# Patient Record
Sex: FEMALE | Race: WHITE | NOT HISPANIC OR LATINO | Employment: FULL TIME | URBAN - METROPOLITAN AREA
[De-identification: names, ages, dates, MRNs, and addresses within clinical notes are randomized per-mention and may not be internally consistent; named-entity substitution may affect disease eponyms.]

---

## 2017-01-05 RX ORDER — OMEPRAZOLE 40 MG/1
CAPSULE, DELAYED RELEASE ORAL
Qty: 90 CAPSULE | Refills: 1 | Status: SHIPPED | OUTPATIENT
Start: 2017-01-05 | End: 2017-09-27 | Stop reason: SDUPTHER

## 2017-02-13 RX ORDER — ALPRAZOLAM 0.5 MG/1
TABLET ORAL
Qty: 60 TABLET | Refills: 4 | OUTPATIENT
Start: 2017-02-13 | End: 2017-03-10

## 2017-03-10 ENCOUNTER — OFFICE VISIT (OUTPATIENT)
Dept: INTERNAL MEDICINE | Facility: CLINIC | Age: 52
End: 2017-03-10

## 2017-03-10 VITALS
WEIGHT: 129 LBS | OXYGEN SATURATION: 98 % | SYSTOLIC BLOOD PRESSURE: 128 MMHG | BODY MASS INDEX: 25.32 KG/M2 | DIASTOLIC BLOOD PRESSURE: 80 MMHG | HEIGHT: 60 IN | HEART RATE: 68 BPM

## 2017-03-10 DIAGNOSIS — N32.81 OAB (OVERACTIVE BLADDER): Primary | ICD-10-CM

## 2017-03-10 DIAGNOSIS — R15.9 FECAL INCONTINENCE: ICD-10-CM

## 2017-03-10 PROCEDURE — 99213 OFFICE O/P EST LOW 20 MIN: CPT | Performed by: NURSE PRACTITIONER

## 2017-03-10 NOTE — PROGRESS NOTES
"Subjective  Med Refill (overactive bladder )      Alcira Phelan is a 51 y.o. female.   Allergies   Allergen Reactions   • Penicillins      History of Present Illness      Pt urinating frequently and can't hold it when she has to go, was on enablex in the past but couldn't tell if working , would like to try something else , has worsened over past 6 mos but started in 2007  Over past few months has noticed she can not always make it to the bathroom when having bm, not diarrhea   The following portions of the patient's history were reviewed and updated as appropriate: allergies, current medications, past family history, past medical history, past social history, past surgical history and problem list.    Review of Systems   Gastrointestinal:        Fecal incontinence   Genitourinary:        OAB   All other systems reviewed and are negative.      Objective   Physical Exam   Constitutional: She is oriented to person, place, and time. She appears well-developed.   HENT:   Head: Normocephalic.   Eyes: Conjunctivae are normal.   Pulmonary/Chest: Effort normal and breath sounds normal.   Neurological: She is alert and oriented to person, place, and time.   Skin: Skin is warm and dry.   Psychiatric: She has a normal mood and affect. Her behavior is normal. Judgment and thought content normal.   Nursing note and vitals reviewed.    Visit Vitals   • /80   • Pulse 68   • Ht 60\" (152.4 cm)   • Wt 129 lb (58.5 kg)   • SpO2 98%   • BMI 25.19 kg/m2       Assessment/Plan     Problem List Items Addressed This Visit        Musculoskeletal and Integument    OAB (overactive bladder) - Primary     Myrbetriq 25 mg and 50 mg samples to try to see if this helps            Other Visit Diagnoses     Fecal incontinence        Relevant Orders    Ambulatory Referral For Screening Colonoscopy        She will let me know if Mybetriq works       "

## 2017-03-30 ENCOUNTER — TELEPHONE (OUTPATIENT)
Dept: INTERNAL MEDICINE | Facility: CLINIC | Age: 52
End: 2017-03-30

## 2017-03-30 DIAGNOSIS — N32.81 OAB (OVERACTIVE BLADDER): Primary | ICD-10-CM

## 2017-03-30 NOTE — TELEPHONE ENCOUNTER
----- Message from Ivory Sr MA sent at 3/30/2017  2:05 PM EDT -----  Contact: PATIENT  Would you be able to fill this? Please advise.   ----- Message -----     From: Adelaide Barkley     Sent: 3/30/2017   2:00 PM       To: Ivory Sr MA    PATIENT IS CALLING NEEDING A SCRIPT FOR MYRBETRIQ 50 MG MEDICATION. CRISS GAVE HER SAMPLES TO TRY AND SHE IS NOW NEEDING A SCRIPT. [facility]. PATIENTS PHONE [phone]

## 2017-07-03 DIAGNOSIS — N32.81 OAB (OVERACTIVE BLADDER): ICD-10-CM

## 2017-07-03 RX ORDER — MIRABEGRON 50 MG/1
TABLET, FILM COATED, EXTENDED RELEASE ORAL
Qty: 30 TABLET | Refills: 2 | Status: SHIPPED | OUTPATIENT
Start: 2017-07-03 | End: 2018-04-18 | Stop reason: SDUPTHER

## 2017-08-19 DIAGNOSIS — K02.9 TOOTH CARIES: ICD-10-CM

## 2017-08-21 RX ORDER — DOXYCYCLINE HYCLATE 100 MG/1
CAPSULE ORAL
Qty: 20 CAPSULE | Refills: 0 | OUTPATIENT
Start: 2017-08-21

## 2017-08-25 RX ORDER — ALPRAZOLAM 0.5 MG/1
0.5 TABLET ORAL 2 TIMES DAILY PRN
Qty: 60 TABLET | Refills: 0 | OUTPATIENT
Start: 2017-08-25 | End: 2017-10-20 | Stop reason: SDUPTHER

## 2017-09-27 RX ORDER — OMEPRAZOLE 40 MG/1
CAPSULE, DELAYED RELEASE ORAL
Qty: 30 CAPSULE | Refills: 0 | Status: SHIPPED | OUTPATIENT
Start: 2017-09-27 | End: 2017-11-05 | Stop reason: SDUPTHER

## 2017-10-20 RX ORDER — ALPRAZOLAM 0.5 MG/1
0.5 TABLET ORAL 2 TIMES DAILY PRN
Qty: 60 TABLET | Refills: 0 | OUTPATIENT
Start: 2017-10-20 | End: 2017-12-20 | Stop reason: SDUPTHER

## 2017-10-26 ENCOUNTER — OFFICE VISIT (OUTPATIENT)
Dept: ONCOLOGY | Facility: CLINIC | Age: 52
End: 2017-10-26

## 2017-10-26 VITALS
RESPIRATION RATE: 16 BRPM | SYSTOLIC BLOOD PRESSURE: 138 MMHG | WEIGHT: 144 LBS | HEIGHT: 60 IN | DIASTOLIC BLOOD PRESSURE: 84 MMHG | BODY MASS INDEX: 28.27 KG/M2 | HEART RATE: 81 BPM | TEMPERATURE: 97.2 F

## 2017-10-26 DIAGNOSIS — C50.112 MALIGNANT NEOPLASM OF CENTRAL PORTION OF LEFT FEMALE BREAST, UNSPECIFIED ESTROGEN RECEPTOR STATUS (HCC): Primary | ICD-10-CM

## 2017-10-26 PROCEDURE — 99213 OFFICE O/P EST LOW 20 MIN: CPT | Performed by: INTERNAL MEDICINE

## 2017-10-26 NOTE — PROGRESS NOTES
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  Status post bilateral mastectomies and subsequent adjuvant therapy PROBLEM LIST   Patient Active Problem List   Diagnosis   • Malignant neoplasm of left female breast   • Depression   • Arthralgia of hand   • Tooth caries   • OAB (overactive bladder)       HISTORY OF PRESENT ILLNESS:   Yearly follow-up.  She is over 10 years out and she's doing well.  Working regularly.  No new neurologic bony pulmonary GI or  symptoms    Past Medical History, Past Surgical History, Social History, Family History have been reviewed and are without significant changes except as mentioned.      Medications:      Current Outpatient Prescriptions:   •  ALPRAZolam (XANAX) 0.5 MG tablet, Take 1 tablet by mouth 2 (Two) Times a Day As Needed for Anxiety., Disp: 60 tablet, Rfl: 0  •  Multiple Vitamins-Minerals (MULTI VITAMIN/MINERALS) tablet, Take  by mouth., Disp: , Rfl:   •  MYRBETRIQ 50 MG tablet sustained-release 24 hour 24 hr tablet, TAKE ONE TABLET BY MOUTH DAILY, Disp: 30 tablet, Rfl: 2  •  omeprazole (priLOSEC) 40 MG capsule, TAKE ONE CAPSULE BY MOUTH DAILY, Disp: 30 capsule, Rfl: 0  •  venlafaxine XR (EFFEXOR-XR) 150 MG 24 hr capsule, Take 1 capsule by mouth Daily., Disp: 30 capsule, Rfl: 2    ALLERGIES:    Allergies   Allergen Reactions   • Penicillins        ROS:  Review of Systems   Constitutional: Negative for activity change and appetite change.   HENT: Negative for mouth sores, sinus pressure and voice change.    Eyes: Negative for visual disturbance.   Respiratory: Negative for shortness of breath.    Cardiovascular: Negative for chest pain.   Gastrointestinal: Negative for abdominal pain and vomiting.   Genitourinary: Negative for dysuria.   Musculoskeletal: Negative for arthralgias and myalgias.   Skin: Negative for color change.   Neurological: Negative for dizziness, syncope and  "headaches.   Hematological: Negative for adenopathy.   Psychiatric/Behavioral: Negative for confusion, sleep disturbance and suicidal ideas. The patient is not nervous/anxious.            Objective:    /84 Comment: RUE  Pulse 81  Temp 97.2 °F (36.2 °C) (Temporal Artery )   Resp 16  Ht 60\" (152.4 cm)  Wt 144 lb (65.3 kg)  BMI 28.12 kg/m2    Physical Exam   Constitutional: She is oriented to person, place, and time. She appears well-developed and well-nourished. No distress.   Appears healthy youthful and vigorous.   HENT:   Head: Normocephalic.   Mouth/Throat: Oropharynx is clear and moist.   Eyes: Conjunctivae and EOM are normal. No scleral icterus.   Neck: Normal range of motion. Neck supple. No thyromegaly present.   Cardiovascular: Normal rate, regular rhythm and normal heart sounds.    No murmur heard.  Pulmonary/Chest: Effort normal and breath sounds normal. She has no wheezes. She has no rales.   Bilateral breast implants in place with excellent symmetry and cosmesis.  No worrisome mass present   Abdominal: Soft. Bowel sounds are normal. She exhibits no distension and no mass. There is no tenderness.   Musculoskeletal: She exhibits no edema or tenderness.   Lymphadenopathy:     She has no cervical adenopathy.   Neurological: She is alert and oriented to person, place, and time. She displays normal reflexes. No cranial nerve deficit.   Skin: Skin is warm and dry. No rash noted.   Psychiatric: She has a normal mood and affect. Judgment normal.   Vitals reviewed.             RECENT LABS:  Hematology WBC   Date Value Ref Range Status   09/06/2016 5.20 3.50 - 10.80 10*3/mm3 Final     Hemoglobin   Date Value Ref Range Status   09/06/2016 15.0 11.5 - 15.5 g/dL Final     Hematocrit   Date Value Ref Range Status   09/06/2016 44.0 34.5 - 44.0 % Final     MCV   Date Value Ref Range Status   09/06/2016 87.3 80.0 - 99.0 fL Final     RDW   Date Value Ref Range Status   09/06/2016 12.1 11.3 - 14.5 % Final     MPV "   Date Value Ref Range Status   09/06/2016 7.4 6.0 - 12.0 fL Final     Platelets   Date Value Ref Range Status   09/06/2016 212 150 - 450 10*3/mm3 Final     Neutrophils, Absolute   Date Value Ref Range Status   09/06/2016 3.30 1.50 - 8.30 10*3/mm3 Final     Lymphocytes, Absolute   Date Value Ref Range Status   09/06/2016 1.60 0.60 - 4.80 10*3/mm3 Final     Monocytes, Absolute   Date Value Ref Range Status   09/06/2016 0.30 0.00 - 1.00 10*3/mm3 Final     Eosinophils Absolute   Date Value Ref Range Status   12/14/2015 0.26 0.10 - 0.30 K/mcL Final     Basophils Absolute   Date Value Ref Range Status   12/14/2015 0.02 0.00 - 0.20 K/mcL Final       Glucose   Date Value Ref Range Status   09/06/2016 82 70 - 100 mg/dL Final     Sodium   Date Value Ref Range Status   09/06/2016 142 132 - 146 mmol/L Final     Potassium   Date Value Ref Range Status   09/06/2016 4.4 3.5 - 5.5 mmol/L Final     CO2   Date Value Ref Range Status   09/06/2016 32.0 (H) 20.0 - 31.0 mmol/L Final     Chloride   Date Value Ref Range Status   09/06/2016 105 99 - 109 mmol/L Final     Anion Gap   Date Value Ref Range Status   09/06/2016 5.0 3.0 - 11.0 mmol/L Final     Creatinine   Date Value Ref Range Status   09/06/2016 0.70 0.60 - 1.30 mg/dL Final     BUN   Date Value Ref Range Status   09/06/2016 16 9 - 23 mg/dL Final     BUN/Creatinine Ratio   Date Value Ref Range Status   09/06/2016 22.9 7.0 - 25.0 Final     Calcium   Date Value Ref Range Status   09/06/2016 9.6 8.7 - 10.4 mg/dL Final     eGFR Non  Amer   Date Value Ref Range Status   09/06/2016 88 >60 mL/min/1.73 Final     Alkaline Phosphatase   Date Value Ref Range Status   09/06/2016 116 (H) 25 - 100 U/L Final     Total Protein   Date Value Ref Range Status   09/06/2016 7.3 5.7 - 8.2 g/dL Final     ALT (SGPT)   Date Value Ref Range Status   09/06/2016 21 7 - 40 U/L Final     AST (SGOT)   Date Value Ref Range Status   09/06/2016 22 0 - 33 U/L Final     Total Bilirubin   Date Value Ref Range  Status   09/06/2016 0.2 (L) 0.3 - 1.2 mg/dL Final     Albumin   Date Value Ref Range Status   09/06/2016 4.40 3.20 - 4.80 g/dL Final     Globulin   Date Value Ref Range Status   09/06/2016 2.9 gm/dL Final     A/G Ratio   Date Value Ref Range Status   09/06/2016 1.5 g/dL Final          Perf Status: 0    Assessment/Plan    Diagnoses and all orders for this visit:    Malignant neoplasm of central portion of left female breast, unspecified estrogen receptor status      Patient is doing splendidly.  She has no evidence of recurrence.  I'll see her back in a year.      Tristian De La Torre MD , 10/26/2017    CC:

## 2017-11-05 RX ORDER — OMEPRAZOLE 40 MG/1
CAPSULE, DELAYED RELEASE ORAL
Qty: 30 CAPSULE | Refills: 5 | Status: SHIPPED | OUTPATIENT
Start: 2017-11-05 | End: 2018-05-22 | Stop reason: SDUPTHER

## 2017-12-20 RX ORDER — ALPRAZOLAM 0.5 MG/1
TABLET ORAL
Qty: 60 TABLET | Refills: 3 | OUTPATIENT
Start: 2017-12-20 | End: 2018-01-25

## 2017-12-26 NOTE — TELEPHONE ENCOUNTER
REFILL REQUEST RECEIVED FROM [facility] FOR VENLAFAXINE. LOV 3/10/17.  LAST LABS 10/26/17. NO F/U SCHEDULED. OK TO REFILL?

## 2017-12-27 RX ORDER — VENLAFAXINE HYDROCHLORIDE 150 MG/1
150 CAPSULE, EXTENDED RELEASE ORAL DAILY
Qty: 30 CAPSULE | Refills: 3 | Status: SHIPPED | OUTPATIENT
Start: 2017-12-27 | End: 2018-05-22 | Stop reason: SDUPTHER

## 2018-01-25 ENCOUNTER — OFFICE VISIT (OUTPATIENT)
Dept: INTERNAL MEDICINE | Facility: CLINIC | Age: 53
End: 2018-01-25

## 2018-01-25 VITALS
HEART RATE: 105 BPM | OXYGEN SATURATION: 100 % | DIASTOLIC BLOOD PRESSURE: 82 MMHG | BODY MASS INDEX: 27.68 KG/M2 | WEIGHT: 141 LBS | HEIGHT: 60 IN | SYSTOLIC BLOOD PRESSURE: 126 MMHG | TEMPERATURE: 98.2 F

## 2018-01-25 DIAGNOSIS — R68.89 FLU-LIKE SYMPTOMS: ICD-10-CM

## 2018-01-25 DIAGNOSIS — A08.4 VIRAL GASTROENTERITIS: Primary | ICD-10-CM

## 2018-01-25 LAB
EXPIRATION DATE: NORMAL
FLUAV AG NPH QL: NEGATIVE
FLUBV AG NPH QL: NEGATIVE
INTERNAL CONTROL: NORMAL
Lab: NORMAL

## 2018-01-25 PROCEDURE — 87804 INFLUENZA ASSAY W/OPTIC: CPT | Performed by: NURSE PRACTITIONER

## 2018-01-25 PROCEDURE — 99213 OFFICE O/P EST LOW 20 MIN: CPT | Performed by: NURSE PRACTITIONER

## 2018-01-25 RX ORDER — RANITIDINE 150 MG/1
150 CAPSULE ORAL EVERY EVENING
Qty: 30 CAPSULE | Refills: 1 | Status: SHIPPED | OUTPATIENT
Start: 2018-01-25 | End: 2018-10-17

## 2018-01-25 RX ORDER — ONDANSETRON 4 MG/1
4 TABLET, ORALLY DISINTEGRATING ORAL EVERY 8 HOURS PRN
Qty: 20 TABLET | Refills: 0 | Status: SHIPPED | OUTPATIENT
Start: 2018-01-25 | End: 2018-10-17

## 2018-02-05 ENCOUNTER — TELEPHONE (OUTPATIENT)
Dept: INTERNAL MEDICINE | Facility: CLINIC | Age: 53
End: 2018-02-05

## 2018-02-05 NOTE — TELEPHONE ENCOUNTER
PATIENT IS NEEDING TO BE ADVISED ABOUT RECENT ILLNESS AND WATCHING HER GRANDDAUGHTER WHO IS NOW ILL. SHE WAS SEEN AT THIS OFFICE BY MATTIE 10 DAYS AGO AND REPORTS SHE WAS DX WITH 'STOMACH FLU'. SHE STATES THAT BECAUSE OF CANCER 5 YEARS AGO, SHE HAS A COMPROMISED IMMUNE SYSTEM AND WANTS TO KNOW IF SHE SHOULD BABYSIT HER GRANDDAUGHTER.     [phone]

## 2018-02-05 NOTE — TELEPHONE ENCOUNTER
She should limit her exposure to anyone that is ill due to her decreased immune system.  I cannot tell her to hire a , but if she is worried then she should be very cautious.

## 2018-03-26 ENCOUNTER — OFFICE VISIT (OUTPATIENT)
Dept: INTERNAL MEDICINE | Facility: CLINIC | Age: 53
End: 2018-03-26

## 2018-03-26 VITALS
BODY MASS INDEX: 28.47 KG/M2 | HEART RATE: 88 BPM | DIASTOLIC BLOOD PRESSURE: 70 MMHG | WEIGHT: 145.8 LBS | OXYGEN SATURATION: 99 % | SYSTOLIC BLOOD PRESSURE: 122 MMHG

## 2018-03-26 DIAGNOSIS — Z91.018 FOOD ALLERGY: Primary | ICD-10-CM

## 2018-03-26 DIAGNOSIS — E04.9 ENLARGED THYROID: ICD-10-CM

## 2018-03-26 LAB — TSH SERPL DL<=0.05 MIU/L-ACNC: 1.6 MIU/ML (ref 0.35–5.35)

## 2018-03-26 PROCEDURE — 84443 ASSAY THYROID STIM HORMONE: CPT | Performed by: NURSE PRACTITIONER

## 2018-03-26 PROCEDURE — 99213 OFFICE O/P EST LOW 20 MIN: CPT | Performed by: NURSE PRACTITIONER

## 2018-03-26 NOTE — PROGRESS NOTES
Subjective  Follow-up (Overactive Bladder)      Alcira Phelan is a 53 y.o. female.   Allergies   Allergen Reactions   • Penicillins      History of Present Illness      Went to dentist last week was told thyroid was in enlarged , no sx   Has had allergy test in the past and was told allergic to eat fish, wants to be retested     Did have full panel labs recently at her work and reports a normal  Work up, will bring those in at next visit for scanning    The following portions of the patient's history were reviewed and updated as appropriate: allergies, past medical history, past surgical history and problem list.    Review of Systems   Genitourinary: Negative for dysuria and genital sores.   All other systems reviewed and are negative.      Objective   Physical Exam   Constitutional: She is oriented to person, place, and time. She appears well-developed and well-nourished.   HENT:   Head: Normocephalic and atraumatic.   Mouth/Throat: Oropharynx is clear and moist.   Eyes: Conjunctivae are normal.   Neck: Thyromegaly (slight enlargement right side ) present.   Cardiovascular: Normal rate and regular rhythm.    Pulmonary/Chest: Effort normal and breath sounds normal.   Lymphadenopathy:     She has no cervical adenopathy.   Neurological: She is alert and oriented to person, place, and time.   Skin: Skin is warm and dry.   Psychiatric: She has a normal mood and affect. Her behavior is normal. Judgment and thought content normal.   Nursing note and vitals reviewed.    /70   Pulse 88   Wt 66.1 kg (145 lb 12.8 oz)   SpO2 99%   Breastfeeding? No   BMI 28.47 kg/m²     Assessment/Plan     Problem List Items Addressed This Visit     None      Visit Diagnoses     Food allergy    -  Primary    Relevant Orders    Ambulatory Referral to Allergy    Enlarged thyroid        Relevant Orders    TSH    US Thyroid          Will call pt with results and poc

## 2018-03-27 ENCOUNTER — TELEPHONE (OUTPATIENT)
Dept: INTERNAL MEDICINE | Facility: CLINIC | Age: 53
End: 2018-03-27

## 2018-04-18 DIAGNOSIS — N32.81 OAB (OVERACTIVE BLADDER): ICD-10-CM

## 2018-04-18 RX ORDER — MIRABEGRON 50 MG/1
TABLET, FILM COATED, EXTENDED RELEASE ORAL
Qty: 30 TABLET | Refills: 2 | Status: SHIPPED | OUTPATIENT
Start: 2018-04-18 | End: 2018-07-22 | Stop reason: SDUPTHER

## 2018-05-09 ENCOUNTER — TELEPHONE (OUTPATIENT)
Dept: INTERNAL MEDICINE | Facility: CLINIC | Age: 53
End: 2018-05-09

## 2018-05-09 NOTE — TELEPHONE ENCOUNTER
PT CALLED NEEDING A REFILL FOR MEDICATION: XANAX. MEDICATION IS NOT IN PATIENTS CHART. PT HAS HAD ANXIETY ISSUES LATELY AND SHE FEELS THAT SHE NEEDS THE MEDICATION NOW. PT SAID THAT CRISS WROTE HER A SCRIPT FOR XANAX AT HER LAST VISIT BUT THE PATIENT DIDN'T FILL SHE NEEDED THE MEDICATION SO SHE DID NOT FILL THE SCRIPT. PLEASE ADVISE AND GIVE PT A CALL. THANKS.

## 2018-05-10 NOTE — TELEPHONE ENCOUNTER
Called pt, LVM explaining appt details and to stop in before appt on Monday for labs/uds. Gave office #.

## 2018-05-10 NOTE — TELEPHONE ENCOUNTER
Patient stated that she can come Monday for lab (UDS) and can only be seen at 9:30am Tuesday or Wednesday. Jia [facility]DA[facility] does not have any open slots on Tuesday. Okay to schedule for Wednesday?  Patient works 3rd shift and sleeps during the morning/afternoon hours and stated it was okay to LVM with appt time and date.

## 2018-05-10 NOTE — TELEPHONE ENCOUNTER
LVM for Pt to return call. Office number given.   Pt needs to come in for lab today and schedule apt for Tuesday per Jia.

## 2018-05-16 ENCOUNTER — OFFICE VISIT (OUTPATIENT)
Dept: INTERNAL MEDICINE | Facility: CLINIC | Age: 53
End: 2018-05-16

## 2018-05-16 VITALS
DIASTOLIC BLOOD PRESSURE: 70 MMHG | SYSTOLIC BLOOD PRESSURE: 124 MMHG | OXYGEN SATURATION: 99 % | BODY MASS INDEX: 28.01 KG/M2 | HEART RATE: 80 BPM | WEIGHT: 143.4 LBS

## 2018-05-16 DIAGNOSIS — F41.9 ANXIETY: ICD-10-CM

## 2018-05-16 DIAGNOSIS — F33.1 MODERATE EPISODE OF RECURRENT MAJOR DEPRESSIVE DISORDER (HCC): Primary | ICD-10-CM

## 2018-05-16 PROCEDURE — 99213 OFFICE O/P EST LOW 20 MIN: CPT | Performed by: NURSE PRACTITIONER

## 2018-05-16 NOTE — PROGRESS NOTES
Subjective  Anxiety, medication concerns (Follow Up)      Alcira Phelan is a 53 y.o. female.   Allergies   Allergen Reactions   • Penicillins      History of Present Illness      Had stopped xanax but now having severe anxiety, does not want to try vistaril, takes effexor with relief  The following portions of the patient's history were reviewed and updated as appropriate: allergies, current medications, past medical history and problem list.    Review of Systems   Psychiatric/Behavioral: Positive for dysphoric mood. The patient is nervous/anxious.    All other systems reviewed and are negative.      Objective   Physical Exam   Constitutional: She appears well-developed and well-nourished.   HENT:   Head: Normocephalic and atraumatic.   Eyes: Conjunctivae are normal.   Neck: Neck supple. No thyromegaly present.   Cardiovascular: Normal rate and regular rhythm.    Pulmonary/Chest: Effort normal and breath sounds normal.   Lymphadenopathy:     She has no cervical adenopathy.   Skin: Skin is warm and dry.   Psychiatric: She has a normal mood and affect. Her behavior is normal. Judgment and thought content normal.   Nursing note and vitals reviewed.    /70   Pulse 80   Wt 65 kg (143 lb 6.4 oz)   SpO2 99%   Breastfeeding? No   BMI 28.01 kg/m²     Assessment/Plan     Problem List Items Addressed This Visit        Other    Depression - Primary      Other Visit Diagnoses     Anxiety            I have told pt that we do not make a habit of benzos but I think vistaril would be a good choice for her, she declines states she does not want to try anything else she will try to stay away from the stressor, which is her brother

## 2018-05-22 RX ORDER — VENLAFAXINE HYDROCHLORIDE 150 MG/1
CAPSULE, EXTENDED RELEASE ORAL
Qty: 30 CAPSULE | Refills: 2 | Status: SHIPPED | OUTPATIENT
Start: 2018-05-22 | End: 2018-10-17 | Stop reason: SDUPTHER

## 2018-05-22 RX ORDER — OMEPRAZOLE 40 MG/1
CAPSULE, DELAYED RELEASE ORAL
Qty: 30 CAPSULE | Refills: 4 | Status: SHIPPED | OUTPATIENT
Start: 2018-05-22 | End: 2018-10-17 | Stop reason: DRUGHIGH

## 2018-07-22 DIAGNOSIS — N32.81 OAB (OVERACTIVE BLADDER): ICD-10-CM

## 2018-07-22 RX ORDER — MIRABEGRON 50 MG/1
TABLET, FILM COATED, EXTENDED RELEASE ORAL
Qty: 30 TABLET | Refills: 1 | Status: SHIPPED | OUTPATIENT
Start: 2018-07-22 | End: 2018-10-17 | Stop reason: SDUPTHER

## 2018-08-20 RX ORDER — VENLAFAXINE HYDROCHLORIDE 150 MG/1
CAPSULE, EXTENDED RELEASE ORAL
Qty: 30 CAPSULE | Refills: 1 | OUTPATIENT
Start: 2018-08-20

## 2018-09-23 DIAGNOSIS — N32.81 OAB (OVERACTIVE BLADDER): ICD-10-CM

## 2018-09-24 RX ORDER — MIRABEGRON 50 MG/1
TABLET, FILM COATED, EXTENDED RELEASE ORAL
Qty: 30 TABLET | Refills: 0 | OUTPATIENT
Start: 2018-09-24

## 2018-10-17 ENCOUNTER — OFFICE VISIT (OUTPATIENT)
Dept: INTERNAL MEDICINE | Facility: CLINIC | Age: 53
End: 2018-10-17

## 2018-10-17 VITALS
BODY MASS INDEX: 28.07 KG/M2 | WEIGHT: 143 LBS | HEART RATE: 66 BPM | SYSTOLIC BLOOD PRESSURE: 120 MMHG | HEIGHT: 60 IN | DIASTOLIC BLOOD PRESSURE: 76 MMHG | OXYGEN SATURATION: 99 %

## 2018-10-17 DIAGNOSIS — F32.9 MAJOR DEPRESSIVE DISORDER, REMISSION STATUS UNSPECIFIED, UNSPECIFIED WHETHER RECURRENT: ICD-10-CM

## 2018-10-17 DIAGNOSIS — K21.9 GASTROESOPHAGEAL REFLUX DISEASE, ESOPHAGITIS PRESENCE NOT SPECIFIED: Primary | ICD-10-CM

## 2018-10-17 DIAGNOSIS — N32.81 OAB (OVERACTIVE BLADDER): ICD-10-CM

## 2018-10-17 LAB
ALBUMIN SERPL-MCNC: 4.42 G/DL (ref 3.2–4.8)
ALBUMIN/GLOB SERPL: 1.9 G/DL (ref 1.5–2.5)
ALP SERPL-CCNC: 109 U/L (ref 25–100)
ALT SERPL W P-5'-P-CCNC: 20 U/L (ref 7–40)
ANION GAP SERPL CALCULATED.3IONS-SCNC: 8 MMOL/L (ref 3–11)
AST SERPL-CCNC: 23 U/L (ref 0–33)
BASOPHILS # BLD AUTO: 0.04 10*3/MM3 (ref 0–0.2)
BASOPHILS NFR BLD AUTO: 0.5 % (ref 0–1)
BILIRUB SERPL-MCNC: 0.2 MG/DL (ref 0.3–1.2)
BUN BLD-MCNC: 21 MG/DL (ref 9–23)
BUN/CREAT SERPL: 25.6 (ref 7–25)
CALCIUM SPEC-SCNC: 9.3 MG/DL (ref 8.7–10.4)
CHLORIDE SERPL-SCNC: 104 MMOL/L (ref 99–109)
CO2 SERPL-SCNC: 27 MMOL/L (ref 20–31)
CREAT BLD-MCNC: 0.82 MG/DL (ref 0.6–1.3)
DEPRECATED RDW RBC AUTO: 37.9 FL (ref 37–54)
EOSINOPHIL # BLD AUTO: 0.25 10*3/MM3 (ref 0–0.3)
EOSINOPHIL NFR BLD AUTO: 3.1 % (ref 0–3)
ERYTHROCYTE [DISTWIDTH] IN BLOOD BY AUTOMATED COUNT: 12.4 % (ref 11.3–14.5)
GFR SERPL CREATININE-BSD FRML MDRD: 72 ML/MIN/1.73
GLOBULIN UR ELPH-MCNC: 2.4 GM/DL
GLUCOSE BLD-MCNC: 83 MG/DL (ref 70–100)
HCT VFR BLD AUTO: 40.8 % (ref 34.5–44)
HGB BLD-MCNC: 14 G/DL (ref 11.5–15.5)
IMM GRANULOCYTES # BLD: 0.01 10*3/MM3 (ref 0–0.03)
IMM GRANULOCYTES NFR BLD: 0.1 % (ref 0–0.6)
LYMPHOCYTES # BLD AUTO: 2.82 10*3/MM3 (ref 0.6–4.8)
LYMPHOCYTES NFR BLD AUTO: 34.8 % (ref 24–44)
MCH RBC QN AUTO: 29 PG (ref 27–31)
MCHC RBC AUTO-ENTMCNC: 34.3 G/DL (ref 32–36)
MCV RBC AUTO: 84.6 FL (ref 80–99)
MONOCYTES # BLD AUTO: 0.52 10*3/MM3 (ref 0–1)
MONOCYTES NFR BLD AUTO: 6.4 % (ref 0–12)
NEUTROPHILS # BLD AUTO: 4.48 10*3/MM3 (ref 1.5–8.3)
NEUTROPHILS NFR BLD AUTO: 55.2 % (ref 41–71)
PLATELET # BLD AUTO: 221 10*3/MM3 (ref 150–450)
PMV BLD AUTO: 9.8 FL (ref 6–12)
POTASSIUM BLD-SCNC: 4 MMOL/L (ref 3.5–5.5)
PROT SERPL-MCNC: 6.8 G/DL (ref 5.7–8.2)
RBC # BLD AUTO: 4.82 10*6/MM3 (ref 3.89–5.14)
SODIUM BLD-SCNC: 139 MMOL/L (ref 132–146)
WBC NRBC COR # BLD: 8.11 10*3/MM3 (ref 3.5–10.8)

## 2018-10-17 PROCEDURE — 80053 COMPREHEN METABOLIC PANEL: CPT | Performed by: NURSE PRACTITIONER

## 2018-10-17 PROCEDURE — 99213 OFFICE O/P EST LOW 20 MIN: CPT | Performed by: NURSE PRACTITIONER

## 2018-10-17 PROCEDURE — 85025 COMPLETE CBC W/AUTO DIFF WBC: CPT | Performed by: NURSE PRACTITIONER

## 2018-10-17 RX ORDER — VENLAFAXINE HYDROCHLORIDE 150 MG/1
150 CAPSULE, EXTENDED RELEASE ORAL DAILY
Qty: 30 CAPSULE | Refills: 2 | Status: SHIPPED | OUTPATIENT
Start: 2018-10-17 | End: 2019-02-05 | Stop reason: SDUPTHER

## 2018-10-17 RX ORDER — OMEPRAZOLE 20 MG/1
20 CAPSULE, DELAYED RELEASE ORAL DAILY
Qty: 30 CAPSULE | Refills: 2 | Status: SHIPPED | OUTPATIENT
Start: 2018-10-17 | End: 2019-01-30 | Stop reason: SDUPTHER

## 2018-10-17 NOTE — PROGRESS NOTES
"Ashli Phelan is a 53 y.o. female.   Chief Complaint   Patient presents with   • OAB   • Depression   • Med Refill     follow up      History of Present Illness as above.  Patient takes Myrbetriq for her bladder.  She says it definitely controls her symptoms.  No blood in urine.  She is on Effexor for depression with good relief.  No thoughts of self-harm.  She is on Prilosec for GERD.  Denies abdominal pain, nausea vomiting or diarrhea, blood in stool.  Reports she feels well.  Patient denies fever chills, headache, ear pain, sore throat, shortness of air, cough, chest pain.    Eating and drinking as usual.  No unexplained weight loss or gain.        The following portions of the patient's history were reviewed and updated as appropriate: allergies, current medications, past family history, past medical history, past social history, past surgical history and problem list.    Current Outpatient Prescriptions:   •  Mirabegron ER (MYRBETRIQ) 50 MG tablet sustained-release 24 hour 24 hr tablet, Take 50 mg by mouth Daily., Disp: 30 tablet, Rfl: 2  •  Multiple Vitamins-Minerals (MULTI VITAMIN/MINERALS) tablet, Take  by mouth., Disp: , Rfl:   •  venlafaxine XR (EFFEXOR-XR) 150 MG 24 hr capsule, Take 1 capsule by mouth Daily., Disp: 30 capsule, Rfl: 2  •  omeprazole (PRILOSEC) 20 MG capsule, Take 1 capsule by mouth Daily., Disp: 30 capsule, Rfl: 2    Review of Systems Consitutional, HEENT, Respiratory, CV, GI, , Skin, Musculoskeletal, Neuro-mental, Endocrinological, Hematological were reviewed.  Positives were discussed in the HPI, otherwise ROS was negative       /76   Pulse 66   Ht 152.4 cm (60\")   Wt 64.9 kg (143 lb)   SpO2 99%   BMI 27.93 kg/m²     Objective   Allergies   Allergen Reactions   • Penicillins        Physical Exam   Constitutional: She is oriented to person, place, and time. She appears well-developed and well-nourished. No distress.   HENT:   Head: Normocephalic.   Right Ear: " External ear normal.   Left Ear: External ear normal.   Mouth/Throat: Oropharynx is clear and moist.   Eyes: Right eye exhibits no discharge. Left eye exhibits no discharge. No scleral icterus.   Neck: Neck supple.   Cardiovascular: Normal rate, regular rhythm, normal heart sounds and intact distal pulses.  Exam reveals no gallop and no friction rub.    No murmur heard.  Pulmonary/Chest: Effort normal and breath sounds normal. No respiratory distress.   Abdominal: Soft. Bowel sounds are normal. There is no tenderness.   Lymphadenopathy:     She has no cervical adenopathy.   Neurological: She is alert and oriented to person, place, and time.   Skin: Skin is warm and dry. Capillary refill takes less than 2 seconds.   Is pink, no rash    Psychiatric: She has a normal mood and affect. Her behavior is normal. Judgment and thought content normal.   Nursing note and vitals reviewed.      Procedures    LABS  Results for orders placed or performed in visit on 10/17/18   Comprehensive Metabolic Panel   Result Value Ref Range    Glucose 83 70 - 100 mg/dL    BUN 21 9 - 23 mg/dL    Creatinine 0.82 0.60 - 1.30 mg/dL    Sodium 139 132 - 146 mmol/L    Potassium 4.0 3.5 - 5.5 mmol/L    Chloride 104 99 - 109 mmol/L    CO2 27.0 20.0 - 31.0 mmol/L    Calcium 9.3 8.7 - 10.4 mg/dL    Total Protein 6.8 5.7 - 8.2 g/dL    Albumin 4.42 3.20 - 4.80 g/dL    ALT (SGPT) 20 7 - 40 U/L    AST (SGOT) 23 0 - 33 U/L    Alkaline Phosphatase 109 (H) 25 - 100 U/L    Total Bilirubin 0.2 (L) 0.3 - 1.2 mg/dL    eGFR Non African Amer 72 >60 mL/min/1.73    Globulin 2.4 gm/dL    A/G Ratio 1.9 1.5 - 2.5 g/dL    BUN/Creatinine Ratio 25.6 (H) 7.0 - 25.0    Anion Gap 8.0 3.0 - 11.0 mmol/L   CBC Auto Differential   Result Value Ref Range    WBC 8.11 3.50 - 10.80 10*3/mm3    RBC 4.82 3.89 - 5.14 10*6/mm3    Hemoglobin 14.0 11.5 - 15.5 g/dL    Hematocrit 40.8 34.5 - 44.0 %    MCV 84.6 80.0 - 99.0 fL    MCH 29.0 27.0 - 31.0 pg    MCHC 34.3 32.0 - 36.0 g/dL    RDW  12.4 11.3 - 14.5 %    RDW-SD 37.9 37.0 - 54.0 fl    MPV 9.8 6.0 - 12.0 fL    Platelets 221 150 - 450 10*3/mm3    Neutrophil % 55.2 41.0 - 71.0 %    Lymphocyte % 34.8 24.0 - 44.0 %    Monocyte % 6.4 0.0 - 12.0 %    Eosinophil % 3.1 (H) 0.0 - 3.0 %    Basophil % 0.5 0.0 - 1.0 %    Immature Grans % 0.1 0.0 - 0.6 %    Neutrophils, Absolute 4.48 1.50 - 8.30 10*3/mm3    Lymphocytes, Absolute 2.82 0.60 - 4.80 10*3/mm3    Monocytes, Absolute 0.52 0.00 - 1.00 10*3/mm3    Eosinophils, Absolute 0.25 0.00 - 0.30 10*3/mm3    Basophils, Absolute 0.04 0.00 - 0.20 10*3/mm3    Immature Grans, Absolute 0.01 0.00 - 0.03 10*3/mm3       Assessment/Plan   Alcira was seen today for oab, depression and med refill.    Diagnoses and all orders for this visit:    Gastroesophageal reflux disease, esophagitis presence not specified  -     omeprazole (PRILOSEC) 20 MG capsule; Take 1 capsule by mouth Daily.  -     Comprehensive Metabolic Panel  -     CBC & Differential  -     CBC Auto Differential    OAB (overactive bladder)  -     Mirabegron ER (MYRBETRIQ) 50 MG tablet sustained-release 24 hour 24 hr tablet; Take 50 mg by mouth Daily.    Major depressive disorder, remission status unspecified, unspecified whether recurrent  -     venlafaxine XR (EFFEXOR-XR) 150 MG 24 hr capsule; Take 1 capsule by mouth Daily.  -     Comprehensive Metabolic Panel  -     CBC & Differential  -     CBC Auto Differential        Patient Instructions   She is to have labs done if that was ordered by Susana previously.  Refilled Prilosec Myrbetriq and Effexor.  Follow-up with Sharyn in 3 months sooner if you worsen in anyway.  Get your flu shot either here or at a pharmacy.  Also, recommend your shingles vaccine. Pt verbalizes understanding and agreement with plan of care.       EMR Dragon/transcription disclaimer:  Please note that portions of this note were completed with a voice recognition program.  Electronic transcription of the voice recognition program may permit  erroneous words or phrases to be inadvertently transcribed.  Although I have reviewed the note for such errors, some may still exist in this documentation   JOVAN Salter

## 2018-10-17 NOTE — PATIENT INSTRUCTIONS
She is to have labs done if that was ordered by Susana previously.  Refilled Prilosec Myrbetriq and Effexor.  Follow-up with Sharyn in 3 months sooner if you worsen in anyway.  Get your flu shot either here or at a pharmacy.  Also, recommend your shingles vaccine. Pt verbalizes understanding and agreement with plan of care.

## 2018-10-18 ENCOUNTER — TELEPHONE (OUTPATIENT)
Dept: INTERNAL MEDICINE | Facility: CLINIC | Age: 53
End: 2018-10-18

## 2018-11-02 PROBLEM — Z85.3 HISTORY OF LEFT BREAST CANCER: Status: ACTIVE | Noted: 2018-11-02

## 2018-11-09 RX ORDER — OMEPRAZOLE 40 MG/1
CAPSULE, DELAYED RELEASE ORAL
Qty: 30 CAPSULE | Refills: 3 | OUTPATIENT
Start: 2018-11-09

## 2019-01-30 DIAGNOSIS — K21.9 GASTROESOPHAGEAL REFLUX DISEASE, ESOPHAGITIS PRESENCE NOT SPECIFIED: ICD-10-CM

## 2019-01-30 DIAGNOSIS — N32.81 OAB (OVERACTIVE BLADDER): ICD-10-CM

## 2019-01-30 DIAGNOSIS — F32.9 MAJOR DEPRESSIVE DISORDER, REMISSION STATUS UNSPECIFIED, UNSPECIFIED WHETHER RECURRENT: ICD-10-CM

## 2019-01-30 RX ORDER — OMEPRAZOLE 20 MG/1
20 CAPSULE, DELAYED RELEASE ORAL DAILY
Qty: 30 CAPSULE | Refills: 2 | Status: SHIPPED | OUTPATIENT
Start: 2019-01-30 | End: 2019-05-15 | Stop reason: SDUPTHER

## 2019-01-30 RX ORDER — OMEPRAZOLE 20 MG/1
CAPSULE, DELAYED RELEASE ORAL
Qty: 30 CAPSULE | Refills: 1 | Status: CANCELLED | OUTPATIENT
Start: 2019-01-30

## 2019-01-31 NOTE — TELEPHONE ENCOUNTER
Received refill request via fax from pharmacy for myrbetriq 50, med refilled electronically per protocol.

## 2019-02-05 RX ORDER — VENLAFAXINE HYDROCHLORIDE 150 MG/1
150 CAPSULE, EXTENDED RELEASE ORAL DAILY
Qty: 30 CAPSULE | Refills: 0 | Status: SHIPPED | OUTPATIENT
Start: 2019-02-05 | End: 2019-03-12 | Stop reason: SDUPTHER

## 2019-03-06 DIAGNOSIS — F32.9 MAJOR DEPRESSIVE DISORDER, REMISSION STATUS UNSPECIFIED, UNSPECIFIED WHETHER RECURRENT: ICD-10-CM

## 2019-03-06 RX ORDER — VENLAFAXINE HYDROCHLORIDE 150 MG/1
CAPSULE, EXTENDED RELEASE ORAL
Qty: 30 CAPSULE | Refills: 0 | Status: CANCELLED | OUTPATIENT
Start: 2019-03-06

## 2019-03-12 DIAGNOSIS — F32.9 MAJOR DEPRESSIVE DISORDER, REMISSION STATUS UNSPECIFIED, UNSPECIFIED WHETHER RECURRENT: ICD-10-CM

## 2019-03-12 RX ORDER — VENLAFAXINE HYDROCHLORIDE 150 MG/1
150 CAPSULE, EXTENDED RELEASE ORAL DAILY
Qty: 30 CAPSULE | Refills: 0 | Status: SHIPPED | OUTPATIENT
Start: 2019-03-12 | End: 2019-05-22 | Stop reason: SDUPTHER

## 2019-04-05 DIAGNOSIS — N32.81 OAB (OVERACTIVE BLADDER): ICD-10-CM

## 2019-04-05 RX ORDER — MIRABEGRON 50 MG/1
TABLET, FILM COATED, EXTENDED RELEASE ORAL
Qty: 30 TABLET | Refills: 0 | Status: CANCELLED | OUTPATIENT
Start: 2019-04-05

## 2019-04-21 DIAGNOSIS — F32.9 MAJOR DEPRESSIVE DISORDER, REMISSION STATUS UNSPECIFIED, UNSPECIFIED WHETHER RECURRENT: ICD-10-CM

## 2019-04-23 RX ORDER — VENLAFAXINE HYDROCHLORIDE 150 MG/1
CAPSULE, EXTENDED RELEASE ORAL
Qty: 30 CAPSULE | Refills: 0 | OUTPATIENT
Start: 2019-04-23

## 2019-05-15 DIAGNOSIS — K21.9 GASTROESOPHAGEAL REFLUX DISEASE, ESOPHAGITIS PRESENCE NOT SPECIFIED: ICD-10-CM

## 2019-05-16 RX ORDER — OMEPRAZOLE 20 MG/1
CAPSULE, DELAYED RELEASE ORAL
Qty: 30 CAPSULE | Refills: 0 | Status: SHIPPED | OUTPATIENT
Start: 2019-05-16 | End: 2019-06-15 | Stop reason: SDUPTHER

## 2019-05-16 NOTE — TELEPHONE ENCOUNTER
I have not seen her since 2016. She needs to come in to establish care with me- or maybe she plans to continue seeing Olivia or Umu, either way is fine. Will do one month refill.

## 2019-05-17 ENCOUNTER — OFFICE VISIT (OUTPATIENT)
Dept: INTERNAL MEDICINE | Facility: CLINIC | Age: 54
End: 2019-05-17

## 2019-05-17 VITALS
DIASTOLIC BLOOD PRESSURE: 82 MMHG | HEART RATE: 85 BPM | BODY MASS INDEX: 28.47 KG/M2 | SYSTOLIC BLOOD PRESSURE: 120 MMHG | TEMPERATURE: 98.8 F | WEIGHT: 145 LBS | OXYGEN SATURATION: 95 % | HEIGHT: 60 IN

## 2019-05-17 DIAGNOSIS — L55.1 SUNBURN OF SECOND DEGREE: Primary | ICD-10-CM

## 2019-05-17 DIAGNOSIS — L03.115 CELLULITIS OF RIGHT LOWER EXTREMITY: ICD-10-CM

## 2019-05-17 DIAGNOSIS — L55.2: ICD-10-CM

## 2019-05-17 PROCEDURE — 99213 OFFICE O/P EST LOW 20 MIN: CPT | Performed by: NURSE PRACTITIONER
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
## 2019-05-21 ENCOUNTER — TELEPHONE (OUTPATIENT)
Dept: INTERNAL MEDICINE | Facility: CLINIC | Age: 54
End: 2019-05-21

## 2019-05-21 DIAGNOSIS — F32.9 MAJOR DEPRESSIVE DISORDER, REMISSION STATUS UNSPECIFIED, UNSPECIFIED WHETHER RECURRENT: ICD-10-CM

## 2019-05-21 RX ORDER — VENLAFAXINE HYDROCHLORIDE 150 MG/1
150 CAPSULE, EXTENDED RELEASE ORAL DAILY
Qty: 30 CAPSULE | Refills: 0 | Status: CANCELLED | OUTPATIENT
Start: 2019-05-21

## 2019-05-21 NOTE — TELEPHONE ENCOUNTER
LVM that [facility] paperwork is ready for pickup. Made copy for scan, office number given

## 2019-05-22 DIAGNOSIS — F32.9 MAJOR DEPRESSIVE DISORDER, REMISSION STATUS UNSPECIFIED, UNSPECIFIED WHETHER RECURRENT: ICD-10-CM

## 2019-05-22 RX ORDER — VENLAFAXINE HYDROCHLORIDE 150 MG/1
150 CAPSULE, EXTENDED RELEASE ORAL DAILY
Qty: 30 CAPSULE | Refills: 0 | Status: SHIPPED | OUTPATIENT
Start: 2019-05-22 | End: 2019-06-20 | Stop reason: SDUPTHER

## 2019-05-22 NOTE — TELEPHONE ENCOUNTER
Please call patient needs an appointment for reassessment of mood.  I have sent in 30 days of her medicine.

## 2019-06-15 DIAGNOSIS — N32.81 OAB (OVERACTIVE BLADDER): ICD-10-CM

## 2019-06-15 DIAGNOSIS — K21.9 GASTROESOPHAGEAL REFLUX DISEASE, ESOPHAGITIS PRESENCE NOT SPECIFIED: ICD-10-CM

## 2019-06-15 RX ORDER — OMEPRAZOLE 20 MG/1
CAPSULE, DELAYED RELEASE ORAL
Qty: 30 CAPSULE | Refills: 0 | Status: SHIPPED | OUTPATIENT
Start: 2019-06-15 | End: 2019-07-17 | Stop reason: SDUPTHER

## 2019-06-15 RX ORDER — MIRABEGRON 50 MG/1
TABLET, FILM COATED, EXTENDED RELEASE ORAL
Qty: 30 TABLET | Refills: 0 | Status: SHIPPED | OUTPATIENT
Start: 2019-06-15 | End: 2019-07-17 | Stop reason: SDUPTHER

## 2019-06-18 DIAGNOSIS — F32.9 MAJOR DEPRESSIVE DISORDER, REMISSION STATUS UNSPECIFIED, UNSPECIFIED WHETHER RECURRENT: ICD-10-CM

## 2019-06-18 RX ORDER — VENLAFAXINE HYDROCHLORIDE 150 MG/1
CAPSULE, EXTENDED RELEASE ORAL
Qty: 30 CAPSULE | Refills: 0 | OUTPATIENT
Start: 2019-06-18

## 2019-06-18 NOTE — TELEPHONE ENCOUNTER
This patient does have an upcoming appointment scheduled with Umu Cabrera on 7/3    Do you want her to reschedule that appointment or can we send a two week supply to her pharmacy?

## 2019-06-18 NOTE — TELEPHONE ENCOUNTER
Addressed yesterday, SHANE RUIZ regarding patient request for Rx refill. Patient needs F/U apt for additional refills. Okay to put her on my schedule if she can come in sometime this week as Umu is out of office. Thanks.

## 2019-06-20 ENCOUNTER — OFFICE VISIT (OUTPATIENT)
Dept: INTERNAL MEDICINE | Facility: CLINIC | Age: 54
End: 2019-06-20

## 2019-06-20 VITALS
RESPIRATION RATE: 18 BRPM | BODY MASS INDEX: 28.66 KG/M2 | OXYGEN SATURATION: 98 % | SYSTOLIC BLOOD PRESSURE: 122 MMHG | HEIGHT: 60 IN | TEMPERATURE: 98.5 F | DIASTOLIC BLOOD PRESSURE: 80 MMHG | WEIGHT: 146 LBS | HEART RATE: 78 BPM

## 2019-06-20 DIAGNOSIS — N64.51 HARDNESS OF BREAST: Primary | ICD-10-CM

## 2019-06-20 DIAGNOSIS — F32.9 MAJOR DEPRESSIVE DISORDER, REMISSION STATUS UNSPECIFIED, UNSPECIFIED WHETHER RECURRENT: ICD-10-CM

## 2019-06-20 PROBLEM — F41.9 ANXIETY: Status: ACTIVE | Noted: 2019-06-20

## 2019-06-20 PROBLEM — M77.9 TENDINITIS: Status: ACTIVE | Noted: 2019-06-20

## 2019-06-20 PROBLEM — S09.90XA INJURY OF HEAD: Status: ACTIVE | Noted: 2019-06-20

## 2019-06-20 PROCEDURE — 99213 OFFICE O/P EST LOW 20 MIN: CPT | Performed by: NURSE PRACTITIONER

## 2019-06-20 RX ORDER — VENLAFAXINE HYDROCHLORIDE 150 MG/1
150 CAPSULE, EXTENDED RELEASE ORAL DAILY
Qty: 30 CAPSULE | Refills: 2 | Status: SHIPPED | OUTPATIENT
Start: 2019-06-20 | End: 2019-09-15 | Stop reason: SDUPTHER

## 2019-06-20 NOTE — PROGRESS NOTES
"Subjective   Alcira Phelan is a 54 y.o. female.   Chief Complaint   Patient presents with   • Depression   • Anxiety      History of Present Illness Needs Effexor RF.  Feels like it is working for her.   Notes her right breast feels hard (she has had bilat Mastectomy for left breast CA with reconstruction).   Hardness began approx 1 week ago.  Not sore or warm.  She has an appointment to see Dr. Kaur who did her breast reconstruction surgeries at the end of July.  Patient denies fever chills, headache, ear pain, sore throat, shortness of air, cough, wheezing, chest pain, abdominal pain, nausea, vomiting, diarrhea, dysuria, blood in stool or urine.  Eating and drinking as usual.  No unexplained weight loss or gain.    Has appt with Umu on July 3, 2019  for physical     The following portions of the patient's history were reviewed and updated as appropriate: allergies, current medications, past family history, past medical history, past social history, past surgical history and problem list.    Current Outpatient Medications:   •  Multiple Vitamins-Minerals (MULTI VITAMIN/MINERALS) tablet, Take  by mouth., Disp: , Rfl:   •  MYRBETRIQ 50 MG tablet sustained-release 24 hour 24 hr tablet, TAKE ONE TABLET BY MOUTH DAILY, Disp: 30 tablet, Rfl: 0  •  omeprazole (priLOSEC) 20 MG capsule, TAKE ONE CAPSULE BY MOUTH DAILY, Disp: 30 capsule, Rfl: 0  •  venlafaxine XR (EFFEXOR-XR) 150 MG 24 hr capsule, Take 1 capsule by mouth Daily., Disp: 30 capsule, Rfl: 2    Review of Systems Consitutional, HEENT, Respiratory, CV, GI, , Skin, Musculoskeletal, Neuro-mental, Endocrinological, Hematological were reviewed.  Positives were discussed in the HPI, otherwise ROS was negative   /80   Pulse 78   Temp 98.5 °F (36.9 °C)   Resp 18   Ht 152.4 cm (60\")   Wt 66.2 kg (146 lb)   SpO2 98%   Breastfeeding? No   BMI 28.51 kg/m²     Objective   Allergies   Allergen Reactions   • Penicillins Other (See Comments)     Headache "       Physical Exam   Constitutional: She is oriented to person, place, and time. She appears well-developed and well-nourished. No distress.   HENT:   Head: Normocephalic.   Eyes: Right eye exhibits no discharge. Left eye exhibits no discharge. No scleral icterus.   Neck: Neck supple.   Cardiovascular: Normal rate, regular rhythm, normal heart sounds and intact distal pulses. Exam reveals no gallop and no friction rub.   No murmur heard.  Pulmonary/Chest: Effort normal and breath sounds normal. No stridor. No respiratory distress. She has no wheezes. She has no rales. She exhibits no tenderness.   Both breast has scarring around the nipples.  Right breast is more firm than left.  No irene mass, nipple discharge, nodes noted.   Lymphadenopathy:     She has no cervical adenopathy.   Neurological: She is alert and oriented to person, place, and time.   Skin: Skin is warm and dry. Capillary refill takes less than 2 seconds.   Psychiatric: She has a normal mood and affect. Her behavior is normal. Judgment and thought content normal.   Nursing note and vitals reviewed.      Procedures        Assessment/Plan   Alcira was seen today for depression and anxiety.    Diagnoses and all orders for this visit:    Hardness of breast    Major depressive disorder, remission status unspecified, unspecified whether recurrent  -     venlafaxine XR (EFFEXOR-XR) 150 MG 24 hr capsule; Take 1 capsule by mouth Daily.        Patient Instructions   I refilled patient's Effexor.  I have spoken with Dr. Haley's office and schedule her to be seen on Monday(brest firmness).  She is to keep her appointment with Umu for her physical.Pt verbalizes understanding and agreement with plan of care.     EMR Dragon/transcription disclaimer:  Please note that portions of this note were completed with a voice recognition program.  Electronic transcription of the voice recognition program may permit erroneous words or phrases to be inadvertently  transcribed.  Although I have reviewed the note for such errors, some may still exist in this documentation     Olivia Mart APRN

## 2019-06-21 PROBLEM — Z98.890 HX OF BREAST RECONSTRUCTION: Status: ACTIVE | Noted: 2019-06-21

## 2019-06-21 NOTE — PATIENT INSTRUCTIONS
I refilled patient's Effexor.  I have spoken with Dr. Haley's office and schedule her to be seen on Monday(brest firmness).  She is to keep her appointment with Umu for her physical.Pt verbalizes understanding and agreement with plan of care.

## 2019-07-01 ENCOUNTER — APPOINTMENT (OUTPATIENT)
Dept: PREADMISSION TESTING | Facility: HOSPITAL | Age: 54
End: 2019-07-01

## 2019-07-01 VITALS — HEIGHT: 61 IN | WEIGHT: 148 LBS | BODY MASS INDEX: 27.94 KG/M2

## 2019-07-01 LAB
BASOPHILS # BLD AUTO: 0.05 10*3/MM3 (ref 0–0.2)
BASOPHILS NFR BLD AUTO: 0.8 % (ref 0–1.5)
DEPRECATED RDW RBC AUTO: 38.9 FL (ref 37–54)
EOSINOPHIL # BLD AUTO: 0.38 10*3/MM3 (ref 0–0.4)
EOSINOPHIL NFR BLD AUTO: 6.2 % (ref 0.3–6.2)
ERYTHROCYTE [DISTWIDTH] IN BLOOD BY AUTOMATED COUNT: 12 % (ref 12.3–15.4)
HCT VFR BLD AUTO: 41.7 % (ref 34–46.6)
HGB BLD-MCNC: 13.9 G/DL (ref 12–15.9)
IMM GRANULOCYTES # BLD AUTO: 0.01 10*3/MM3 (ref 0–0.05)
IMM GRANULOCYTES NFR BLD AUTO: 0.2 % (ref 0–0.5)
LYMPHOCYTES # BLD AUTO: 2.33 10*3/MM3 (ref 0.7–3.1)
LYMPHOCYTES NFR BLD AUTO: 38.1 % (ref 19.6–45.3)
MCH RBC QN AUTO: 29.1 PG (ref 26.6–33)
MCHC RBC AUTO-ENTMCNC: 33.3 G/DL (ref 31.5–35.7)
MCV RBC AUTO: 87.4 FL (ref 79–97)
MONOCYTES # BLD AUTO: 0.49 10*3/MM3 (ref 0.1–0.9)
MONOCYTES NFR BLD AUTO: 8 % (ref 5–12)
NEUTROPHILS # BLD AUTO: 2.86 10*3/MM3 (ref 1.7–7)
NEUTROPHILS NFR BLD AUTO: 46.7 % (ref 42.7–76)
NRBC BLD AUTO-RTO: 0 /100 WBC (ref 0–0.2)
PLATELET # BLD AUTO: 248 10*3/MM3 (ref 140–450)
PMV BLD AUTO: 9.3 FL (ref 6–12)
RBC # BLD AUTO: 4.77 10*6/MM3 (ref 3.77–5.28)
WBC NRBC COR # BLD: 6.12 10*3/MM3 (ref 3.4–10.8)

## 2019-07-01 PROCEDURE — 85025 COMPLETE CBC W/AUTO DIFF WBC: CPT | Performed by: PLASTIC SURGERY

## 2019-07-01 PROCEDURE — 36415 COLL VENOUS BLD VENIPUNCTURE: CPT

## 2019-07-01 NOTE — PAT
No order for consent while in PAT. Called to get new orders however Dr. Sanford was not in the office. Please get consent signed day of surgery.     Patient to apply Chlorhexadine wipes  to surgical area (as instructed) the night before procedure and the AM of procedure. Wipes provided.

## 2019-07-03 ENCOUNTER — OFFICE VISIT (OUTPATIENT)
Dept: INTERNAL MEDICINE | Facility: CLINIC | Age: 54
End: 2019-07-03

## 2019-07-03 VITALS
HEIGHT: 61 IN | SYSTOLIC BLOOD PRESSURE: 118 MMHG | OXYGEN SATURATION: 99 % | WEIGHT: 146 LBS | DIASTOLIC BLOOD PRESSURE: 80 MMHG | BODY MASS INDEX: 27.56 KG/M2 | TEMPERATURE: 98 F | HEART RATE: 85 BPM

## 2019-07-03 DIAGNOSIS — Z00.00 ANNUAL PHYSICAL EXAM: Primary | ICD-10-CM

## 2019-07-03 LAB
ALBUMIN SERPL-MCNC: 4.6 G/DL (ref 3.5–5.2)
ALBUMIN/GLOB SERPL: 1.5 G/DL
ALP SERPL-CCNC: 100 U/L (ref 39–117)
ALT SERPL W P-5'-P-CCNC: 25 U/L (ref 1–33)
ANION GAP SERPL CALCULATED.3IONS-SCNC: 13.6 MMOL/L (ref 5–15)
AST SERPL-CCNC: 29 U/L (ref 1–32)
BILIRUB BLD-MCNC: NEGATIVE MG/DL
BILIRUB SERPL-MCNC: 0.2 MG/DL (ref 0.2–1.2)
BUN BLD-MCNC: 13 MG/DL (ref 6–20)
BUN/CREAT SERPL: 16 (ref 7–25)
CALCIUM SPEC-SCNC: 10.4 MG/DL (ref 8.6–10.5)
CHLORIDE SERPL-SCNC: 102 MMOL/L (ref 98–107)
CHOLEST SERPL-MCNC: 203 MG/DL (ref 0–200)
CLARITY, POC: CLEAR
CO2 SERPL-SCNC: 28.4 MMOL/L (ref 22–29)
COLOR UR: YELLOW
CREAT BLD-MCNC: 0.81 MG/DL (ref 0.57–1)
GFR SERPL CREATININE-BSD FRML MDRD: 73 ML/MIN/1.73
GLOBULIN UR ELPH-MCNC: 3 GM/DL
GLUCOSE BLD-MCNC: 59 MG/DL (ref 65–99)
GLUCOSE UR STRIP-MCNC: NEGATIVE MG/DL
HBA1C MFR BLD: 5.5 % (ref 4.8–5.6)
HDLC SERPL-MCNC: 54 MG/DL (ref 40–60)
KETONES UR QL: NEGATIVE
LDLC SERPL CALC-MCNC: 103 MG/DL (ref 0–100)
LDLC/HDLC SERPL: 1.91 {RATIO}
LEUKOCYTE EST, POC: NEGATIVE
NITRITE UR-MCNC: NEGATIVE MG/ML
PH UR: 6 [PH] (ref 5–8)
POTASSIUM BLD-SCNC: 4.6 MMOL/L (ref 3.5–5.2)
PROT SERPL-MCNC: 7.6 G/DL (ref 6–8.5)
PROT UR STRIP-MCNC: NEGATIVE MG/DL
RBC # UR STRIP: NEGATIVE /UL
SODIUM BLD-SCNC: 144 MMOL/L (ref 136–145)
SP GR UR: 1.02 (ref 1–1.03)
TRIGL SERPL-MCNC: 230 MG/DL (ref 0–150)
TSH SERPL DL<=0.05 MIU/L-ACNC: 3.48 MIU/ML (ref 0.27–4.2)
UROBILINOGEN UR QL: NORMAL
VLDLC SERPL-MCNC: 46 MG/DL (ref 5–40)

## 2019-07-03 PROCEDURE — 90471 IMMUNIZATION ADMIN: CPT | Performed by: NURSE PRACTITIONER

## 2019-07-03 PROCEDURE — 83036 HEMOGLOBIN GLYCOSYLATED A1C: CPT | Performed by: NURSE PRACTITIONER

## 2019-07-03 PROCEDURE — 99396 PREV VISIT EST AGE 40-64: CPT | Performed by: NURSE PRACTITIONER

## 2019-07-03 PROCEDURE — 81003 URINALYSIS AUTO W/O SCOPE: CPT | Performed by: NURSE PRACTITIONER

## 2019-07-03 PROCEDURE — 93000 ELECTROCARDIOGRAM COMPLETE: CPT | Performed by: NURSE PRACTITIONER

## 2019-07-03 PROCEDURE — 80053 COMPREHEN METABOLIC PANEL: CPT | Performed by: NURSE PRACTITIONER

## 2019-07-03 PROCEDURE — 90715 TDAP VACCINE 7 YRS/> IM: CPT | Performed by: NURSE PRACTITIONER

## 2019-07-03 PROCEDURE — 80061 LIPID PANEL: CPT | Performed by: NURSE PRACTITIONER

## 2019-07-03 PROCEDURE — 84443 ASSAY THYROID STIM HORMONE: CPT | Performed by: NURSE PRACTITIONER
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
## 2019-07-08 ENCOUNTER — TELEPHONE (OUTPATIENT)
Dept: INTERNAL MEDICINE | Facility: CLINIC | Age: 54
End: 2019-07-08

## 2019-07-08 ENCOUNTER — ANESTHESIA EVENT (OUTPATIENT)
Dept: PERIOP | Facility: HOSPITAL | Age: 54
End: 2019-07-08

## 2019-07-08 RX ORDER — FAMOTIDINE 10 MG/ML
20 INJECTION, SOLUTION INTRAVENOUS ONCE
Status: CANCELLED | OUTPATIENT
Start: 2019-07-08 | End: 2019-07-08

## 2019-07-08 NOTE — TELEPHONE ENCOUNTER
----- Message from JOVAN Mcclure sent at 7/6/2019  7:42 PM EDT -----  Please contact patient regarding results.  Electrolytes normal; liver kidney function normal. Negative diabetes screening. Normal thyroid lab.  CBC/blood count: no anemia.  Lipid: cholesterol 203  LDL bad cholesterol 103, triglycerides 230; need heart healthy diet and exercise.  Nutrition and activity goals reviewed including: mainly water to drink, limit white flour/processed sugar; increase high protein, high fiber carbs, good breakfast, working toward 150 mins cardio per week, resistance training 2x/week.  Will recheck fasting lipids in 6 months; if remains elevated will consider starting statin antichol med.  Thanks.

## 2019-07-09 ENCOUNTER — HOSPITAL ENCOUNTER (OUTPATIENT)
Facility: HOSPITAL | Age: 54
Setting detail: HOSPITAL OUTPATIENT SURGERY
Discharge: HOME | End: 2019-07-09
Attending: PLASTIC SURGERY | Admitting: PLASTIC SURGERY

## 2019-07-09 ENCOUNTER — ANESTHESIA (OUTPATIENT)
Dept: PERIOP | Facility: HOSPITAL | Age: 54
End: 2019-07-09

## 2019-07-09 VITALS
HEART RATE: 90 BPM | SYSTOLIC BLOOD PRESSURE: 120 MMHG | HEIGHT: 60 IN | WEIGHT: 145 LBS | OXYGEN SATURATION: 97 % | RESPIRATION RATE: 16 BRPM | DIASTOLIC BLOOD PRESSURE: 85 MMHG | TEMPERATURE: 98.5 F | BODY MASS INDEX: 28.47 KG/M2

## 2019-07-09 DIAGNOSIS — C50.919 BREAST CANCER (HCC): ICD-10-CM

## 2019-07-09 PROCEDURE — C1789 PROSTHESIS, BREAST, IMP: HCPCS | Performed by: PLASTIC SURGERY

## 2019-07-09 PROCEDURE — 25010000003 CEFAZOLIN IN DEXTROSE 2-4 GM/100ML-% SOLUTION: Performed by: PLASTIC SURGERY

## 2019-07-09 PROCEDURE — 25010000002 PHENYLEPHRINE PER 1 ML: Performed by: NURSE ANESTHETIST, CERTIFIED REGISTERED

## 2019-07-09 PROCEDURE — 25010000002 MIDAZOLAM PER 1 MG: Performed by: NURSE ANESTHETIST, CERTIFIED REGISTERED

## 2019-07-09 PROCEDURE — 88305 TISSUE EXAM BY PATHOLOGIST: CPT | Performed by: PLASTIC SURGERY

## 2019-07-09 PROCEDURE — 25010000002 PROPOFOL 1000 MG/ML EMULSION: Performed by: NURSE ANESTHETIST, CERTIFIED REGISTERED

## 2019-07-09 PROCEDURE — 88341 IMHCHEM/IMCYTCHM EA ADD ANTB: CPT | Performed by: PLASTIC SURGERY

## 2019-07-09 PROCEDURE — 25010000002 NEOSTIGMINE 10 MG/10ML SOLUTION: Performed by: ANESTHESIOLOGY

## 2019-07-09 PROCEDURE — 88173 CYTOPATH EVAL FNA REPORT: CPT | Performed by: PLASTIC SURGERY

## 2019-07-09 PROCEDURE — 25010000002 ONDANSETRON PER 1 MG: Performed by: ANESTHESIOLOGY

## 2019-07-09 PROCEDURE — 88342 IMHCHEM/IMCYTCHM 1ST ANTB: CPT | Performed by: PLASTIC SURGERY

## 2019-07-09 PROCEDURE — 25010000002 FENTANYL CITRATE (PF) 100 MCG/2ML SOLUTION: Performed by: NURSE ANESTHETIST, CERTIFIED REGISTERED

## 2019-07-09 PROCEDURE — 25010000002 DEXAMETHASONE PER 1 MG: Performed by: NURSE ANESTHETIST, CERTIFIED REGISTERED

## 2019-07-09 PROCEDURE — 25010000002 HYDROMORPHONE PER 4 MG: Performed by: NURSE ANESTHETIST, CERTIFIED REGISTERED

## 2019-07-09 DEVICE — BRST SIL NATRELLE INSPIRA SMOTH F/P 605CC: Type: IMPLANTABLE DEVICE | Site: BREAST | Status: FUNCTIONAL

## 2019-07-09 DEVICE — SMOOTH ROUND HIGH PROFILE GEL-FILLED BREAST IMPLANT, 500CC  SMOOTH ROUND SILICONE
Type: IMPLANTABLE DEVICE | Site: BREAST | Status: FUNCTIONAL
Brand: MENTOR MEMORYGEL BREAST IMPLANT

## 2019-07-09 RX ORDER — HYDROMORPHONE HYDROCHLORIDE 1 MG/ML
0.5 INJECTION, SOLUTION INTRAMUSCULAR; INTRAVENOUS; SUBCUTANEOUS
Status: DISCONTINUED | OUTPATIENT
Start: 2019-07-09 | End: 2019-07-10 | Stop reason: HOSPADM

## 2019-07-09 RX ORDER — GLYCOPYRROLATE 0.2 MG/ML
INJECTION INTRAMUSCULAR; INTRAVENOUS AS NEEDED
Status: DISCONTINUED | OUTPATIENT
Start: 2019-07-09 | End: 2019-07-09 | Stop reason: SURG

## 2019-07-09 RX ORDER — ALBUTEROL SULFATE 2.5 MG/3ML
2.5 SOLUTION RESPIRATORY (INHALATION) ONCE AS NEEDED
Status: DISCONTINUED | OUTPATIENT
Start: 2019-07-09 | End: 2019-07-10 | Stop reason: HOSPADM

## 2019-07-09 RX ORDER — ONDANSETRON 2 MG/ML
4 INJECTION INTRAMUSCULAR; INTRAVENOUS ONCE AS NEEDED
Status: DISCONTINUED | OUTPATIENT
Start: 2019-07-09 | End: 2019-07-10 | Stop reason: HOSPADM

## 2019-07-09 RX ORDER — SODIUM CHLORIDE 0.9 % (FLUSH) 0.9 %
3-10 SYRINGE (ML) INJECTION AS NEEDED
Status: DISCONTINUED | OUTPATIENT
Start: 2019-07-09 | End: 2019-07-09 | Stop reason: HOSPADM

## 2019-07-09 RX ORDER — SODIUM CHLORIDE 9 MG/ML
INJECTION, SOLUTION INTRAVENOUS AS NEEDED
Status: DISCONTINUED | OUTPATIENT
Start: 2019-07-09 | End: 2019-07-09 | Stop reason: HOSPADM

## 2019-07-09 RX ORDER — FENTANYL CITRATE 50 UG/ML
INJECTION, SOLUTION INTRAMUSCULAR; INTRAVENOUS AS NEEDED
Status: DISCONTINUED | OUTPATIENT
Start: 2019-07-09 | End: 2019-07-09 | Stop reason: SURG

## 2019-07-09 RX ORDER — KETAMINE HCL IN NACL, ISO-OSM 100MG/10ML
SYRINGE (ML) INJECTION AS NEEDED
Status: DISCONTINUED | OUTPATIENT
Start: 2019-07-09 | End: 2019-07-09 | Stop reason: SURG

## 2019-07-09 RX ORDER — ACETAMINOPHEN 500 MG
1000 TABLET ORAL EVERY 6 HOURS
Qty: 30 TABLET | Refills: 0 | Status: SHIPPED | OUTPATIENT
Start: 2019-07-09 | End: 2019-07-11

## 2019-07-09 RX ORDER — PROMETHAZINE HYDROCHLORIDE 25 MG/ML
6.25 INJECTION, SOLUTION INTRAMUSCULAR; INTRAVENOUS ONCE AS NEEDED
Status: DISCONTINUED | OUTPATIENT
Start: 2019-07-09 | End: 2019-07-10 | Stop reason: HOSPADM

## 2019-07-09 RX ORDER — DEXAMETHASONE SODIUM PHOSPHATE 10 MG/ML
INJECTION INTRAMUSCULAR; INTRAVENOUS AS NEEDED
Status: DISCONTINUED | OUTPATIENT
Start: 2019-07-09 | End: 2019-07-09 | Stop reason: SURG

## 2019-07-09 RX ORDER — EPHEDRINE SULFATE 50 MG/ML
5 INJECTION, SOLUTION INTRAVENOUS ONCE AS NEEDED
Status: DISCONTINUED | OUTPATIENT
Start: 2019-07-09 | End: 2019-07-10 | Stop reason: HOSPADM

## 2019-07-09 RX ORDER — ROCURONIUM BROMIDE 10 MG/ML
INJECTION, SOLUTION INTRAVENOUS AS NEEDED
Status: DISCONTINUED | OUTPATIENT
Start: 2019-07-09 | End: 2019-07-09 | Stop reason: SURG

## 2019-07-09 RX ORDER — HYDRALAZINE HYDROCHLORIDE 20 MG/ML
5 INJECTION INTRAMUSCULAR; INTRAVENOUS
Status: DISCONTINUED | OUTPATIENT
Start: 2019-07-09 | End: 2019-07-10 | Stop reason: HOSPADM

## 2019-07-09 RX ORDER — LIDOCAINE HYDROCHLORIDE AND EPINEPHRINE 10; 10 MG/ML; UG/ML
INJECTION, SOLUTION INFILTRATION; PERINEURAL AS NEEDED
Status: DISCONTINUED | OUTPATIENT
Start: 2019-07-09 | End: 2019-07-09 | Stop reason: HOSPADM

## 2019-07-09 RX ORDER — FAMOTIDINE 20 MG/1
20 TABLET, FILM COATED ORAL ONCE
Status: COMPLETED | OUTPATIENT
Start: 2019-07-09 | End: 2019-07-09

## 2019-07-09 RX ORDER — SODIUM CHLORIDE, SODIUM LACTATE, POTASSIUM CHLORIDE, CALCIUM CHLORIDE 600; 310; 30; 20 MG/100ML; MG/100ML; MG/100ML; MG/100ML
9 INJECTION, SOLUTION INTRAVENOUS CONTINUOUS
Status: DISCONTINUED | OUTPATIENT
Start: 2019-07-09 | End: 2019-07-10 | Stop reason: HOSPADM

## 2019-07-09 RX ORDER — CEPHALEXIN 500 MG/1
500 CAPSULE ORAL 4 TIMES DAILY
Qty: 40 CAPSULE | Refills: 0 | Status: SHIPPED | OUTPATIENT
Start: 2019-07-09 | End: 2019-07-19

## 2019-07-09 RX ORDER — NEOSTIGMINE METHYLSULFATE 1 MG/ML
INJECTION, SOLUTION INTRAVENOUS AS NEEDED
Status: DISCONTINUED | OUTPATIENT
Start: 2019-07-09 | End: 2019-07-09 | Stop reason: SURG

## 2019-07-09 RX ORDER — ONDANSETRON 2 MG/ML
INJECTION INTRAMUSCULAR; INTRAVENOUS AS NEEDED
Status: DISCONTINUED | OUTPATIENT
Start: 2019-07-09 | End: 2019-07-09 | Stop reason: SURG

## 2019-07-09 RX ORDER — CEFAZOLIN SODIUM 2 G/100ML
2 INJECTION, SOLUTION INTRAVENOUS ONCE
Status: COMPLETED | OUTPATIENT
Start: 2019-07-09 | End: 2019-07-09

## 2019-07-09 RX ORDER — MIDAZOLAM HYDROCHLORIDE 1 MG/ML
INJECTION INTRAMUSCULAR; INTRAVENOUS AS NEEDED
Status: DISCONTINUED | OUTPATIENT
Start: 2019-07-09 | End: 2019-07-09 | Stop reason: SURG

## 2019-07-09 RX ORDER — OXYCODONE HYDROCHLORIDE 5 MG/1
5 TABLET ORAL EVERY 4 HOURS PRN
Qty: 15 TABLET | Refills: 0 | Status: SHIPPED | OUTPATIENT
Start: 2019-07-09 | End: 2020-06-24

## 2019-07-09 RX ORDER — LIDOCAINE HYDROCHLORIDE 10 MG/ML
INJECTION, SOLUTION EPIDURAL; INFILTRATION; INTRACAUDAL; PERINEURAL AS NEEDED
Status: DISCONTINUED | OUTPATIENT
Start: 2019-07-09 | End: 2019-07-09 | Stop reason: SURG

## 2019-07-09 RX ORDER — LIDOCAINE HYDROCHLORIDE 10 MG/ML
0.5 INJECTION, SOLUTION EPIDURAL; INFILTRATION; INTRACAUDAL; PERINEURAL ONCE AS NEEDED
Status: COMPLETED | OUTPATIENT
Start: 2019-07-09 | End: 2019-07-09

## 2019-07-09 RX ORDER — PROMETHAZINE HYDROCHLORIDE 25 MG/1
25 TABLET ORAL ONCE AS NEEDED
Status: DISCONTINUED | OUTPATIENT
Start: 2019-07-09 | End: 2019-07-10 | Stop reason: HOSPADM

## 2019-07-09 RX ORDER — FENTANYL CITRATE 50 UG/ML
50 INJECTION, SOLUTION INTRAMUSCULAR; INTRAVENOUS
Status: DISCONTINUED | OUTPATIENT
Start: 2019-07-09 | End: 2019-07-10 | Stop reason: HOSPADM

## 2019-07-09 RX ORDER — MAGNESIUM HYDROXIDE 1200 MG/15ML
LIQUID ORAL AS NEEDED
Status: DISCONTINUED | OUTPATIENT
Start: 2019-07-09 | End: 2019-07-09 | Stop reason: HOSPADM

## 2019-07-09 RX ORDER — PROMETHAZINE HYDROCHLORIDE 12.5 MG/1
12.5 TABLET ORAL EVERY 6 HOURS PRN
Qty: 15 TABLET | Refills: 0 | Status: SHIPPED | OUTPATIENT
Start: 2019-07-09 | End: 2020-06-24

## 2019-07-09 RX ORDER — VECURONIUM BROMIDE 1 MG/ML
INJECTION, POWDER, LYOPHILIZED, FOR SOLUTION INTRAVENOUS AS NEEDED
Status: DISCONTINUED | OUTPATIENT
Start: 2019-07-09 | End: 2019-07-09 | Stop reason: SURG

## 2019-07-09 RX ORDER — SODIUM CHLORIDE 0.9 % (FLUSH) 0.9 %
3 SYRINGE (ML) INJECTION EVERY 12 HOURS SCHEDULED
Status: DISCONTINUED | OUTPATIENT
Start: 2019-07-09 | End: 2019-07-09 | Stop reason: HOSPADM

## 2019-07-09 RX ORDER — LABETALOL HYDROCHLORIDE 5 MG/ML
5 INJECTION, SOLUTION INTRAVENOUS
Status: DISCONTINUED | OUTPATIENT
Start: 2019-07-09 | End: 2019-07-10 | Stop reason: HOSPADM

## 2019-07-09 RX ORDER — SCOLOPAMINE TRANSDERMAL SYSTEM 1 MG/1
1 PATCH, EXTENDED RELEASE TRANSDERMAL CONTINUOUS
Status: DISCONTINUED | OUTPATIENT
Start: 2019-07-09 | End: 2019-07-10 | Stop reason: HOSPADM

## 2019-07-09 RX ORDER — PROMETHAZINE HYDROCHLORIDE 25 MG/1
25 SUPPOSITORY RECTAL ONCE AS NEEDED
Status: DISCONTINUED | OUTPATIENT
Start: 2019-07-09 | End: 2019-07-10 | Stop reason: HOSPADM

## 2019-07-09 RX ADMIN — FENTANYL CITRATE 50 MCG: 50 INJECTION, SOLUTION INTRAMUSCULAR; INTRAVENOUS at 12:53

## 2019-07-09 RX ADMIN — ROCURONIUM BROMIDE 30 MG: 10 INJECTION INTRAVENOUS at 12:53

## 2019-07-09 RX ADMIN — GLYCOPYRROLATE 0.6 MG: 0.2 INJECTION, SOLUTION INTRAMUSCULAR; INTRAVENOUS at 18:55

## 2019-07-09 RX ADMIN — GLYCOPYRROLATE 0.2 MG: 0.2 INJECTION, SOLUTION INTRAMUSCULAR; INTRAVENOUS at 14:45

## 2019-07-09 RX ADMIN — HYDROMORPHONE HYDROCHLORIDE 0.5 MG: 1 INJECTION, SOLUTION INTRAMUSCULAR; INTRAVENOUS; SUBCUTANEOUS at 20:45

## 2019-07-09 RX ADMIN — DEXAMETHASONE SODIUM PHOSPHATE 4 MG: 10 INJECTION INTRAMUSCULAR; INTRAVENOUS at 12:53

## 2019-07-09 RX ADMIN — METOPROLOL TARTRATE 3 MG: 5 INJECTION, SOLUTION INTRAVENOUS at 17:25

## 2019-07-09 RX ADMIN — PHENYLEPHRINE HYDROCHLORIDE 80 MCG: 10 INJECTION INTRAVENOUS at 15:33

## 2019-07-09 RX ADMIN — FENTANYL CITRATE 100 MCG: 50 INJECTION, SOLUTION INTRAMUSCULAR; INTRAVENOUS at 17:35

## 2019-07-09 RX ADMIN — FAMOTIDINE 20 MG: 20 TABLET ORAL at 11:30

## 2019-07-09 RX ADMIN — VECURONIUM BROMIDE 3 MG: 1 INJECTION, POWDER, LYOPHILIZED, FOR SOLUTION INTRAVENOUS at 15:05

## 2019-07-09 RX ADMIN — LIDOCAINE HYDROCHLORIDE 50 MG: 10 INJECTION, SOLUTION EPIDURAL; INFILTRATION; INTRACAUDAL; PERINEURAL at 12:53

## 2019-07-09 RX ADMIN — ROCURONIUM BROMIDE 10 MG: 10 INJECTION INTRAVENOUS at 14:09

## 2019-07-09 RX ADMIN — MIDAZOLAM HYDROCHLORIDE 2 MG: 1 INJECTION, SOLUTION INTRAMUSCULAR; INTRAVENOUS at 12:53

## 2019-07-09 RX ADMIN — EPHEDRINE SULFATE 10 MG: 50 INJECTION INTRAMUSCULAR; INTRAVENOUS; SUBCUTANEOUS at 13:36

## 2019-07-09 RX ADMIN — VECURONIUM BROMIDE 0.5 MG: 1 INJECTION, POWDER, LYOPHILIZED, FOR SOLUTION INTRAVENOUS at 16:48

## 2019-07-09 RX ADMIN — PROPOFOL 30 MCG/KG/MIN: 10 INJECTION, EMULSION INTRAVENOUS at 13:17

## 2019-07-09 RX ADMIN — CEFAZOLIN SODIUM 2 G: 2 INJECTION, SOLUTION INTRAVENOUS at 12:53

## 2019-07-09 RX ADMIN — SCOPALAMINE 1 PATCH: 1 PATCH, EXTENDED RELEASE TRANSDERMAL at 11:37

## 2019-07-09 RX ADMIN — PHENYLEPHRINE HYDROCHLORIDE 80 MCG: 10 INJECTION INTRAVENOUS at 14:45

## 2019-07-09 RX ADMIN — EPHEDRINE SULFATE 10 MG: 50 INJECTION INTRAMUSCULAR; INTRAVENOUS; SUBCUTANEOUS at 13:47

## 2019-07-09 RX ADMIN — PHENYLEPHRINE HYDROCHLORIDE 80 MCG: 10 INJECTION INTRAVENOUS at 17:50

## 2019-07-09 RX ADMIN — EPHEDRINE SULFATE 10 MG: 50 INJECTION INTRAMUSCULAR; INTRAVENOUS; SUBCUTANEOUS at 14:41

## 2019-07-09 RX ADMIN — VECURONIUM BROMIDE 3 MG: 1 INJECTION, POWDER, LYOPHILIZED, FOR SOLUTION INTRAVENOUS at 15:30

## 2019-07-09 RX ADMIN — EPHEDRINE SULFATE 10 MG: 50 INJECTION INTRAMUSCULAR; INTRAVENOUS; SUBCUTANEOUS at 13:58

## 2019-07-09 RX ADMIN — NEOSTIGMINE METHYLSULFATE 4 MG: 1 INJECTION, SOLUTION INTRAVENOUS at 18:55

## 2019-07-09 RX ADMIN — SODIUM CHLORIDE, POTASSIUM CHLORIDE, SODIUM LACTATE AND CALCIUM CHLORIDE: 600; 310; 30; 20 INJECTION, SOLUTION INTRAVENOUS at 15:34

## 2019-07-09 RX ADMIN — ROCURONIUM BROMIDE 10 MG: 10 INJECTION INTRAVENOUS at 14:40

## 2019-07-09 RX ADMIN — EPHEDRINE SULFATE 10 MG: 50 INJECTION INTRAMUSCULAR; INTRAVENOUS; SUBCUTANEOUS at 14:43

## 2019-07-09 RX ADMIN — FENTANYL CITRATE 50 MCG: 50 INJECTION, SOLUTION INTRAMUSCULAR; INTRAVENOUS at 13:03

## 2019-07-09 RX ADMIN — PHENYLEPHRINE HYDROCHLORIDE 80 MCG: 10 INJECTION INTRAVENOUS at 16:06

## 2019-07-09 RX ADMIN — SODIUM CHLORIDE, POTASSIUM CHLORIDE, SODIUM LACTATE AND CALCIUM CHLORIDE 9 ML/HR: 600; 310; 30; 20 INJECTION, SOLUTION INTRAVENOUS at 11:33

## 2019-07-09 RX ADMIN — Medication 30 MG: at 12:56

## 2019-07-09 RX ADMIN — ONDANSETRON 4 MG: 2 INJECTION INTRAMUSCULAR; INTRAVENOUS at 18:50

## 2019-07-09 RX ADMIN — LIDOCAINE HYDROCHLORIDE 0.5 ML: 10 INJECTION, SOLUTION EPIDURAL; INFILTRATION; INTRACAUDAL; PERINEURAL at 11:33

## 2019-07-09 NOTE — ANESTHESIA POSTPROCEDURE EVALUATION
Patient: Alcira Phelan    Procedure Summary     Date:  07/09/19 Room / Location:   [facility]    Anesthesia Start:  1249 Anesthesia Stop:  1917    Procedure:  BILATERAL BREAST CAPSULOTOMY, REMOVAL OLD IMPLANTS, REPLACEMENT OF NEW IMPLANTS FOR BREAST RECONSTRUCTION (Bilateral Breast) Diagnosis:      Surgeon:  Melanie Sanford MD Provider:  Urbano Magana MD    Anesthesia Type:  general ASA Status:  2          Anesthesia Type: general  Last vitals  BP   132/87 (07/09/19 1122)   Temp   98.7 °F (37.1 °C) (07/09/19 1122)   Pulse   69 (07/09/19 1122)   Resp   18 (07/09/19 1122)     SpO2   99 % (07/09/19 1122)     Post Anesthesia Care and Evaluation    Patient location during evaluation: PACU  Patient participation: complete - patient participated  Level of consciousness: awake and alert  Pain score: 0  Pain management: adequate  Airway patency: patent  Anesthetic complications: No anesthetic complications  PONV Status: none  Cardiovascular status: hemodynamically stable and acceptable  Respiratory status: nonlabored ventilation, acceptable and nasal cannula  Hydration status: acceptable

## 2019-07-09 NOTE — ANESTHESIA PREPROCEDURE EVALUATION
Anesthesia Evaluation     Patient summary reviewed and Nursing notes reviewed   history of anesthetic complications:  NPO Solid Status: > 8 hours  NPO Liquid Status: > 8 hours           Airway   Mallampati: I  TM distance: >3 FB  Neck ROM: full  No difficulty expected  Dental      Pulmonary     breath sounds clear to auscultation  Cardiovascular     Rhythm: regular  Rate: normal        Neuro/Psych  GI/Hepatic/Renal/Endo      Musculoskeletal     Abdominal    Substance History      OB/GYN          Other      history of cancer                    Anesthesia Plan    ASA 2     general     intravenous induction   Anesthetic plan, all risks, benefits, and alternatives have been provided, discussed and informed consent has been obtained with: patient.    Plan discussed with CRNA.

## 2019-07-09 NOTE — BRIEF OP NOTE
BREAST CAPSULOTOMY, IMPLANT REVISION  Progress Note    Alcira Phelan  7/9/2019    Pre-op Diagnosis:   1. Capsular contracture of breast implants  2. History of breast carcinoma  3. Acquired absence of bilateral breasts s/p bilateral mastectomies       Post-Op Diagnosis Codes:     * Capsular contracture of breast implant [T85.44XA]     * Personal history of breast cancer [Z85.3]     * Acquired absence of both breasts [Z90.13]     * Ruptured silicone breast implant [T85.49XA]     * Seroma of breast [N64.89]    Procedure/CPT® Codes:  NJ REMOVAL OF BREAST CAPSULE [90888]  NJ INSERT BREAST PROS IMMED AFTER EXCIS [41617]    Procedure(s):  BILATERAL BREAST CAPSULECTOMY, REMOVAL OLD IMPLANTS, PLACEMENT OF NEW IMPLANTS FOR BREAST RECONSTRUCTION    Surgeon(s):  Melanie Sanford MD    Anesthesia: General with Block    Staff:   Circulator: Jovita Russo RN; Renee Burgos RN; Sabra Waite RN; Rasheeda Campa RN  Scrub Person: Edwardo Powell; Beni Charles; Se Romero  Assistant: Marie La PA-C    Estimated Blood Loss: 100 mL    Urine Voided: 450 mL    Specimens:                Specimens     ID Source Type Tests Collected By Collected At Frozen?      A Breast, Right Tissue · TISSUE PATHOLOGY EXAM   Melanie Sanford MD 7/9/19 1351 No     Description: RIGHT BREAST CAPSULAR TISSUE R/O LYMPOMA    B Breast, Right Tissue · NON-GYNECOLOGIC CYTOLOGY   Melanie Sanford MD 7/9/19 1353 No     Description: FRESH RIGHT BREAST CAPSULAR FLUID     C Breast, Right Tissue · TISSUE PATHOLOGY EXAM   Melanie Sanford MD 7/9/19 1550 No     Description: right breast capsule    D Breast, Left Tissue · TISSUE PATHOLOGY EXAM   Melanie Sanford MD 7/9/19 1705      Description: left breast implant capsule                Drains:   Closed/Suction Drain 1 Right Breast Bulb 15 Fr. (Active)   Dressing Status Clean;Dry;Intact 7/9/2019  7:14 PM   Drainage Appearance Serosanguineous 7/9/2019  7:14 PM   Status To bulb  suction 7/9/2019  7:14 PM       Closed/Suction Drain 2 Left Breast Bulb 15 Fr. (Active)   Dressing Status Clean;Intact;Dry 7/9/2019  7:14 PM   Drainage Appearance Serosanguineous 7/9/2019  7:14 PM   Status To bulb suction 7/9/2019  7:14 PM       Complications: None      CarleyLaure Sanford MD     Date: 7/9/2019  Time: 7:25 PM

## 2019-07-09 NOTE — ANESTHESIA PROCEDURE NOTES
Airway  Urgency: elective    Difficult airway    General Information and Staff    Patient location during procedure: OR    Indications and Patient Condition  Indications for airway management: airway protection    Preoxygenated: yes  MILS maintained throughout  Mask difficulty assessment: 1 - vent by mask    Final Airway Details  Final airway type: endotracheal airway      Successful airway: ETT  Cuffed: yes   Successful intubation technique: direct laryngoscopy  Endotracheal tube insertion site: oral  Blade: Rocío  Blade size: 3  ETT size (mm): 6.5  Cormack-Lehane Classification: grade IIa - partial view of glottis  Placement verified by: chest auscultation   Measured from: gums  ETT to gums (cm): 22  Number of attempts at approach: 1

## 2019-07-09 NOTE — INTERVAL H&P NOTE
[facility] Pre-op    Full history and physical note from office is up to date.  See office note attached.    Afebrile /87 O2 97% HR 72  LAB Results:  Lab Results   Component Value Date    WBC 6.12 07/01/2019    HGB 13.9 07/01/2019    HCT 41.7 07/01/2019    MCV 87.4 07/01/2019     07/01/2019    NEUTROABS 2.86 07/01/2019    GLUCOSE 59 (L) 07/03/2019    BUN 13 07/03/2019    CREATININE 0.81 07/03/2019    EGFRIFNONA 73 07/03/2019     07/03/2019    K 4.6 07/03/2019     07/03/2019    CO2 28.4 07/03/2019    CALCIUM 10.4 07/03/2019    ALBUMIN 4.60 07/03/2019    AST 29 07/03/2019    ALT 25 07/03/2019    BILITOT 0.2 07/03/2019     Ann-Marie Berumen, APRN 7/9/2019 11:15 AM

## 2019-07-10 NOTE — OP NOTE
Preoperative Diagnosis:   1. Capsular contracture bilateral breast implants  2. Personal history of breast carcinoma  3.  Acquired absence of bilateral breasts status post bilateral mastectomies    Postoperative diagnosis:   1. Capsular contracture bilateral breast implants  2. Bilateral ruptured silicone breast implants  3. Right breast bloody seroma  4. Personal history of breast carcinoma  5. Acquired absence of bilateral breast status post bilateral mastectomies    Procedure:   1. Right breast capsulectomy with drainage of seroma, capsular biopsy and removal of ruptured silicone breast implant  2. Placement of new silicone implant for right breast reconstruction  3. Left breast capsulectomy and removal of ruptured silicone breast implant  4. Placement of new silicone implant for left breast reconstruction    Surgeon: Carley Peralta M.D.    Assistant: Marie La PA-C    Anesthesia: General endotracheal    Estimated blood loss: 100 mL    Complications: None    Procedure in detail: The patient was identified and taken to the OR where she was placed in a supine position and general anesthesia was induced. Bilateral sequential compression devices were placed prior to induction of anesthesia.  Care was taken to position her extremities.  The patient was then prepped and draped in a sterile fashion.  Attention was first directed to the right side.  An incision was made along the old  mastectomy scar at the lateral aspect of the breast and carried down to the level of the pectoralis major muscle. Dissection was then carried out in an inferior direction deep to the level of the subcutaneous tissues and superficial to the level of the pectoralis major muscle. The implant capsule was seen at the inferior border and dissection was carried out to separate the overlying pectoralis major muscle from the underlying breast capsule. A tiny hole was made in the capsule and dark brown serous fluid immediately drained from  within the capsule. The capsule was incised and the fluid was drained. It was sent for cytology to rule out ALCL. There was approximately 150 to 200 mL of fluid within the capsule. The silicone implant was noted to be ruptured with multiple tears in the implant shell. The implant shell and silicone were completely removed.  The capsule was noted to be extremely thick.  It was also noted to have a double capsule. A fleshy lesion was noted at the lateral aspect of the interior capsule which was verrucous in appearance. It was biopsied and sent fresh to the pathology lab to be tested to rule out lymphoma.  Capsulectomy was then performed  the thickened capsule from the overlying pectoralis major muscle and surrounding tissues. It was intimately adherent to the muscle and surrounding tissues, making the dissection much more difficult. It was also intimately adherent to the underlying ribs and rib fascia. Most of it could be , but a small portion of posterior capsule approximately 4 cm x 2 cm remained adherent and could not be  from the rib and rib facia at the superomedial aspect of the capsule. It was therefore treated with Bovie electrocautery. The remaining capsule was removed in its entirety. The pocket was then irrigated with copious amounts of antibiotic irrigation and hemostasis achieved with Bovie electrocautery. A sizer was placed and the skin temporarily closed with staples. Attention was then directed to the left side.  Similarly, an incision was made along the old mastectomy scar at the lateral aspect of the breast.  The incision was carried down to the level of the pectoralis major muscle. Dissection was carried out deep to the level of the subcutaneous tissues and superficial to the level of the pectoralis major muscle in an inferior direction to the inferior border of the muscle where the implant capsule was seen. The implant capsule which was incised. The implant was noted to  be ruptured and the shell was in multiple pieces. The entire silicone implant shell and silicone gel were removed. The capsule was moderately thick but not nearly as thick as the contralateral side. It was also not as adherent to the surrounding tissues and overlying muscle. It was then dissected free from the overlying pectoralis major muscle and surrounding tissues using Bovie electrocautery. Upon dissecting it free from the underlying ribs and rib fascia, there was an adherent portion approximately 4.5 cm x 1.5 cm at the superomedial aspect of the posterior capsule.. This was treated with Bovie electrocautery. The remaining capsule was removed in its entirety.  The tissues were irrigated with copious amounts of antibiotic irrigation and hemostasis was achieved with Bovie electrovautery. A sizer was then placed and the right breast noted to be smaller than the left side. It appeared that there was soft tissue deficiency on the right side, compared with the left and particularly at the inferomedial aspect of the right breast. It was determined that a larger implant would be required on the right side. A larger sizer was therefore placed and the patient was then placed into a sitting position to assess for symmetry.  It was felt that the patient would require a silicone implant 605 mL on the right side and 500 mL on the left side.  The patient was placed back into a supine position.  The sizers were removed. The pockets were irrigated with both antibiotic irrigation and dilute Betadine irrigation. A number 15 Barry drain was placed through a separate stab wound and sutured in place. One drain was placed on each side. The skin was then reprepped and gloves were changed.  On the right [facility] smooth silicone gel breast implant, reference number SCF-605 serial #[id number] size 605 mL was brought onto the field.  It was placed into the right breast pocket using a Schmidt funnel.  On the left side  the [place] memory gel breast implant, smooth high profile silicone implant, reference number [id number]  serial #[id number] size 500 mL was brought onto the field.  It was placed into the left breast pocket using a Schmidt funnel.  Closure then proceeded in 3 layers closing the muscle/fascial layer followed by the deep dermis and subcutis.  The drains replaced to bulb suction. Dry dressings were then applied followed by a surgical support bra. The patient tolerated the procedure well.

## 2019-07-11 LAB
LAB AP CASE REPORT: NORMAL
PATH REPORT.FINAL DX SPEC: NORMAL

## 2019-07-13 LAB
CYTO UR: NORMAL
LAB AP CASE REPORT: NORMAL
LAB AP CLINICAL INFORMATION: NORMAL
LAB AP DIAGNOSIS COMMENT: NORMAL
LAB AP FLOW CYTOMETRY SUMMARY: NORMAL
PATH REPORT.FINAL DX SPEC: NORMAL
PATH REPORT.GROSS SPEC: NORMAL

## 2019-07-17 DIAGNOSIS — K21.9 GASTROESOPHAGEAL REFLUX DISEASE, ESOPHAGITIS PRESENCE NOT SPECIFIED: ICD-10-CM

## 2019-07-17 DIAGNOSIS — N32.81 OAB (OVERACTIVE BLADDER): ICD-10-CM

## 2019-07-17 RX ORDER — MIRABEGRON 50 MG/1
TABLET, FILM COATED, EXTENDED RELEASE ORAL
Qty: 30 TABLET | Refills: 5 | Status: SHIPPED | OUTPATIENT
Start: 2019-07-17 | End: 2020-01-15

## 2019-07-17 RX ORDER — OMEPRAZOLE 20 MG/1
CAPSULE, DELAYED RELEASE ORAL
Qty: 30 CAPSULE | Refills: 5 | Status: SHIPPED | OUTPATIENT
Start: 2019-07-17 | End: 2020-01-15

## 2019-09-15 DIAGNOSIS — F32.9 MAJOR DEPRESSIVE DISORDER, REMISSION STATUS UNSPECIFIED, UNSPECIFIED WHETHER RECURRENT: ICD-10-CM

## 2019-09-15 RX ORDER — VENLAFAXINE HYDROCHLORIDE 150 MG/1
CAPSULE, EXTENDED RELEASE ORAL
Qty: 30 CAPSULE | Refills: 1 | Status: SHIPPED | OUTPATIENT
Start: 2019-09-15 | End: 2019-10-29 | Stop reason: SDUPTHER

## 2019-10-29 ENCOUNTER — TELEPHONE (OUTPATIENT)
Dept: INTERNAL MEDICINE | Facility: CLINIC | Age: 54
End: 2019-10-29

## 2019-10-29 DIAGNOSIS — F32.9 MAJOR DEPRESSIVE DISORDER, REMISSION STATUS UNSPECIFIED, UNSPECIFIED WHETHER RECURRENT: ICD-10-CM

## 2019-10-29 RX ORDER — VENLAFAXINE HYDROCHLORIDE 150 MG/1
150 CAPSULE, EXTENDED RELEASE ORAL DAILY
Qty: 30 CAPSULE | Refills: 1 | Status: SHIPPED | OUTPATIENT
Start: 2019-10-29 | End: 2020-01-13 | Stop reason: SDUPTHER

## 2019-10-29 NOTE — TELEPHONE ENCOUNTER
Informing pt no appt needed at this time for future refills. Pt would like refill, sent in per protocol.

## 2019-10-29 NOTE — TELEPHONE ENCOUNTER
Pt called to see if she needs to schedule another appt before she can get her venlafaxine XR refilled.  She says she does not need a refill at this time but just wants to know if she needs appt first.  Pt requesting call back.

## 2020-01-13 ENCOUNTER — OFFICE VISIT (OUTPATIENT)
Dept: INTERNAL MEDICINE | Facility: CLINIC | Age: 55
End: 2020-01-13

## 2020-01-13 VITALS
SYSTOLIC BLOOD PRESSURE: 120 MMHG | OXYGEN SATURATION: 98 % | DIASTOLIC BLOOD PRESSURE: 78 MMHG | BODY MASS INDEX: 29.29 KG/M2 | WEIGHT: 150 LBS | HEART RATE: 77 BPM

## 2020-01-13 DIAGNOSIS — F32.9 MAJOR DEPRESSIVE DISORDER, REMISSION STATUS UNSPECIFIED, UNSPECIFIED WHETHER RECURRENT: ICD-10-CM

## 2020-01-13 DIAGNOSIS — M25.532 LEFT WRIST PAIN: Primary | ICD-10-CM

## 2020-01-13 PROCEDURE — 99213 OFFICE O/P EST LOW 20 MIN: CPT | Performed by: PHYSICIAN ASSISTANT

## 2020-01-13 RX ORDER — VENLAFAXINE HYDROCHLORIDE 150 MG/1
150 CAPSULE, EXTENDED RELEASE ORAL DAILY
Qty: 90 CAPSULE | Refills: 1 | Status: SHIPPED | OUTPATIENT
Start: 2020-01-13 | End: 2020-06-24 | Stop reason: SDUPTHER

## 2020-01-13 NOTE — PROGRESS NOTES
Chief Complaint   Patient presents with   • Knot on left hand     Acute       Subjective   Alcira Phelan is a 54 y.o. female.       History of Present Illness     About 2 months ago pt noticed some swelling in her left radial wrist. Has pain that radiates into her hands and up her arm. Worse at night and in the mornings. Has been told she has carpal tunnel in her left hand.        Current Outpatient Medications:   •  Multiple Vitamins-Minerals (MULTI VITAMIN/MINERALS) tablet, Take  by mouth., Disp: , Rfl:   •  MYRBETRIQ 50 MG tablet sustained-release 24 hour 24 hr tablet, TAKE ONE TABLET BY MOUTH DAILY, Disp: 30 tablet, Rfl: 5  •  omeprazole (priLOSEC) 20 MG capsule, TAKE ONE CAPSULE BY MOUTH DAILY, Disp: 30 capsule, Rfl: 5  •  oxyCODONE (ROXICODONE) 5 MG immediate release tablet, Take 1 tablet by mouth Every 4 (Four) Hours As Needed for Severe Pain  for up to 15 doses., Disp: 15 tablet, Rfl: 0  •  promethazine (PHENERGAN) 12.5 MG tablet, Take 1 tablet by mouth Every 6 (Six) Hours As Needed for Nausea or Vomiting., Disp: 15 tablet, Rfl: 0  •  venlafaxine XR (EFFEXOR-XR) 150 MG 24 hr capsule, Take 1 capsule by mouth Daily., Disp: 90 capsule, Rfl: 1     PMFSH  The following portions of the patient's history were reviewed and updated as appropriate: allergies, current medications, past family history, past medical history, past social history, past surgical history and problem list.    Review of Systems   Constitutional: Negative for fever and unexpected weight change.   HENT: Negative.    Eyes: Negative.    Respiratory: Negative for chest tightness, shortness of breath and wheezing.    Cardiovascular: Negative.    Gastrointestinal: Negative for abdominal pain.   Endocrine: Negative.    Genitourinary: Negative.    Musculoskeletal: Positive for arthralgias, joint swelling and myalgias.   Skin: Negative for color change, rash and wound.   Allergic/Immunologic: Negative.    Neurological: Negative for seizures and  syncope.   Hematological: Negative for adenopathy. Does not bruise/bleed easily.   Psychiatric/Behavioral: Negative for confusion.       Objective   /78   Pulse 77   Wt 68 kg (150 lb)   SpO2 98%   BMI 29.29 kg/m²     Physical Exam   Constitutional: She is oriented to person, place, and time. She appears well-developed and well-nourished.   HENT:   Head: Normocephalic and atraumatic.   Right Ear: External ear normal.   Left Ear: External ear normal.   Eyes: Conjunctivae are normal.   Neck: Normal range of motion.   Cardiovascular: Normal rate and regular rhythm.   Pulmonary/Chest: Effort normal and breath sounds normal.   Musculoskeletal:        Left wrist: She exhibits tenderness (radial) and swelling (radial). She exhibits normal range of motion and no bony tenderness.   Neurological: She is alert and oriented to person, place, and time.   Skin: Skin is warm and dry.            ASSESSMENT/PLAN    Problem List Items Addressed This Visit        Other    Depression     Psychological condition is unchanged.  Continue current treatment regimen.  Regular aerobic exercise.  Psychological condition  will be reassessed at the next regular appointment.         Relevant Medications    venlafaxine XR (EFFEXOR-XR) 150 MG 24 hr capsule      Other Visit Diagnoses     Left wrist pain    -  Primary    Check xray of wrist. Refer for nerve conduction study and hand specialist referral.    Relevant Orders    XR Wrist 3+ View Left    EMG & Nerve Conduction Test    Ambulatory Referral to Hand Surgery               Return in about 6 months (around 7/13/2020) for Annual with pap and fasting labs.

## 2020-01-14 ENCOUNTER — HOSPITAL ENCOUNTER (OUTPATIENT)
Dept: GENERAL RADIOLOGY | Facility: HOSPITAL | Age: 55
Discharge: HOME | End: 2020-01-14
Admitting: PHYSICIAN ASSISTANT

## 2020-01-14 DIAGNOSIS — M25.532 LEFT WRIST PAIN: ICD-10-CM

## 2020-01-14 PROCEDURE — 73110 X-RAY EXAM OF WRIST: CPT

## 2020-01-15 DIAGNOSIS — N32.81 OAB (OVERACTIVE BLADDER): ICD-10-CM

## 2020-01-15 DIAGNOSIS — K21.9 GASTROESOPHAGEAL REFLUX DISEASE, ESOPHAGITIS PRESENCE NOT SPECIFIED: ICD-10-CM

## 2020-01-15 RX ORDER — MIRABEGRON 50 MG/1
TABLET, FILM COATED, EXTENDED RELEASE ORAL
Qty: 30 TABLET | Refills: 1 | Status: SHIPPED | OUTPATIENT
Start: 2020-01-15 | End: 2020-06-24 | Stop reason: SDUPTHER

## 2020-01-15 RX ORDER — OMEPRAZOLE 20 MG/1
CAPSULE, DELAYED RELEASE ORAL
Qty: 30 CAPSULE | Refills: 1 | Status: SHIPPED | OUTPATIENT
Start: 2020-01-15 | End: 2020-04-01

## 2020-01-16 ENCOUNTER — TELEPHONE (OUTPATIENT)
Dept: INTERNAL MEDICINE | Facility: CLINIC | Age: 55
End: 2020-01-16

## 2020-01-16 NOTE — TELEPHONE ENCOUNTER
Patient called wanting to know her x-ray results she had done on 01/14/20. She can be reached at [phone]. Patient states that you can leave a voicemail if she does not answer.

## 2020-01-17 ENCOUNTER — TELEPHONE (OUTPATIENT)
Dept: INTERNAL MEDICINE | Facility: CLINIC | Age: 55
End: 2020-01-17

## 2020-01-17 RX ORDER — IBUPROFEN 800 MG/1
800 TABLET ORAL EVERY 6 HOURS PRN
Qty: 30 TABLET | Refills: 0 | Status: SHIPPED | OUTPATIENT
Start: 2020-01-17 | End: 2020-04-14 | Stop reason: SDUPTHER

## 2020-01-17 NOTE — TELEPHONE ENCOUNTER
PT called, states she was seen Monday, 1/13/2020, for hand pain. PT states pain is ongoing and was wondering if PCP could call in Ibuprofen 800 MG for her.     Confirmed Pharmacy: [facility] [address] [place] [address] [address] [facility] [address] [employer] [address] - [phone]  - [phone] FX.    Please call Alcira back with an update: [phone]

## 2020-01-22 RX ORDER — IBUPROFEN 800 MG/1
TABLET ORAL
Qty: 30 TABLET | Refills: 0 | OUTPATIENT
Start: 2020-01-22

## 2020-02-06 ENCOUNTER — HOSPITAL ENCOUNTER (OUTPATIENT)
Dept: NEUROLOGY | Facility: HOSPITAL | Age: 55
Discharge: HOME | End: 2020-02-06
Admitting: PHYSICIAN ASSISTANT

## 2020-02-06 ENCOUNTER — TELEPHONE (OUTPATIENT)
Dept: INTERNAL MEDICINE | Facility: CLINIC | Age: 55
End: 2020-02-06

## 2020-02-06 DIAGNOSIS — M25.532 LEFT WRIST PAIN: ICD-10-CM

## 2020-02-06 PROCEDURE — 95908 NRV CNDJ TST 3-4 STUDIES: CPT

## 2020-02-06 PROCEDURE — 95886 MUSC TEST DONE W/N TEST COMP: CPT

## 2020-02-06 NOTE — TELEPHONE ENCOUNTER
Patient called regarding results from EMG this morning.    Patient is supposed to be going to see a surgeon tomorrow and would like a call back about the results beforehand.     Patient call back: [phone].

## 2020-03-30 ENCOUNTER — TELEPHONE (OUTPATIENT)
Dept: INTERNAL MEDICINE | Facility: CLINIC | Age: 55
End: 2020-03-30

## 2020-03-30 ENCOUNTER — NURSE TRIAGE (OUTPATIENT)
Dept: CALL CENTER | Facility: HOSPITAL | Age: 55
End: 2020-03-30

## 2020-03-30 NOTE — TELEPHONE ENCOUNTER
"    Reason for Disposition  • [1] Cough occurs AND [2] within 14 days of COVID-19 EXPOSURE  • [1] Fever (or feeling feverish) OR cough occurs AND [2] travel from or living in high risk area (identified by CDC) AND [3] within last 14 days    Additional Information  • Negative: Severe difficulty breathing (e.g., struggling for each breath, speak in single words, bluish lips)  • Negative: Sounds like a life-threatening emergency to the triager  • Negative: [1] Difficulty breathing (shortness of breath) occurs AND [2] onset > 14 days after COVID-19 EXPOSURE (Close Contact)  • Negative: [1] Dry cough occurs AND [2] onset > 14 days after COVID-19 EXPOSURE  • Negative: [1] Wet cough (i.e., white-yellow, yellow, green, or jesus colored sputum) AND [2] onset > 14 days after COVID-19 EXPOSURE  • Negative: [1] Common cold symptoms AND [2] onset > 14 days after COVID-19 EXPOSURE  • Negative: [1] Difficulty breathing occurs AND [2] within 14 days of COVID-19 EXPOSURE (Close Contact)  • Negative: Patient sounds very sick or weak to the triager  • Negative: [1] Fever or feeling feverish AND [2] within 14 Days of COVID-19 EXPOSURE (Close Contact)    Answer Assessment - Initial Assessment Questions  1. CONFIRMED CASE: \"Who is the person with the confirmed COVID-19 infection that you were exposed to?\"      Pt has no known exposure to COVID-19. Pt works in [employer] in [facility]. Last day of work was 3/25/20. Pt went shopping at [email or web address] in [facility] on 3/30/20. Pt has a 3 year old grandchild. Last time patient was with grandchild was 3/22/20.      2. PLACE of CONTACT: \"Where were you when you were exposed to COVID-19  (coronavirus disease 2019)?\" (e.g., city, state, country)      [facility]       3. TYPE of CONTACT: \"How much contact was there?\" (e.g., live in same house, work in same office, same school)      Work at [employer], visited grandchild in her home, and out in the community with close contact with the " "public.      4. DATE of CONTACT: \"When did you have contact with a coronavirus patient?\" (e.g., days)      [place] on 3/30/20. Grandchild on 3/22/20. Last day at work was 3/25/20.      5. DURATION of CONTACT: \"How long were you in contact with the COVID-19 (coronavirus disease) patient?\" (e.g., a few seconds, passed by person, a few minutes, live with the patient)      Pt works for hours. Pt visited with grandchild for 3 hours. Pt is in [facility] at time of call.       6. SYMPTOMS: \"Do you have any symptoms?\" (e.g., fever, cough, breathing difficulty)      Fever-no. She feels warm but never has a fever      Cough-it's dry and wet. Sputum she coughs up is clear. Cough started on 3/26/20.       Breathing difficulty-no       Other symptoms-sweating, clear nasal drainage, nasal congestion, headache, and sore throat that comes and goes      7. PREGNANCY OR POSTPARTUM: \"Is there any chance you are pregnant?\" \"When was your last menstrual period?\" \"Did you deliver in the last 2 weeks?\"      No      8. HIGH RISK: \"Do you have any heart or lung problems? Do you have a weakened immune system?\" (e.g., CHF, COPD, asthma, HIV positive, chemotherapy, renal failure, diabetes mellitus, sickle cell anemia)      Pt has no problems with her heart and lungs. Pt reports no weakened immune system but has HX of cancer.      Pt went to [facility] in [facility] on 3/26/20. Pt was afebrile at clinic. Flu test was negative.    Protocols used: CORONAVIRUS (COVID-19) EXPOSURE-ADULT-AH      "

## 2020-03-30 NOTE — TELEPHONE ENCOUNTER
Please contact pt to screen for possible covid 19 symptoms/exposure  Currently does not have mychart

## 2020-03-31 ENCOUNTER — NURSE TRIAGE (OUTPATIENT)
Dept: CALL CENTER | Facility: HOSPITAL | Age: 55
End: 2020-03-31

## 2020-03-31 ENCOUNTER — TELEPHONE (OUTPATIENT)
Dept: INTERNAL MEDICINE | Facility: CLINIC | Age: 55
End: 2020-03-31

## 2020-03-31 NOTE — TELEPHONE ENCOUNTER
There is a note in her chart that may have been mailed or can be printed for someone to . Her  can use that note or he can contact his PCP for a note. We cannot write one specifically for him since he is not our patient.

## 2020-03-31 NOTE — TELEPHONE ENCOUNTER
PATIENT CALLED STATING SHE IS ON QUARANTINE AS OF YESTERDAY, AND SO IS HER . PATIENT IS REQUESTING A WORK EXCUSE FOR HER AND HER , PATIENT STATES THAT SHE WAS TOLD AN EXCUSE WOULD BE MAILED TO HER YESTERDAY BUT NOW THAT HE HAS TO BE QUARANTINED, SHE IS REQUESTING A NOTE FOR HIM TO BE MAILED TO THEIR HOME TOO,.      'S NAME    SHELDON THOMAS    PLEASE CALL PATIENT AND CONFIRM   [phone]

## 2020-03-31 NOTE — TELEPHONE ENCOUNTER
"    Reason for Disposition  • Information only question and nurse able to answer    Additional Information  • Negative: Nursing judgment  • Negative: Nursing judgment  • Negative: Nursing judgment  • Negative: Nursing judgment    Answer Assessment - Initial Assessment Questions  1. REASON FOR CALL or QUESTION: \"What is your reason for calling today?\" or \"How can I best help you?\" or \"What question do you have that I can help answer?\"        Pt was advised yesterday to self quarantine for 14 days r/t S&S. She's requesting a work note for  as well since they live in the same home. RN advised patient that her  needs to call his PCP and     1. ask for a note for himself or   2. he can use the one we've mailed for her.     Informed patient that since her  has a different PCP RN is unable to provide note for him. Patient verbalized understanding and requested a copy of the note that was generated yesterday be mailed to her. RN will request a copy of the patients work note be mailed to her.    Protocols used: INFORMATION ONLY CALL - NO TRIAGE-ADULT-OH      "

## 2020-04-01 DIAGNOSIS — K21.9 GASTROESOPHAGEAL REFLUX DISEASE, ESOPHAGITIS PRESENCE NOT SPECIFIED: ICD-10-CM

## 2020-04-01 RX ORDER — OMEPRAZOLE 20 MG/1
CAPSULE, DELAYED RELEASE ORAL
Qty: 30 CAPSULE | Refills: 0 | Status: SHIPPED | OUTPATIENT
Start: 2020-04-01 | End: 2020-05-06

## 2020-04-07 ENCOUNTER — TELEPHONE (OUTPATIENT)
Dept: INTERNAL MEDICINE | Facility: CLINIC | Age: 55
End: 2020-04-07

## 2020-04-07 NOTE — TELEPHONE ENCOUNTER
Pt said Susana Marquez put her whole family on self quarantine for 14 days.  Pt says she is needing a note for her husbands work to excuse him from being off.  Pt is requesting call back to discuss further.

## 2020-04-07 NOTE — TELEPHONE ENCOUNTER
She was advised to self-quarantine by the nursing triage based on her symptoms. She was told at that time and again when she called our office the next day that we cannot provide a note for her , he would need to get that from his primary care provider.

## 2020-04-14 RX ORDER — IBUPROFEN 800 MG/1
800 TABLET ORAL EVERY 6 HOURS PRN
Qty: 30 TABLET | Refills: 1 | Status: SHIPPED | OUTPATIENT
Start: 2020-04-14 | End: 2020-05-19 | Stop reason: SDUPTHER

## 2020-04-14 NOTE — TELEPHONE ENCOUNTER
PATIENT REQUESTING A REFILL ON HER ibuprofen (ADVIL,MOTRIN) 800 MG tablet    PHARMACY VERIFIED AS:  [facility] [place] [address] [address] [facility] [address]  - [phone]  - [phone] FX     ANY QUESTIONS PLEASE CALL PATIENT   [phone]

## 2020-05-06 ENCOUNTER — TELEPHONE (OUTPATIENT)
Dept: INTERNAL MEDICINE | Facility: CLINIC | Age: 55
End: 2020-05-06

## 2020-05-06 DIAGNOSIS — K21.9 GASTROESOPHAGEAL REFLUX DISEASE, ESOPHAGITIS PRESENCE NOT SPECIFIED: ICD-10-CM

## 2020-05-06 RX ORDER — OMEPRAZOLE 20 MG/1
CAPSULE, DELAYED RELEASE ORAL
Qty: 30 CAPSULE | Refills: 0 | Status: SHIPPED | OUTPATIENT
Start: 2020-05-06 | End: 2020-06-24 | Stop reason: SDUPTHER

## 2020-05-06 NOTE — TELEPHONE ENCOUNTER
Pt called in with extreme chest pain and states it feels like a brick on her chest, I advised her to call 911 and go to the ED and she voiced understanding,

## 2020-05-19 ENCOUNTER — TELEPHONE (OUTPATIENT)
Dept: INTERNAL MEDICINE | Facility: CLINIC | Age: 55
End: 2020-05-19

## 2020-05-19 RX ORDER — IBUPROFEN 800 MG/1
800 TABLET ORAL 2 TIMES DAILY PRN
Qty: 60 TABLET | Refills: 2 | Status: SHIPPED | OUTPATIENT
Start: 2020-05-19 | End: 2020-08-03 | Stop reason: SDUPTHER

## 2020-05-19 NOTE — TELEPHONE ENCOUNTER
I sent in refill of the ibuprofen but I want her to only take it when she needs it and make sure she takes it with a big glass of water and some food on her stomach.     Also, she should be cleared to care for her granddaughter.

## 2020-05-19 NOTE — TELEPHONE ENCOUNTER
PATIENT CALLING TO REQUEST A REFILL ON     ibuprofen (ADVIL,MOTRIN) 800 MG tablet    PATIENT SAYS SHE DID SEE THE HAND SPECIALIST AND IT WAS RECOMMENDED THAT SHE GET A STEROID INJECTION. PT SAYS SHE DECLINED THAT AT THE TIME AND SAYS  MG MOTRIN HAS BEEN WORKING PRETTY GOOD.     PATIENT SAYS SHE IS TAKING TWICE A DAY.     PATIENT ALSO WANTS TO KNOW IF IT'S ALRIGHT TO START WATCHING HER GRANDDAUGHTER AGAIN.   SAYS SHE PREV HAD COVID BUT HAS BEEN CLEARED AND BACK TO WORK FOR ABOUT A MONTH NOW.     [facility] [id number] [facility]--CONFIRMED       PATIENT CALLBACK [phone]

## 2020-05-24 PROCEDURE — U0003 INFECTIOUS AGENT DETECTION BY NUCLEIC ACID (DNA OR RNA); SEVERE ACUTE RESPIRATORY SYNDROME CORONAVIRUS 2 (SARS-COV-2) (CORONAVIRUS DISEASE [COVID-19]), AMPLIFIED PROBE TECHNIQUE, MAKING USE OF HIGH THROUGHPUT TECHNOLOGIES AS DESCRIBED BY CMS-2020-01-R: HCPCS | Performed by: NURSE PRACTITIONER

## 2020-05-27 ENCOUNTER — TELEPHONE (OUTPATIENT)
Dept: URGENT CARE | Facility: CLINIC | Age: 55
End: 2020-05-27

## 2020-05-27 NOTE — TELEPHONE ENCOUNTER
Pt called and given negative covid results. Pt states she is feeling better. Advised pt that CDC recommends at least 7 day home quarantine from onset of symptoms. MARIA DE JESUS

## 2020-06-06 DIAGNOSIS — N32.81 OAB (OVERACTIVE BLADDER): ICD-10-CM

## 2020-06-06 DIAGNOSIS — K21.9 GASTROESOPHAGEAL REFLUX DISEASE, ESOPHAGITIS PRESENCE NOT SPECIFIED: ICD-10-CM

## 2020-06-08 RX ORDER — OMEPRAZOLE 20 MG/1
CAPSULE, DELAYED RELEASE ORAL
Qty: 30 CAPSULE | Refills: 0 | OUTPATIENT
Start: 2020-06-08

## 2020-06-08 RX ORDER — MIRABEGRON 50 MG/1
TABLET, FILM COATED, EXTENDED RELEASE ORAL
Qty: 30 TABLET | Refills: 0 | OUTPATIENT
Start: 2020-06-08

## 2020-06-24 ENCOUNTER — OFFICE VISIT (OUTPATIENT)
Dept: INTERNAL MEDICINE | Facility: CLINIC | Age: 55
End: 2020-06-24

## 2020-06-24 VITALS
DIASTOLIC BLOOD PRESSURE: 82 MMHG | HEIGHT: 60 IN | BODY MASS INDEX: 29.29 KG/M2 | RESPIRATION RATE: 16 BRPM | TEMPERATURE: 97.2 F | SYSTOLIC BLOOD PRESSURE: 128 MMHG

## 2020-06-24 DIAGNOSIS — F32.9 MAJOR DEPRESSIVE DISORDER, REMISSION STATUS UNSPECIFIED, UNSPECIFIED WHETHER RECURRENT: ICD-10-CM

## 2020-06-24 DIAGNOSIS — N32.81 OAB (OVERACTIVE BLADDER): ICD-10-CM

## 2020-06-24 DIAGNOSIS — K21.9 GASTROESOPHAGEAL REFLUX DISEASE, ESOPHAGITIS PRESENCE NOT SPECIFIED: ICD-10-CM

## 2020-06-24 PROCEDURE — 99213 OFFICE O/P EST LOW 20 MIN: CPT | Performed by: NURSE PRACTITIONER

## 2020-06-24 RX ORDER — VENLAFAXINE HYDROCHLORIDE 150 MG/1
150 CAPSULE, EXTENDED RELEASE ORAL DAILY
Qty: 30 CAPSULE | Refills: 0 | Status: SHIPPED | OUTPATIENT
Start: 2020-06-24 | End: 2020-07-15 | Stop reason: SDUPTHER

## 2020-06-24 RX ORDER — VENLAFAXINE HYDROCHLORIDE 150 MG/1
150 CAPSULE, EXTENDED RELEASE ORAL DAILY
Qty: 30 CAPSULE | Refills: 0 | Status: CANCELLED | OUTPATIENT
Start: 2020-06-24

## 2020-06-24 RX ORDER — OMEPRAZOLE 20 MG/1
20 CAPSULE, DELAYED RELEASE ORAL DAILY
Qty: 30 CAPSULE | Refills: 0 | Status: SHIPPED | OUTPATIENT
Start: 2020-06-24 | End: 2020-07-20

## 2020-06-24 NOTE — PROGRESS NOTES
Subjective     Alcira Phelan is a 55 y.o. female here today for Anxiety (Follow Up); Depression; and Med Refill        I have reviewed the following portions of the patient's history and confirmed they are accurate: allergies, current medications, past family history, past medical history, past social history, past surgical history and problem list    I have personally completed the patient's review of systems.  55 year old female presents to follow up for medication refills. She has a history of anxiety and depression. She also has overactive bladder. She is needing refills until her next appointment with her primary. She has been tolerating the medication well with no issues.      Review of Systems   Constitutional: Negative for chills and fever.   Eyes: Negative for blurred vision and visual disturbance.   Respiratory: Negative for cough.    Cardiovascular: Negative for chest pain, palpitations and leg swelling.   Gastrointestinal: Negative for abdominal pain and nausea.   Genitourinary: Negative for difficulty urinating, dysuria, hematuria and urinary incontinence.   Skin: Negative for rash.   Allergic/Immunologic: Negative for immunocompromised state.   Neurological: Negative for headache.   Hematological: Negative for adenopathy.   Psychiatric/Behavioral: Negative for behavioral problems, sleep disturbance, suicidal ideas and depressed mood. The patient is nervous/anxious.        Current Outpatient Medications on File Prior to Visit   Medication Sig   • ibuprofen (ADVIL,MOTRIN) 800 MG tablet Take 1 tablet by mouth 2 (Two) Times a Day As Needed for Mild Pain .   • Multiple Vitamins-Minerals (MULTI VITAMIN/MINERALS) tablet Take  by mouth.   • [DISCONTINUED] MYRBETRIQ 50 MG tablet sustained-release 24 hour 24 hr tablet TAKE ONE TABLET BY MOUTH DAILY   • [DISCONTINUED] omeprazole (priLOSEC) 20 MG capsule TAKE ONE CAPSULE BY MOUTH DAILY   • [DISCONTINUED] venlafaxine XR (EFFEXOR-XR) 150 MG 24 hr capsule Take 1  "capsule by mouth Daily.   • [DISCONTINUED] oxyCODONE (ROXICODONE) 5 MG immediate release tablet Take 1 tablet by mouth Every 4 (Four) Hours As Needed for Severe Pain  for up to 15 doses.   • [DISCONTINUED] promethazine (PHENERGAN) 12.5 MG tablet Take 1 tablet by mouth Every 6 (Six) Hours As Needed for Nausea or Vomiting.     No current facility-administered medications on file prior to visit.        Objective   Vitals:    06/24/20 0956   BP: 128/82   Resp: 16   Temp: 97.2 °F (36.2 °C)   Height: 152.4 cm (60\")   PainSc: 0-No pain     Body mass index is 29.29 kg/m².    Physical Exam   Constitutional: She is oriented to person, place, and time. She appears well-developed and well-nourished. No distress.   HENT:   Head: Normocephalic and atraumatic.   Eyes: Pupils are equal, round, and reactive to light. Conjunctivae and EOM are normal.   Neck: Normal range of motion. Neck supple.   Cardiovascular: Normal rate, regular rhythm and normal heart sounds. Exam reveals no gallop and no friction rub.   No murmur heard.  Pulmonary/Chest: Effort normal and breath sounds normal.   Musculoskeletal: Normal range of motion.   Lymphadenopathy:     She has no cervical adenopathy.   Neurological: She is alert and oriented to person, place, and time.   Skin: Skin is warm. Capillary refill takes less than 2 seconds. No rash noted. She is not diaphoretic.   Psychiatric: She has a normal mood and affect. Her behavior is normal. Judgment and thought content normal.   Nursing note and vitals reviewed.      Assessment/Plan   Problems Addressed this Visit        Genitourinary    OAB (overactive bladder)     Refilled Myrbetriq         Relevant Medications    Mirabegron ER (Myrbetriq) 50 MG tablet sustained-release 24 hour 24 hr tablet       Other    Depression     Psychological condition is improving with treatment.  Continue current treatment regimen.  Psychological condition  will be reassessed at the next regular appointment.         " "Relevant Medications    venlafaxine XR (EFFEXOR-XR) 150 MG 24 hr capsule      Other Visit Diagnoses     Gastroesophageal reflux disease, esophagitis presence not specified        Relevant Medications    omeprazole (priLOSEC) 20 MG capsule             Current Outpatient Medications:   •  ibuprofen (ADVIL,MOTRIN) 800 MG tablet, Take 1 tablet by mouth 2 (Two) Times a Day As Needed for Mild Pain ., Disp: 60 tablet, Rfl: 2  •  Mirabegron ER (Myrbetriq) 50 MG tablet sustained-release 24 hour 24 hr tablet, Take 50 mg by mouth Daily., Disp: 30 tablet, Rfl: 0  •  Multiple Vitamins-Minerals (MULTI VITAMIN/MINERALS) tablet, Take  by mouth., Disp: , Rfl:   •  omeprazole (priLOSEC) 20 MG capsule, Take 1 capsule by mouth Daily., Disp: 30 capsule, Rfl: 0  •  venlafaxine XR (EFFEXOR-XR) 150 MG 24 hr capsule, Take 1 capsule by mouth Daily., Disp: 30 capsule, Rfl: 0       Plan of care reviewed with the patient at the conclusion of today's visit.  Education was provided regarding diagnosis, management, and any prescribed or recommended OTC medications.  Patient verbalized understanding of and agreement with management plan.     “I, the teaching provider, verify the accuracy of all student documentation.  I further attest that I was physically present during the HPI portion of the encounter, and personally performed/re-performed the exam, assessment, and plan.\" Carmina Galvin MSN, APRN, FNP-C      Return in about 1 month (around 7/24/2020) for Annual.      JOVAN Cassidy      "

## 2020-07-15 DIAGNOSIS — F32.9 MAJOR DEPRESSIVE DISORDER, REMISSION STATUS UNSPECIFIED, UNSPECIFIED WHETHER RECURRENT: ICD-10-CM

## 2020-07-15 RX ORDER — VENLAFAXINE HYDROCHLORIDE 150 MG/1
150 CAPSULE, EXTENDED RELEASE ORAL DAILY
Qty: 30 CAPSULE | Refills: 0 | Status: SHIPPED | OUTPATIENT
Start: 2020-07-15 | End: 2020-08-03 | Stop reason: SDUPTHER

## 2020-07-15 NOTE — TELEPHONE ENCOUNTER
PATIENT IS ALMOST OUT OF THE FOLLOWING MEDICATION: SHE HAS APPOIINTMENT ON 8/3/20, CAN SHE GET ENOUGH CALLED IN UNTIL HER APPOINTMENT.     venlafaxine XR (EFFEXOR-XR) 150 MG 24 hr capsule 30 capsule 0      Sig - Route: Take 1 capsule by mouth Daily. - Oral                Associated Diagnoses     Major depressive disorder, remission status unspecified, unspecified whether recurrent       Ph[facility]     [facility] [address] [facility] [address] - [phone]  - [phone] FX

## 2020-07-20 DIAGNOSIS — K21.9 GASTROESOPHAGEAL REFLUX DISEASE, ESOPHAGITIS PRESENCE NOT SPECIFIED: ICD-10-CM

## 2020-07-20 RX ORDER — OMEPRAZOLE 20 MG/1
CAPSULE, DELAYED RELEASE ORAL
Qty: 30 CAPSULE | Refills: 0 | Status: SHIPPED | OUTPATIENT
Start: 2020-07-20 | End: 2020-08-03 | Stop reason: SDUPTHER

## 2020-07-21 DIAGNOSIS — N32.81 OAB (OVERACTIVE BLADDER): ICD-10-CM

## 2020-07-21 RX ORDER — MIRABEGRON 50 MG/1
TABLET, FILM COATED, EXTENDED RELEASE ORAL
Qty: 30 TABLET | Refills: 0 | Status: SHIPPED | OUTPATIENT
Start: 2020-07-21 | End: 2020-08-03 | Stop reason: SDUPTHER

## 2020-08-03 ENCOUNTER — OFFICE VISIT (OUTPATIENT)
Dept: INTERNAL MEDICINE | Facility: CLINIC | Age: 55
End: 2020-08-03

## 2020-08-03 VITALS
BODY MASS INDEX: 28.55 KG/M2 | OXYGEN SATURATION: 99 % | WEIGHT: 146.2 LBS | SYSTOLIC BLOOD PRESSURE: 140 MMHG | DIASTOLIC BLOOD PRESSURE: 78 MMHG | HEART RATE: 84 BPM

## 2020-08-03 DIAGNOSIS — Z11.59 ENCOUNTER FOR HEPATITIS C SCREENING TEST FOR LOW RISK PATIENT: ICD-10-CM

## 2020-08-03 DIAGNOSIS — K21.9 GASTROESOPHAGEAL REFLUX DISEASE, ESOPHAGITIS PRESENCE NOT SPECIFIED: ICD-10-CM

## 2020-08-03 DIAGNOSIS — Z78.0 POST-MENOPAUSAL: ICD-10-CM

## 2020-08-03 DIAGNOSIS — Z00.00 HEALTH CARE MAINTENANCE: Primary | ICD-10-CM

## 2020-08-03 DIAGNOSIS — F32.9 MAJOR DEPRESSIVE DISORDER, REMISSION STATUS UNSPECIFIED, UNSPECIFIED WHETHER RECURRENT: ICD-10-CM

## 2020-08-03 DIAGNOSIS — N32.81 OAB (OVERACTIVE BLADDER): ICD-10-CM

## 2020-08-03 PROCEDURE — 99396 PREV VISIT EST AGE 40-64: CPT | Performed by: PHYSICIAN ASSISTANT

## 2020-08-03 RX ORDER — IBUPROFEN 800 MG/1
800 TABLET ORAL 2 TIMES DAILY PRN
Qty: 60 TABLET | Refills: 2 | Status: SHIPPED | OUTPATIENT
Start: 2020-08-03 | End: 2021-01-07

## 2020-08-03 RX ORDER — VENLAFAXINE HYDROCHLORIDE 150 MG/1
150 CAPSULE, EXTENDED RELEASE ORAL DAILY
Qty: 90 CAPSULE | Refills: 3 | Status: SHIPPED | OUTPATIENT
Start: 2020-08-03 | End: 2021-09-08

## 2020-08-03 RX ORDER — OMEPRAZOLE 20 MG/1
20 CAPSULE, DELAYED RELEASE ORAL DAILY
Qty: 90 CAPSULE | Refills: 3 | Status: SHIPPED | OUTPATIENT
Start: 2020-08-03 | End: 2021-09-07

## 2020-08-03 NOTE — PROGRESS NOTES
"Chief Complaint   Patient presents with   • Annual Exam       Subjective   Alcira Phelan is a 55 y.o. female.       History of Present Illness     The patient is being seen for a health maintenance evaluation.  The last health maintenance was 1 year(s) ago.    Social History  Alcira  does not smoke cigarettes.   She drinks no alcohol.  She does not use illicit drugs.    General History  Alcira  does have regular dental visits.  She does complain of vision problems.  Wears reading glasses. Last eye exam was 2018.  Immunizations are not up to date. The patient needs the following immunizations: shingrix recommended    Lifestyle  Alcira  consumes in general, an \"unhealthy\" diet.  She exercises intermittently. On her feet at work.     Reproductive Health  Alcira  is postmenopausal.  She reports periods are nonexistent since menopause in 2008.  She is not sexually active. Her contraceptive plan is no method.    Screening  Last pap was 2017 by Jia Schultz, per patient. History of abnormal pap smear or family history of gyn cancer: none  Last mammogram was  2007, no longer needed due to double mastectomy. Personal or family history of abnormal mammograms or breast cancer: breast cancer in 2008; aunt with breast cancer  Last colonoscopy was 2017 by Jannette, repeat 10 years. Family history of colon cancer: none  Last DEXA was 2016.                      Current Outpatient Medications:   •  ibuprofen (ADVIL,MOTRIN) 800 MG tablet, Take 1 tablet by mouth 2 (Two) Times a Day As Needed for Mild Pain ., Disp: 60 tablet, Rfl: 2  •  Mirabegron ER (Myrbetriq) 50 MG tablet sustained-release 24 hour 24 hr tablet, Take 50 mg by mouth Daily., Disp: 90 tablet, Rfl: 3  •  Multiple Vitamins-Minerals (MULTI VITAMIN/MINERALS) tablet, Take  by mouth., Disp: , Rfl:   •  omeprazole (priLOSEC) 20 MG capsule, Take 1 capsule by mouth Daily., Disp: 90 capsule, Rfl: 3  •  venlafaxine XR (EFFEXOR-XR) 150 MG 24 hr capsule, Take 1 capsule by mouth " Daily., Disp: 90 capsule, Rfl: 3     PMFSH  The following portions of the patient's history were reviewed and updated as appropriate: allergies, current medications, past family history, past medical history, past social history, past surgical history and problem list.    Review of Systems   Constitutional: Negative for appetite change, fever and unexpected weight change.   HENT: Negative.  Negative for ear pain, facial swelling and sore throat.    Eyes: Negative for pain and visual disturbance.   Respiratory: Negative for chest tightness, shortness of breath and wheezing.         No nipple discharge or breast mass   Cardiovascular: Negative for chest pain and palpitations.   Gastrointestinal: Negative for abdominal pain and blood in stool.   Endocrine: Negative.    Genitourinary: Negative for difficulty urinating, dyspareunia, dysuria, frequency, hematuria, menstrual problem, pelvic pain, vaginal discharge and vaginal pain.   Musculoskeletal: Negative for joint swelling.   Skin: Negative for color change, rash and wound.   Allergic/Immunologic: Negative.    Neurological: Negative for dizziness, tremors, seizures and syncope.   Hematological: Negative for adenopathy.   Psychiatric/Behavioral: Negative.    All other systems reviewed and are negative.      Objective   /78   Pulse 84   Wt 66.3 kg (146 lb 3.2 oz)   SpO2 99%   Breastfeeding No   BMI 28.55 kg/m²     Physical Exam   Constitutional: She is oriented to person, place, and time. She appears well-developed and well-nourished. No distress.   HENT:   Head: Normocephalic and atraumatic. Hair is normal.   Right Ear: Hearing, tympanic membrane, external ear and ear canal normal. No drainage. No decreased hearing is noted.   Left Ear: Hearing, tympanic membrane, external ear and ear canal normal. No decreased hearing is noted.   Nose: No nasal deformity.   Mouth/Throat: Oropharynx is clear and moist.   Eyes: Pupils are equal, round, and reactive to light.  Conjunctivae, EOM and lids are normal. Lids are everted and swept, no foreign bodies found. Right eye exhibits no discharge. Left eye exhibits no discharge.   Fundoscopic exam:       The right eye shows red reflex.        The left eye shows red reflex.   Neck: Normal range of motion. Neck supple. No JVD present. No tracheal deviation present. No thyromegaly present.   Cardiovascular: Normal rate, regular rhythm, normal heart sounds, intact distal pulses and normal pulses. Exam reveals no gallop and no friction rub.   No murmur heard.  Pulmonary/Chest: Effort normal and breath sounds normal. No respiratory distress. She has no wheezes. She has no rales. She exhibits no tenderness. Right breast exhibits no mass, no nipple discharge and no skin change. Left breast exhibits no mass, no nipple discharge and no skin change.   Abdominal: Soft. Bowel sounds are normal. She exhibits no distension and no mass. There is no tenderness. There is no rebound and no guarding. No hernia.   Genitourinary: Vagina normal and uterus normal. Pelvic exam was performed with patient supine. There is no rash or lesion on the right labia. There is no rash or lesion on the left labia. Uterus is not enlarged and not tender. Cervix exhibits no motion tenderness, no discharge and no friability. Right adnexum displays no mass, no tenderness and no fullness. Left adnexum displays no mass, no tenderness and no fullness. No erythema, tenderness or bleeding in the vagina. No vaginal discharge found.   Musculoskeletal: Normal range of motion. She exhibits no edema, tenderness or deformity.   Lymphadenopathy:     She has no cervical adenopathy.        Right: No inguinal adenopathy present.        Left: No inguinal adenopathy present.   Neurological: She is alert and oriented to person, place, and time. She has normal reflexes. She displays normal reflexes. No cranial nerve deficit. She exhibits normal muscle tone. Coordination normal.   Skin: Skin is  warm and dry. No rash noted. She is not diaphoretic. No erythema.   Psychiatric: She has a normal mood and affect. Her behavior is normal. Judgment and thought content normal.   Nursing note and vitals reviewed.           ASSESSMENT/PLAN    Problem List Items Addressed This Visit        Genitourinary    OAB (overactive bladder)    Relevant Medications    Mirabegron ER (Myrbetriq) 50 MG tablet sustained-release 24 hour 24 hr tablet       Other    Depression    Relevant Medications    venlafaxine XR (EFFEXOR-XR) 150 MG 24 hr capsule    Health care maintenance - Primary     Immunizations: shingrix recommended, others utd  Eye exam: recommended  Pap Smear: done today  Mammogram: no longer needed due to double mastectomy  Dexa: ordered  Colonoscopy: done 2017 by Jannette, repeat 2027  Labs: fasting labs ordered    Counseled patient regarding multimodal approach with healthy nutrition, healthy sleep, regular physical activity, social activities, counseling, safety measures and medications.          Relevant Orders    CBC & Differential    Comprehensive Metabolic Panel    Lipid Panel    TSH      Other Visit Diagnoses     Gastroesophageal reflux disease, esophagitis presence not specified        Relevant Medications    omeprazole (priLOSEC) 20 MG capsule    Post-menopausal        Relevant Orders    DEXA Bone Density Axial    Encounter for hepatitis C screening test for low risk patient        Relevant Orders    Hepatitis C Antibody               Return in about 1 year (around 8/3/2021) for Annual with fasting labs.

## 2020-08-03 NOTE — ASSESSMENT & PLAN NOTE
Immunizations: shingrix recommended, others utd  Eye exam: recommended  Pap Smear: done today  Mammogram: no longer needed due to double mastectomy  Dexa: ordered  Colonoscopy: done 2017 by Jannette, gwen 2027  Labs: fasting labs ordered    Counseled patient regarding multimodal approach with healthy nutrition, healthy sleep, regular physical activity, social activities, counseling, safety measures and medications.

## 2020-09-02 PROCEDURE — U0003 INFECTIOUS AGENT DETECTION BY NUCLEIC ACID (DNA OR RNA); SEVERE ACUTE RESPIRATORY SYNDROME CORONAVIRUS 2 (SARS-COV-2) (CORONAVIRUS DISEASE [COVID-19]), AMPLIFIED PROBE TECHNIQUE, MAKING USE OF HIGH THROUGHPUT TECHNOLOGIES AS DESCRIBED BY CMS-2020-01-R: HCPCS | Performed by: PERSONAL EMERGENCY RESPONSE ATTENDANT

## 2020-09-09 ENCOUNTER — TELEPHONE (OUTPATIENT)
Dept: URGENT CARE | Facility: CLINIC | Age: 55
End: 2020-09-09

## 2020-09-09 NOTE — TELEPHONE ENCOUNTER
Lab results reviewed with TESS ALDANA. Patient notified of negative Covid-19 test result. VU. States they are feeling better. Advised to remain in quarantine for 10 days from onset of symptoms per CDC recommendation, patient TWILA.

## 2020-09-21 ENCOUNTER — LAB (OUTPATIENT)
Dept: LAB | Facility: HOSPITAL | Age: 55
End: 2020-09-21

## 2020-09-21 DIAGNOSIS — Z11.59 ENCOUNTER FOR HEPATITIS C SCREENING TEST FOR LOW RISK PATIENT: ICD-10-CM

## 2020-09-21 DIAGNOSIS — Z00.00 HEALTH CARE MAINTENANCE: ICD-10-CM

## 2020-09-21 LAB
ALBUMIN SERPL-MCNC: 4.4 G/DL (ref 3.5–5.2)
ALBUMIN/GLOB SERPL: 1.5 G/DL
ALP SERPL-CCNC: 100 U/L (ref 39–117)
ALT SERPL W P-5'-P-CCNC: 14 U/L (ref 1–33)
ANION GAP SERPL CALCULATED.3IONS-SCNC: 8.1 MMOL/L (ref 5–15)
AST SERPL-CCNC: 16 U/L (ref 1–32)
BASOPHILS # BLD AUTO: 0.03 10*3/MM3 (ref 0–0.2)
BASOPHILS NFR BLD AUTO: 0.6 % (ref 0–1.5)
BILIRUB SERPL-MCNC: <0.2 MG/DL (ref 0–1.2)
BUN SERPL-MCNC: 16 MG/DL (ref 6–20)
BUN/CREAT SERPL: 20 (ref 7–25)
CALCIUM SPEC-SCNC: 9.7 MG/DL (ref 8.6–10.5)
CHLORIDE SERPL-SCNC: 107 MMOL/L (ref 98–107)
CHOLEST SERPL-MCNC: 227 MG/DL (ref 0–200)
CO2 SERPL-SCNC: 28.9 MMOL/L (ref 22–29)
CREAT SERPL-MCNC: 0.8 MG/DL (ref 0.57–1)
DEPRECATED RDW RBC AUTO: 42 FL (ref 37–54)
EOSINOPHIL # BLD AUTO: 0.34 10*3/MM3 (ref 0–0.4)
EOSINOPHIL NFR BLD AUTO: 6.4 % (ref 0.3–6.2)
ERYTHROCYTE [DISTWIDTH] IN BLOOD BY AUTOMATED COUNT: 12.9 % (ref 12.3–15.4)
GFR SERPL CREATININE-BSD FRML MDRD: 74 ML/MIN/1.73
GLOBULIN UR ELPH-MCNC: 2.9 GM/DL
GLUCOSE SERPL-MCNC: 79 MG/DL (ref 65–99)
HCT VFR BLD AUTO: 38.1 % (ref 34–46.6)
HCV AB SER DONR QL: NORMAL
HDLC SERPL-MCNC: 57 MG/DL (ref 40–60)
HGB BLD-MCNC: 12.9 G/DL (ref 12–15.9)
IMM GRANULOCYTES # BLD AUTO: 0.01 10*3/MM3 (ref 0–0.05)
IMM GRANULOCYTES NFR BLD AUTO: 0.2 % (ref 0–0.5)
LDLC SERPL CALC-MCNC: 143 MG/DL (ref 0–100)
LDLC/HDLC SERPL: 2.52 {RATIO}
LYMPHOCYTES # BLD AUTO: 1.66 10*3/MM3 (ref 0.7–3.1)
LYMPHOCYTES NFR BLD AUTO: 31.4 % (ref 19.6–45.3)
MCH RBC QN AUTO: 30.4 PG (ref 26.6–33)
MCHC RBC AUTO-ENTMCNC: 33.9 G/DL (ref 31.5–35.7)
MCV RBC AUTO: 89.6 FL (ref 79–97)
MONOCYTES # BLD AUTO: 0.54 10*3/MM3 (ref 0.1–0.9)
MONOCYTES NFR BLD AUTO: 10.2 % (ref 5–12)
NEUTROPHILS NFR BLD AUTO: 2.7 10*3/MM3 (ref 1.7–7)
NEUTROPHILS NFR BLD AUTO: 51.2 % (ref 42.7–76)
NRBC BLD AUTO-RTO: 0 /100 WBC (ref 0–0.2)
PLATELET # BLD AUTO: 238 10*3/MM3 (ref 140–450)
PMV BLD AUTO: 10.1 FL (ref 6–12)
POTASSIUM SERPL-SCNC: 4.8 MMOL/L (ref 3.5–5.2)
PROT SERPL-MCNC: 7.3 G/DL (ref 6–8.5)
RBC # BLD AUTO: 4.25 10*6/MM3 (ref 3.77–5.28)
SODIUM SERPL-SCNC: 144 MMOL/L (ref 136–145)
TRIGL SERPL-MCNC: 133 MG/DL (ref 0–150)
TSH SERPL DL<=0.05 MIU/L-ACNC: 1.46 UIU/ML (ref 0.27–4.2)
VLDLC SERPL-MCNC: 26.6 MG/DL (ref 5–40)
WBC # BLD AUTO: 5.28 10*3/MM3 (ref 3.4–10.8)

## 2020-09-21 PROCEDURE — 86803 HEPATITIS C AB TEST: CPT | Performed by: PHYSICIAN ASSISTANT

## 2020-09-21 PROCEDURE — 84443 ASSAY THYROID STIM HORMONE: CPT | Performed by: PHYSICIAN ASSISTANT

## 2020-09-21 PROCEDURE — 80053 COMPREHEN METABOLIC PANEL: CPT | Performed by: PHYSICIAN ASSISTANT

## 2020-09-21 PROCEDURE — 85025 COMPLETE CBC W/AUTO DIFF WBC: CPT | Performed by: PHYSICIAN ASSISTANT

## 2020-09-21 PROCEDURE — 80061 LIPID PANEL: CPT | Performed by: PHYSICIAN ASSISTANT

## 2020-10-24 ENCOUNTER — APPOINTMENT (OUTPATIENT)
Dept: GENERAL RADIOLOGY | Facility: HOSPITAL | Age: 55
End: 2020-10-24

## 2020-10-24 ENCOUNTER — HOSPITAL ENCOUNTER (EMERGENCY)
Facility: HOSPITAL | Age: 55
Discharge: HOME | End: 2020-10-25
Attending: EMERGENCY MEDICINE | Admitting: EMERGENCY MEDICINE

## 2020-10-24 VITALS
RESPIRATION RATE: 18 BRPM | SYSTOLIC BLOOD PRESSURE: 148 MMHG | HEIGHT: 61 IN | WEIGHT: 147 LBS | HEART RATE: 63 BPM | OXYGEN SATURATION: 97 % | BODY MASS INDEX: 27.75 KG/M2 | TEMPERATURE: 97.9 F | DIASTOLIC BLOOD PRESSURE: 94 MMHG

## 2020-10-24 DIAGNOSIS — R07.9 CHEST PAIN, UNSPECIFIED TYPE: Primary | ICD-10-CM

## 2020-10-24 LAB
ALBUMIN SERPL-MCNC: 4.2 G/DL (ref 3.5–5.2)
ALBUMIN/GLOB SERPL: 1.4 G/DL
ALP SERPL-CCNC: 107 U/L (ref 39–117)
ALT SERPL W P-5'-P-CCNC: 19 U/L (ref 1–33)
ANION GAP SERPL CALCULATED.3IONS-SCNC: 11 MMOL/L (ref 5–15)
AST SERPL-CCNC: 22 U/L (ref 1–32)
BASOPHILS # BLD AUTO: 0.04 10*3/MM3 (ref 0–0.2)
BASOPHILS NFR BLD AUTO: 0.7 % (ref 0–1.5)
BILIRUB SERPL-MCNC: 0.2 MG/DL (ref 0–1.2)
BUN SERPL-MCNC: 17 MG/DL (ref 6–20)
BUN/CREAT SERPL: 25.4 (ref 7–25)
CALCIUM SPEC-SCNC: 9.4 MG/DL (ref 8.6–10.5)
CHLORIDE SERPL-SCNC: 106 MMOL/L (ref 98–107)
CO2 SERPL-SCNC: 22 MMOL/L (ref 22–29)
CREAT SERPL-MCNC: 0.67 MG/DL (ref 0.57–1)
DEPRECATED RDW RBC AUTO: 38 FL (ref 37–54)
EOSINOPHIL # BLD AUTO: 0.96 10*3/MM3 (ref 0–0.4)
EOSINOPHIL NFR BLD AUTO: 16.1 % (ref 0.3–6.2)
ERYTHROCYTE [DISTWIDTH] IN BLOOD BY AUTOMATED COUNT: 12.1 % (ref 12.3–15.4)
GFR SERPL CREATININE-BSD FRML MDRD: 91 ML/MIN/1.73
GLOBULIN UR ELPH-MCNC: 2.9 GM/DL
GLUCOSE SERPL-MCNC: 96 MG/DL (ref 65–99)
HCT VFR BLD AUTO: 39.9 % (ref 34–46.6)
HGB BLD-MCNC: 13.6 G/DL (ref 12–15.9)
HOLD SPECIMEN: NORMAL
HOLD SPECIMEN: NORMAL
IMM GRANULOCYTES # BLD AUTO: 0.01 10*3/MM3 (ref 0–0.05)
IMM GRANULOCYTES NFR BLD AUTO: 0.2 % (ref 0–0.5)
LIPASE SERPL-CCNC: 25 U/L (ref 13–60)
LYMPHOCYTES # BLD AUTO: 1.93 10*3/MM3 (ref 0.7–3.1)
LYMPHOCYTES NFR BLD AUTO: 32.4 % (ref 19.6–45.3)
MCH RBC QN AUTO: 29.4 PG (ref 26.6–33)
MCHC RBC AUTO-ENTMCNC: 34.1 G/DL (ref 31.5–35.7)
MCV RBC AUTO: 86.4 FL (ref 79–97)
MONOCYTES # BLD AUTO: 0.53 10*3/MM3 (ref 0.1–0.9)
MONOCYTES NFR BLD AUTO: 8.9 % (ref 5–12)
NEUTROPHILS NFR BLD AUTO: 2.48 10*3/MM3 (ref 1.7–7)
NEUTROPHILS NFR BLD AUTO: 41.7 % (ref 42.7–76)
NRBC BLD AUTO-RTO: 0 /100 WBC (ref 0–0.2)
NT-PROBNP SERPL-MCNC: 35.5 PG/ML (ref 0–900)
PLAT MORPH BLD: NORMAL
PLATELET # BLD AUTO: 188 10*3/MM3 (ref 140–450)
PMV BLD AUTO: 9.8 FL (ref 6–12)
POTASSIUM SERPL-SCNC: 4.5 MMOL/L (ref 3.5–5.2)
PROT SERPL-MCNC: 7.1 G/DL (ref 6–8.5)
RBC # BLD AUTO: 4.62 10*6/MM3 (ref 3.77–5.28)
RBC MORPH BLD: NORMAL
SODIUM SERPL-SCNC: 139 MMOL/L (ref 136–145)
TROPONIN T SERPL-MCNC: <0.01 NG/ML (ref 0–0.03)
TROPONIN T SERPL-MCNC: <0.01 NG/ML (ref 0–0.03)
WBC # BLD AUTO: 5.95 10*3/MM3 (ref 3.4–10.8)
WBC MORPH BLD: NORMAL
WHOLE BLOOD HOLD SPECIMEN: NORMAL
WHOLE BLOOD HOLD SPECIMEN: NORMAL

## 2020-10-24 PROCEDURE — 99284 EMERGENCY DEPT VISIT MOD MDM: CPT

## 2020-10-24 PROCEDURE — 80053 COMPREHEN METABOLIC PANEL: CPT | Performed by: EMERGENCY MEDICINE

## 2020-10-24 PROCEDURE — 71045 X-RAY EXAM CHEST 1 VIEW: CPT

## 2020-10-24 PROCEDURE — 83880 ASSAY OF NATRIURETIC PEPTIDE: CPT | Performed by: EMERGENCY MEDICINE

## 2020-10-24 PROCEDURE — 93005 ELECTROCARDIOGRAM TRACING: CPT | Performed by: EMERGENCY MEDICINE

## 2020-10-24 PROCEDURE — 84484 ASSAY OF TROPONIN QUANT: CPT | Performed by: EMERGENCY MEDICINE

## 2020-10-24 PROCEDURE — 93005 ELECTROCARDIOGRAM TRACING: CPT

## 2020-10-24 PROCEDURE — 85025 COMPLETE CBC W/AUTO DIFF WBC: CPT | Performed by: EMERGENCY MEDICINE

## 2020-10-24 PROCEDURE — 85007 BL SMEAR W/DIFF WBC COUNT: CPT | Performed by: EMERGENCY MEDICINE

## 2020-10-24 PROCEDURE — 83690 ASSAY OF LIPASE: CPT | Performed by: EMERGENCY MEDICINE

## 2020-10-24 RX ORDER — SODIUM CHLORIDE 0.9 % (FLUSH) 0.9 %
10 SYRINGE (ML) INJECTION AS NEEDED
Status: DISCONTINUED | OUTPATIENT
Start: 2020-10-24 | End: 2020-10-25 | Stop reason: HOSPADM

## 2020-10-24 RX ORDER — ASPIRIN 325 MG
325 TABLET ORAL ONCE
Status: DISCONTINUED | OUTPATIENT
Start: 2020-10-24 | End: 2020-10-25 | Stop reason: HOSPADM
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
## 2020-10-28 ENCOUNTER — OFFICE VISIT (OUTPATIENT)
Dept: CARDIOLOGY | Facility: HOSPITAL | Age: 55
End: 2020-10-28

## 2020-10-28 VITALS
SYSTOLIC BLOOD PRESSURE: 135 MMHG | HEIGHT: 61 IN | HEART RATE: 83 BPM | DIASTOLIC BLOOD PRESSURE: 65 MMHG | BODY MASS INDEX: 28.32 KG/M2 | TEMPERATURE: 97.1 F | RESPIRATION RATE: 18 BRPM | OXYGEN SATURATION: 98 % | WEIGHT: 150 LBS

## 2020-10-28 DIAGNOSIS — R07.89 CHEST PAIN, ATYPICAL: Primary | ICD-10-CM

## 2020-10-28 DIAGNOSIS — R42 LIGHTHEADEDNESS: ICD-10-CM

## 2020-10-28 DIAGNOSIS — R68.84 JAW PAIN: ICD-10-CM

## 2020-10-28 PROCEDURE — 99213 OFFICE O/P EST LOW 20 MIN: CPT | Performed by: NURSE PRACTITIONER

## 2020-11-03 ENCOUNTER — TELEPHONE (OUTPATIENT)
Dept: CARDIOLOGY | Facility: HOSPITAL | Age: 55
End: 2020-11-03

## 2020-11-03 NOTE — TELEPHONE ENCOUNTER
Spoke with patient and informed her that covid testing needed to be 48 hours prior to centralized scheduling. Transferred patient to centralized scheduling to schedule.

## 2020-11-04 ENCOUNTER — TELEPHONE (OUTPATIENT)
Dept: CARDIOLOGY | Facility: HOSPITAL | Age: 55
End: 2020-11-04

## 2020-11-04 NOTE — TELEPHONE ENCOUNTER
Patient has not been scheduled. She was given the number to central scheduling to call and schedule her stress test and covid.

## 2020-11-04 NOTE — TELEPHONE ENCOUNTER
----- Message from Cande Almaguer sent at 11/3/2020  8:01 AM EST -----  Regarding: FW: Patient unsure about scheduling , due to requirement of covid testing.  Please contact patient to discuss her concerns about the CoVid testing and her needed stress test please.    Thank you  Marge   ----- Message -----  From: Jodie Tomas APRN  Sent: 11/2/2020   3:45 PM EST  To: Cande Almaguer  Subject: FW: Patient unsure about scheduling , due to#    Yes, please have MA call this patient. thanks  ----- Message -----  From: Marge Moreno RegSched Rep  Sent: 11/2/2020   3:35 PM EST  To: JOVAN Villanueva  Subject: Patient unsure about scheduling , due to req#    Patient unsure about scheduling , due to requirement of covid testing. Should an MA call her to discuss this?  Please Advise

## 2020-11-17 ENCOUNTER — TELEPHONE (OUTPATIENT)
Dept: INTERNAL MEDICINE | Facility: CLINIC | Age: 55
End: 2020-11-17

## 2020-11-17 NOTE — TELEPHONE ENCOUNTER
To eliminate any risk of infection, she would need to stay away from the sick child. However, If it is just a runny nose for a month, it is probably allergy or viral. If there is concern for a Covid infection the child may be able to be tested through his/her pediatrician. Otherwise, she can wear a mask around the kids to be safe.

## 2020-11-17 NOTE — TELEPHONE ENCOUNTER
PT STATED SHE WAS ASKED TO WATCH HER GRANDCHILDREN OVER THANKSGIVING BREAK. PT IS CONCERN THAT ONE OF THE CHILD HAVE HAD A RUNNY NOSE FOR THE PAST MONTH. SHE WOULD LIKE SOME GUIDANCE ABOUT HER QUESTIONS AND CONCERNS.      CALLBACK NUMBER# [phone]    PLEASE ADVISE

## 2020-11-19 NOTE — TELEPHONE ENCOUNTER
Spoke with patient and advised her of Susana's recommendations, she said the grandchild was tested today and her lungs are inflamed and she has asthma, she will not be babysitting her now anyway

## 2020-12-06 ENCOUNTER — APPOINTMENT (OUTPATIENT)
Dept: PREADMISSION TESTING | Facility: HOSPITAL | Age: 55
End: 2020-12-06

## 2020-12-08 ENCOUNTER — APPOINTMENT (OUTPATIENT)
Dept: CARDIOLOGY | Facility: HOSPITAL | Age: 55
End: 2020-12-08

## 2021-01-06 NOTE — TELEPHONE ENCOUNTER
Last Office Visit: 08/03/20  Next Office Visit: 08/10/21    Labs completed in past 6 months? yes  Labs completed in past year? no    Last Refill Date:08/03/20  Quantity: 60  Refills:2    Pharmacy:

## 2021-01-07 RX ORDER — IBUPROFEN 800 MG/1
TABLET ORAL
Qty: 60 TABLET | Refills: 1 | Status: SHIPPED | OUTPATIENT
Start: 2021-01-07 | End: 2021-04-15 | Stop reason: SDUPTHER

## 2021-01-15 ENCOUNTER — APPOINTMENT (OUTPATIENT)
Dept: BONE DENSITY | Facility: HOSPITAL | Age: 56
End: 2021-01-15

## 2021-01-17 ENCOUNTER — APPOINTMENT (OUTPATIENT)
Dept: PREADMISSION TESTING | Facility: HOSPITAL | Age: 56
End: 2021-01-17

## 2021-01-19 ENCOUNTER — HOSPITAL ENCOUNTER (OUTPATIENT)
Dept: CARDIOLOGY | Facility: HOSPITAL | Age: 56
End: 2021-01-19

## 2021-02-24 ENCOUNTER — OFFICE VISIT (OUTPATIENT)
Dept: INTERNAL MEDICINE | Facility: CLINIC | Age: 56
End: 2021-02-24

## 2021-02-24 VITALS
HEART RATE: 75 BPM | HEIGHT: 61 IN | SYSTOLIC BLOOD PRESSURE: 150 MMHG | OXYGEN SATURATION: 98 % | TEMPERATURE: 97.7 F | WEIGHT: 150 LBS | DIASTOLIC BLOOD PRESSURE: 82 MMHG | BODY MASS INDEX: 28.32 KG/M2

## 2021-02-24 DIAGNOSIS — S46.812A STRAIN OF LEFT TRAPEZIUS MUSCLE, INITIAL ENCOUNTER: Primary | ICD-10-CM

## 2021-02-24 PROCEDURE — 99213 OFFICE O/P EST LOW 20 MIN: CPT | Performed by: INTERNAL MEDICINE

## 2021-02-24 PROCEDURE — 96372 THER/PROPH/DIAG INJ SC/IM: CPT | Performed by: INTERNAL MEDICINE

## 2021-02-24 RX ORDER — CYCLOBENZAPRINE HCL 5 MG
5 TABLET ORAL 3 TIMES DAILY PRN
Qty: 60 TABLET | Refills: 0 | Status: SHIPPED | OUTPATIENT
Start: 2021-02-24 | End: 2023-02-07

## 2021-02-24 RX ORDER — PREDNISONE 10 MG/1
TABLET ORAL
Qty: 18 TABLET | Refills: 0 | Status: SHIPPED | OUTPATIENT
Start: 2021-02-24 | End: 2022-03-02

## 2021-02-24 RX ORDER — KETOROLAC TROMETHAMINE 30 MG/ML
60 INJECTION, SOLUTION INTRAMUSCULAR; INTRAVENOUS ONCE
Status: COMPLETED | OUTPATIENT
Start: 2021-02-24 | End: 2021-02-24

## 2021-02-24 RX ADMIN — KETOROLAC TROMETHAMINE 60 MG: 30 INJECTION, SOLUTION INTRAMUSCULAR; INTRAVENOUS at 13:56

## 2021-02-24 NOTE — PROGRESS NOTES
Neck Pain (for about 1 week)    Subjective   Alcira Phelan is a 55 y.o. female is here today for follow-up.    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  Took the 800mg ibuprofen , tried , ice, and heat    Current Outpatient Medications:   •  ibuprofen (ADVIL,MOTRIN) 800 MG tablet, TAKE ONE TABLET BY MOUTH TWICE A DAY AS NEEDED FOR MILD PAIN, Disp: 60 tablet, Rfl: 1  •  Mirabegron ER (Myrbetriq) 50 MG tablet sustained-release 24 hour 24 hr tablet, Take 50 mg by mouth Daily., Disp: 90 tablet, Rfl: 3  •  Multiple Vitamins-Minerals (MULTI VITAMIN/MINERALS) tablet, Take  by mouth., Disp: , Rfl:   •  omeprazole (priLOSEC) 20 MG capsule, Take 1 capsule by mouth Daily., Disp: 90 capsule, Rfl: 3  •  venlafaxine XR (EFFEXOR-XR) 150 MG 24 hr capsule, Take 1 capsule by mouth Daily., Disp: 90 capsule, Rfl: 3  •  cyclobenzaprine (FLEXERIL) 5 MG tablet, Take 1 tablet by mouth 3 (Three) Times a Day As Needed for Muscle Spasms., Disp: 60 tablet, Rfl: 0  •  predniSONE (DELTASONE) 10 MG tablet, Take 3 tabs daily x 3 days then 2 tabs daily x 3 days then 1 tab daily x 3 days then stop., Disp: 18 tablet, Rfl: 0      The following portions of the patient's history were reviewed and updated as appropriate: allergies, current medications, past family history, past medical history, past social history, past surgical history and problem list.    Review of Systems   Constitutional: Negative.  Negative for chills and fever.   HENT: Negative for ear discharge, ear pain, sinus pressure and sore throat.    Respiratory: Negative for cough, chest tightness and shortness of breath.    Cardiovascular: Negative for chest pain, palpitations and leg swelling.   Gastrointestinal: Negative for diarrhea, nausea and vomiting.   Musculoskeletal: Positive for neck pain and neck stiffness. Negative for arthralgias, back pain and  "myalgias.   Neurological: Negative for dizziness, syncope and headaches.   Psychiatric/Behavioral: Negative for confusion and sleep disturbance.       Objective   /82   Pulse 75   Temp 97.7 °F (36.5 °C)   Ht 154.9 cm (61\")   Wt 68 kg (150 lb)   SpO2 98% Comment: ra  BMI 28.34 kg/m²   Physical Exam  Vitals signs and nursing note reviewed.   Constitutional:       Appearance: She is well-developed.   HENT:      Head: Normocephalic and atraumatic.      Mouth/Throat:      Pharynx: No oropharyngeal exudate.   Eyes:      Conjunctiva/sclera: Conjunctivae normal.      Pupils: Pupils are equal, round, and reactive to light.   Neck:      Musculoskeletal: Neck supple.      Thyroid: No thyromegaly.   Cardiovascular:      Rate and Rhythm: Normal rate and regular rhythm.   Pulmonary:      Effort: Pulmonary effort is normal.      Breath sounds: Normal breath sounds.   Abdominal:      General: Bowel sounds are normal. There is no distension.      Palpations: Abdomen is soft.      Tenderness: There is no abdominal tenderness.   Musculoskeletal:         General: Tenderness present.      Cervical back: She exhibits decreased range of motion, tenderness (left upper trap) and spasm.   Skin:     General: Skin is warm and dry.   Neurological:      Mental Status: She is alert and oriented to person, place, and time.      Cranial Nerves: No cranial nerve deficit.   Psychiatric:         Judgment: Judgment normal.           Results for orders placed or performed during the hospital encounter of 10/24/20   Troponin    Specimen: Blood   Result Value Ref Range    Troponin T <0.010 0.000 - 0.030 ng/mL   Troponin    Specimen: Blood   Result Value Ref Range    Troponin T <0.010 0.000 - 0.030 ng/mL   Comprehensive Metabolic Panel    Specimen: Blood   Result Value Ref Range    Glucose 96 65 - 99 mg/dL    BUN 17 6 - 20 mg/dL    Creatinine 0.67 0.57 - 1.00 mg/dL    Sodium 139 136 - 145 mmol/L    Potassium 4.5 3.5 - 5.2 mmol/L    Chloride 106 " 98 - 107 mmol/L    CO2 22.0 22.0 - 29.0 mmol/L    Calcium 9.4 8.6 - 10.5 mg/dL    Total Protein 7.1 6.0 - 8.5 g/dL    Albumin 4.20 3.50 - 5.20 g/dL    ALT (SGPT) 19 1 - 33 U/L    AST (SGOT) 22 1 - 32 U/L    Alkaline Phosphatase 107 39 - 117 U/L    Total Bilirubin 0.2 0.0 - 1.2 mg/dL    eGFR Non African Amer 91 >60 mL/min/1.73    Globulin 2.9 gm/dL    A/G Ratio 1.4 g/dL    BUN/Creatinine Ratio 25.4 (H) 7.0 - 25.0    Anion Gap 11.0 5.0 - 15.0 mmol/L   Lipase    Specimen: Blood   Result Value Ref Range    Lipase 25 13 - 60 U/L   BNP    Specimen: Blood   Result Value Ref Range    proBNP 35.5 0.0 - 900.0 pg/mL   CBC Auto Differential    Specimen: Blood   Result Value Ref Range    WBC 5.95 3.40 - 10.80 10*3/mm3    RBC 4.62 3.77 - 5.28 10*6/mm3    Hemoglobin 13.6 12.0 - 15.9 g/dL    Hematocrit 39.9 34.0 - 46.6 %    MCV 86.4 79.0 - 97.0 fL    MCH 29.4 26.6 - 33.0 pg    MCHC 34.1 31.5 - 35.7 g/dL    RDW 12.1 (L) 12.3 - 15.4 %    RDW-SD 38.0 37.0 - 54.0 fl    MPV 9.8 6.0 - 12.0 fL    Platelets 188 140 - 450 10*3/mm3    Neutrophil % 41.7 (L) 42.7 - 76.0 %    Lymphocyte % 32.4 19.6 - 45.3 %    Monocyte % 8.9 5.0 - 12.0 %    Eosinophil % 16.1 (H) 0.3 - 6.2 %    Basophil % 0.7 0.0 - 1.5 %    Immature Grans % 0.2 0.0 - 0.5 %    Neutrophils, Absolute 2.48 1.70 - 7.00 10*3/mm3    Lymphocytes, Absolute 1.93 0.70 - 3.10 10*3/mm3    Monocytes, Absolute 0.53 0.10 - 0.90 10*3/mm3    Eosinophils, Absolute 0.96 (H) 0.00 - 0.40 10*3/mm3    Basophils, Absolute 0.04 0.00 - 0.20 10*3/mm3    Immature Grans, Absolute 0.01 0.00 - 0.05 10*3/mm3    nRBC 0.0 0.0 - 0.2 /100 WBC   Scan Slide    Specimen: Blood   Result Value Ref Range    RBC Morphology Normal Normal    WBC Morphology Normal Normal    Platelet Morphology Normal Normal   Light Blue Top   Result Value Ref Range    Extra Tube hold for add-on    Green Top (Gel)   Result Value Ref Range    Extra Tube Hold for add-ons.    Lavender Top   Result Value Ref Range    Extra Tube hold for add-on     Gold Top - SST   Result Value Ref Range    Extra Tube Hold for add-ons.              Assessment/Plan   Diagnoses and all orders for this visit:    Strain of left trapezius muscle, initial encounter  -     ketorolac (TORADOL) injection 60 mg  -     predniSONE (DELTASONE) 10 MG tablet; Take 3 tabs daily x 3 days then 2 tabs daily x 3 days then 1 tab daily x 3 days then stop.  -     cyclobenzaprine (FLEXERIL) 5 MG tablet; Take 1 tablet by mouth 3 (Three) Times a Day As Needed for Muscle Spasms.  -     XR Spine Cervical 2 or 3 View; Future  -     Ambulatory Referral to Physical Therapy Evaluate and treat    Start meds and if not better in 1 week proceed with x-ray of the neck and pending results start physical therapy.  Patient agrees to this plan.             Return if symptoms worsen or fail to improve.

## 2021-04-15 NOTE — TELEPHONE ENCOUNTER
Caller: Aclira Phelan    Relationship: Self    Best call back number: [phone]    Medication needed:   Requested Prescriptions     Pending Prescriptions Disp Refills   • ibuprofen (ADVIL,MOTRIN) 800 MG tablet 60 tablet 1     Sig: Take 1 tablet by mouth 2 (Two) Times a Day As Needed for Mild Pain .       When do you need the refill by: ASAP    What additional details did the patient provide when requesting the medication: PATIENT IS OUT OF MEDICATION     Does the patient have less than a 3 day supply:  [x] Yes  [] No    What is the patient's preferred pharmacy: [facility] [address] AT [facility] & [address] - [phone]  - [phone] FX

## 2021-04-15 NOTE — TELEPHONE ENCOUNTER
Last Office Visit: 2/24/21 Essie  Next Office Visit: 8/10/21    Labs completed in past 6 months? yes  Labs completed in past year? yes    Last Refill Date:1/7/21  Quantity: 60 tab  Refills: 1    Pharmacy: On File    Please review pended refill request for any changes needed on refills or quantities. Thank you!

## 2021-04-16 RX ORDER — IBUPROFEN 800 MG/1
800 TABLET ORAL 2 TIMES DAILY PRN
Qty: 60 TABLET | Refills: 1 | Status: SHIPPED | OUTPATIENT
Start: 2021-04-16 | End: 2021-06-21 | Stop reason: SDUPTHER

## 2021-06-21 RX ORDER — IBUPROFEN 800 MG/1
800 TABLET ORAL 2 TIMES DAILY PRN
Qty: 60 TABLET | Refills: 1 | Status: SHIPPED | OUTPATIENT
Start: 2021-06-21 | End: 2021-10-20 | Stop reason: SDUPTHER

## 2021-06-21 NOTE — TELEPHONE ENCOUNTER
Caller: Tapan Alcira L    Relationship: Self    Best call back number: [phone]    Medication needed:   Requested Prescriptions     Pending Prescriptions Disp Refills   • ibuprofen (ADVIL,MOTRIN) 800 MG tablet 60 tablet 1     Sig: Take 1 tablet by mouth 2 (Two) Times a Day As Needed for Mild Pain .       When do you need the refill by: 06/21/2021    What additional details did the patient provide when requesting the medication:   PATIENT IS COMPLETLEY OUT OF MEDICATION     Does the patient have less than a 3 day supply:  [x] Yes  [] No    What is the patient's preferred pharmacy: [facility] [address] [facility] & [address] - [phone]  - [phone] FX

## 2021-08-18 ENCOUNTER — OFFICE VISIT (OUTPATIENT)
Dept: INTERNAL MEDICINE | Facility: CLINIC | Age: 56
End: 2021-08-18

## 2021-08-18 VITALS
DIASTOLIC BLOOD PRESSURE: 82 MMHG | OXYGEN SATURATION: 98 % | BODY MASS INDEX: 27.21 KG/M2 | SYSTOLIC BLOOD PRESSURE: 138 MMHG | TEMPERATURE: 98.5 F | WEIGHT: 144 LBS | HEART RATE: 96 BPM

## 2021-08-18 DIAGNOSIS — J06.9 ACUTE URI: Primary | ICD-10-CM

## 2021-08-18 DIAGNOSIS — R05.9 COUGH: ICD-10-CM

## 2021-08-18 PROCEDURE — 87804 INFLUENZA ASSAY W/OPTIC: CPT | Performed by: PHYSICIAN ASSISTANT

## 2021-08-18 PROCEDURE — 99213 OFFICE O/P EST LOW 20 MIN: CPT | Performed by: PHYSICIAN ASSISTANT

## 2021-08-18 PROCEDURE — U0004 COV-19 TEST NON-CDC HGH THRU: HCPCS | Performed by: PHYSICIAN ASSISTANT

## 2021-08-18 NOTE — PROGRESS NOTES
Chief Complaint   Patient presents with   • Cough   • URI       Subjective     Alcira Phelan is a 56 y.o. female.        History of Present Illness     Pt started feeling bad a few days ago with head congestion, nasal drainage, left sided sore throat, cough. Some sweats at night. Feels some stomach upset, decreased appetite. No taste or smell changes. Has been vaccinated for Covid. Coworker is positive for flu.    Had to miss work today due to illness.        Current Outpatient Medications:   •  cyclobenzaprine (FLEXERIL) 5 MG tablet, Take 1 tablet by mouth 3 (Three) Times a Day As Needed for Muscle Spasms., Disp: 60 tablet, Rfl: 0  •  ibuprofen (ADVIL,MOTRIN) 800 MG tablet, Take 1 tablet by mouth 2 (Two) Times a Day As Needed for Mild Pain ., Disp: 60 tablet, Rfl: 1  •  Mirabegron ER (Myrbetriq) 50 MG tablet sustained-release 24 hour 24 hr tablet, Take 50 mg by mouth Daily., Disp: 90 tablet, Rfl: 3  •  Multiple Vitamins-Minerals (MULTI VITAMIN/MINERALS) tablet, Take  by mouth., Disp: , Rfl:   •  omeprazole (priLOSEC) 20 MG capsule, Take 1 capsule by mouth Daily., Disp: 90 capsule, Rfl: 3  •  predniSONE (DELTASONE) 10 MG tablet, Take 3 tabs daily x 3 days then 2 tabs daily x 3 days then 1 tab daily x 3 days then stop., Disp: 18 tablet, Rfl: 0  •  venlafaxine XR (EFFEXOR-XR) 150 MG 24 hr capsule, Take 1 capsule by mouth Daily., Disp: 90 capsule, Rfl: 3     PMFSH  The following portions of the patient's history were reviewed and updated as appropriate: allergies, current medications, past family history, past medical history, past social history, past surgical history and problem list.    Review of Systems   Constitutional: Positive for fatigue. Negative for activity change and unexpected weight change.   HENT: Positive for congestion, postnasal drip and sore throat. Negative for ear pain.    Eyes: Negative for pain and discharge.   Respiratory: Positive for cough. Negative for chest tightness, shortness of breath  and wheezing.    Cardiovascular: Negative for chest pain and palpitations.   Gastrointestinal: Negative for abdominal pain, diarrhea and vomiting.   Endocrine: Negative.    Genitourinary: Negative.    Musculoskeletal: Negative for joint swelling.   Skin: Negative for color change, rash and wound.   Allergic/Immunologic: Negative.    Neurological: Negative for seizures and syncope.   Psychiatric/Behavioral: Negative.        Objective   /82   Pulse 96   Temp 98.5 °F (36.9 °C)   Wt 65.3 kg (144 lb)   SpO2 98%   Breastfeeding No   BMI 27.21 kg/m²     Physical Exam  Vitals and nursing note reviewed.   Constitutional:       General: She is not in acute distress.     Appearance: She is well-developed. She is not toxic-appearing or diaphoretic.   HENT:      Head: Normocephalic and atraumatic. Hair is normal.      Right Ear: External ear normal. No drainage, swelling or tenderness. Tympanic membrane is retracted.      Left Ear: External ear normal. No drainage, swelling or tenderness. Tympanic membrane is retracted.      Nose: Mucosal edema present.      Mouth/Throat:      Mouth: No oral lesions.      Pharynx: Uvula midline. Posterior oropharyngeal erythema present. No oropharyngeal exudate or uvula swelling.   Eyes:      General: No scleral icterus.        Right eye: No discharge.         Left eye: No discharge.      Conjunctiva/sclera: Conjunctivae normal.      Pupils: Pupils are equal, round, and reactive to light.   Cardiovascular:      Rate and Rhythm: Normal rate and regular rhythm.      Heart sounds: Normal heart sounds. No murmur heard.   No gallop.    Pulmonary:      Effort: No respiratory distress.      Breath sounds: Normal breath sounds. No stridor. No wheezing or rales.   Chest:      Chest wall: No tenderness.   Abdominal:      Palpations: Abdomen is soft.      Tenderness: There is no abdominal tenderness.   Musculoskeletal:      Cervical back: Normal range of motion and neck supple.    Lymphadenopathy:      Cervical: Cervical adenopathy present.   Skin:     General: Skin is warm and dry.      Findings: No rash.   Neurological:      Mental Status: She is alert and oriented to person, place, and time.      Motor: No abnormal muscle tone.   Psychiatric:         Behavior: Behavior normal.         Thought Content: Thought content normal.         Judgment: Judgment normal.         Results for orders placed or performed in visit on 08/18/21   POCT Influenza A/B    Specimen: Swab   Result Value Ref Range    Rapid Influenza A Ag Negative Negative    Rapid Influenza B Ag Negative Negative    Internal Control Passed Passed    Lot Number [id number]     Expiration Date 2,269,693         ASSESSMENT/PLAN    Diagnoses and all orders for this visit:    1. Acute URI (Primary)  Comments:  In office flu test is negative. Check for Covid. Off work until results available. Continue otc symptomatic care. Increase rest and fluids.   Orders:  -     COVID-19 PCR, [facility], NP SWAB IN [facility]SPORT MEDIA/ORAL SWISH 24-30 HR TAT - Swab, Nasopharynx; Future  -     POCT Influenza A/B  -     COVID-19 PCR, [facility], NP SWAB IN [facility] TRANSP[facility] MEDIA/ORAL SWISH 24-30 HR TAT - Swab, Nasopharynx    2. Cough  -     POCT Influenza A/B             Return if symptoms worsen or fail to improve.

## 2021-08-19 LAB — SARS-COV-2 RNA NOSE QL NAA+PROBE: NOT DETECTED

## 2021-08-19 NOTE — PROGRESS NOTES
Please let her know that her Covid test is negative. She can go back to work tomorrow if she is feeling better.

## 2021-09-07 DIAGNOSIS — K21.9 GASTROESOPHAGEAL REFLUX DISEASE: ICD-10-CM

## 2021-09-07 RX ORDER — OMEPRAZOLE 20 MG/1
CAPSULE, DELAYED RELEASE ORAL
Qty: 90 CAPSULE | Refills: 3 | Status: SHIPPED | OUTPATIENT
Start: 2021-09-07 | End: 2022-09-05

## 2021-09-08 DIAGNOSIS — F32.9 MAJOR DEPRESSIVE DISORDER, REMISSION STATUS UNSPECIFIED, UNSPECIFIED WHETHER RECURRENT: ICD-10-CM

## 2021-09-08 RX ORDER — VENLAFAXINE HYDROCHLORIDE 150 MG/1
CAPSULE, EXTENDED RELEASE ORAL
Qty: 90 CAPSULE | Refills: 3 | Status: SHIPPED | OUTPATIENT
Start: 2021-09-08 | End: 2021-10-20 | Stop reason: SDUPTHER

## 2021-09-08 NOTE — TELEPHONE ENCOUNTER
Last Office Visit: 8/18/21  Next Office Visit: 10/20/21    Labs completed in past 6 months? yes  Labs completed in past year? yes    Last Refill Date: 8/3/20  Quantity: 90 caps  Refills: 3    Pharmacy: [facility] [address] [place] [address] [address] AT [facility] [address] - [phone]  - [phone] FX

## 2021-10-20 ENCOUNTER — LAB (OUTPATIENT)
Dept: LAB | Facility: HOSPITAL | Age: 56
End: 2021-10-20

## 2021-10-20 ENCOUNTER — OFFICE VISIT (OUTPATIENT)
Dept: INTERNAL MEDICINE | Facility: CLINIC | Age: 56
End: 2021-10-20

## 2021-10-20 VITALS
OXYGEN SATURATION: 98 % | BODY MASS INDEX: 27.38 KG/M2 | HEART RATE: 84 BPM | WEIGHT: 145 LBS | TEMPERATURE: 97.6 F | SYSTOLIC BLOOD PRESSURE: 144 MMHG | HEIGHT: 61 IN | DIASTOLIC BLOOD PRESSURE: 72 MMHG

## 2021-10-20 DIAGNOSIS — F32.9 MAJOR DEPRESSIVE DISORDER, REMISSION STATUS UNSPECIFIED, UNSPECIFIED WHETHER RECURRENT: ICD-10-CM

## 2021-10-20 DIAGNOSIS — Z00.00 HEALTH CARE MAINTENANCE: Primary | ICD-10-CM

## 2021-10-20 DIAGNOSIS — Z78.0 POST-MENOPAUSAL: ICD-10-CM

## 2021-10-20 DIAGNOSIS — Z00.00 HEALTH CARE MAINTENANCE: ICD-10-CM

## 2021-10-20 LAB
ALBUMIN SERPL-MCNC: 4.9 G/DL (ref 3.5–5.2)
ALBUMIN/GLOB SERPL: 1.8 G/DL
ALP SERPL-CCNC: 106 U/L (ref 39–117)
ALT SERPL W P-5'-P-CCNC: 27 U/L (ref 1–33)
ANION GAP SERPL CALCULATED.3IONS-SCNC: 11.1 MMOL/L (ref 5–15)
AST SERPL-CCNC: 27 U/L (ref 1–32)
BASOPHILS # BLD AUTO: 0.04 10*3/MM3 (ref 0–0.2)
BASOPHILS NFR BLD AUTO: 0.5 % (ref 0–1.5)
BILIRUB SERPL-MCNC: <0.2 MG/DL (ref 0–1.2)
BUN SERPL-MCNC: 14 MG/DL (ref 6–20)
BUN/CREAT SERPL: 19.4 (ref 7–25)
CALCIUM SPEC-SCNC: 9.7 MG/DL (ref 8.6–10.5)
CHLORIDE SERPL-SCNC: 103 MMOL/L (ref 98–107)
CHOLEST SERPL-MCNC: 215 MG/DL (ref 0–200)
CO2 SERPL-SCNC: 27.9 MMOL/L (ref 22–29)
CREAT SERPL-MCNC: 0.72 MG/DL (ref 0.57–1)
DEPRECATED RDW RBC AUTO: 40 FL (ref 37–54)
EOSINOPHIL # BLD AUTO: 0.38 10*3/MM3 (ref 0–0.4)
EOSINOPHIL NFR BLD AUTO: 5.1 % (ref 0.3–6.2)
ERYTHROCYTE [DISTWIDTH] IN BLOOD BY AUTOMATED COUNT: 12.5 % (ref 12.3–15.4)
GFR SERPL CREATININE-BSD FRML MDRD: 83 ML/MIN/1.73
GLOBULIN UR ELPH-MCNC: 2.8 GM/DL
GLUCOSE SERPL-MCNC: 77 MG/DL (ref 65–99)
HCT VFR BLD AUTO: 42.9 % (ref 34–46.6)
HDLC SERPL-MCNC: 49 MG/DL (ref 40–60)
HGB BLD-MCNC: 14.5 G/DL (ref 12–15.9)
IMM GRANULOCYTES # BLD AUTO: 0.02 10*3/MM3 (ref 0–0.05)
IMM GRANULOCYTES NFR BLD AUTO: 0.3 % (ref 0–0.5)
LDLC SERPL CALC-MCNC: 130 MG/DL (ref 0–100)
LDLC/HDLC SERPL: 2.57 {RATIO}
LYMPHOCYTES # BLD AUTO: 2.28 10*3/MM3 (ref 0.7–3.1)
LYMPHOCYTES NFR BLD AUTO: 30.7 % (ref 19.6–45.3)
MCH RBC QN AUTO: 29.8 PG (ref 26.6–33)
MCHC RBC AUTO-ENTMCNC: 33.8 G/DL (ref 31.5–35.7)
MCV RBC AUTO: 88.3 FL (ref 79–97)
MONOCYTES # BLD AUTO: 0.57 10*3/MM3 (ref 0.1–0.9)
MONOCYTES NFR BLD AUTO: 7.7 % (ref 5–12)
NEUTROPHILS NFR BLD AUTO: 4.13 10*3/MM3 (ref 1.7–7)
NEUTROPHILS NFR BLD AUTO: 55.7 % (ref 42.7–76)
NRBC BLD AUTO-RTO: 0 /100 WBC (ref 0–0.2)
PLATELET # BLD AUTO: 279 10*3/MM3 (ref 140–450)
PMV BLD AUTO: 9.7 FL (ref 6–12)
POTASSIUM SERPL-SCNC: 4.8 MMOL/L (ref 3.5–5.2)
PROT SERPL-MCNC: 7.7 G/DL (ref 6–8.5)
RBC # BLD AUTO: 4.86 10*6/MM3 (ref 3.77–5.28)
SODIUM SERPL-SCNC: 142 MMOL/L (ref 136–145)
TRIGL SERPL-MCNC: 200 MG/DL (ref 0–150)
VLDLC SERPL-MCNC: 36 MG/DL (ref 5–40)
WBC # BLD AUTO: 7.42 10*3/MM3 (ref 3.4–10.8)

## 2021-10-20 PROCEDURE — 85025 COMPLETE CBC W/AUTO DIFF WBC: CPT

## 2021-10-20 PROCEDURE — 84443 ASSAY THYROID STIM HORMONE: CPT

## 2021-10-20 PROCEDURE — 80061 LIPID PANEL: CPT

## 2021-10-20 PROCEDURE — 80053 COMPREHEN METABOLIC PANEL: CPT

## 2021-10-20 PROCEDURE — 99396 PREV VISIT EST AGE 40-64: CPT | Performed by: PHYSICIAN ASSISTANT

## 2021-10-20 RX ORDER — IBUPROFEN 800 MG/1
800 TABLET ORAL 2 TIMES DAILY PRN
Qty: 60 TABLET | Refills: 1 | Status: SHIPPED | OUTPATIENT
Start: 2021-10-20 | End: 2022-08-03 | Stop reason: SDUPTHER

## 2021-10-20 RX ORDER — VENLAFAXINE HYDROCHLORIDE 150 MG/1
150 CAPSULE, EXTENDED RELEASE ORAL DAILY
Qty: 90 CAPSULE | Refills: 3 | Status: SHIPPED | OUTPATIENT
Start: 2021-10-20 | End: 2022-12-03

## 2021-10-20 NOTE — PROGRESS NOTES
"Chief Complaint   Patient presents with   • Annual Exam   • Depression   • Anxiety       Subjective     History of Present Illness    Alcira Phelan is a 56 y.o. female.     The patient is being seen for a health maintenance evaluation.  The last health maintenance was 1 year(s) ago.    Social History  Alcira  does not smoke cigarettes.   She drinks no alcohol.  She does not use illicit drugs.    General History  Alcira  does have regular dental visits.  She does complain of vision problems. Wears reading glasses. Last eye exam was a few years ago.  Immunizations are not up to date. The patient needs the following immunizations: shingrix recommended    Lifestyle  Alcira  consumes in general, an \"unhealthy\" diet.  She exercises intermittently. On her feet at work.    Reproductive Health  Alcira  is postmenopausal.  She reports periods are nonexistent since menopause in 2008.  She is not sexually active. Her contraceptive plan is no method.    Screening  Last pap was 2020 by me. History of abnormal pap smear or family history of gyn cancer: none  Last mammogram was  2007, no longer needed due to double mastectomy. Personal or family history of abnormal mammograms or breast cancer: breast cancer in 2008; aunt with breast cancer  Last colonoscopy was 2017 by Dr. Bhatia, repeat in 10 years. Family history of colon cancer: none  Last DEXA was 2016.                 Current Outpatient Medications:   •  cyclobenzaprine (FLEXERIL) 5 MG tablet, Take 1 tablet by mouth 3 (Three) Times a Day As Needed for Muscle Spasms., Disp: 60 tablet, Rfl: 0  •  ibuprofen (ADVIL,MOTRIN) 800 MG tablet, Take 1 tablet by mouth 2 (Two) Times a Day As Needed for Mild Pain ., Disp: 60 tablet, Rfl: 1  •  Mirabegron ER (Myrbetriq) 50 MG tablet sustained-release 24 hour 24 hr tablet, Take 50 mg by mouth Daily., Disp: 90 tablet, Rfl: 3  •  Multiple Vitamins-Minerals (MULTI VITAMIN/MINERALS) tablet, Take  by mouth., Disp: , Rfl:   •  omeprazole " "(priLOSEC) 20 MG capsule, TAKE ONE CAPSULE BY MOUTH DAILY, Disp: 90 capsule, Rfl: 3  •  predniSONE (DELTASONE) 10 MG tablet, Take 3 tabs daily x 3 days then 2 tabs daily x 3 days then 1 tab daily x 3 days then stop., Disp: 18 tablet, Rfl: 0  •  venlafaxine XR (EFFEXOR-XR) 150 MG 24 hr capsule, Take 1 capsule by mouth Daily., Disp: 90 capsule, Rfl: 3     PMFSH  The following portions of the patient's history were reviewed and updated as appropriate: allergies, current medications, past family history, past medical history, past social history, past surgical history and problem list.    Review of Systems   Constitutional: Negative for appetite change, fever and unexpected weight change.   HENT: Negative for ear pain, facial swelling and sore throat.    Eyes: Negative for pain and visual disturbance.   Respiratory: Negative for chest tightness, shortness of breath and wheezing.    Cardiovascular: Negative for chest pain and palpitations.   Gastrointestinal: Negative for abdominal pain and blood in stool.   Endocrine: Negative.    Genitourinary: Negative for difficulty urinating and hematuria.   Musculoskeletal: Negative for joint swelling.   Neurological: Negative for tremors, seizures and syncope.   Hematological: Negative for adenopathy.   Psychiatric/Behavioral: Negative for dysphoric mood. The patient is not nervous/anxious.        Objective   /72   Pulse 84   Temp 97.6 °F (36.4 °C)   Ht 154.9 cm (61\")   Wt 65.8 kg (145 lb)   SpO2 98%   Breastfeeding No   BMI 27.40 kg/m²     Physical Exam  Vitals and nursing note reviewed.   Constitutional:       General: She is not in acute distress.     Appearance: She is well-developed. She is not diaphoretic.   HENT:      Head: Normocephalic and atraumatic. Hair is normal.      Right Ear: Hearing, tympanic membrane, ear canal and external ear normal. No decreased hearing noted. No drainage.      Left Ear: Hearing, tympanic membrane, ear canal and external ear " normal. No decreased hearing noted.      Nose: No nasal deformity.   Eyes:      General: Lids are normal. Lids are everted, no foreign bodies appreciated.         Right eye: No discharge.         Left eye: No discharge.      Conjunctiva/sclera: Conjunctivae normal.      Pupils: Pupils are equal, round, and reactive to light.   Neck:      Thyroid: No thyromegaly.      Vascular: No JVD.      Trachea: No tracheal deviation.   Cardiovascular:      Rate and Rhythm: Normal rate and regular rhythm.      Pulses: Normal pulses.      Heart sounds: Normal heart sounds. No murmur heard.  No friction rub. No gallop.    Pulmonary:      Effort: Pulmonary effort is normal. No respiratory distress.      Breath sounds: Normal breath sounds. No wheezing or rales.   Chest:      Chest wall: No tenderness.   Abdominal:      General: Bowel sounds are normal. There is no distension.      Palpations: Abdomen is soft. There is no mass.      Tenderness: There is no abdominal tenderness. There is no guarding or rebound.      Hernia: No hernia is present.   Musculoskeletal:         General: No tenderness or deformity. Normal range of motion.      Cervical back: Normal range of motion and neck supple.   Lymphadenopathy:      Cervical: No cervical adenopathy.   Skin:     General: Skin is warm and dry.      Findings: No erythema or rash.   Neurological:      Mental Status: She is alert and oriented to person, place, and time.      Cranial Nerves: No cranial nerve deficit.      Motor: No abnormal muscle tone.      Coordination: Coordination normal.      Deep Tendon Reflexes: Reflexes are normal and symmetric. Reflexes normal.   Psychiatric:         Behavior: Behavior normal.         Thought Content: Thought content normal.         Judgment: Judgment normal.              ASSESSMENT/PLAN    Diagnoses and all orders for this visit:    1. Health care maintenance (Primary)  Assessment & Plan:  Immunizations: shingrix recommended, others utd  Eye exam:  recommended  Pap Smear: done 2020  Mammogram: no longer needed due to double mastectomy  Dexa: ordered  Colonoscopy: done 2017 by Jannette, repeat 2027  Labs: fasting labs ordered    Counseled patient regarding multimodal approach with healthy nutrition, healthy sleep, regular physical activity, social activities, counseling, safety measures and medications.     Orders:  -     CBC & Differential; Future  -     Comprehensive Metabolic Panel; Future  -     Lipid Panel; Future  -     TSH; Future    2. Major depressive disorder, remission status unspecified, unspecified whether recurrent  Assessment & Plan:  Patient's depression is recurrent and is mild without psychosis. Their depression is currently in full remission and the condition is improving with treatment. This will be reassessed at the next regular appointment. F/U as described:patient will continue current medication therapy.    Orders:  -     venlafaxine XR (EFFEXOR-XR) 150 MG 24 hr capsule; Take 1 capsule by mouth Daily.  Dispense: 90 capsule; Refill: 3    3. Post-menopausal  -     DEXA Bone Density Axial; Future    Other orders  -     ibuprofen (ADVIL,MOTRIN) 800 MG tablet; Take 1 tablet by mouth 2 (Two) Times a Day As Needed for Mild Pain .  Dispense: 60 tablet; Refill: 1           Return in about 1 year (around 10/20/2022) for Annual with fasting labs.

## 2021-10-20 NOTE — ASSESSMENT & PLAN NOTE
Immunizations: shingrix recommended, others utd  Eye exam: recommended  Pap Smear: done 2020  Mammogram: no longer needed due to double mastectomy  Dexa: ordered  Colonoscopy: done 2017 by Jannette, gwen 2027  Labs: fasting labs ordered    Counseled patient regarding multimodal approach with healthy nutrition, healthy sleep, regular physical activity, social activities, counseling, safety measures and medications.

## 2021-10-21 LAB — TSH SERPL DL<=0.05 MIU/L-ACNC: 2.8 UIU/ML (ref 0.27–4.2)

## 2022-01-11 ENCOUNTER — OFFICE VISIT (OUTPATIENT)
Dept: INTERNAL MEDICINE | Facility: CLINIC | Age: 57
End: 2022-01-11

## 2022-01-11 VITALS
DIASTOLIC BLOOD PRESSURE: 90 MMHG | HEART RATE: 66 BPM | SYSTOLIC BLOOD PRESSURE: 130 MMHG | WEIGHT: 149.6 LBS | OXYGEN SATURATION: 98 % | BODY MASS INDEX: 28.27 KG/M2

## 2022-01-11 DIAGNOSIS — R22.2 SOFT TISSUE SWELLING OF CHEST WALL: Primary | ICD-10-CM

## 2022-01-11 PROCEDURE — 99213 OFFICE O/P EST LOW 20 MIN: CPT | Performed by: PHYSICIAN ASSISTANT

## 2022-01-11 NOTE — PROGRESS NOTES
Chief Complaint   Patient presents with   • Knot on upper chest     Acute        Subjective     Alcira Phelan is a 56 y.o. female.        History of Present Illness     Pt noticed a swelling in her right upper chest 2 days ago when she was in the shower. No pain or fullness associated with it. No skin discoloration. Has not changed since she noticed it.       Current Outpatient Medications:   •  cyclobenzaprine (FLEXERIL) 5 MG tablet, Take 1 tablet by mouth 3 (Three) Times a Day As Needed for Muscle Spasms., Disp: 60 tablet, Rfl: 0  •  ibuprofen (ADVIL,MOTRIN) 800 MG tablet, Take 1 tablet by mouth 2 (Two) Times a Day As Needed for Mild Pain ., Disp: 60 tablet, Rfl: 1  •  Mirabegron ER (Myrbetriq) 50 MG tablet sustained-release 24 hour 24 hr tablet, Take 50 mg by mouth Daily., Disp: 90 tablet, Rfl: 3  •  Multiple Vitamins-Minerals (MULTI VITAMIN/MINERALS) tablet, Take  by mouth., Disp: , Rfl:   •  omeprazole (priLOSEC) 20 MG capsule, TAKE ONE CAPSULE BY MOUTH DAILY, Disp: 90 capsule, Rfl: 3  •  predniSONE (DELTASONE) 10 MG tablet, Take 3 tabs daily x 3 days then 2 tabs daily x 3 days then 1 tab daily x 3 days then stop., Disp: 18 tablet, Rfl: 0  •  venlafaxine XR (EFFEXOR-XR) 150 MG 24 hr capsule, Take 1 capsule by mouth Daily., Disp: 90 capsule, Rfl: 3     PMFSH  The following portions of the patient's history were reviewed and updated as appropriate: allergies, current medications, past family history, past medical history, past social history, past surgical history and problem list.    Review of Systems   Constitutional: Negative for activity change, appetite change and fatigue.   HENT: Negative for congestion and rhinorrhea.    Respiratory: Negative for chest tightness and shortness of breath.    Cardiovascular: Negative for chest pain and palpitations.   Gastrointestinal: Negative for abdominal pain.   Genitourinary: Negative for dysuria.   Musculoskeletal: Negative for arthralgias and myalgias.    Neurological: Negative for dizziness, weakness, light-headedness and headaches.   Psychiatric/Behavioral: Negative for dysphoric mood. The patient is not nervous/anxious.        Objective   /90   Pulse 66   Wt 67.9 kg (149 lb 9.6 oz)   SpO2 98%   BMI 28.27 kg/m²     Physical Exam  Chest:          Comments: Prominent, soft mass in area depicted; no tenderness, no skin changes             ASSESSMENT/PLAN    Diagnoses and all orders for this visit:    1. Soft tissue swelling of chest wall (Primary)  Comments:  Check ultrasound of area. Further recs based on results.  Orders:  -     US chest; Future             Return if symptoms worsen or fail to improve, for Next scheduled follow up.

## 2022-01-12 ENCOUNTER — APPOINTMENT (OUTPATIENT)
Dept: ULTRASOUND IMAGING | Facility: HOSPITAL | Age: 57
End: 2022-01-12

## 2022-01-14 ENCOUNTER — HOSPITAL ENCOUNTER (OUTPATIENT)
Dept: ULTRASOUND IMAGING | Facility: HOSPITAL | Age: 57
Discharge: HOME | End: 2022-01-14
Admitting: PHYSICIAN ASSISTANT

## 2022-01-14 DIAGNOSIS — R22.2 MASS OF CHEST WALL, LEFT: Primary | ICD-10-CM

## 2022-01-14 DIAGNOSIS — R22.2 SOFT TISSUE SWELLING OF CHEST WALL: ICD-10-CM

## 2022-01-14 PROCEDURE — 76604 US EXAM CHEST: CPT

## 2022-01-14 NOTE — PROGRESS NOTES
Discussed with Radiologist who recommended chest CT for next step. Spoke with patient and discussed results. Chest CT with and without was ordered.

## 2022-01-17 ENCOUNTER — TELEPHONE (OUTPATIENT)
Dept: INTERNAL MEDICINE | Facility: CLINIC | Age: 57
End: 2022-01-17

## 2022-01-17 ENCOUNTER — HOSPITAL ENCOUNTER (OUTPATIENT)
Dept: CT IMAGING | Facility: HOSPITAL | Age: 57
Discharge: HOME | End: 2022-01-17
Admitting: PHYSICIAN ASSISTANT

## 2022-01-17 DIAGNOSIS — R22.2 CHEST WALL MASS: Primary | ICD-10-CM

## 2022-01-17 DIAGNOSIS — Z85.3 HISTORY OF LEFT BREAST CANCER: ICD-10-CM

## 2022-01-17 DIAGNOSIS — R22.2 MASS OF CHEST WALL, LEFT: ICD-10-CM

## 2022-01-17 PROCEDURE — 71270 CT THORAX DX C-/C+: CPT

## 2022-01-17 PROCEDURE — 25010000002 IOPAMIDOL 61 % SOLUTION: Performed by: PHYSICIAN ASSISTANT

## 2022-01-17 RX ADMIN — IOPAMIDOL 75 ML: 612 INJECTION, SOLUTION INTRAVENOUS at 09:40

## 2022-01-17 NOTE — TELEPHONE ENCOUNTER
Caller: Alcira Phelan    Relationship: Self    Best call back number: [phone]     Caller requesting test results: PATIENT    What test was performed: CT CHEST    When was the test performed: TODAY    Where was the test performed: [facility]    Additional Notes:  THE PATIENT STATES THAT THEY WERE STAT RESULTS.

## 2022-01-18 NOTE — TELEPHONE ENCOUNTER
Spoke with patient and gave her results of MRI- still needing u/s guided biopsy. Will refer to Oncology to re-establish care and for assistance with interpreting results.

## 2022-01-19 ENCOUNTER — TELEPHONE (OUTPATIENT)
Dept: INTERNAL MEDICINE | Facility: CLINIC | Age: 57
End: 2022-01-19

## 2022-01-19 NOTE — TELEPHONE ENCOUNTER
Spoke with pt and she confirmed she spoke with someone this morning who scheduled her apt to see the surgeon in two weeks.

## 2022-01-19 NOTE — TELEPHONE ENCOUNTER
Spoke with patient and she stated that they called her this morning to schedule the apt with the general surgeon for the biopsy. She goes in two weeks.

## 2022-01-19 NOTE — TELEPHONE ENCOUNTER
Please let her know that I have put in a referral to a general surgeon to discuss how to go about the biopsy.

## 2022-01-31 ENCOUNTER — TRANSCRIBE ORDERS (OUTPATIENT)
Dept: ADMINISTRATIVE | Facility: HOSPITAL | Age: 57
End: 2022-01-31

## 2022-01-31 DIAGNOSIS — R22.2 MASS IN CHEST: Primary | ICD-10-CM

## 2022-02-01 ENCOUNTER — TELEPHONE (OUTPATIENT)
Dept: INTERNAL MEDICINE | Facility: CLINIC | Age: 57
End: 2022-02-01

## 2022-02-01 NOTE — TELEPHONE ENCOUNTER
Patient states she had u/s and CT scan and a mass was found and was told yesterday she has cancer, she would like to speak with you but also wanted a second opinion

## 2022-02-01 NOTE — TELEPHONE ENCOUNTER
Caller: Alcira Phelan    Relationship: Self    Best call back number: [phone]    What is the best time to reach you: ANYTIME     Who are you requesting to speak with (clinical staff, provider,  specific staff member): CLINICAL STAFF     What was the call regarding: PATIENT SI WANTING TO SPEAK WITH HER PCP ABOUT THE RESULTS SHE RECEIVED. THE PATIENT IS WANTING A SECOND OPINION.     Do you require a callback: YES

## 2022-02-02 NOTE — TELEPHONE ENCOUNTER
Spoke with patient. She saw Dr. Ng and was not happy with the way the appointment went. She has since seen her plastic surgeon who scheduled her with Dr. GUPTA. She does have ultrasound guided biopsy scheduled. Oncology appointment is still pending scheduling.

## 2022-02-08 ENCOUNTER — TELEPHONE (OUTPATIENT)
Dept: INTERNAL MEDICINE | Facility: CLINIC | Age: 57
End: 2022-02-08

## 2022-02-08 NOTE — TELEPHONE ENCOUNTER
MEET FROM [facility] CALLED STATING SHE HAD SAW AN ENCOUNTER WHERE THE PT WAS NOT HAPPY WITH HER VISIT WITH DR. ADAM, BUT SHE STILL HAS AN APPOINTMENT SCHEDULED THROUGH ORDERS FROM DR. ADAM, AND THEY JUST WANTED TO SEE IF SHE NEEDED TO REACH OUT AND CANCEL THAT APPOINTMENT.       PT WAS SCHEDULED ANOTHER BIOPSY WITH DR. GUPTA BUT STILL HAS APPOINTMENTS THROUGH DR. ADAM.    PLEASE ADVISE [phone]

## 2022-02-09 NOTE — TELEPHONE ENCOUNTER
Spoke to Umu (Umu works with [employer]), states that pt is switching care of MD's, and just wanted you to know.  Pt is sched for biopsy tomorrow.

## 2022-02-10 ENCOUNTER — HOSPITAL ENCOUNTER (OUTPATIENT)
Dept: ULTRASOUND IMAGING | Facility: HOSPITAL | Age: 57
Discharge: HOME | End: 2022-02-10
Admitting: SURGERY

## 2022-02-10 DIAGNOSIS — R22.2 MASS IN CHEST: ICD-10-CM

## 2022-02-10 PROCEDURE — 88307 TISSUE EXAM BY PATHOLOGIST: CPT | Performed by: SURGERY

## 2022-02-10 PROCEDURE — 76942 ECHO GUIDE FOR BIOPSY: CPT

## 2022-02-10 PROCEDURE — 88360 TUMOR IMMUNOHISTOCHEM/MANUAL: CPT | Performed by: SURGERY

## 2022-02-10 PROCEDURE — 88342 IMHCHEM/IMCYTCHM 1ST ANTB: CPT | Performed by: SURGERY

## 2022-02-10 PROCEDURE — 88341 IMHCHEM/IMCYTCHM EA ADD ANTB: CPT | Performed by: SURGERY

## 2022-02-10 RX ORDER — LIDOCAINE HYDROCHLORIDE 10 MG/ML
0.5 INJECTION, SOLUTION EPIDURAL; INFILTRATION; INTRACAUDAL; PERINEURAL ONCE
Status: COMPLETED | OUTPATIENT
Start: 2022-02-10 | End: 2022-02-10

## 2022-02-10 RX ADMIN — LIDOCAINE HYDROCHLORIDE 0.5 ML: 10 INJECTION, SOLUTION EPIDURAL; INFILTRATION; INTRACAUDAL; PERINEURAL at 12:22

## 2022-02-13 NOTE — PROGRESS NOTES
"  Subjective     PROBLEM LIST:  1. Local recurrence of HR+ carcinoma of the left breast  A) history of left breast cancer in 2007.  Bilateral mastectomy 5/30/07.  pathology showed grade 2 IDC of the left breast measuring 1.8 cm.  ER+ AR+ Her2 negative.   0/2 SLN involved.  [id number].  B) adjuvant chemotherapy with TC x 4 cycles, completed September 2007 with Dr. De La Torre.  Tamoxifen October 2007 thru October 2012.  C) presented January 2022 with left chest wall mass.  CT chest 1/17/22 showed 4.6 cm lesion involving left manubrium with soft tissue extent extending posteriorly to the anterior aspect of mediastinum.  12 mm nodule right lung base.  D) ultrasound guided biopsy of chest wall mass on 2/10/22.  Pathology showed invasive ductal carcinoma of the breast.  ER positive (100%), AR positive (50%), HER-2 2+  2. Depression/anxiety  3. GERD      CHIEF COMPLAINT: breast cancer      HISTORY OF PRESENT ILLNESS:  The patient is a 56 y.o. female, referred for evaluation of a recently diagnosed chest wall recurrence of breast cancer.    She has a history of left breast cancer, treated by Dr. Colby Oakley and Dr. De La Torre as detailed above.  She has been off of treatment for 10 years, completing endocrine therapy in 2012.    REVIEW OF SYSTEMS:  A 14 point review of systems was performed and is negative except as noted above.    Past Medical History:   Diagnosis Date   • Anxiety and depression    • Cancer (HCC)     breast cancer   • Cellulitis    • GERD (gastroesophageal reflux disease)    • Head injuries     hx of trauma to head with coke bottle   • Headache    • History of breast problem     malignant carcinoma   • Joint pain, knee     pt denies this   • Localized swelling, mass and lump, neck     pt denies this   • PONV (postoperative nausea and vomiting)    • Urinary frequency    • Wears glasses                Objective     /78   Pulse 77   Temp 98.2 °F (36.8 °C)   Resp 18   Ht 154.9 cm (61\")   Wt 66.7 kg (147 lb) "   SpO2 96%   BMI 27.78 kg/m²   Performance Status:  ECOG score: 0           General: well appearing female in no acute distress  Neuro: alert and oriented  HEENT: sclerae anicteric  Chest: To the left of the sternal notch there is an approximately 4 cm rounded nontender mass  Extremities: no lower extremity edema  Skin: no rashes, lesions, bruising, or petechiae  Psych: mood and affect appropriate    Lab Results   Component Value Date    WBC 7.42 10/20/2021    HGB 14.5 10/20/2021    HCT 42.9 10/20/2021    MCV 88.3 10/20/2021     10/20/2021     Lab Results   Component Value Date    GLUCOSE 77 10/20/2021    BUN 14 10/20/2021    CREATININE 0.72 10/20/2021    EGFRIFNONA 83 10/20/2021    BCR 19.4 10/20/2021    K 4.8 10/20/2021    CO2 27.9 10/20/2021    CALCIUM 9.7 10/20/2021    ALBUMIN 4.90 10/20/2021    AST 27 10/20/2021    ALT 27 10/20/2021       Ct chest:   IMPRESSION:  4.6 cm lesion involving the left aspect of the manubrium  with considerable extraosseous soft tissue extent correlating to the  palpable finding noted on recent ultrasound. This lesion would again be  amenable to ultrasound-guided soft tissue sampling, as indicated,  remaining concerning for breast cancer recurrence. This finding also  extends posteriorly to the anterior aspect of the mediastinum.     Nonspecific 12 mm nodule medially at the right lung base. There is an  adjacent large thoracic osteophyte in this could represent some chronic  inflammatory change, however in the setting of suspected recurrence  raises suspicion for metastatic involvement.     Additional component of nonspecific mild new interlobular septal  thickening involving the upper lobes with minimal peribronchial  nodularity. This finding is nonspecific and favored to relate to  possible vascular congestion or minimal atypical pneumonia. Lymphangitic  metastatic involvement cannot a similar appearance but is felt less    I personally reviewed the imaging  studies.          Assessment/Plan     Alcira Phelan is a 56 y.o. female with a history of stage I breast cancer, now with local chest wall recurrence that is ER/MS+ Her2 2+    We will do a PET CT for further staging evaluation.      We discussed that at this point until I know the results of the PET scan as well as the results of the HER-2 now FISH testing, I am not able to recommend a specific treatment.  It is possible that she has only localized disease recurrence, in which case I would consider radiation treatment followed by endocrine therapy.  If she has more widespread disease, then an aromatase inhibitor along with a CDK 4 6 inhibitor would be an option.    We discussed that because of bone involvement this would be considered stage IV disease.  It may not be curable, but there is still certainly the potential for effective treatment that can sometimes result in additional years of life.    I will plan to see her back in a week to review PET scan results as well as the HER-2 status.  We will make treatment plans at that time.               A total greater than 60 mins minutes was spent in face to face patient time, examination, counseling, charting, reviewing test results, and reviewing outside records.    Ruth Castro MD    2/16/2022

## 2022-02-14 ENCOUNTER — TELEPHONE (OUTPATIENT)
Dept: INTERNAL MEDICINE | Facility: CLINIC | Age: 57
End: 2022-02-14

## 2022-02-14 NOTE — TELEPHONE ENCOUNTER
Caller: Alcira Phelan    Relationship: Self    Best call back number: [phone] (H)    Caller requesting test results: YES     What test was performed: BIOPSY    When was the test performed:02/10/22    Where was the test performed: WITH IN [facility]     Additional notes: PATIENT CALLED AND REQUESTED HER RESULTS- THE CALL WAS DROPPED AND I CALLED THE PATIENT BACK BUT IT WENT STRAIGHT  TO

## 2022-02-15 NOTE — TELEPHONE ENCOUNTER
Spoke with patient and gave her results of biopsy. She has appointment with Oncology, Dr. Castro , tomorrow to discuss next steps.

## 2022-02-15 NOTE — TELEPHONE ENCOUNTER
PT CALLED TO CHECK ON STATUS FOR TEST RESULTS, STATED THAT SHE HAD TEST DONE LAST Thursday.    PLEASE ADVISE.  CALL BACK:[phone]

## 2022-02-16 ENCOUNTER — LAB (OUTPATIENT)
Dept: LAB | Facility: HOSPITAL | Age: 57
End: 2022-02-16

## 2022-02-16 ENCOUNTER — CONSULT (OUTPATIENT)
Dept: ONCOLOGY | Facility: CLINIC | Age: 57
End: 2022-02-16

## 2022-02-16 VITALS
TEMPERATURE: 98.2 F | HEIGHT: 61 IN | RESPIRATION RATE: 18 BRPM | SYSTOLIC BLOOD PRESSURE: 154 MMHG | HEART RATE: 77 BPM | OXYGEN SATURATION: 96 % | DIASTOLIC BLOOD PRESSURE: 78 MMHG | WEIGHT: 147 LBS | BODY MASS INDEX: 27.75 KG/M2

## 2022-02-16 DIAGNOSIS — C50.112 MALIGNANT NEOPLASM OF CENTRAL PORTION OF LEFT FEMALE BREAST, UNSPECIFIED ESTROGEN RECEPTOR STATUS: Primary | ICD-10-CM

## 2022-02-16 DIAGNOSIS — C50.112 MALIGNANT NEOPLASM OF CENTRAL PORTION OF LEFT FEMALE BREAST, UNSPECIFIED ESTROGEN RECEPTOR STATUS: ICD-10-CM

## 2022-02-16 LAB
ALBUMIN SERPL-MCNC: 4.4 G/DL (ref 3.5–5.2)
ALBUMIN/GLOB SERPL: 1.5 G/DL
ALP SERPL-CCNC: 118 U/L (ref 39–117)
ALT SERPL W P-5'-P-CCNC: 20 U/L (ref 1–33)
ANION GAP SERPL CALCULATED.3IONS-SCNC: 8 MMOL/L (ref 5–15)
AST SERPL-CCNC: 21 U/L (ref 1–32)
BILIRUB SERPL-MCNC: <0.2 MG/DL (ref 0–1.2)
BUN SERPL-MCNC: 14 MG/DL (ref 6–20)
BUN/CREAT SERPL: 18.2 (ref 7–25)
CALCIUM SPEC-SCNC: 9.5 MG/DL (ref 8.6–10.5)
CANCER AG15-3 SERPL-ACNC: 36.7 U/ML
CHLORIDE SERPL-SCNC: 105 MMOL/L (ref 98–107)
CO2 SERPL-SCNC: 29 MMOL/L (ref 22–29)
CREAT SERPL-MCNC: 0.77 MG/DL (ref 0.57–1)
ERYTHROCYTE [DISTWIDTH] IN BLOOD BY AUTOMATED COUNT: 12.3 % (ref 12.3–15.4)
GFR SERPL CREATININE-BSD FRML MDRD: 77 ML/MIN/1.73
GLOBULIN UR ELPH-MCNC: 3 GM/DL
GLUCOSE SERPL-MCNC: 103 MG/DL (ref 65–99)
HCT VFR BLD AUTO: 42.1 % (ref 34–46.6)
HGB BLD-MCNC: 13.8 G/DL (ref 12–15.9)
LYMPHOCYTES # BLD AUTO: 2.5 10*3/MM3 (ref 0.7–3.1)
LYMPHOCYTES NFR BLD AUTO: 37.7 % (ref 19.6–45.3)
MCH RBC QN AUTO: 28.9 PG (ref 26.6–33)
MCHC RBC AUTO-ENTMCNC: 32.7 G/DL (ref 31.5–35.7)
MCV RBC AUTO: 88.3 FL (ref 79–97)
MONOCYTES # BLD AUTO: 0.4 10*3/MM3 (ref 0.1–0.9)
MONOCYTES NFR BLD AUTO: 6 % (ref 5–12)
NEUTROPHILS NFR BLD AUTO: 3.8 10*3/MM3 (ref 1.7–7)
NEUTROPHILS NFR BLD AUTO: 56.3 % (ref 42.7–76)
PLATELET # BLD AUTO: 275 10*3/MM3 (ref 140–450)
PMV BLD AUTO: 7 FL (ref 6–12)
POTASSIUM SERPL-SCNC: 4.2 MMOL/L (ref 3.5–5.2)
PROT SERPL-MCNC: 7.4 G/DL (ref 6–8.5)
RBC # BLD AUTO: 4.77 10*6/MM3 (ref 3.77–5.28)
SODIUM SERPL-SCNC: 142 MMOL/L (ref 136–145)
WBC NRBC COR # BLD: 6.7 10*3/MM3 (ref 3.4–10.8)

## 2022-02-16 PROCEDURE — 99205 OFFICE O/P NEW HI 60 MIN: CPT | Performed by: INTERNAL MEDICINE

## 2022-02-16 PROCEDURE — 85025 COMPLETE CBC W/AUTO DIFF WBC: CPT

## 2022-02-16 PROCEDURE — 80053 COMPREHEN METABOLIC PANEL: CPT

## 2022-02-16 PROCEDURE — 36415 COLL VENOUS BLD VENIPUNCTURE: CPT

## 2022-02-16 PROCEDURE — 86300 IMMUNOASSAY TUMOR CA 15-3: CPT

## 2022-02-17 LAB — CANCER AG27-29 SERPL-ACNC: 63.6 U/ML (ref 0–38.6)

## 2022-02-21 LAB
CYTO UR: NORMAL
LAB AP CASE REPORT: NORMAL
LAB AP CLINICAL INFORMATION: NORMAL
LAB AP DIAGNOSIS COMMENT: NORMAL
LAB AP INTEGRATED ONCOLOGY, ADDENDUM: NORMAL
LAB AP SPECIAL STAINS: NORMAL
PATH REPORT.FINAL DX SPEC: NORMAL
PATH REPORT.GROSS SPEC: NORMAL

## 2022-02-23 ENCOUNTER — APPOINTMENT (OUTPATIENT)
Dept: PET IMAGING | Facility: HOSPITAL | Age: 57
End: 2022-02-23

## 2022-02-23 ENCOUNTER — HOSPITAL ENCOUNTER (OUTPATIENT)
Dept: PET IMAGING | Facility: HOSPITAL | Age: 57
End: 2022-02-23

## 2022-02-28 ENCOUNTER — HOSPITAL ENCOUNTER (OUTPATIENT)
Dept: PET IMAGING | Facility: HOSPITAL | Age: 57
Discharge: HOME | End: 2022-02-28

## 2022-02-28 DIAGNOSIS — C50.112 MALIGNANT NEOPLASM OF CENTRAL PORTION OF LEFT FEMALE BREAST, UNSPECIFIED ESTROGEN RECEPTOR STATUS: ICD-10-CM

## 2022-02-28 LAB — GLUCOSE BLDC GLUCOMTR-MCNC: 88 MG/DL (ref 70–130)

## 2022-02-28 PROCEDURE — A9552 F18 FDG: HCPCS | Performed by: INTERNAL MEDICINE

## 2022-02-28 PROCEDURE — 0 FLUDEOXYGLUCOSE F18 SOLUTION: Performed by: INTERNAL MEDICINE

## 2022-02-28 PROCEDURE — 78815 PET IMAGE W/CT SKULL-THIGH: CPT

## 2022-02-28 PROCEDURE — 82962 GLUCOSE BLOOD TEST: CPT

## 2022-02-28 RX ADMIN — FLUDEOXYGLUCOSE F18 1 DOSE: 300 INJECTION INTRAVENOUS at 13:16

## 2022-03-01 ENCOUNTER — TELEPHONE (OUTPATIENT)
Dept: ONCOLOGY | Facility: CLINIC | Age: 57
End: 2022-03-01

## 2022-03-01 NOTE — TELEPHONE ENCOUNTER
Caller: Alcira Phelan    Relationship: Self    Best call back number: [phone]      What was the call regarding: PATIENT MISSED CALL FROM THE OFFICE, SOMETHING ABOUT CHANGING APPOINTMENT. SHE CAN DO TOMORROW BUT WHAT TIME    Do you require a callback: YES

## 2022-03-02 ENCOUNTER — OFFICE VISIT (OUTPATIENT)
Dept: RADIATION ONCOLOGY | Facility: HOSPITAL | Age: 57
End: 2022-03-02

## 2022-03-02 ENCOUNTER — HOSPITAL ENCOUNTER (OUTPATIENT)
Dept: RADIATION ONCOLOGY | Facility: HOSPITAL | Age: 57
Setting detail: RADIATION/ONCOLOGY SERIES
Discharge: HOME | End: 2022-03-02

## 2022-03-02 ENCOUNTER — OFFICE VISIT (OUTPATIENT)
Dept: ONCOLOGY | Facility: CLINIC | Age: 57
End: 2022-03-02

## 2022-03-02 VITALS
SYSTOLIC BLOOD PRESSURE: 135 MMHG | TEMPERATURE: 98 F | HEIGHT: 60 IN | DIASTOLIC BLOOD PRESSURE: 75 MMHG | RESPIRATION RATE: 16 BRPM | OXYGEN SATURATION: 97 % | BODY MASS INDEX: 28.64 KG/M2 | HEART RATE: 73 BPM | WEIGHT: 145.9 LBS

## 2022-03-02 VITALS
TEMPERATURE: 98 F | BODY MASS INDEX: 27.38 KG/M2 | HEIGHT: 61 IN | RESPIRATION RATE: 16 BRPM | SYSTOLIC BLOOD PRESSURE: 145 MMHG | OXYGEN SATURATION: 97 % | DIASTOLIC BLOOD PRESSURE: 78 MMHG | HEART RATE: 80 BPM | WEIGHT: 145 LBS

## 2022-03-02 DIAGNOSIS — Z85.3 HISTORY OF LEFT BREAST CANCER: Primary | ICD-10-CM

## 2022-03-02 DIAGNOSIS — C79.51 BONE METASTASES: Primary | ICD-10-CM

## 2022-03-02 DIAGNOSIS — C50.112 MALIGNANT NEOPLASM OF CENTRAL PORTION OF LEFT FEMALE BREAST, UNSPECIFIED ESTROGEN RECEPTOR STATUS: Primary | ICD-10-CM

## 2022-03-02 PROCEDURE — 99215 OFFICE O/P EST HI 40 MIN: CPT | Performed by: INTERNAL MEDICINE

## 2022-03-02 RX ORDER — LETROZOLE 2.5 MG/1
TABLET, FILM COATED ORAL DAILY
COMMUNITY
End: 2022-03-04 | Stop reason: SDUPTHER

## 2022-03-02 NOTE — PROGRESS NOTES
CONSULTATION NOTE    NAME:      Alcira Phelan  :                                                          1965  DATE OF CONSULTATION:                       22  REQUESTING PHYSICIAN:                   Ruth Castro MD  REASON FOR CONSULTATION:           Local recurrence in the sternum of hormone receptor positive left breast cancer       BRIEF HISTORY:  Alcira Phelan  is a very pleasant 56 y.o. female  with a history of left breast cancer in .  She underwent bilateral mastectomies 2007.  Pathology showed grade 2 invasive ductal carcinoma of the left breast measuring 1.8 cm, ER/CA positive HER-2/lizz negative.  0/2 sentinel nodes were positive for tumor.  She underwent adjuvant chemotherapy of TC x4 completing 2007.  She then took tamoxifen for 5 years.  In 2022 she presented with a left chest wall mass.  CT 2022 showed a 4.6 cm lesion involving the left manubrium with soft tissue extent posteriorly to the anterior aspect of the mediastinum.  There is a 12 mm nodule in the right lung base.  Ultrasound-guided biopsy of the chest wall mass on 2/10/2022 showed invasive ductal carcinoma ER/CA positive HER-2 2+.    2022  total body PET scan revealed a hypermetabolic soft tissue mass involving the manubrium of the sternum and extending along the left internal mammary artery and vein and into the left pectoralis major muscle consistent with metastatic disease.  There was no metastatic disease elsewhere within the neck chest abdomen or pelvis.  Tumor size is 4.5 x 3.2 cm with SUV of 4.7.  Within the medial right lower lobe of the lung there was a stable noncalcified pulmonary nodule measuring 1.4 cm in size that was not hypermetabolic.    Ms. Phelan is here to discuss palliative radiotherapy.      Age at menarche:  10  Age at menopause:  42  Hormone replacement therapy:  no  Personal history of breast cancer:  yes  Family history of breast cancer:  yes; family member  aunt  Radiation to chest before age of 28:  no  Age of first live birth:  16    BMI:  Body mass index is 28.49 kg/m².      Social History     Substance and Sexual Activity   Alcohol Use No       Allergies   Allergen Reactions   • Penicillins Other (See Comments)     Headache       Social History     Tobacco Use   • Smoking status: Former Smoker     Types: Cigarettes   • Smokeless tobacco: Never Used   • Tobacco comment: as a teenager   Vaping Use   • Vaping Use: Never used   Substance Use Topics   • Alcohol use: No   • Drug use: No         Past Medical History:   Diagnosis Date   • Anxiety and depression    • Arthritis    • Breast cancer (HCC)    • Cancer (HCC)     breast cancer   • Cellulitis    • GERD (gastroesophageal reflux disease)    • Head injuries     hx of trauma to head with coke bottle   • Headache    • History of breast problem     malignant carcinoma   • Joint pain, knee     pt denies this   • Localized swelling, mass and lump, neck     pt denies this   • PONV (postoperative nausea and vomiting)    • Urinary frequency    • Wears glasses        family history includes Breast cancer in her maternal aunt; Diabetes in her mother and another family member; Emphysema in her paternal grandmother; Heart attack (age of onset: 72) in her father; Hypertension in her father; No Known Problems in her brother, maternal grandfather, maternal grandmother, paternal grandfather, and sister.     Past Surgical History:   Procedure Laterality Date   • BREAST CAPSULOTOMY, IMPLANT REVISION Bilateral 7/9/2019    Procedure: BREAST CAPSULOTOMY, REMOVAL OLD IMPLANTS, REPLACEMENT OF NEW IMPLANTS FOR BREAST RECONSTRUCTION BILATERAL;  Surgeon: Melanie Sanford MD;  Location: [facility];  Service: Plastics   • CARPAL TUNNEL RELEASE Right    • COLONOSCOPY  06/06/2017    Dr Johnson Repeat in 2027   • MASTECTOMY Bilateral 05/30/2007        Review of Systems   Constitutional: Negative.    HENT:  Negative.    Eyes: Negative.   "  Respiratory: Negative.    Cardiovascular: Negative.    Gastrointestinal: Negative.    Endocrine: Negative.    Genitourinary: Negative.     Musculoskeletal: Negative.    Skin: Negative.    Neurological: Negative.    Hematological: Negative.    Psychiatric/Behavioral: Negative.            Objective   VITAL SIGNS:   Vitals:    03/02/22 1428   BP: 135/75   Pulse: 73   Resp: 16   Temp: 98 °F (36.7 °C)   SpO2: 97%  Comment: RA   Weight: 66.2 kg (145 lb 14.4 oz)   Height: 152.4 cm (60\")   PainSc: 0-No pain        KPS       90%    Physical Exam  Constitutional:       Appearance: Normal appearance. She is normal weight.   Cardiovascular:      Rate and Rhythm: Normal rate and regular rhythm.      Heart sounds: Normal heart sounds.   Pulmonary:      Effort: Pulmonary effort is normal.      Breath sounds: Normal breath sounds.   Neurological:      Mental Status: She is alert.     Ms. Ward has 6 cm firm mass on the left anterior chest wall.  It is fixed and not freely movable.        The following portions of the patient's history were reviewed and updated as appropriate: allergies, current medications, past family history, past medical history, past social history, past surgical history and problem list.    Assessment      IMPRESSION:     Alcira Phelan  is a 56 y.o. female  with a history of left breast cancer now with a a 4.6 cm lesion involving the left manubrium with soft tissue extent posteriorly to the anterior aspect of the mediastinum and a 12 mm nodule in the right lung base.  Ultrasound-guided biopsy of the chest wall mass on 2/10/2022 showed invasive ductal carcinoma ER/MD positive HER-2 2+.  PET scan revealed the right lung nodule was not hypermetabolic.    RECOMMENDATIONS: I discussed radiation treatment options with Ms. Phelan and her .  She has only getting metastatic disease.  We could do conventional radiation but we could get a match better dose in with CyberKnife SBRT.  The pros and cons, risks " and benefits of treatment were discussed and informed consent obtained.  They watched our CyberKnife educational video as well.  I will have our interventional radiologist place fiducials in the lesion for tracking.  I anticipate 40-50 Gray in 3-5 fractions.  Dr. Ruth Castro was in agreement and plans systemic therapy to follow.  Thank you for asking me to see this very pleasant patient               Anuja Pisano MD    Total time of patient care on day of service including time prior to, face to face with patient, and following visit spent in reviewing records, lab results, imaging studies, discussion with patient, and documentation/charting was > 45 minutes.    Errors in dictation may reflect use of voice recognition software and not all errors in transcription may have been detected prior to signing.

## 2022-03-02 NOTE — PROGRESS NOTES
"      PROBLEM LIST:  1. Local recurrence of HR+ carcinoma of the left breast  A) history of left breast cancer in 2007.  Bilateral mastectomy 5/30/07.  pathology showed grade 2 IDC of the left breast measuring 1.8 cm.  ER+ WA+ Her2 negative.   0/2 SLN involved.  [id number].  B) adjuvant chemotherapy with TC x 4 cycles, completed September 2007 with Dr. De La Torre.  Tamoxifen October 2007 thru October 2012.  C) presented January 2022 with left chest wall mass.  CT chest 1/17/22 showed 4.6 cm lesion involving left manubrium with soft tissue extent extending posteriorly to the anterior aspect of mediastinum.  12 mm nodule right lung base.  D) ultrasound guided biopsy of chest wall mass on 2/10/22.  Pathology showed invasive ductal carcinoma of the breast.  ER positive (100%), WA positive (50%), HER-2 2+  2. Depression/anxiety  3. GERD    Subjective     CHIEF COMPLAINT: Breast cancer    HISTORY OF PRESENT ILLNESS:   Alcira Phelan returns for follow-up.   She is here to review her PET scan results.      Objective      /78   Pulse 80   Temp 98 °F (36.7 °C)   Resp 16   Ht 154.9 cm (61\")   Wt 65.8 kg (145 lb)   SpO2 97%   BMI 27.40 kg/m²      Performance Status:  ECOG score: 0             General: well appearing female in no acute distress  Neuro: alert and oriented  Psych: mood and affect appropriate          RECENT LABS:  Lab Results   Component Value Date    WBC 6.70 02/16/2022    HGB 13.8 02/16/2022    HCT 42.1 02/16/2022    MCV 88.3 02/16/2022     02/16/2022       Lab Results   Component Value Date    GLUCOSE 103 (H) 02/16/2022    BUN 14 02/16/2022    CREATININE 0.77 02/16/2022    EGFRIFNONA 77 02/16/2022    BCR 18.2 02/16/2022    K 4.2 02/16/2022    CO2 29.0 02/16/2022    CALCIUM 9.5 02/16/2022    ALBUMIN 4.40 02/16/2022    AST 21 02/16/2022    ALT 20 02/16/2022       NM PET/CT Skull Base to Mid Thigh  Narrative: NM PET/CT SKULL BASE TO MID THIGH-     Date of Exam: 2/28/2022 1:33 PM     Indication: " recurrent breast cancer; C50.112-Malignant neoplasm of  central portion of left female breast.     Comparison Exams: Chest CT 1/17/2022     Technique: Routine PET scanning was performed from the skull vertex  through the mid thighs following the intravenous injection of 13.7  millicuries of fluorine 18 labeled FDG. The patient's blood glucose  level was 88 milligrams per deciliter at the time of injection.  Limited noncontrast CT scanning was also performed from skull base  through mid thighs for anatomic correlation and attenuation correction  purposes.     FINDINGS:  Mediastinal background activity: SUV max 1.9     Head/neck: Visualized intracranial contents and globes and orbits appear  within normal limits.  No evidence of cervical adenopathy.  No abnormal  hypermetabolic activity seen within the head or neck.     CHEST:Patient status post bilateral mastectomy.  Again seen is a soft  tissue mass at the level of the manubrium sternum extending into the  left pectoralis major muscle.  There is also stable tissue thickening  along the internal artery and vein.  Soft tissue masslike area measures  approximately 4.5 x 3.2 cm in size.  This area is hypermetabolic with a  maximum SUV of 4.7.  Findings would be consistent with metastatic  disease.  No other abnormal hypermetabolic activity seen within the  chest.  No hilar or axillary adenopathy.  No enlarged or hypermetabolic  supraclavicular nodes.  No evidence of pleural effusion.  Within the  medial right lower lobe there is a stable noncalcified pulmonary nodule  measuring 1.4 cm in size.  This is not hypermetabolic.  No other  noncalcified pulmonary nodule identified.     ABDOMEN:The liver, pancreas, and spleen are within normal limits.   Bilateral adrenal glands appear normal.  Kidneys appear normal.   Gallbladder is normal.  There is no abdominal or retroperitoneal  adenopathy.  The upper GI tract is within normal limits.  No abnormal  hypermetabolic activity  seen within the abdomen.     Pelvis:No abnormal hypermetabolic activity seen within the pelvis.  GI  tract is within normal limits.  Urinary bladder is completely empty.   Uterus and ovaries appear within normal limits.  The appendix is normal.   There is no pelvic or inguinal adenopathy.  No free intraperitoneal  fluid.     Bones:There are degenerative changes throughout spine.  No suspicious  lytic or sclerotic bony lesions identified.  No abnormal hypermetabolic  activity seen within the bony structures.           Impression:    1.  Hypermetabolic soft tissue mass involving the manubrium of the  sternum and extending along the left internal mammary artery and vein  and into the left pectoralis major muscle consistent with metastatic  disease.  2.  No metastatic disease elsewhere within the neck, chest, abdomen, or  pelvis.     This report was finalized on 2/28/2022 2:42 PM by Ethan Wall MD.         I personally reviewed the imaging studies.        Assessment/Plan   Alcira Phelan is a 56 y.o. female with a local regional recurrence of ER positive MA positive HER-2 negative invasive ductal carcinoma.  This is mainly involving the sternum.  There is no evidence of distant disease.  We discussed that this is not surgically resectable, but given its localized nature, I do recommend radiation treatment.  I have discussed this with Dr. Pisano.  Dr. Pisano has agreed to see her today.    Following radiation treatment I would recommend systemic therapy with letrozole and palbociclib.  Side effects of this treatment were discussed including  hot flashes, vaginal dryness, joint pain, decrease in bone density, mood or sleep disturbance, nausea, and neutropenia.     We will start the letrozole now, and likely plan to begin palbociclib following completion of radiation treatment.    For now follow-up in 6 weeks.    Total time of patient care on day of service including time prior to, face to face with patient, and  following visit spent in reviewing records, lab results, imaging studies, discussion with patient, and documentation/charting was > 40 minutes.                            Ruth Castro MD  [facility] Hematology and Oncology    3/2/2022          CC:

## 2022-03-03 ENCOUNTER — TELEPHONE (OUTPATIENT)
Dept: ONCOLOGY | Facility: CLINIC | Age: 57
End: 2022-03-03

## 2022-03-03 DIAGNOSIS — C50.112 MALIGNANT NEOPLASM OF CENTRAL PORTION OF LEFT FEMALE BREAST, UNSPECIFIED ESTROGEN RECEPTOR STATUS: Primary | ICD-10-CM

## 2022-03-03 NOTE — TELEPHONE ENCOUNTER
VIVIENNE w/Urbano. Medication was sent in already in October #90 w/3RF from [facility]. Should they need anything further to call our office.   [id number]   ~Shell

## 2022-03-03 NOTE — TELEPHONE ENCOUNTER
Caller: Urbano Phelan    Relationship: Emergency Contact    Best call back number: [phone]    What medications are you currently taking:   Current Outpatient Medications on File Prior to Visit   Medication Sig Dispense Refill   • cyclobenzaprine (FLEXERIL) 5 MG tablet Take 1 tablet by mouth 3 (Three) Times a Day As Needed for Muscle Spasms. 60 tablet 0   • ibuprofen (ADVIL,MOTRIN) 800 MG tablet Take 1 tablet by mouth 2 (Two) Times a Day As Needed for Mild Pain . 60 tablet 1   • letrozole (FEMARA) 2.5 MG tablet Take  by mouth Daily.     • Mirabegron ER (Myrbetriq) 50 MG tablet sustained-release 24 hour 24 hr tablet Take 50 mg by mouth Daily. 90 tablet 3   • Multiple Vitamins-Minerals (MULTI VITAMIN/MINERALS) tablet Take  by mouth.     • omeprazole (priLOSEC) 20 MG capsule TAKE ONE CAPSULE BY MOUTH DAILY 90 capsule 3   • venlafaxine XR (EFFEXOR-XR) 150 MG 24 hr capsule Take 1 capsule by mouth Daily. 90 capsule 3     No current facility-administered medications on file prior to visit.      When did you start taking these medications: PER SPOUSE, HE WENT TO  RX THAT DR. OTTO WAS SUPPOSED TO CALL IN FOR PATIENT YESTERDAY BUT IT HAS NOT BEEN REC'D BY THE PHARMACY    Who prescribed you this medication: CLARITA    What are your concerns: DR. OTTO HAD WANTED PATIENT TO START THIS MEDICATION ASAP    PLEASE CALL SPOUSE TO DISCUSS AND ADVISE WHEN THIS HAS BEEN CALLED IN     [address] [facility] [address]  - [phone]  - [phone] FX

## 2022-03-04 ENCOUNTER — SPECIALTY PHARMACY (OUTPATIENT)
Dept: ONCOLOGY | Facility: HOSPITAL | Age: 57
End: 2022-03-04

## 2022-03-04 DIAGNOSIS — C50.112 MALIGNANT NEOPLASM OF CENTRAL PORTION OF LEFT FEMALE BREAST, UNSPECIFIED ESTROGEN RECEPTOR STATUS: Primary | ICD-10-CM

## 2022-03-04 DIAGNOSIS — C79.51 BONE METASTASES: ICD-10-CM

## 2022-03-04 DIAGNOSIS — Z85.3 HISTORY OF LEFT BREAST CANCER: ICD-10-CM

## 2022-03-04 RX ORDER — LETROZOLE 2.5 MG/1
2.5 TABLET, FILM COATED ORAL DAILY
Qty: 30 TABLET | Refills: 11 | Status: SHIPPED | OUTPATIENT
Start: 2022-03-04 | End: 2023-02-24

## 2022-03-04 NOTE — TELEPHONE ENCOUNTER
Caller: Alcira Phelan    Relationship: Self    Best call back number: [phone]    What is the best time to reach you: ANYTIME    Who are you requesting to speak with (clinical staff, provider,  specific staff member): DR OTTO OR NURSE    What was the call regarding: PT STATED THAT DR OTTO WAS SUPPOSED TO CALL IN A PRESCRIPTION FOR LETROZOLE AND HER PHARMACY HAS NOT RECVD ANYTHING YET.     [facility] [address] AT [facility] [address] [facility] - [phone]  - [phone] FX  [phone].    Do you require a callback: YES, PLS CALL PT TO LET HER KNOW SCRIPT HAS BEEN SENT TO THE PHARMACY.

## 2022-03-04 NOTE — PROGRESS NOTES
Oral Chemotherapy - New Referral    Received a referral from Dr. Castro     Medication(s): ribociclib + letrozole  Start date of treatment planned for: Patient will complete radiation prior to starting ribociclib (late April). Patient will begin letrozole now.  Indication: metastatic ER/MA+, HER2- breast cancer  Relevant past treatments: bilateral mastectomy, adjuvant chemotherapy with TC x 4 cycles, tamoxifen  Is the therapy appropriate based on treatment guidelines and FDA labeling?: yes  Therapeutic Goals: Continue treatment until progression or intolerable toxicity  Patient can self-administer oral medications: Yes    The current medication list was reviewed and drug-drug interactions of note include:   Flexeril and ribociclib - increase QT prolongation  Effexor and ribociclib - increase QT prolongation  This will be discussed during education.    The patient has no relevant drug allergies.    The patient's most recent labs were reviewed and all are WNL to start treatment at this dose.  Patient will need baseline labs prior to starting ribociclib.    Patient will need a baseline EKG and this will need to be monitored closely due to DDIs.     A prescription was released to [facility] for   Drug: ribociclib  Strength: 200 mg  Directions: Take 3 tablets PO daily Days 1-21 then 7 days off  Quantity: 63  Refills: 5    Due to insurance restriction, patient must fill at [facility].    Insurance denied ribociclib due to it being non-formulary.  Formulary options are palbociclib or abemaciclib.  Dr. Castro would like to proceed with palbociclib.     Prescription sent to [facility].  Drug: palbociclib  Strength: 125 mg  Directions: Take 1 tablet PO daily Days 1-21 then 7 days off  Quantity: 21  Refills: 5

## 2022-03-07 NOTE — PROGRESS NOTES
I completed an independent review of the medication order and prescription for ribociclib. I agree with Dr. Joseph' assessment and what is in her note.  I confirm she sent the prescription to [facility] specialty pharmacy as described.    I completed an independent review of the medication order and prescription for palbociclib. I agree with Dr. Joseph' assessment and what is in her note.  I confirm she sent the prescription to [facility] as described.    Ines Haro, PharmD, BCOP - Oncology Clinical Pharmacist [phone]

## 2022-03-08 ENCOUNTER — TELEPHONE (OUTPATIENT)
Dept: ONCOLOGY | Facility: CLINIC | Age: 57
End: 2022-03-08

## 2022-03-08 NOTE — TELEPHONE ENCOUNTER
Pharmacy Name:  [facility]    Pharmacy representative name:APRIL    Pharmacy representative phone number: [phone]    What medication are you calling in regards to:  ribociclib succinate 200 MG tablet therapy pack tablet [[id number]] (Order [id number])    What question does the pharmacy have: PHARMACY REQUESTING A COPY OF INS CARDS AND CHART NOTES    Who is the provider that prescribed the medication:CLARITA

## 2022-03-14 ENCOUNTER — HOSPITAL ENCOUNTER (OUTPATIENT)
Dept: INTERVENTIONAL RADIOLOGY/VASCULAR | Facility: HOSPITAL | Age: 57
Discharge: HOME | End: 2022-03-14
Admitting: RADIOLOGY

## 2022-03-14 VITALS
WEIGHT: 146 LBS | BODY MASS INDEX: 28.66 KG/M2 | TEMPERATURE: 98.6 F | DIASTOLIC BLOOD PRESSURE: 79 MMHG | HEART RATE: 84 BPM | RESPIRATION RATE: 16 BRPM | SYSTOLIC BLOOD PRESSURE: 140 MMHG | OXYGEN SATURATION: 98 % | HEIGHT: 60 IN

## 2022-03-14 DIAGNOSIS — Z85.3 HISTORY OF LEFT BREAST CANCER: ICD-10-CM

## 2022-03-14 LAB
ANION GAP SERPL CALCULATED.3IONS-SCNC: 9 MMOL/L (ref 5–15)
BASOPHILS # BLD AUTO: 0.04 10*3/MM3 (ref 0–0.2)
BASOPHILS NFR BLD AUTO: 0.8 % (ref 0–1.5)
BUN SERPL-MCNC: 14 MG/DL (ref 6–20)
BUN/CREAT SERPL: 21.2 (ref 7–25)
CALCIUM SPEC-SCNC: 9.6 MG/DL (ref 8.6–10.5)
CHLORIDE SERPL-SCNC: 106 MMOL/L (ref 98–107)
CO2 SERPL-SCNC: 26 MMOL/L (ref 22–29)
CREAT SERPL-MCNC: 0.66 MG/DL (ref 0.57–1)
DEPRECATED RDW RBC AUTO: 37.8 FL (ref 37–54)
EGFRCR SERPLBLD CKD-EPI 2021: 101.8 ML/MIN/1.73
EOSINOPHIL # BLD AUTO: 0.6 10*3/MM3 (ref 0–0.4)
EOSINOPHIL NFR BLD AUTO: 11.4 % (ref 0.3–6.2)
ERYTHROCYTE [DISTWIDTH] IN BLOOD BY AUTOMATED COUNT: 11.9 % (ref 12.3–15.4)
GLUCOSE SERPL-MCNC: 94 MG/DL (ref 65–99)
HCT VFR BLD AUTO: 41.2 % (ref 34–46.6)
HGB BLD-MCNC: 14 G/DL (ref 12–15.9)
IMM GRANULOCYTES # BLD AUTO: 0 10*3/MM3 (ref 0–0.05)
IMM GRANULOCYTES NFR BLD AUTO: 0 % (ref 0–0.5)
INR PPP: 0.96 (ref 0.85–1.16)
LYMPHOCYTES # BLD AUTO: 1.75 10*3/MM3 (ref 0.7–3.1)
LYMPHOCYTES NFR BLD AUTO: 33.1 % (ref 19.6–45.3)
MCH RBC QN AUTO: 29.5 PG (ref 26.6–33)
MCHC RBC AUTO-ENTMCNC: 34 G/DL (ref 31.5–35.7)
MCV RBC AUTO: 86.9 FL (ref 79–97)
MONOCYTES # BLD AUTO: 0.53 10*3/MM3 (ref 0.1–0.9)
MONOCYTES NFR BLD AUTO: 10 % (ref 5–12)
NEUTROPHILS NFR BLD AUTO: 2.36 10*3/MM3 (ref 1.7–7)
NEUTROPHILS NFR BLD AUTO: 44.7 % (ref 42.7–76)
NRBC BLD AUTO-RTO: 0 /100 WBC (ref 0–0.2)
PLATELET # BLD AUTO: 248 10*3/MM3 (ref 140–450)
PMV BLD AUTO: 9.3 FL (ref 6–12)
POTASSIUM SERPL-SCNC: 4.2 MMOL/L (ref 3.5–5.2)
PROTHROMBIN TIME: 12.5 SECONDS (ref 11.4–14.4)
RBC # BLD AUTO: 4.74 10*6/MM3 (ref 3.77–5.28)
SODIUM SERPL-SCNC: 141 MMOL/L (ref 136–145)
WBC NRBC COR # BLD: 5.28 10*3/MM3 (ref 3.4–10.8)

## 2022-03-14 PROCEDURE — C1819 TISSUE LOCALIZATION-EXCISION: HCPCS

## 2022-03-14 PROCEDURE — 76942 ECHO GUIDE FOR BIOPSY: CPT

## 2022-03-14 PROCEDURE — 85025 COMPLETE CBC W/AUTO DIFF WBC: CPT | Performed by: RADIOLOGY

## 2022-03-14 PROCEDURE — 0 LIDOCAINE 1 % SOLUTION: Performed by: RADIOLOGY

## 2022-03-14 PROCEDURE — 85610 PROTHROMBIN TIME: CPT | Performed by: RADIOLOGY

## 2022-03-14 PROCEDURE — 80048 BASIC METABOLIC PNL TOTAL CA: CPT | Performed by: RADIOLOGY

## 2022-03-14 RX ORDER — SODIUM CHLORIDE 0.9 % (FLUSH) 0.9 %
10 SYRINGE (ML) INJECTION AS NEEDED
Status: DISCONTINUED | OUTPATIENT
Start: 2022-03-14 | End: 2022-03-15 | Stop reason: HOSPADM

## 2022-03-14 RX ORDER — SODIUM CHLORIDE 0.9 % (FLUSH) 0.9 %
3 SYRINGE (ML) INJECTION EVERY 12 HOURS SCHEDULED
Status: DISCONTINUED | OUTPATIENT
Start: 2022-03-14 | End: 2022-03-15 | Stop reason: HOSPADM

## 2022-03-14 RX ORDER — LIDOCAINE HYDROCHLORIDE 10 MG/ML
4 INJECTION, SOLUTION INFILTRATION; PERINEURAL ONCE
Status: COMPLETED | OUTPATIENT
Start: 2022-03-14 | End: 2022-03-14

## 2022-03-14 RX ADMIN — LIDOCAINE HYDROCHLORIDE 4 ML: 10 INJECTION, SOLUTION INFILTRATION; PERINEURAL at 12:25

## 2022-03-14 RX ADMIN — SODIUM BICARBONATE 1 MEQ: 0.2 INJECTION, SOLUTION INTRAVENOUS at 12:25

## 2022-03-15 ENCOUNTER — TELEPHONE (OUTPATIENT)
Dept: INFUSION THERAPY | Facility: HOSPITAL | Age: 57
End: 2022-03-15

## 2022-03-17 ENCOUNTER — TELEPHONE (OUTPATIENT)
Dept: ONCOLOGY | Facility: CLINIC | Age: 57
End: 2022-03-17

## 2022-03-17 NOTE — TELEPHONE ENCOUNTER
Specialty Pharmacy is working on the appeal.  Thank you!    Ines Haro, PharmD, BCOP - Oncology Clinical Pharmacist [phone]

## 2022-03-17 NOTE — TELEPHONE ENCOUNTER
Caller: [facility] [place] - [address] - [phone]  - [phone] FX    Relationship: Pharmacy    Best call back number: MASOOD [phone]    Who are you requesting to speak with (clinical staff, provider,  specific staff member): CLINICAL STAFF    What was the call regarding: [facility]  MASOOD SPENCER PATIENT ACCESS CORD CALLED IN STATING THAT THE PRIOR AUTH FOR PALBOCICLIB 125 MG TABLET PRESCRIBED BY MD OTTO WAS NOT APPROVED.  IN ORDER TO APPEAL PRIOR AUTH PLEASE CONTACT MASOOD [phone].  MASOOD STATED THEY WILL NEED A COPY OF THE DENIAL LETTER FIRST BEFORE THEY CAN BEGIN THE APPEAL.  PLEASE FAX THE DENIAL LETTER [phone] [facility].        Do you require a callback: YES ONCE COMPLETED         DJC/ALEXA

## 2022-03-21 ENCOUNTER — OFFICE VISIT (OUTPATIENT)
Dept: RADIATION ONCOLOGY | Facility: HOSPITAL | Age: 57
End: 2022-03-21

## 2022-03-21 ENCOUNTER — HOSPITAL ENCOUNTER (OUTPATIENT)
Dept: RADIATION ONCOLOGY | Facility: HOSPITAL | Age: 57
Discharge: HOME | End: 2022-03-21

## 2022-03-21 VITALS
HEART RATE: 80 BPM | OXYGEN SATURATION: 97 % | HEIGHT: 60 IN | TEMPERATURE: 98 F | BODY MASS INDEX: 29.12 KG/M2 | WEIGHT: 148.3 LBS | SYSTOLIC BLOOD PRESSURE: 139 MMHG | RESPIRATION RATE: 16 BRPM | DIASTOLIC BLOOD PRESSURE: 84 MMHG

## 2022-03-21 DIAGNOSIS — C79.51 BONE METASTASES: Primary | ICD-10-CM

## 2022-03-21 PROCEDURE — 77290 THER RAD SIMULAJ FIELD CPLX: CPT | Performed by: RADIOLOGY

## 2022-03-21 PROCEDURE — 77332 RADIATION TREATMENT AID(S): CPT | Performed by: RADIOLOGY

## 2022-03-21 PROCEDURE — G0463 HOSPITAL OUTPT CLINIC VISIT: HCPCS

## 2022-03-21 NOTE — PROGRESS NOTES
"RE-EVALUATION    PATIENT:                                                      Alcira Phelan  :                                                          1965  DATE:                          3/21/2022   DIAGNOSIS:      Local recurrence in the sternum of hormone receptor positive left breast cancer        BRIEF HISTORY:  Ms. Phelan is a pleasant 56-year-old female with history of left breast cancer .  She was recently found to have ultrasound-guided biopsy positive recurrence in the chest wall.  I saw her for consult on 3/2/2022.  There is a full note dictated on that day.  She has had fiducials placed in the mass and is ready to proceed with CyberKnife SBRT.  She has no complaints today.    Allergies   Allergen Reactions   • Penicillins Other (See Comments)     Headache       Review of Systems   Cardiovascular: Positive for chest pain (discomfort).   Psychiatric/Behavioral: Positive for sleep disturbance (works 3rd shift).           Objective   VITAL SIGNS:   Vitals:    22 0931   BP: 139/84   Pulse: 80   Resp: 16   Temp: 98 °F (36.7 °C)   TempSrc: Tympanic   SpO2: 97%  Comment: RA   Weight: 67.3 kg (148 lb 4.8 oz)   Height: 152.4 cm (60\")   PainSc:   3   PainLoc: Chest  Comment: left side/burning sensation        Karnofsky score: 90       Physical Exam  Vitals reviewed.   Constitutional:       Appearance: Normal appearance. She is normal weight.   Cardiovascular:      Rate and Rhythm: Normal rate and regular rhythm.      Heart sounds: Normal heart sounds.   Pulmonary:      Effort: Pulmonary effort is normal.      Breath sounds: Normal breath sounds.   Neurological:      Mental Status: She is alert.      She has a 6 cm firm mass on the left anterior chest wall.  It is fixed and not freely movable.              The following portions of the patient's history were reviewed and updated as appropriate: allergies, current medications, past family history, past medical history, past social history, " past surgical history and problem list.    Diagnoses and all orders for this visit:    Bone metastases (HCC)      IMPRESSION:   Ms. Phelan is a pleasant 56-year-old female with history of left breast cancer 2007.  She was recently found to have ultrasound-guided biopsy positive recurrence in the chest wall.  I saw her for consult on 3/2/2022.  There is a full note dictated on that day.  She has had fiducials placed in the mass and is ready to proceed with CyberKnife SBRT.  She has no complaints today.    RECOMMENDATIONS:  I again discussed radiation treatment options with Ms. Phelan.    She has oliogometastatic disease. The pros and cons, risks and benefits of treatment were discussed and informed consent obtained.   I anticipate 40-50 Gray in 3-5 fractions.  Dr. Ruth Castro was in agreement and plans systemic therapy to follow.            Anuja Pisano MD    Total time of patient care on day of service including time prior to, face to face with patient, and following visit spent in reviewing records, lab results, imaging studies, discussion with patient, and documentation/charting was > 30 minutes.

## 2022-03-22 ENCOUNTER — HOSPITAL ENCOUNTER (OUTPATIENT)
Dept: ONCOLOGY | Facility: HOSPITAL | Age: 57
Discharge: HOME | End: 2022-03-22
Admitting: INTERNAL MEDICINE

## 2022-03-22 ENCOUNTER — EDUCATION (OUTPATIENT)
Dept: ONCOLOGY | Facility: HOSPITAL | Age: 57
End: 2022-03-22

## 2022-03-22 PROCEDURE — G0463 HOSPITAL OUTPT CLINIC VISIT: HCPCS

## 2022-03-22 NOTE — PLAN OF CARE
Specialty Pharmacy - Oral Oncology     Ashli Eli is a 56 y.o. female, who is followed by the specialty pharmacy service for Palbociclib and Letrozole    Cancer Staging  No matching staging information was found for the patient.    Treatment Plan:  Treatment Plan Name:   OP BREAST Letrozole / Palbociclib      Treatment Plan Provider:   Ruth Castro MD      Treatment Plan Goal:   Control      Treatment Plan Status:   Active      Treatment Plan Start Date:   3/10/2022 (Planned)      Treatment Plan Specialty Medication:   Palbociclib 125 MG tablet, 125 mg, Oral, Daily, 0 of 1 cycle, Start date: 3/10/2022, End date: 3/31/2022        Treatment Weight:   Weight type: Documented (effective on 3/9/2022), 66.2 kg (entered on 3/2/2022), 152.4 cm (entered on 3/2/2022), BSA 1.63 m2      Last Treatment Date:   1/12/2023 (Planned)      Treatment Discontinue Date:   [Plan is still active]      Treatment Discontinue Reason:   [Plan is still active]            Objective     Oral Chemotherapy Teaching  Oral Chemotherapy Regimen: Palbociclib (IBRANCE) 125 mg tablet - take 1 tablet by mouth daily with food for 21 days, then off for 7 days; Letrozole   Date Started Medication: Letrozole - already started (patient said MD is aware), Palbociclib - late April following radiation  Chemotherapy/Biotherapy Education Sheets Provided: Palbociclib, Letrozole, Adherence, CINV and Diarrhea  Expected Duration of Therapy: 12 cycles OR Until disease progression or intolerable toxicity.    Initial Teaching Comments   Safety   Contraindications/Safety Precautions N/A   Storage instructions (away from children; away from heat/cold, sunlight, or moisture), handling - use of gloves (caregivers), washing hands after touching pills, managing waste -Storage: Reviewed with patient to store at room temperature away from sunlight and moisture.  -Discussed safe handling and what to do with any unused medication.   Adherence    patient and/or  caregiver on how to take medication, take with/without food, assess their adherence potential, stress importance of adherence, ways to manage adherence (pill boxes, phone reminders, calendars), what to do if miss a dose -Dose/Frequency: Palbociclib 125 mg tablet once daily for 21 days, then off for 7 days; Letrozole 2.5 mg tablet - take one tablet by mouth daily  -Administration: Palbociclib 125 mg tablet - Take 1 tablet by mouth once daily with food for 21 days, then off for 7 days; Letrozole 2.5 mg tablet - take one tablet by mouth daily  -Patient is likely to have good treatment adherence;  reinforced the importance of adherence.   -Reviewed how to address missed doses and to let us know of any missed doses.   Side Effects/Adverse Reactions    patient on potential side effects, s/s, ways to manage, when to call MD/seek help See full details below.   Miscellaneous   Food interactions, DDIs, financial issues -Drug-drug interactions: Palbociclib and omeprazole - take together and take with food    -Reminded the patient to let us know before making any changes or starting any new prescription or OTC medications so we can first assess drug interactions.  -Drug-food interactions: Grapefruit/Grapefruit juice  -Financial issues: Insurance approved       TOPICS EDUCATION PROVIDED COMMENTS   ANEMIA:  role of RBC, cause, s/s, ways to manage, role of transfusion [x] Reviewed the role of RBC and the use of transfusions if hemoglobin decreases too much.  Patient to notify us if they experience shortness of breath, dizziness, or palpitations.  Also let patient know they could feel more tired than usual and to try to stay active, but rest if they need to.    THROMBOCYTOPENIA:  role of platelet, cause, s/s, ways to prevent bleeding, things to avoid, when to seek help [x] Reviewed the role of platelets in blood clotting and when to call clinic (bloody nose that bleeds for 5 mins despite pressure, a cut that won't stop  bleeding despite pressure, gums that bleed excessively with brushing or flossing). Recommended using an electric razor, soft bristle toothbrush, and blowing your nose gently.    NEUTROPENIA:  role of WBC, cause, infection precautions, s/s of infection, when to call MD [x] Reviewed the role of WBC, good infection prevention practices, and when to call the clinic (temperature 100.4F, sore throat, burning urination, etc)  Vaccines: 2 doses of COVID-19 Moderna, received 2021 flu vaccine   DIARRHEA:  causes, s/s of dehydration, ways to manage, dietary changes, when to call MD [x] Provided supplementary handout with instructions for use of loperamide and other OTC therapies to manage diarrhea.  Instructed to call us if medications aren't working.    NAUSEA & VOMITING:  cause, use of antiemetics, dietary changes, when to call MD [x] Premeds: N/A  PRN meds: Ondansetron 8 mg   Pharmacy PRN meds sent to: [facility] [place]  Instructed the patient to take a dose of the PRN medication at the first onset of nausea and if it's not working to call us for additional medications.  Also provided non-drug measures to mitigate nausea.    MOUTH SORES:  causes, oral care, ways to manage [x] Mouth sores can be prevented by making a mouth wash mixture of salt, baking soda, and water. The patient was instructed to swish and spit four times daily after meals and before bedtime.  Use of a soft bristle toothbrush was recommended.  The patient was instructed to avoid alcohol-containing OTC mouthwashes.   ALOPECIA:  cause, ways to manage, resources [x] Discussed with patient that there may be some hair thinning associated with this regimen.   INFERTILITY & SEXUALITY:  causes, fertility preservation options, sexuality changes, ways to manage, importance of birth control [x] Discussed safe sex practices.   PAIN:  causes, ways to manage [x] Discussed possible joint pain and fatigue from letrozole and ways to help with this and keep track of this.    ORGAN TOXICITIES:  cause, s/s, need for diagnostic tests, labs, when to notify MD [x] Discussed potential effects on organ systems, monitoring, diagnostic tests, labs, and when to notify their MD. Discussed the signs/symptoms of the following: hepatotoxicity   HOME CARE:  use of spill kits, storing of PO chemo, how to manage bodily fluids [x] PO - Counseled on management of soiled linens and proper flush technique. Discussed proper handling, storage, and disposal of PO chemo. Discussed how to manage all the side effects at home and advised when to contact the MD office   MISCELLANEOUS:  drug interactions, administration, vesicant, etc [x] Discussed chemotherapy schedule, lab draws, infusion times, and total expected visit time.     Also discussed hot flashes may be caused by letrozole and ways to help with these.    DDIs: Counseled patient on interaction between palbociclib and omeprazole. Patient counseled to take both medications at the same time and with food.    Discussed with patient that she will be getting lab draws on Days 1 and 15 of the first 2 cycles, and then on Day 1 of each subsequent cycle.       Assessment/Plan   Indication, effectiveness, safety and convenience of her specialty medication(s) were reviewed today.     Follow-up: Appointment with Dr. Castro on 4/14/2022 @ [id number]    Notes: All questions and concerns were addressed. Provided a personalized treatment calendar to patient (includes treatment and lab schedule). Provided patient with contact information for the pharmacist and clinic while instructing them to call if any questions or concerns arise. Informed consent for treatment was obtained. Patient was receptive to information and expressed understanding.    Jennifer Zamorano, PharmD   03/22/22   11:58 EDT     Ines Haro, PharmD, [facility]  Oncology Clinical Pharmacist  [phone]    Batool March, KimD, BCOP  Oncology Clinical Pharmacist  [phone]

## 2022-03-25 PROCEDURE — 77295 3-D RADIOTHERAPY PLAN: CPT | Performed by: RADIOLOGY

## 2022-03-25 PROCEDURE — 77334 RADIATION TREATMENT AID(S): CPT | Performed by: RADIOLOGY

## 2022-03-25 PROCEDURE — 77300 RADIATION THERAPY DOSE PLAN: CPT | Performed by: RADIOLOGY

## 2022-04-11 ENCOUNTER — HOSPITAL ENCOUNTER (OUTPATIENT)
Dept: RADIATION ONCOLOGY | Facility: HOSPITAL | Age: 57
Setting detail: RADIATION/ONCOLOGY SERIES
Discharge: HOME | End: 2022-04-11

## 2022-04-11 ENCOUNTER — HOSPITAL ENCOUNTER (OUTPATIENT)
Dept: RADIATION ONCOLOGY | Facility: HOSPITAL | Age: 57
Discharge: HOME | End: 2022-04-11

## 2022-04-11 PROCEDURE — 77332 RADIATION TREATMENT AID(S): CPT | Performed by: RADIOLOGY

## 2022-04-11 PROCEDURE — 77290 THER RAD SIMULAJ FIELD CPLX: CPT | Performed by: RADIOLOGY

## 2022-04-11 PROCEDURE — 77373 STRTCTC BDY RAD THER TX DLVR: CPT | Performed by: RADIOLOGY

## 2022-04-12 ENCOUNTER — HOSPITAL ENCOUNTER (OUTPATIENT)
Dept: RADIATION ONCOLOGY | Facility: HOSPITAL | Age: 57
Discharge: HOME | End: 2022-04-12

## 2022-04-12 PROCEDURE — 77373 STRTCTC BDY RAD THER TX DLVR: CPT | Performed by: RADIOLOGY

## 2022-04-12 PROCEDURE — 77290 THER RAD SIMULAJ FIELD CPLX: CPT | Performed by: RADIOLOGY

## 2022-04-13 ENCOUNTER — HOSPITAL ENCOUNTER (OUTPATIENT)
Dept: RADIATION ONCOLOGY | Facility: HOSPITAL | Age: 57
Discharge: HOME | End: 2022-04-13

## 2022-04-13 PROCEDURE — 77290 THER RAD SIMULAJ FIELD CPLX: CPT | Performed by: RADIOLOGY

## 2022-04-13 PROCEDURE — 77373 STRTCTC BDY RAD THER TX DLVR: CPT | Performed by: RADIOLOGY

## 2022-04-14 ENCOUNTER — OFFICE VISIT (OUTPATIENT)
Dept: ONCOLOGY | Facility: CLINIC | Age: 57
End: 2022-04-14

## 2022-04-14 ENCOUNTER — HOSPITAL ENCOUNTER (OUTPATIENT)
Dept: RADIATION ONCOLOGY | Facility: HOSPITAL | Age: 57
Discharge: HOME | End: 2022-04-14

## 2022-04-14 VITALS
SYSTOLIC BLOOD PRESSURE: 168 MMHG | HEIGHT: 60 IN | RESPIRATION RATE: 18 BRPM | BODY MASS INDEX: 29.45 KG/M2 | WEIGHT: 150 LBS | DIASTOLIC BLOOD PRESSURE: 86 MMHG | HEART RATE: 73 BPM | OXYGEN SATURATION: 98 % | TEMPERATURE: 97.3 F

## 2022-04-14 DIAGNOSIS — Z85.3 HISTORY OF LEFT BREAST CANCER: ICD-10-CM

## 2022-04-14 DIAGNOSIS — C79.51 BONE METASTASES: ICD-10-CM

## 2022-04-14 DIAGNOSIS — C50.112 MALIGNANT NEOPLASM OF CENTRAL PORTION OF LEFT FEMALE BREAST, UNSPECIFIED ESTROGEN RECEPTOR STATUS: Primary | ICD-10-CM

## 2022-04-14 PROCEDURE — 99214 OFFICE O/P EST MOD 30 MIN: CPT | Performed by: INTERNAL MEDICINE

## 2022-04-14 PROCEDURE — 77373 STRTCTC BDY RAD THER TX DLVR: CPT | Performed by: RADIOLOGY

## 2022-04-14 PROCEDURE — 77290 THER RAD SIMULAJ FIELD CPLX: CPT | Performed by: RADIOLOGY

## 2022-04-14 RX ORDER — LORAZEPAM 1 MG/1
1 TABLET ORAL EVERY 8 HOURS PRN
Qty: 30 TABLET | Refills: 2 | Status: SHIPPED | OUTPATIENT
Start: 2022-04-14 | End: 2023-02-07

## 2022-04-14 NOTE — PROGRESS NOTES
"      PROBLEM LIST:  1. Local recurrence of HR+ carcinoma of the left breast  A) history of left breast cancer in 2007.  Bilateral mastectomy 5/30/07.  pathology showed grade 2 IDC of the left breast measuring 1.8 cm.  ER+ HI+ Her2 negative.   0/2 SLN involved.  [id number].  B) adjuvant chemotherapy with TC x 4 cycles, completed September 2007 with Dr. De La Torre.  Tamoxifen October 2007 thru October 2012.  C) presented January 2022 with left chest wall mass.  CT chest 1/17/22 showed 4.6 cm lesion involving left manubrium with soft tissue extent extending posteriorly to the anterior aspect of mediastinum.  12 mm nodule right lung base.  D) ultrasound guided biopsy of chest wall mass on 2/10/22.  Pathology showed invasive ductal carcinoma of the breast.  ER positive (100%), HI positive (50%), HER-2 2+  E) letrozole started February 2022.  CyberKnife to the chest wall mass completed 4/15/2022.  Ibrance started 4/18/2022.  2. Depression/anxiety  3. GERD    Subjective     CHIEF COMPLAINT: Breast cancer    HISTORY OF PRESENT ILLNESS:   Alcira Phelan returns for follow-up.  She says she is doing okay.  She will complete CyberKnife radiation treatment tomorrow.  She has been taking the letrozole.  She has her Ibrance and is ready to start it after radiation is complete.  She says she has been feeling a little tired and sometimes has an upset stomach.  She has a history of anxiety and panic attacks.  She is taking Effexor 150.  Despite this she still has occasional panic attacks and is asking for medication for this.      Objective      /86   Pulse 73   Temp 97.3 °F (36.3 °C) (Temporal)   Resp 18   Ht 152.4 cm (60\")   Wt 68 kg (150 lb)   SpO2 98%   BMI 29.29 kg/m²      Performance Status:  ECOG score: 0             General: well appearing female in no acute distress  Neuro: alert and oriented  Psych: mood and affect appropriate          RECENT LABS:  Lab Results   Component Value Date    WBC 5.28 03/14/2022    " HGB 14.0 03/14/2022    HCT 41.2 03/14/2022    MCV 86.9 03/14/2022     03/14/2022       Lab Results   Component Value Date    GLUCOSE 94 03/14/2022    BUN 14 03/14/2022    CREATININE 0.66 03/14/2022    EGFRIFNONA 77 02/16/2022    BCR 21.2 03/14/2022    K 4.2 03/14/2022    CO2 26.0 03/14/2022    CALCIUM 9.6 03/14/2022    ALBUMIN 4.40 02/16/2022    AST 21 02/16/2022    ALT 20 02/16/2022                   Assessment/Plan   Alcira Phelan is a 57 y.o. female with a local regional recurrence of ER positive VT positive HER-2 negative invasive ductal carcinoma.      She will complete stereotactic radiation tomorrow.  I recommended that she start taking Ibrance on Monday.  We will need to do labs 2 weeks after starting the medication.  We discussed that the Ibrance can have side effects of neutropenia, hair thinning, or nausea.     I would plan to repeat PET scan imaging in about 3 months from now.    Anxiety and panic attacks: Prescription for Ativan sent to her pharmacy.  I suggested that she talk with her primary care doctor regarding whether any adjustment needs to be made to her Effexor.    Total time of patient care on day of service including time prior to, face to face with patient, and following visit spent in reviewing records, lab results, discussion with patient, and documentation/charting was > 32 minutes.                            Ruth Castro MD  [facility]    4/14/2022          CC:

## 2022-04-15 ENCOUNTER — SPECIALTY PHARMACY (OUTPATIENT)
Dept: ONCOLOGY | Facility: HOSPITAL | Age: 57
End: 2022-04-15

## 2022-04-15 ENCOUNTER — HOSPITAL ENCOUNTER (OUTPATIENT)
Dept: RADIATION ONCOLOGY | Facility: HOSPITAL | Age: 57
Discharge: HOME | End: 2022-04-15

## 2022-04-15 PROCEDURE — 77290 THER RAD SIMULAJ FIELD CPLX: CPT | Performed by: RADIOLOGY

## 2022-04-15 PROCEDURE — 77373 STRTCTC BDY RAD THER TX DLVR: CPT | Performed by: RADIOLOGY

## 2022-04-15 PROCEDURE — 77336 RADIATION PHYSICS CONSULT: CPT | Performed by: RADIOLOGY

## 2022-04-20 ENCOUNTER — TELEPHONE (OUTPATIENT)
Dept: ONCOLOGY | Facility: CLINIC | Age: 57
End: 2022-04-20

## 2022-04-20 NOTE — TELEPHONE ENCOUNTER
I called the patient back and told her that per Dr. Castro, it was okay to take the vitamin B12 vitamin.  I had to leave a voicemail as patient did not answer.

## 2022-04-21 ENCOUNTER — TELEPHONE (OUTPATIENT)
Dept: ONCOLOGY | Facility: CLINIC | Age: 57
End: 2022-04-21

## 2022-04-21 NOTE — TELEPHONE ENCOUNTER
Spoke w/PT as to where the letter needs to go. PT will have  . PT notified and verbalized understanding that her letter will be at the  in an envelope.  [id number]  ~Shell

## 2022-04-21 NOTE — TELEPHONE ENCOUNTER
Caller: OSMAN    Who are you requesting to speak with (clinical staff, provider,  specific staff member): PROVIDER    What was the call regarding: OSMAN HAS JURY DUTY SCHEDULED FOR June, SHE STATES SHE DOES NOT FEEL UP TO IT AND IS WANTING TO SEE IF SHE CAN GET DR OTTO TO WRITE HER A LETTER    Do you require a callback: YES

## 2022-05-11 ENCOUNTER — APPOINTMENT (OUTPATIENT)
Dept: LAB | Facility: HOSPITAL | Age: 57
End: 2022-05-11

## 2022-05-11 ENCOUNTER — TELEPHONE (OUTPATIENT)
Dept: ONCOLOGY | Facility: CLINIC | Age: 57
End: 2022-05-11

## 2022-05-11 ENCOUNTER — LAB (OUTPATIENT)
Dept: LAB | Facility: HOSPITAL | Age: 57
End: 2022-05-11

## 2022-05-11 DIAGNOSIS — Z85.3 HISTORY OF LEFT BREAST CANCER: ICD-10-CM

## 2022-05-11 DIAGNOSIS — C50.112 MALIGNANT NEOPLASM OF CENTRAL PORTION OF LEFT FEMALE BREAST, UNSPECIFIED ESTROGEN RECEPTOR STATUS: ICD-10-CM

## 2022-05-11 DIAGNOSIS — C79.51 BONE METASTASES: ICD-10-CM

## 2022-05-11 LAB
ALBUMIN SERPL-MCNC: 4.3 G/DL (ref 3.5–5.2)
ALBUMIN/GLOB SERPL: 1.4 G/DL
ALP SERPL-CCNC: 92 U/L (ref 39–117)
ALT SERPL W P-5'-P-CCNC: 18 U/L (ref 1–33)
ANION GAP SERPL CALCULATED.3IONS-SCNC: 11 MMOL/L (ref 5–15)
AST SERPL-CCNC: 23 U/L (ref 1–32)
BASOPHILS # BLD AUTO: 0.02 10*3/MM3 (ref 0–0.2)
BASOPHILS NFR BLD AUTO: 1.2 % (ref 0–1.5)
BILIRUB SERPL-MCNC: 0.3 MG/DL (ref 0–1.2)
BUN SERPL-MCNC: 15 MG/DL (ref 6–20)
BUN/CREAT SERPL: 19 (ref 7–25)
CALCIUM SPEC-SCNC: 9.4 MG/DL (ref 8.6–10.5)
CHLORIDE SERPL-SCNC: 107 MMOL/L (ref 98–107)
CO2 SERPL-SCNC: 26 MMOL/L (ref 22–29)
CREAT SERPL-MCNC: 0.79 MG/DL (ref 0.57–1)
DEPRECATED RDW RBC AUTO: 38.5 FL (ref 37–54)
EGFRCR SERPLBLD CKD-EPI 2021: 86.3 ML/MIN/1.73
EOSINOPHIL # BLD AUTO: 0.09 10*3/MM3 (ref 0–0.4)
EOSINOPHIL NFR BLD AUTO: 5.5 % (ref 0.3–6.2)
ERYTHROCYTE [DISTWIDTH] IN BLOOD BY AUTOMATED COUNT: 12.9 % (ref 12.3–15.4)
GLOBULIN UR ELPH-MCNC: 3 GM/DL
GLUCOSE SERPL-MCNC: 81 MG/DL (ref 65–99)
HCT VFR BLD AUTO: 31 % (ref 34–46.6)
HGB BLD-MCNC: 10.6 G/DL (ref 12–15.9)
IMM GRANULOCYTES # BLD AUTO: 0 10*3/MM3 (ref 0–0.05)
IMM GRANULOCYTES NFR BLD AUTO: 0 % (ref 0–0.5)
LYMPHOCYTES # BLD AUTO: 0.72 10*3/MM3 (ref 0.7–3.1)
LYMPHOCYTES NFR BLD AUTO: 44.2 % (ref 19.6–45.3)
MCH RBC QN AUTO: 29.8 PG (ref 26.6–33)
MCHC RBC AUTO-ENTMCNC: 34.2 G/DL (ref 31.5–35.7)
MCV RBC AUTO: 87.1 FL (ref 79–97)
MONOCYTES # BLD AUTO: 0.13 10*3/MM3 (ref 0.1–0.9)
MONOCYTES NFR BLD AUTO: 8 % (ref 5–12)
NEUTROPHILS NFR BLD AUTO: 0.67 10*3/MM3 (ref 1.7–7)
NEUTROPHILS NFR BLD AUTO: 41.1 % (ref 42.7–76)
NRBC BLD AUTO-RTO: 0 /100 WBC (ref 0–0.2)
PLATELET # BLD AUTO: 120 10*3/MM3 (ref 140–450)
PMV BLD AUTO: 9.6 FL (ref 6–12)
POTASSIUM SERPL-SCNC: 4.7 MMOL/L (ref 3.5–5.2)
PROT SERPL-MCNC: 7.3 G/DL (ref 6–8.5)
RBC # BLD AUTO: 3.56 10*6/MM3 (ref 3.77–5.28)
SODIUM SERPL-SCNC: 144 MMOL/L (ref 136–145)
WBC NRBC COR # BLD: 1.63 10*3/MM3 (ref 3.4–10.8)

## 2022-05-11 PROCEDURE — 85025 COMPLETE CBC W/AUTO DIFF WBC: CPT

## 2022-05-11 PROCEDURE — 80053 COMPREHEN METABOLIC PANEL: CPT

## 2022-05-11 PROCEDURE — 36415 COLL VENOUS BLD VENIPUNCTURE: CPT

## 2022-05-11 NOTE — TELEPHONE ENCOUNTER
----- Message from Regina Anderson RN sent at 5/11/2022  4:07 PM EDT -----  Regarding: critical lab      Critical Test Results      MD: Matthew    Date: 05/11/2022     Critical test result: WBC 1.63    Time results received: 4:07pm

## 2022-05-11 NOTE — TELEPHONE ENCOUNTER
Name of Physician notified:  Matthew    Time Physician Notified: 4:15      []  Orders received    []  Protocol/Standing orders followed    [x]  No new orders  Dr Castro spoke to Mervat about patient.  Patient has appt tomorrow.

## 2022-05-12 ENCOUNTER — OFFICE VISIT (OUTPATIENT)
Dept: ONCOLOGY | Facility: CLINIC | Age: 57
End: 2022-05-12

## 2022-05-12 VITALS
WEIGHT: 143 LBS | HEART RATE: 81 BPM | HEIGHT: 60 IN | BODY MASS INDEX: 28.07 KG/M2 | OXYGEN SATURATION: 98 % | DIASTOLIC BLOOD PRESSURE: 77 MMHG | RESPIRATION RATE: 16 BRPM | SYSTOLIC BLOOD PRESSURE: 127 MMHG | TEMPERATURE: 96.9 F

## 2022-05-12 DIAGNOSIS — Z17.0 MALIGNANT NEOPLASM OF CENTRAL PORTION OF LEFT BREAST IN FEMALE, ESTROGEN RECEPTOR POSITIVE: Primary | ICD-10-CM

## 2022-05-12 DIAGNOSIS — C50.112 MALIGNANT NEOPLASM OF CENTRAL PORTION OF LEFT BREAST IN FEMALE, ESTROGEN RECEPTOR POSITIVE: Primary | ICD-10-CM

## 2022-05-12 PROCEDURE — 99215 OFFICE O/P EST HI 40 MIN: CPT | Performed by: NURSE PRACTITIONER

## 2022-05-12 NOTE — PROGRESS NOTES
"      PROBLEM LIST:  1. Local recurrence of HR+ carcinoma of the left breast  A) history of left breast cancer in 2007.  Bilateral mastectomy 5/30/07.  pathology showed grade 2 IDC of the left breast measuring 1.8 cm.  ER+ GA+ Her2 negative.   0/2 SLN involved.  [id number].  B) adjuvant chemotherapy with TC x 4 cycles, completed September 2007 with Dr. De La Torre.  Tamoxifen October 2007 thru October 2012.  C) presented January 2022 with left chest wall mass.  CT chest 1/17/22 showed 4.6 cm lesion involving left manubrium with soft tissue extent extending posteriorly to the anterior aspect of mediastinum.  12 mm nodule right lung base.  D) ultrasound guided biopsy of chest wall mass on 2/10/22.  Pathology showed invasive ductal carcinoma of the breast.  ER positive (100%), GA positive (50%), HER-2 2+  E) letrozole started February 2022.  CyberKnife to the chest wall mass completed 4/15/2022.  Ibrance started 4/18/2022.  2. Depression/anxiety  3. GERD    Subjective     CHIEF COMPLAINT: Breast cancer    HISTORY OF PRESENT ILLNESS:   Alcira Phelan returns for follow-up.  She started Ibrance 4/18/2022.  She continues on letrozole.  She is tolerating the medication well.  She can tell a significant improvement in the left chest wall recurrence after radiation.  Her energy level is good.  She denies any pain.  No fevers or chills.  Her bowels are good.  No cough or shortness of air.        Objective      /77   Pulse 81   Temp 96.9 °F (36.1 °C)   Resp 16   Ht 152.4 cm (60\")   Wt 64.9 kg (143 lb)   SpO2 98%   BMI 27.93 kg/m²    Vitals:    05/12/22 0923   PainSc: 0-No pain               ECOG score: 0             General: well appearing female in no acute distress  Neuro: alert and oriented  Chest: no palpable mass noted on left chest wall  Psych: mood and affect appropriate          RECENT LABS:  Lab Results   Component Value Date    WBC 1.63 (C) 05/11/2022    HGB 10.6 (L) 05/11/2022    HCT 31.0 (L) 05/11/2022    " MCV 87.1 05/11/2022     (L) 05/11/2022       Lab Results   Component Value Date    GLUCOSE 81 05/11/2022    BUN 15 05/11/2022    CREATININE 0.79 05/11/2022    EGFRIFNONA 77 02/16/2022    BCR 19.0 05/11/2022    K 4.7 05/11/2022    CO2 26.0 05/11/2022    CALCIUM 9.4 05/11/2022    ALBUMIN 4.30 05/11/2022    AST 23 05/11/2022    ALT 18 05/11/2022                   Assessment & Plan   Alcira Phelan is a 57 y.o. female with a local regional recurrence of ER positive MI positive HER-2 negative invasive ductal carcinoma.      She completed stereotactic radiation to left chest wall mass 4/15/2022.  She started Ibrance 4/18/2022.  She continues on letrozole.  She is tolerating the medication well.  Her ANC yesterday was low at 0.67 and white count was 1.63.  Hemoglobin was 10.6.  I will have her recheck a CBC on Monday, May 16.  If her white count and ANC are improved we will restart Ibrance on May 16.  If her ANC is less than 1 we will hold the Ibrance for 1 week.  She has had an excellent response to the left chest wall mass.  It is no longer palpable on today's exam.    Plan to repeat PET scan July 2022.    Follow-up in 1 month with labs prior to return.              I spent 40 minutes caring for Alcira on this date of service. This time includes time spent by me in the following activities: preparing for the visit, reviewing tests, obtaining and/or reviewing a separately obtained history, performing a medically appropriate examination and/or evaluation, counseling and educating the patient/family/caregiver, ordering medications, tests, or procedures and documenting information in the medical record          Rasheeda Ramirez APRN  [facility] Hematology and Oncology    5/12/2022          CC:

## 2022-05-16 ENCOUNTER — TELEPHONE (OUTPATIENT)
Dept: ONCOLOGY | Facility: CLINIC | Age: 57
End: 2022-05-16

## 2022-05-16 ENCOUNTER — LAB (OUTPATIENT)
Dept: LAB | Facility: HOSPITAL | Age: 57
End: 2022-05-16

## 2022-05-16 ENCOUNTER — SPECIALTY PHARMACY (OUTPATIENT)
Dept: ONCOLOGY | Facility: HOSPITAL | Age: 57
End: 2022-05-16

## 2022-05-16 DIAGNOSIS — C50.112 MALIGNANT NEOPLASM OF CENTRAL PORTION OF LEFT FEMALE BREAST, UNSPECIFIED ESTROGEN RECEPTOR STATUS: ICD-10-CM

## 2022-05-16 DIAGNOSIS — C50.112 MALIGNANT NEOPLASM OF CENTRAL PORTION OF LEFT BREAST IN FEMALE, ESTROGEN RECEPTOR POSITIVE: ICD-10-CM

## 2022-05-16 DIAGNOSIS — Z85.3 HISTORY OF LEFT BREAST CANCER: ICD-10-CM

## 2022-05-16 DIAGNOSIS — Z17.0 MALIGNANT NEOPLASM OF CENTRAL PORTION OF LEFT BREAST IN FEMALE, ESTROGEN RECEPTOR POSITIVE: ICD-10-CM

## 2022-05-16 DIAGNOSIS — C79.51 BONE METASTASES: ICD-10-CM

## 2022-05-16 LAB
ALBUMIN SERPL-MCNC: 4.6 G/DL (ref 3.5–5.2)
ALBUMIN/GLOB SERPL: 1.8 G/DL
ALP SERPL-CCNC: 103 U/L (ref 39–117)
ALT SERPL W P-5'-P-CCNC: 18 U/L (ref 1–33)
ANION GAP SERPL CALCULATED.3IONS-SCNC: 7 MMOL/L (ref 5–15)
AST SERPL-CCNC: 25 U/L (ref 1–32)
BILIRUB SERPL-MCNC: <0.2 MG/DL (ref 0–1.2)
BUN SERPL-MCNC: 17 MG/DL (ref 6–20)
BUN/CREAT SERPL: 25.8 (ref 7–25)
CALCIUM SPEC-SCNC: 9.5 MG/DL (ref 8.6–10.5)
CHLORIDE SERPL-SCNC: 106 MMOL/L (ref 98–107)
CO2 SERPL-SCNC: 28 MMOL/L (ref 22–29)
CREAT SERPL-MCNC: 0.66 MG/DL (ref 0.57–1)
EGFRCR SERPLBLD CKD-EPI 2021: 101.2 ML/MIN/1.73
ERYTHROCYTE [DISTWIDTH] IN BLOOD BY AUTOMATED COUNT: 14.8 % (ref 12.3–15.4)
GLOBULIN UR ELPH-MCNC: 2.5 GM/DL
GLUCOSE SERPL-MCNC: 75 MG/DL (ref 65–99)
HCT VFR BLD AUTO: 34.1 % (ref 34–46.6)
HGB BLD-MCNC: 11 G/DL (ref 12–15.9)
LYMPHOCYTES # BLD AUTO: 0.9 10*3/MM3 (ref 0.7–3.1)
LYMPHOCYTES NFR BLD AUTO: 40.7 % (ref 19.6–45.3)
MCH RBC QN AUTO: 28.5 PG (ref 26.6–33)
MCHC RBC AUTO-ENTMCNC: 32.2 G/DL (ref 31.5–35.7)
MCV RBC AUTO: 88.5 FL (ref 79–97)
MONOCYTES # BLD AUTO: 0.3 10*3/MM3 (ref 0.1–0.9)
MONOCYTES NFR BLD AUTO: 12 % (ref 5–12)
NEUTROPHILS NFR BLD AUTO: 1.1 10*3/MM3 (ref 1.7–7)
NEUTROPHILS NFR BLD AUTO: 47.3 % (ref 42.7–76)
PLATELET # BLD AUTO: 241 10*3/MM3 (ref 140–450)
PMV BLD AUTO: 5.8 FL (ref 6–12)
POTASSIUM SERPL-SCNC: 4.1 MMOL/L (ref 3.5–5.2)
PROT SERPL-MCNC: 7.1 G/DL (ref 6–8.5)
RBC # BLD AUTO: 3.85 10*6/MM3 (ref 3.77–5.28)
SODIUM SERPL-SCNC: 141 MMOL/L (ref 136–145)
WBC NRBC COR # BLD: 2.3 10*3/MM3 (ref 3.4–10.8)

## 2022-05-16 PROCEDURE — 85025 COMPLETE CBC W/AUTO DIFF WBC: CPT

## 2022-05-16 PROCEDURE — 80053 COMPREHEN METABOLIC PANEL: CPT

## 2022-05-16 PROCEDURE — 36415 COLL VENOUS BLD VENIPUNCTURE: CPT

## 2022-05-16 NOTE — TELEPHONE ENCOUNTER
Pt came by  hem/onc wanting to speak with aRegan(RN) due to a rash that has appeared on patient. Pt also states she was around someone who tested positive for covid and wants to speak with Raegan about that as well. I advised pt, Raegan would contact her regarding these concerns.

## 2022-05-16 NOTE — TELEPHONE ENCOUNTER
Reviewed patient's lab results.  Her white count and ANC have improved and she can restart Ibrance today.  Patient reports a rash on her chest that started Saturday, 5/14/2022.  She has been putting Benadryl cream on the rash and it is improving.  She does not have a rash anywhere else on her body.    Saturday evening May 14, 2022 her son and daughter-in-law came to visit after returning from a cruise.  The daughter-in-law tested positive for COVID yesterday.  Currently patient has no symptoms of COVID.  I did ask if she develops symptoms to test for COVID right away.  If she does not have symptoms of COVID she should test for COVID on Thursday, 5/19/2022.

## 2022-05-18 ENCOUNTER — HOSPITAL ENCOUNTER (OUTPATIENT)
Dept: RADIATION ONCOLOGY | Facility: HOSPITAL | Age: 57
Setting detail: RADIATION/ONCOLOGY SERIES
Discharge: HOME | End: 2022-05-18

## 2022-05-18 ENCOUNTER — OFFICE VISIT (OUTPATIENT)
Dept: RADIATION ONCOLOGY | Facility: HOSPITAL | Age: 57
End: 2022-05-18

## 2022-05-18 VITALS
WEIGHT: 141.4 LBS | OXYGEN SATURATION: 97 % | SYSTOLIC BLOOD PRESSURE: 138 MMHG | TEMPERATURE: 98.1 F | RESPIRATION RATE: 16 BRPM | HEIGHT: 61 IN | HEART RATE: 78 BPM | DIASTOLIC BLOOD PRESSURE: 76 MMHG | BODY MASS INDEX: 26.7 KG/M2

## 2022-05-18 DIAGNOSIS — C79.51 BONE METASTASES: Primary | ICD-10-CM

## 2022-05-18 NOTE — PROGRESS NOTES
FOLLOW UP NOTE    PATIENT:                                                      Alcira Phelan  MEDICAL RECORD #:                        [id number]  :                                                          1965  COMPLETION DATE:   4/15/2022  DIAGNOSIS:     Metastatic breast cancer to bone      BRIEF HISTORY:  Alcira Phelan is a 57 y.o. female presenting for initial follow up visit.  She has a history of left breast cancer diagnosed in  status post bilateral mastectomies and adjuvant chemotherapy followed by 5 years of adjuvant Tamoxifen.  More recently, she was found to have an ultrasound-guided biopsy positive recurrence in the chest wall with pathology consistent with invasive ductal carcinoma ER/AL positive Her2/lizz 2+.  Staging PET/CT scan demonstrated a hypermetabolic soft tissue mass involving the left manubrium of the sternum and extending along the left internal mammary artery and vein and into the left pectoralis major muscle, consistent with oligometastatic disease.  There is a noncalcified nodule in the right lower lobe of lung that is nonhypermetabolic, and no evidence of distant disease.  She underwent a course of SBRT on the CyberKnife to a dose of 50 Gy in 5 fractions delivered to the recurrent sternal mass.  She tolerated treatment well.  She did not develop significant fatigue and has continued to work full time and remain active.  She states the palpable mass resolved very quickly.  She denies chestwall pain, dyspnea, cough, esophagitis symptoms, fever, or chills.  She does note a blotchy, pruritic rash on her chest.  She has been applying Benadryl cream to this.  She denies rash to other areas of the body or new medications.  She continues letrozole as well as Ibrance with Dr. Castro and denies concerns.       MEDICATIONS: Medication reconciliation for the patient was reviewed and confirmed in the electronic medical record.    Review of Systems   Skin: Positive for itching and  "rash.   All other systems reviewed and are negative.          KPS 90%    Physical Exam  Vitals and nursing note reviewed.   Constitutional:       General: She is not in acute distress.     Appearance: Normal appearance. She is well-developed.   HENT:      Head: Normocephalic and atraumatic.   Eyes:      Conjunctiva/sclera: Conjunctivae normal.      Pupils: Pupils are equal, round, and reactive to light.   Neck:      Comments: No palpable cervical or supraclavicular lymphadenopathy  Cardiovascular:      Rate and Rhythm: Normal rate and regular rhythm.      Heart sounds: No murmur heard.    No friction rub.   Pulmonary:      Effort: Pulmonary effort is normal.      Breath sounds: Normal breath sounds. No wheezing.   Chest:      Comments: Examination of the left anterior chest wall demonstrates erythematous rash consistent with miliara rubra.  No evidence of cellulitis or infection.  Treated sternal mass appears resolved and no longer palpable.   Abdominal:      General: Bowel sounds are normal. There is no distension.      Palpations: Abdomen is soft. There is no mass.      Tenderness: There is no abdominal tenderness.   Musculoskeletal:         General: Normal range of motion.      Cervical back: Normal range of motion and neck supple.   Lymphadenopathy:      Cervical: No cervical adenopathy.   Skin:     General: Skin is warm and dry.   Neurological:      Mental Status: She is alert and oriented to person, place, and time.   Psychiatric:         Behavior: Behavior normal.         Thought Content: Thought content normal.         Judgment: Judgment normal.         VITAL SIGNS:   Vitals:    05/18/22 0942   BP: 138/76   Pulse: 78   Resp: 16   Temp: 98.1 °F (36.7 °C)   SpO2: 97%  Comment: RA   Weight: 64.1 kg (141 lb 6.4 oz)   Height: 154.9 cm (61\")   PainSc: 0-No pain             The following portions of the patient's history were reviewed and updated as appropriate: allergies, current medications, past family history, " past medical history, past social history, past surgical history and problem list.         Diagnoses and all orders for this visit:    1. Bone metastases (HCC) (Primary)         IMPRESSION:  Alcira Phelan is a 57 y.o. female with past history significant for early stage left-sided breast cancer, who now presents with a left sternal mass consistent with locally recurrent hormone receptor positive IDC.  She is now 1 month status post CyberKnife SBRT to the biopsy-proven chest wall mass.  She tolerated treatment well.  She did develop miliara rubra within the anterior treated chest.  She was recommended to apply topical hydrocortisone 1% and CeraVe anti-itch lotion to the area.  She was instructed to call us back if no improvement over the next several days.  Sun precautions discussed.  The patient has had excellent clinical response with mass no longer palpable on today's exam.  She will be due for repeat PET scan which is expected in July per medical oncology.  In the interim, she will continue systemic therapy with letrozole and Ibrance per medical oncology.  We reviewed follow up intervals, surveillance imaging, and expectations for response to treatment.    RECOMMENDATIONS:  Alcira Phelan continues routine oncologic management under the care of Dr. Castro, with follow up scheduled 6/9/2022.  At this point, we will be happy to remain available as needed, certainly should symptomology/imaging warrant.    I spent a total of 35 minutes on today's visit, with more than 15 minutes in direct face to face communication, and the remainder of the time spent in reviewing the relevant history, records, available imaging, and for documentation.    Return if symptoms worsen or fail to improve, for Office Visit.    Lona Bentley, APRN

## 2022-05-19 ENCOUNTER — TELEPHONE (OUTPATIENT)
Dept: ONCOLOGY | Facility: CLINIC | Age: 57
End: 2022-05-19

## 2022-05-19 NOTE — TELEPHONE ENCOUNTER
LMOM that her letter is ready for  at the  in an envelope with her name on it.  [id number]  ~Shell

## 2022-05-19 NOTE — TELEPHONE ENCOUNTER
Caller: Alcira Phelan    Relationship: Self    Best call back number: [phone]    What is the best time to reach you: ANYTIME    Who are you requesting to speak with (clinical staff, provider,  specific staff member): DR OTTO OR NURSE    What was the call regarding: PT NEEDS A NOTE FOR WORK TO BE ABLE TO HAVE WATER WITH HER TO STAY HYDRATED.     PLEASE CALL PT TO DISCUSS FURTHER AND SHE WILL PICKUP NOTE WHEN READY.     Do you require a callback: YES

## 2022-05-20 PROCEDURE — U0004 COV-19 TEST NON-CDC HGH THRU: HCPCS | Performed by: NURSE PRACTITIONER

## 2022-06-08 ENCOUNTER — LAB (OUTPATIENT)
Dept: LAB | Facility: HOSPITAL | Age: 57
End: 2022-06-08

## 2022-06-08 DIAGNOSIS — C50.112 MALIGNANT NEOPLASM OF CENTRAL PORTION OF LEFT BREAST IN FEMALE, ESTROGEN RECEPTOR POSITIVE: ICD-10-CM

## 2022-06-08 DIAGNOSIS — Z17.0 MALIGNANT NEOPLASM OF CENTRAL PORTION OF LEFT BREAST IN FEMALE, ESTROGEN RECEPTOR POSITIVE: ICD-10-CM

## 2022-06-08 LAB
ALBUMIN SERPL-MCNC: 4.3 G/DL (ref 3.5–5.2)
ALBUMIN/GLOB SERPL: 1.2 G/DL
ALP SERPL-CCNC: 103 U/L (ref 39–117)
ALT SERPL W P-5'-P-CCNC: 13 U/L (ref 1–33)
ANION GAP SERPL CALCULATED.3IONS-SCNC: 10 MMOL/L (ref 5–15)
AST SERPL-CCNC: 18 U/L (ref 1–32)
BILIRUB SERPL-MCNC: 0.2 MG/DL (ref 0–1.2)
BUN SERPL-MCNC: 15 MG/DL (ref 6–20)
BUN/CREAT SERPL: 16.5 (ref 7–25)
CALCIUM SPEC-SCNC: 9.8 MG/DL (ref 8.6–10.5)
CHLORIDE SERPL-SCNC: 104 MMOL/L (ref 98–107)
CO2 SERPL-SCNC: 30 MMOL/L (ref 22–29)
CREAT SERPL-MCNC: 0.91 MG/DL (ref 0.57–1)
EGFRCR SERPLBLD CKD-EPI 2021: 72.8 ML/MIN/1.73
ERYTHROCYTE [DISTWIDTH] IN BLOOD BY AUTOMATED COUNT: 17.8 % (ref 12.3–15.4)
GLOBULIN UR ELPH-MCNC: 3.5 GM/DL
GLUCOSE SERPL-MCNC: 105 MG/DL (ref 65–99)
HCT VFR BLD AUTO: 40.3 % (ref 34–46.6)
HGB BLD-MCNC: 12.8 G/DL (ref 12–15.9)
LYMPHOCYTES # BLD AUTO: 0.9 10*3/MM3 (ref 0.7–3.1)
LYMPHOCYTES NFR BLD AUTO: 40.9 % (ref 19.6–45.3)
MCH RBC QN AUTO: 29.2 PG (ref 26.6–33)
MCHC RBC AUTO-ENTMCNC: 31.9 G/DL (ref 31.5–35.7)
MCV RBC AUTO: 91.6 FL (ref 79–97)
MONOCYTES # BLD AUTO: 0.1 10*3/MM3 (ref 0.1–0.9)
MONOCYTES NFR BLD AUTO: 6.9 % (ref 5–12)
NEUTROPHILS NFR BLD AUTO: 1.1 10*3/MM3 (ref 1.7–7)
NEUTROPHILS NFR BLD AUTO: 52.2 % (ref 42.7–76)
PLATELET # BLD AUTO: 178 10*3/MM3 (ref 140–450)
PMV BLD AUTO: 6.5 FL (ref 6–12)
POTASSIUM SERPL-SCNC: 3.9 MMOL/L (ref 3.5–5.2)
PROT SERPL-MCNC: 7.8 G/DL (ref 6–8.5)
RBC # BLD AUTO: 4.4 10*6/MM3 (ref 3.77–5.28)
SODIUM SERPL-SCNC: 144 MMOL/L (ref 136–145)
WBC NRBC COR # BLD: 2.1 10*3/MM3 (ref 3.4–10.8)

## 2022-06-08 PROCEDURE — 85025 COMPLETE CBC W/AUTO DIFF WBC: CPT

## 2022-06-08 PROCEDURE — 36415 COLL VENOUS BLD VENIPUNCTURE: CPT

## 2022-06-08 PROCEDURE — 86300 IMMUNOASSAY TUMOR CA 15-3: CPT

## 2022-06-08 PROCEDURE — 80053 COMPREHEN METABOLIC PANEL: CPT

## 2022-06-09 ENCOUNTER — SPECIALTY PHARMACY (OUTPATIENT)
Dept: ONCOLOGY | Facility: HOSPITAL | Age: 57
End: 2022-06-09

## 2022-06-09 ENCOUNTER — OFFICE VISIT (OUTPATIENT)
Dept: ONCOLOGY | Facility: CLINIC | Age: 57
End: 2022-06-09

## 2022-06-09 VITALS
OXYGEN SATURATION: 97 % | TEMPERATURE: 97.3 F | WEIGHT: 135 LBS | HEIGHT: 61 IN | RESPIRATION RATE: 16 BRPM | BODY MASS INDEX: 25.49 KG/M2 | HEART RATE: 88 BPM | SYSTOLIC BLOOD PRESSURE: 134 MMHG | DIASTOLIC BLOOD PRESSURE: 84 MMHG

## 2022-06-09 DIAGNOSIS — C50.112 MALIGNANT NEOPLASM OF CENTRAL PORTION OF LEFT FEMALE BREAST, UNSPECIFIED ESTROGEN RECEPTOR STATUS: ICD-10-CM

## 2022-06-09 DIAGNOSIS — Z17.0 MALIGNANT NEOPLASM OF CENTRAL PORTION OF LEFT BREAST IN FEMALE, ESTROGEN RECEPTOR POSITIVE: Primary | ICD-10-CM

## 2022-06-09 DIAGNOSIS — C50.112 MALIGNANT NEOPLASM OF CENTRAL PORTION OF LEFT BREAST IN FEMALE, ESTROGEN RECEPTOR POSITIVE: Primary | ICD-10-CM

## 2022-06-09 DIAGNOSIS — Z85.3 HISTORY OF LEFT BREAST CANCER: ICD-10-CM

## 2022-06-09 DIAGNOSIS — C79.51 BONE METASTASES: Primary | ICD-10-CM

## 2022-06-09 LAB
CANCER AG15-3 SERPL-ACNC: 25.1 U/ML
CANCER AG27-29 SERPL-ACNC: 29.4 U/ML (ref 0–38.6)

## 2022-06-09 PROCEDURE — 99214 OFFICE O/P EST MOD 30 MIN: CPT | Performed by: INTERNAL MEDICINE

## 2022-06-09 NOTE — PROGRESS NOTES
"      PROBLEM LIST:  1. Local recurrence of HR+ carcinoma of the left breast  A) history of left breast cancer in 2007.  Bilateral mastectomy 5/30/07.  pathology showed grade 2 IDC of the left breast measuring 1.8 cm.  ER+ AL+ Her2 negative.   0/2 SLN involved.  [id number].  B) adjuvant chemotherapy with TC x 4 cycles, completed September 2007 with Dr. De La Torre.  Tamoxifen October 2007 thru October 2012.  C) presented January 2022 with left chest wall mass.  CT chest 1/17/22 showed 4.6 cm lesion involving left manubrium with soft tissue extent extending posteriorly to the anterior aspect of mediastinum.  12 mm nodule right lung base.  D) ultrasound guided biopsy of chest wall mass on 2/10/22.  Pathology showed invasive ductal carcinoma of the breast.  ER positive (100%), AL positive (50%), HER-2 2+  E) letrozole started February 2022.  CyberKnife to the chest wall mass completed 4/15/2022.  Ibrance started 4/18/2022.  2. Depression/anxiety  3. GERD    Subjective     CHIEF COMPLAINT: Breast cancer    HISTORY OF PRESENT ILLNESS:   Alcira Phelan returns for follow-up.  She continues on letrozole and ibrance.  She has been having some nausea the past few days.  She also feels very fatigued.  She is still working and feels like she has to force herself to get up and get going.  This seems to be worse over the past month or so.        Objective      /84   Pulse 88   Temp 97.3 °F (36.3 °C) (Temporal)   Resp 16   Ht 154.9 cm (60.98\")   Wt 61.2 kg (135 lb)   SpO2 97%   BMI 25.52 kg/m²    Vitals:    06/09/22 0954   PainSc: 0-No pain               ECOG score: 0             General: well appearing female in no acute distress  Neuro: alert and oriented  Chest: no palpable mass noted on left chest wall  Psych: mood and affect appropriate      I have reexamined the patient and the results are consistent with the previously documented exam. Ruth Castro MD        RECENT LABS:  Lab Results   Component Value Date    " WBC 2.10 (L) 06/08/2022    HGB 12.8 06/08/2022    HCT 40.3 06/08/2022    MCV 91.6 06/08/2022     06/08/2022       Lab Results   Component Value Date    GLUCOSE 105 (H) 06/08/2022    BUN 15 06/08/2022    CREATININE 0.91 06/08/2022    EGFRIFNONA 77 02/16/2022    BCR 16.5 06/08/2022    K 3.9 06/08/2022    CO2 30.0 (H) 06/08/2022    CALCIUM 9.8 06/08/2022    ALBUMIN 4.30 06/08/2022    AST 18 06/08/2022    ALT 13 06/08/2022                   Assessment & Plan   Alcira Phelan is a 57 y.o. female with a local regional recurrence of ER positive SC positive HER-2 negative invasive ductal carcinoma.      She is doing well with improvement in the chest wall mass and downtrending tumor markers.    She is having some fatigue and nausea.  Its possible this is related to Ibrance but there could be other causes as well.  She does have a history of depression.  I recommended that she continue to hold her Ibrance for at least a week or up to 2 weeks to see if this leads to improvement in her symptoms.    Follow-up in 1 month with PET scan prior to return.              I spent 40 minutes caring for Alcira on this date of service. This time includes time spent by me in the following activities: preparing for the visit, reviewing tests, obtaining and/or reviewing a separately obtained history, performing a medically appropriate examination and/or evaluation, counseling and educating the patient/family/caregiver, ordering medications, tests, or procedures and documenting information in the medical record          Ruth Castro MD  [facility] Hematology and Oncology    6/9/2022          CC:

## 2022-06-09 NOTE — PROGRESS NOTES
Re: Refills of Oral Specialty Medication - Ibrance (palbociclib)    • Drug-Drug Interactions: The current medication list was reviewed and there are no relevant drug-drug interactions.  • Medication Allergies: The patient has no relevant allergies as it relates to their oral specialty medication  • Review of Labs/Dose Adjustments: NO DOSE CHANGE - I reviewed the most recent note and labs and the patient will continue without any dose changes.  I sent refills as described below.   • Note - a refill was sent because the original prescription sent in March 2022 had a stop date so it's gone from the medication list.  She should have around 2 refills on that prescription.  I just sent one with 11 refills and will have Pati Derrick notify [facility] to put this prescription on hold until the patient needs refills.  • As Dr. Castro's most recent note mentions, neutropenia has been a problem, so if a dose reduction is needed, Specialty Pharmacy will send a new prescription for the reduced dose.    Drug: Ibrance (palbociclib)  Strength: 125 mg  Directions: Take 1 tablet by mouth daily ON days 1-21, OFF for 7 days of each 28 day cycle  Quantity: 21  Refills: 11  Pharmacy prescription sent to: [facility]    Ines Haro, PharmD, [facility]  Oncology Clinical Pharmacist  6/9/2022  11:57 EDT

## 2022-06-10 ENCOUNTER — TELEPHONE (OUTPATIENT)
Dept: GENETICS | Facility: HOSPITAL | Age: 57
End: 2022-06-10

## 2022-06-10 NOTE — TELEPHONE ENCOUNTER
Referral from Dr. EATON was received on 2-3-2022. Patient was contacted on 4- and 5- regarding setting up a genetic appointment. Messages were left on the patients voice mail. No response back from the patient.

## 2022-07-12 ENCOUNTER — LAB (OUTPATIENT)
Dept: LAB | Facility: HOSPITAL | Age: 57
End: 2022-07-12

## 2022-07-12 ENCOUNTER — HOSPITAL ENCOUNTER (OUTPATIENT)
Dept: PET IMAGING | Facility: HOSPITAL | Age: 57
Discharge: HOME | End: 2022-07-12

## 2022-07-12 ENCOUNTER — TELEPHONE (OUTPATIENT)
Dept: ONCOLOGY | Facility: CLINIC | Age: 57
End: 2022-07-12

## 2022-07-12 DIAGNOSIS — C50.112 MALIGNANT NEOPLASM OF CENTRAL PORTION OF LEFT BREAST IN FEMALE, ESTROGEN RECEPTOR POSITIVE: ICD-10-CM

## 2022-07-12 DIAGNOSIS — Z85.3 HISTORY OF LEFT BREAST CANCER: ICD-10-CM

## 2022-07-12 DIAGNOSIS — C50.112 MALIGNANT NEOPLASM OF CENTRAL PORTION OF LEFT FEMALE BREAST, UNSPECIFIED ESTROGEN RECEPTOR STATUS: ICD-10-CM

## 2022-07-12 DIAGNOSIS — C79.51 BONE METASTASES: ICD-10-CM

## 2022-07-12 DIAGNOSIS — Z17.0 MALIGNANT NEOPLASM OF CENTRAL PORTION OF LEFT BREAST IN FEMALE, ESTROGEN RECEPTOR POSITIVE: ICD-10-CM

## 2022-07-12 LAB
ALBUMIN SERPL-MCNC: 4 G/DL (ref 3.5–5.2)
ALBUMIN/GLOB SERPL: 1.5 G/DL
ALP SERPL-CCNC: 103 U/L (ref 39–117)
ALT SERPL W P-5'-P-CCNC: 14 U/L (ref 1–33)
ANION GAP SERPL CALCULATED.3IONS-SCNC: 9 MMOL/L (ref 5–15)
AST SERPL-CCNC: 19 U/L (ref 1–32)
BASOPHILS # BLD AUTO: 0.05 10*3/MM3 (ref 0–0.2)
BASOPHILS NFR BLD AUTO: 2.5 % (ref 0–1.5)
BILIRUB SERPL-MCNC: <0.2 MG/DL (ref 0–1.2)
BUN SERPL-MCNC: 13 MG/DL (ref 6–20)
BUN/CREAT SERPL: 16.9 (ref 7–25)
CALCIUM SPEC-SCNC: 8.9 MG/DL (ref 8.6–10.5)
CHLORIDE SERPL-SCNC: 106 MMOL/L (ref 98–107)
CO2 SERPL-SCNC: 27 MMOL/L (ref 22–29)
CREAT SERPL-MCNC: 0.77 MG/DL (ref 0.57–1)
DEPRECATED RDW RBC AUTO: 56.4 FL (ref 37–54)
EGFRCR SERPLBLD CKD-EPI 2021: 89 ML/MIN/1.73
EOSINOPHIL # BLD AUTO: 0.11 10*3/MM3 (ref 0–0.4)
EOSINOPHIL NFR BLD AUTO: 5.6 % (ref 0.3–6.2)
ERYTHROCYTE [DISTWIDTH] IN BLOOD BY AUTOMATED COUNT: 16.2 % (ref 12.3–15.4)
GLOBULIN UR ELPH-MCNC: 2.6 GM/DL
GLUCOSE BLDC GLUCOMTR-MCNC: 118 MG/DL (ref 70–130)
GLUCOSE SERPL-MCNC: 92 MG/DL (ref 65–99)
HCT VFR BLD AUTO: 33.4 % (ref 34–46.6)
HGB BLD-MCNC: 11.6 G/DL (ref 12–15.9)
IMM GRANULOCYTES # BLD AUTO: 0.01 10*3/MM3 (ref 0–0.05)
IMM GRANULOCYTES NFR BLD AUTO: 0.5 % (ref 0–0.5)
LYMPHOCYTES # BLD AUTO: 0.71 10*3/MM3 (ref 0.7–3.1)
LYMPHOCYTES NFR BLD AUTO: 35.9 % (ref 19.6–45.3)
MCH RBC QN AUTO: 32.7 PG (ref 26.6–33)
MCHC RBC AUTO-ENTMCNC: 34.7 G/DL (ref 31.5–35.7)
MCV RBC AUTO: 94.1 FL (ref 79–97)
MONOCYTES # BLD AUTO: 0.38 10*3/MM3 (ref 0.1–0.9)
MONOCYTES NFR BLD AUTO: 19.2 % (ref 5–12)
NEUTROPHILS NFR BLD AUTO: 0.72 10*3/MM3 (ref 1.7–7)
NEUTROPHILS NFR BLD AUTO: 36.3 % (ref 42.7–76)
NRBC BLD AUTO-RTO: 0 /100 WBC (ref 0–0.2)
PLATELET # BLD AUTO: 169 10*3/MM3 (ref 140–450)
PMV BLD AUTO: 9.5 FL (ref 6–12)
POTASSIUM SERPL-SCNC: 4.2 MMOL/L (ref 3.5–5.2)
PROT SERPL-MCNC: 6.6 G/DL (ref 6–8.5)
RBC # BLD AUTO: 3.55 10*6/MM3 (ref 3.77–5.28)
SODIUM SERPL-SCNC: 142 MMOL/L (ref 136–145)
WBC NRBC COR # BLD: 1.98 10*3/MM3 (ref 3.4–10.8)

## 2022-07-12 PROCEDURE — A9552 F18 FDG: HCPCS | Performed by: INTERNAL MEDICINE

## 2022-07-12 PROCEDURE — 36415 COLL VENOUS BLD VENIPUNCTURE: CPT

## 2022-07-12 PROCEDURE — 78815 PET IMAGE W/CT SKULL-THIGH: CPT

## 2022-07-12 PROCEDURE — 80053 COMPREHEN METABOLIC PANEL: CPT

## 2022-07-12 PROCEDURE — 85025 COMPLETE CBC W/AUTO DIFF WBC: CPT

## 2022-07-12 PROCEDURE — 0 FLUDEOXYGLUCOSE F18 SOLUTION: Performed by: INTERNAL MEDICINE

## 2022-07-12 PROCEDURE — 82962 GLUCOSE BLOOD TEST: CPT

## 2022-07-12 RX ADMIN — FLUDEOXYGLUCOSE F18 1 DOSE: 300 INJECTION INTRAVENOUS at 09:51

## 2022-07-12 NOTE — TELEPHONE ENCOUNTER
----- Message from Regina Anderson RN sent at 7/12/2022  1:27 PM EDT -----  Regarding: critical lab      Critical Test Results      MD: Matthew    Date: 07/12/2022     Critical test result: WBC 1.98    Time results received: 127pm

## 2022-07-12 NOTE — TELEPHONE ENCOUNTER
Name of Physician notified:   Matthew    Time Physician Notified: 1:50      []  Orders received    []  Protocol/Standing orders followed    [x]  No new orders   Patient has appt this week with Dr. Castro

## 2022-07-14 ENCOUNTER — SPECIALTY PHARMACY (OUTPATIENT)
Dept: ONCOLOGY | Facility: HOSPITAL | Age: 57
End: 2022-07-14

## 2022-07-14 ENCOUNTER — OFFICE VISIT (OUTPATIENT)
Dept: ONCOLOGY | Facility: CLINIC | Age: 57
End: 2022-07-14

## 2022-07-14 VITALS
OXYGEN SATURATION: 98 % | HEIGHT: 61 IN | DIASTOLIC BLOOD PRESSURE: 69 MMHG | HEART RATE: 70 BPM | SYSTOLIC BLOOD PRESSURE: 158 MMHG | RESPIRATION RATE: 16 BRPM | BODY MASS INDEX: 25.3 KG/M2 | TEMPERATURE: 97.1 F | WEIGHT: 134 LBS

## 2022-07-14 DIAGNOSIS — C50.112 MALIGNANT NEOPLASM OF CENTRAL PORTION OF LEFT FEMALE BREAST, UNSPECIFIED ESTROGEN RECEPTOR STATUS: ICD-10-CM

## 2022-07-14 DIAGNOSIS — C50.112 MALIGNANT NEOPLASM OF CENTRAL PORTION OF LEFT BREAST IN FEMALE, ESTROGEN RECEPTOR POSITIVE: Primary | ICD-10-CM

## 2022-07-14 DIAGNOSIS — C79.51 BONE METASTASES: ICD-10-CM

## 2022-07-14 DIAGNOSIS — Z85.3 HISTORY OF LEFT BREAST CANCER: ICD-10-CM

## 2022-07-14 DIAGNOSIS — Z17.0 MALIGNANT NEOPLASM OF CENTRAL PORTION OF LEFT BREAST IN FEMALE, ESTROGEN RECEPTOR POSITIVE: Primary | ICD-10-CM

## 2022-07-14 PROCEDURE — 99215 OFFICE O/P EST HI 40 MIN: CPT | Performed by: INTERNAL MEDICINE

## 2022-07-14 NOTE — PROGRESS NOTES
"      PROBLEM LIST:  1. Local recurrence of HR+ carcinoma of the left breast  A) history of left breast cancer in 2007.  Bilateral mastectomy 5/30/07.  pathology showed grade 2 IDC of the left breast measuring 1.8 cm.  ER+ MN+ Her2 negative.   0/2 SLN involved.  [id number].  B) adjuvant chemotherapy with TC x 4 cycles, completed September 2007 with Dr. De La Torre.  Tamoxifen October 2007 thru October 2012.  C) presented January 2022 with left chest wall mass.  CT chest 1/17/22 showed 4.6 cm lesion involving left manubrium with soft tissue extent extending posteriorly to the anterior aspect of mediastinum.  12 mm nodule right lung base.  D) ultrasound guided biopsy of chest wall mass on 2/10/22.  Pathology showed invasive ductal carcinoma of the breast.  ER positive (100%), MN positive (50%), HER-2 2+  E) letrozole started February 2022.  CyberKnife to the chest wall mass completed 4/15/2022.  Ibrance started 4/18/2022.  2. Depression/anxiety  3. GERD    Subjective     CHIEF COMPLAINT: Breast cancer    HISTORY OF PRESENT ILLNESS:   Alcira Phelan returns for follow-up.  She continues on letrozole and ibrance.  She did take a short break from it and said this helped with her energy levels.  Her energy has been okay recently.  No other new symptoms or complaints.        Objective      /69   Pulse 70   Temp 97.1 °F (36.2 °C) (Temporal)   Resp 16   Ht 154.9 cm (60.98\")   Wt 60.8 kg (134 lb)   SpO2 98%   BMI 25.33 kg/m²    Vitals:    07/14/22 0928   PainSc: 0-No pain               ECOG score: 0             General: well appearing female in no acute distress  Neuro: alert and oriented  Chest: no palpable mass on chest wall.  Psych: mood and affect appropriate      I have reexamined the patient and the results are consistent with the previously documented exam. Ruth Castro MD        RECENT LABS:  Lab Results   Component Value Date    WBC 1.98 (C) 07/12/2022    HGB 11.6 (L) 07/12/2022    HCT 33.4 (L) 07/12/2022 "    MCV 94.1 07/12/2022     07/12/2022       Lab Results   Component Value Date    GLUCOSE 92 07/12/2022    BUN 13 07/12/2022    CREATININE 0.77 07/12/2022    EGFRIFNONA 77 02/16/2022    BCR 16.9 07/12/2022    K 4.2 07/12/2022    CO2 27.0 07/12/2022    CALCIUM 8.9 07/12/2022    ALBUMIN 4.00 07/12/2022    AST 19 07/12/2022    ALT 14 07/12/2022         NM PET/CT Skull Base to Mid Thigh  Narrative: DATE OF EXAM: 7/12/2022 9:35 AM     PROCEDURE: NM PET/CT SKULL BASE TO MID THIGH-     INDICATIONS: breast cancer; C50.112-Malignant neoplasm of central  portion of left female breast; Z17.0-Estrogen receptor positive status  (ER+)     TECHNIQUE:  13.11 mCi of F-18 labeled FDG was administered intravenously  followed by PET imaging from the skull vertex through the mid thighs.  Low-dose non contrast CT imaging of the same body region was performed.  Fused PET-CT images and 3D MIP PET images were utilized for  interpretation. Blood glucose level at the time of injection was 118  mg/dl.     COMPARISON: Subsequent exam for follow-up with prior PET/CT dated  2/28/2022     HEAD AND NECK: Physiologic hypermetabolism of the aerodigestive tract  structures is present, without hypermetabolic cervical adenopathy or  aerodigestive tract mass. Normal hypermetabolism of the cerebral  hemispheres.     CHEST: There is physiologic hypermetabolism of the left ventricular  myocardium. There is evidence of interval treatment response with  resolved hypermetabolism noted involving the manubrium and anterior  chest wall. CT demonstrates persistent lytic appearance in this area in  addition to some interstitial changes and groundglass opacity within the  anterior lung fields, likely representing some post radiation effects.  No new abnormal hypermetabolism is present in the chest.     ABDOMEN AND PELVIS: There is physiologic activity of the  gastrointestinal and genitourinary tracts, without focal hypermetabolism  concerning for acute or  neoplastic process. Osseous structures are  unremarkable diffusely.     Impression: Evidence of interval treatment response with resolved hypermetabolism of  the previously noted manubrial and anterior chest wall lesion. CT  demonstrates persistent abnormal lytic appearance of the involved  osseous structures, and there is evidence of some impulse radiation  effects within the adjacent anterior lung fields.     PET/CT is otherwise negative, without evidence of new metastatic  involvement elsewhere.     This report was finalized on 7/12/2022 11:58 AM by Ayden Haji.       I personally reviewed the imaging studies          Assessment & Plan   Alcira Phelan is a 57 y.o. female with a local regional recurrence of ER positive NH positive HER-2 negative invasive ductal carcinoma.      Repeat PET scan shows resolution of hypermetabolic activity in the chest wall mass consistent with response to treatment.  We will continue letrozole and ibrance.  We discussed that this treatment will likely be indefinite, as long as she has evidence of disease control.  I would plan to repeat imaging in 4 to 6 months based on how she is feeling and her blood work.    Neutropenia: Her labs were done after she had already started her most recent cycle of treatment.  We will plan to continue this cycle, but she should come in August 5 for a repeat lab check prior to starting the next cycle.  We discussed that if her ANC is still low at that point I would plan to reduce her dose to 100 mg.    Follow-up in 2 months.              I spent 43 minutes caring for Alcira on this date of service. This time includes time spent by me in the following activities: preparing for the visit, reviewing tests, obtaining and/or reviewing a separately obtained history, performing a medically appropriate examination and/or evaluation, counseling and educating the patient/family/caregiver, ordering medications, tests, or procedures, documenting information  in the medical record and independently interpreting results and communicating that information with the patient/family/caregiver          Ruth Castro MD  [facility] Hematology and Oncology    7/14/2022          CC:

## 2022-08-01 ENCOUNTER — TELEPHONE (OUTPATIENT)
Dept: ONCOLOGY | Facility: CLINIC | Age: 57
End: 2022-08-01

## 2022-08-01 DIAGNOSIS — U07.1 COVID-19: ICD-10-CM

## 2022-08-01 DIAGNOSIS — C50.112 MALIGNANT NEOPLASM OF CENTRAL PORTION OF LEFT BREAST IN FEMALE, ESTROGEN RECEPTOR POSITIVE: Primary | ICD-10-CM

## 2022-08-01 DIAGNOSIS — Z17.0 MALIGNANT NEOPLASM OF CENTRAL PORTION OF LEFT BREAST IN FEMALE, ESTROGEN RECEPTOR POSITIVE: Primary | ICD-10-CM

## 2022-08-01 PROCEDURE — U0004 COV-19 TEST NON-CDC HGH THRU: HCPCS | Performed by: FAMILY MEDICINE

## 2022-08-01 NOTE — TELEPHONE ENCOUNTER
I called the patient and she developed symptoms Saturday of sore throat and terrible headache.  She has been neutropenic and we were going to recheck her labs this week per Dr. Castro to see if we needed to decrease her mg of ibrance.  This was her week off.  I talked with Dr. Pereira and we will send in some paxlovid for her to take and he said for her to hold her ibrance and I will check with her on Monday (one week) and see how she is doing.  She is going to go to an urgent care to have a pcr test ran and have an exam.

## 2022-08-03 ENCOUNTER — TELEPHONE (OUTPATIENT)
Dept: URGENT CARE | Facility: CLINIC | Age: 57
End: 2022-08-03

## 2022-08-03 ENCOUNTER — TELEPHONE (OUTPATIENT)
Dept: INTERNAL MEDICINE | Facility: CLINIC | Age: 57
End: 2022-08-03

## 2022-08-03 RX ORDER — IBUPROFEN 800 MG/1
800 TABLET ORAL 2 TIMES DAILY PRN
Qty: 60 TABLET | Refills: 1 | Status: SHIPPED | OUTPATIENT
Start: 2022-08-03 | End: 2022-11-11

## 2022-08-03 NOTE — TELEPHONE ENCOUNTER
Caller: Tapan Alciranilson Ugalde    Relationship: Self    Best call back number: [phone]    Requested Prescriptions:   Requested Prescriptions     Pending Prescriptions Disp Refills   • ibuprofen (ADVIL,MOTRIN) 800 MG tablet 60 tablet 1     Sig: Take 1 tablet by mouth 2 (Two) Times a Day As Needed for Mild Pain .        Pharmacy where request should be sent: [facility] [address] AT [facility] - [phone]  - [phone] FX     Additional details provided by patient: PATIENT IS OUT OF MEDICATION.    Does the patient have less than a 3 day supply:  [x] Yes  [] No    Cande Brown Rep   08/03/22 12:44 EDT

## 2022-08-03 NOTE — TELEPHONE ENCOUNTER
----- Message from Clayton Ruano MD sent at 8/2/2022  7:51 PM EDT -----  COVID is positive.  Please inform patient and have them continue quarantine per CDC guidelines.  Patient is on Paxil Craig as prescribed by her oncologist-Clayton Ruano MD

## 2022-08-08 ENCOUNTER — TELEPHONE (OUTPATIENT)
Dept: ONCOLOGY | Facility: CLINIC | Age: 57
End: 2022-08-08

## 2022-08-08 NOTE — TELEPHONE ENCOUNTER
Patient had COVID last week and was told to stop the Ibrance she is know negative so she needs to know if she can start back on this? Please call.

## 2022-08-08 NOTE — TELEPHONE ENCOUNTER
I had talked with dr Castro and patient is to have labs done this week.  She will come on Wednesday to have labs done.  I told her to continue holding ibrance until we see those numbers.

## 2022-08-10 ENCOUNTER — LAB (OUTPATIENT)
Dept: LAB | Facility: HOSPITAL | Age: 57
End: 2022-08-10

## 2022-08-10 ENCOUNTER — TELEPHONE (OUTPATIENT)
Dept: ONCOLOGY | Facility: CLINIC | Age: 57
End: 2022-08-10

## 2022-08-10 ENCOUNTER — SPECIALTY PHARMACY (OUTPATIENT)
Dept: ONCOLOGY | Facility: HOSPITAL | Age: 57
End: 2022-08-10

## 2022-08-10 DIAGNOSIS — C79.51 BONE METASTASES: ICD-10-CM

## 2022-08-10 DIAGNOSIS — Z85.3 HISTORY OF LEFT BREAST CANCER: ICD-10-CM

## 2022-08-10 DIAGNOSIS — C50.112 MALIGNANT NEOPLASM OF CENTRAL PORTION OF LEFT FEMALE BREAST, UNSPECIFIED ESTROGEN RECEPTOR STATUS: ICD-10-CM

## 2022-08-10 LAB
ALBUMIN SERPL-MCNC: 4.3 G/DL (ref 3.5–5.2)
ALBUMIN/GLOB SERPL: 1.3 G/DL
ALP SERPL-CCNC: 100 U/L (ref 39–117)
ALT SERPL W P-5'-P-CCNC: 38 U/L (ref 1–33)
ANION GAP SERPL CALCULATED.3IONS-SCNC: 12 MMOL/L (ref 5–15)
AST SERPL-CCNC: 28 U/L (ref 1–32)
BILIRUB SERPL-MCNC: 0.2 MG/DL (ref 0–1.2)
BUN SERPL-MCNC: 23 MG/DL (ref 6–20)
BUN/CREAT SERPL: 18.7 (ref 7–25)
CALCIUM SPEC-SCNC: 9.5 MG/DL (ref 8.6–10.5)
CHLORIDE SERPL-SCNC: 103 MMOL/L (ref 98–107)
CO2 SERPL-SCNC: 27 MMOL/L (ref 22–29)
CREAT SERPL-MCNC: 1.23 MG/DL (ref 0.57–1)
EGFRCR SERPLBLD CKD-EPI 2021: 50.7 ML/MIN/1.73
ERYTHROCYTE [DISTWIDTH] IN BLOOD BY AUTOMATED COUNT: 14.2 % (ref 12.3–15.4)
GLOBULIN UR ELPH-MCNC: 3.2 GM/DL
GLUCOSE SERPL-MCNC: 88 MG/DL (ref 65–99)
HCT VFR BLD AUTO: 35.5 % (ref 34–46.6)
HGB BLD-MCNC: 11.5 G/DL (ref 12–15.9)
LYMPHOCYTES # BLD AUTO: 1.2 10*3/MM3 (ref 0.7–3.1)
LYMPHOCYTES NFR BLD AUTO: 33.5 % (ref 19.6–45.3)
MCH RBC QN AUTO: 31.5 PG (ref 26.6–33)
MCHC RBC AUTO-ENTMCNC: 32.4 G/DL (ref 31.5–35.7)
MCV RBC AUTO: 97.1 FL (ref 79–97)
MONOCYTES # BLD AUTO: 0.5 10*3/MM3 (ref 0.1–0.9)
MONOCYTES NFR BLD AUTO: 13 % (ref 5–12)
NEUTROPHILS NFR BLD AUTO: 1.9 10*3/MM3 (ref 1.7–7)
NEUTROPHILS NFR BLD AUTO: 53.5 % (ref 42.7–76)
PLATELET # BLD AUTO: 237 10*3/MM3 (ref 140–450)
PMV BLD AUTO: 6.1 FL (ref 6–12)
POTASSIUM SERPL-SCNC: 5.5 MMOL/L (ref 3.5–5.2)
PROT SERPL-MCNC: 7.5 G/DL (ref 6–8.5)
RBC # BLD AUTO: 3.66 10*6/MM3 (ref 3.77–5.28)
SODIUM SERPL-SCNC: 142 MMOL/L (ref 136–145)
WBC NRBC COR # BLD: 3.5 10*3/MM3 (ref 3.4–10.8)

## 2022-08-10 PROCEDURE — 85025 COMPLETE CBC W/AUTO DIFF WBC: CPT

## 2022-08-10 PROCEDURE — 36415 COLL VENOUS BLD VENIPUNCTURE: CPT

## 2022-08-10 PROCEDURE — 80053 COMPREHEN METABOLIC PANEL: CPT

## 2022-08-10 NOTE — PROGRESS NOTES
Patient has been taking Ibrance 125mg. She has been experiencing neutropenia throughout her treatment course. Dr. Castro would like to reduce the Ibrance to 100mg.  Patient will wait to restart the Ibrance until she receives the new dosage.    Drug: palbociclib  Strength: 100mg tablet  Directions: Take 1 tablet PO daily on Days 1-21 then off for 7 days in a 28-day cycle  QTY: 21  RF:11    Released to pharmacy: [facility]

## 2022-08-10 NOTE — TELEPHONE ENCOUNTER
I called the patient about her labs that were done today.  Dr. Castro reviewed the labs and the white count has recovered but the patient has had two episodes of neutropenic labs since starting the ibrance in April. We are going to reduce the dose of ibrance to 100mg per Dr. Castro.  Patient is to stay off the ibrance until she gets the new dosage.  I left a detailed voicemail for patient and told her if questions, to call me back.

## 2022-08-18 ENCOUNTER — TELEPHONE (OUTPATIENT)
Dept: ONCOLOGY | Facility: CLINIC | Age: 57
End: 2022-08-18

## 2022-08-18 NOTE — TELEPHONE ENCOUNTER
[facility]     Pharmacy representative name: [facility]    Pharmacy representative phone number: [phone]    What medication are you calling in regards to: IBRANCE 100 MG     What question does the pharmacy have: MEDICATION WILL NEED A PRIOR AUTHORIZATION.    Who is the provider that prescribed the medication: CLARITA    Additional notes: REQUESTING CHART NOTE AND LABS BE FAXED FX#[phone]

## 2022-08-30 ENCOUNTER — TELEPHONE (OUTPATIENT)
Dept: ONCOLOGY | Facility: CLINIC | Age: 57
End: 2022-08-30

## 2022-08-30 NOTE — TELEPHONE ENCOUNTER
Caller: Alcira Phelan    Relationship: Self    Best call back number: [phone]    What is the best time to reach you: ASAP    Who are you requesting to speak with (clinical staff, provider,  specific staff member): NURSE    Do you know the name of the person who called:     What was the call regarding: PT HAS QUESTIONS ABOUT IBRANCE    Do you require a callback: YES

## 2022-08-30 NOTE — TELEPHONE ENCOUNTER
Patient wanted to know when to start Ibrance. I advised her to start once she gets the new 100mg dosage.  She will start the new dose on Monday, 9/5.  She also had questions regarding hair loss. Discussed the risk of hair loss with the patient. I provided patient with my direct phone number, if she has any further questions.

## 2022-09-05 DIAGNOSIS — K21.9 GASTROESOPHAGEAL REFLUX DISEASE: ICD-10-CM

## 2022-09-05 RX ORDER — OMEPRAZOLE 20 MG/1
CAPSULE, DELAYED RELEASE ORAL
Qty: 90 CAPSULE | Refills: 0 | Status: SHIPPED | OUTPATIENT
Start: 2022-09-05 | End: 2022-12-03

## 2022-09-12 ENCOUNTER — TELEPHONE (OUTPATIENT)
Dept: ONCOLOGY | Facility: CLINIC | Age: 57
End: 2022-09-12

## 2022-09-12 NOTE — TELEPHONE ENCOUNTER
Caller: Alcira Phelan    Relationship to patient: Self    Best call back number: [phone]    Chief complaint: PATIENT NOT FEELING WELL WANTED TO RESCHEDULE     Type of visit: FOLLOW UP      If rescheduling, when is the original appointment: 9-13-22     Additional notes:PLEASE CALL PATIENT TO RESCHEDULE             Information could not be obtained

## 2022-09-20 ENCOUNTER — LAB (OUTPATIENT)
Dept: LAB | Facility: HOSPITAL | Age: 57
End: 2022-09-20

## 2022-09-20 DIAGNOSIS — C79.51 BONE METASTASES: ICD-10-CM

## 2022-09-20 DIAGNOSIS — Z85.3 HISTORY OF LEFT BREAST CANCER: ICD-10-CM

## 2022-09-20 DIAGNOSIS — C50.112 MALIGNANT NEOPLASM OF CENTRAL PORTION OF LEFT FEMALE BREAST, UNSPECIFIED ESTROGEN RECEPTOR STATUS: ICD-10-CM

## 2022-09-20 DIAGNOSIS — Z85.3 HISTORY OF LEFT BREAST CANCER: Primary | ICD-10-CM

## 2022-09-20 DIAGNOSIS — C79.51 BONE METASTASES: Primary | ICD-10-CM

## 2022-09-20 LAB
ALBUMIN SERPL-MCNC: 3.9 G/DL (ref 3.5–5.2)
ALBUMIN/GLOB SERPL: 1.4 G/DL
ALP SERPL-CCNC: 104 U/L (ref 39–117)
ALT SERPL W P-5'-P-CCNC: 14 U/L (ref 1–33)
ANION GAP SERPL CALCULATED.3IONS-SCNC: 8 MMOL/L (ref 5–15)
AST SERPL-CCNC: 18 U/L (ref 1–32)
BASOPHILS # BLD AUTO: 0.02 10*3/MM3 (ref 0–0.2)
BASOPHILS NFR BLD AUTO: 1 % (ref 0–1.5)
BILIRUB SERPL-MCNC: <0.2 MG/DL (ref 0–1.2)
BUN SERPL-MCNC: 11 MG/DL (ref 6–20)
BUN/CREAT SERPL: 15.9 (ref 7–25)
CALCIUM SPEC-SCNC: 9 MG/DL (ref 8.6–10.5)
CANCER AG15-3 SERPL-ACNC: 16.3 U/ML
CHLORIDE SERPL-SCNC: 107 MMOL/L (ref 98–107)
CO2 SERPL-SCNC: 29 MMOL/L (ref 22–29)
CREAT SERPL-MCNC: 0.69 MG/DL (ref 0.57–1)
DEPRECATED RDW RBC AUTO: 41.3 FL (ref 37–54)
EGFRCR SERPLBLD CKD-EPI 2021: 100.1 ML/MIN/1.73
EOSINOPHIL # BLD AUTO: 0.15 10*3/MM3 (ref 0–0.4)
EOSINOPHIL NFR BLD AUTO: 7.2 % (ref 0.3–6.2)
ERYTHROCYTE [DISTWIDTH] IN BLOOD BY AUTOMATED COUNT: 11.8 % (ref 12.3–15.4)
GLOBULIN UR ELPH-MCNC: 2.7 GM/DL
GLUCOSE SERPL-MCNC: 104 MG/DL (ref 65–99)
HCT VFR BLD AUTO: 34.6 % (ref 34–46.6)
HGB BLD-MCNC: 12.1 G/DL (ref 12–15.9)
IMM GRANULOCYTES # BLD AUTO: 0 10*3/MM3 (ref 0–0.05)
IMM GRANULOCYTES NFR BLD AUTO: 0 % (ref 0–0.5)
LYMPHOCYTES # BLD AUTO: 0.74 10*3/MM3 (ref 0.7–3.1)
LYMPHOCYTES NFR BLD AUTO: 35.4 % (ref 19.6–45.3)
MCH RBC QN AUTO: 33.3 PG (ref 26.6–33)
MCHC RBC AUTO-ENTMCNC: 35 G/DL (ref 31.5–35.7)
MCV RBC AUTO: 95.3 FL (ref 79–97)
MONOCYTES # BLD AUTO: 0.15 10*3/MM3 (ref 0.1–0.9)
MONOCYTES NFR BLD AUTO: 7.2 % (ref 5–12)
NEUTROPHILS NFR BLD AUTO: 1.03 10*3/MM3 (ref 1.7–7)
NEUTROPHILS NFR BLD AUTO: 49.2 % (ref 42.7–76)
PLATELET # BLD AUTO: 106 10*3/MM3 (ref 140–450)
PMV BLD AUTO: 9.3 FL (ref 6–12)
POTASSIUM SERPL-SCNC: 4.3 MMOL/L (ref 3.5–5.2)
PROT SERPL-MCNC: 6.6 G/DL (ref 6–8.5)
RBC # BLD AUTO: 3.63 10*6/MM3 (ref 3.77–5.28)
SODIUM SERPL-SCNC: 144 MMOL/L (ref 136–145)
WBC NRBC COR # BLD: 2.09 10*3/MM3 (ref 3.4–10.8)

## 2022-09-20 PROCEDURE — 80053 COMPREHEN METABOLIC PANEL: CPT

## 2022-09-20 PROCEDURE — 85025 COMPLETE CBC W/AUTO DIFF WBC: CPT

## 2022-09-20 PROCEDURE — 86300 IMMUNOASSAY TUMOR CA 15-3: CPT

## 2022-09-20 PROCEDURE — 36415 COLL VENOUS BLD VENIPUNCTURE: CPT

## 2022-09-21 ENCOUNTER — TELEPHONE (OUTPATIENT)
Dept: ONCOLOGY | Facility: CLINIC | Age: 57
End: 2022-09-21

## 2022-09-21 LAB — CANCER AG27-29 SERPL-ACNC: 18.1 U/ML (ref 0–38.6)

## 2022-09-21 NOTE — PROGRESS NOTES
Can you let her know her labs look good and she can continue on with her Ibrance and letrozole.  Also let her know her CA 27.29 and CA-15-3 are improving as well.  We will see her at her appointment October 11.

## 2022-09-21 NOTE — TELEPHONE ENCOUNTER
I called patient per JOVAN Carranza to let her know her labs look good and she can continue ibrance and femara.  I also told her that the tumor markers had improved.  I had to leave voicemail as patient did not answer.

## 2022-10-11 ENCOUNTER — LAB (OUTPATIENT)
Dept: LAB | Facility: HOSPITAL | Age: 57
End: 2022-10-11

## 2022-10-11 ENCOUNTER — OFFICE VISIT (OUTPATIENT)
Dept: ONCOLOGY | Facility: CLINIC | Age: 57
End: 2022-10-11

## 2022-10-11 ENCOUNTER — TELEPHONE (OUTPATIENT)
Dept: ONCOLOGY | Facility: CLINIC | Age: 57
End: 2022-10-11

## 2022-10-11 VITALS
TEMPERATURE: 97.1 F | OXYGEN SATURATION: 98 % | HEIGHT: 61 IN | WEIGHT: 130 LBS | HEART RATE: 76 BPM | SYSTOLIC BLOOD PRESSURE: 127 MMHG | DIASTOLIC BLOOD PRESSURE: 80 MMHG | RESPIRATION RATE: 16 BRPM | BODY MASS INDEX: 24.55 KG/M2

## 2022-10-11 DIAGNOSIS — C50.112 MALIGNANT NEOPLASM OF CENTRAL PORTION OF LEFT BREAST IN FEMALE, ESTROGEN RECEPTOR POSITIVE: Primary | ICD-10-CM

## 2022-10-11 DIAGNOSIS — C79.51 BONE METASTASES: ICD-10-CM

## 2022-10-11 DIAGNOSIS — Z17.0 MALIGNANT NEOPLASM OF CENTRAL PORTION OF LEFT BREAST IN FEMALE, ESTROGEN RECEPTOR POSITIVE: Primary | ICD-10-CM

## 2022-10-11 LAB
ALBUMIN SERPL-MCNC: 4.1 G/DL (ref 3.5–5.2)
ALBUMIN/GLOB SERPL: 1.5 G/DL
ALP SERPL-CCNC: 98 U/L (ref 39–117)
ALT SERPL W P-5'-P-CCNC: 13 U/L (ref 1–33)
ANION GAP SERPL CALCULATED.3IONS-SCNC: 9 MMOL/L (ref 5–15)
AST SERPL-CCNC: 18 U/L (ref 1–32)
BASOPHILS # BLD AUTO: 0.03 10*3/MM3 (ref 0–0.2)
BASOPHILS NFR BLD AUTO: 1.7 % (ref 0–1.5)
BILIRUB SERPL-MCNC: <0.2 MG/DL (ref 0–1.2)
BUN SERPL-MCNC: 14 MG/DL (ref 6–20)
BUN/CREAT SERPL: 20.3 (ref 7–25)
CALCIUM SPEC-SCNC: 9.2 MG/DL (ref 8.6–10.5)
CANCER AG15-3 SERPL-ACNC: 14.9 U/ML
CHLORIDE SERPL-SCNC: 108 MMOL/L (ref 98–107)
CO2 SERPL-SCNC: 26 MMOL/L (ref 22–29)
CREAT SERPL-MCNC: 0.69 MG/DL (ref 0.57–1)
DEPRECATED RDW RBC AUTO: 44.5 FL (ref 37–54)
EGFRCR SERPLBLD CKD-EPI 2021: 100.1 ML/MIN/1.73
EOSINOPHIL # BLD AUTO: 0.11 10*3/MM3 (ref 0–0.4)
EOSINOPHIL NFR BLD AUTO: 6.2 % (ref 0.3–6.2)
ERYTHROCYTE [DISTWIDTH] IN BLOOD BY AUTOMATED COUNT: 12.6 % (ref 12.3–15.4)
GLOBULIN UR ELPH-MCNC: 2.8 GM/DL
GLUCOSE SERPL-MCNC: 86 MG/DL (ref 65–99)
HCT VFR BLD AUTO: 34.6 % (ref 34–46.6)
HGB BLD-MCNC: 12.1 G/DL (ref 12–15.9)
IMM GRANULOCYTES # BLD AUTO: 0.01 10*3/MM3 (ref 0–0.05)
IMM GRANULOCYTES NFR BLD AUTO: 0.6 % (ref 0–0.5)
LYMPHOCYTES # BLD AUTO: 0.69 10*3/MM3 (ref 0.7–3.1)
LYMPHOCYTES NFR BLD AUTO: 38.8 % (ref 19.6–45.3)
MCH RBC QN AUTO: 33.3 PG (ref 26.6–33)
MCHC RBC AUTO-ENTMCNC: 35 G/DL (ref 31.5–35.7)
MCV RBC AUTO: 95.3 FL (ref 79–97)
MONOCYTES # BLD AUTO: 0.14 10*3/MM3 (ref 0.1–0.9)
MONOCYTES NFR BLD AUTO: 7.9 % (ref 5–12)
NEUTROPHILS NFR BLD AUTO: 0.8 10*3/MM3 (ref 1.7–7)
NEUTROPHILS NFR BLD AUTO: 44.8 % (ref 42.7–76)
PLATELET # BLD AUTO: 162 10*3/MM3 (ref 140–450)
PMV BLD AUTO: 8.7 FL (ref 6–12)
POTASSIUM SERPL-SCNC: 3.9 MMOL/L (ref 3.5–5.2)
PROT SERPL-MCNC: 6.9 G/DL (ref 6–8.5)
RBC # BLD AUTO: 3.63 10*6/MM3 (ref 3.77–5.28)
SODIUM SERPL-SCNC: 143 MMOL/L (ref 136–145)
WBC NRBC COR # BLD: 1.78 10*3/MM3 (ref 3.4–10.8)

## 2022-10-11 PROCEDURE — 99214 OFFICE O/P EST MOD 30 MIN: CPT | Performed by: NURSE PRACTITIONER

## 2022-10-11 PROCEDURE — 80053 COMPREHEN METABOLIC PANEL: CPT | Performed by: NURSE PRACTITIONER

## 2022-10-11 PROCEDURE — 85025 COMPLETE CBC W/AUTO DIFF WBC: CPT | Performed by: NURSE PRACTITIONER

## 2022-10-11 PROCEDURE — 86300 IMMUNOASSAY TUMOR CA 15-3: CPT | Performed by: NURSE PRACTITIONER

## 2022-10-11 PROCEDURE — 36415 COLL VENOUS BLD VENIPUNCTURE: CPT | Performed by: NURSE PRACTITIONER

## 2022-10-11 NOTE — PROGRESS NOTES
"      PROBLEM LIST:  1. Local recurrence of HR+ carcinoma of the left breast  A) history of left breast cancer in 2007.  Bilateral mastectomy 5/30/07.  pathology showed grade 2 IDC of the left breast measuring 1.8 cm.  ER+ DC+ Her2 negative.   0/2 SLN involved.  [id number].  B) adjuvant chemotherapy with TC x 4 cycles, completed September 2007 with Dr. De La Torre.  Tamoxifen October 2007 thru October 2012.  C) presented January 2022 with left chest wall mass.  CT chest 1/17/22 showed 4.6 cm lesion involving left manubrium with soft tissue extent extending posteriorly to the anterior aspect of mediastinum.  12 mm nodule right lung base.  D) ultrasound guided biopsy of chest wall mass on 2/10/22.  Pathology showed invasive ductal carcinoma of the breast.  ER positive (100%), DC positive (50%), HER-2 2+  E) letrozole started February 2022.  CyberKnife to the chest wall mass completed 4/15/2022.  Ibrance started 4/18/2022.  Ibrance decreased to 100 mg due to neutropenia 9/5/2022  2. Depression/anxiety  3. GERD    Subjective     CHIEF COMPLAINT: Breast cancer    HISTORY OF PRESENT ILLNESS:   Alcira Phelan returns for follow-up.  She continues on letrozole and ibrance.  Ibrance was decreased to 100 mg due to neutropenia on 8/10/2022.  She started her first reduced dose cycle on 9/5/2022.  She is tolerating the reduced dose well.  She denies any fatigue.  She denies any pain.  She has an occasional cough that she has had since radiation but denies any shortness of air.  No headaches or diplopia.    She was diagnosed with COVID August 1, 2022.  She took Paxlovid as prescribed and has recovered from COVID completely.          Objective      /80 Comment: RUE  Pulse 76   Temp 97.1 °F (36.2 °C) (Infrared)   Resp 16   Ht 154.9 cm (61\")   Wt 59 kg (130 lb)   SpO2 98% Comment: RA  BMI 24.56 kg/m²    Vitals:    10/11/22 0959   PainSc: 0-No pain               ECOG score: 0             General: well appearing female in no " acute distress  Neuro: alert and oriented  Chest: no palpable mass on chest wall.  Psych: mood and affect appropriate      I have reexamined the patient and the results are consistent with the previously documented exam. Rasheeda Ramirez, JOVAN        RECENT LABS:  Lab Results   Component Value Date    WBC 1.78 (L) 10/11/2022    HGB 12.1 10/11/2022    HCT 34.6 10/11/2022    MCV 95.3 10/11/2022     10/11/2022       Lab Results   Component Value Date    GLUCOSE 104 (H) 09/20/2022    BUN 11 09/20/2022    CREATININE 0.69 09/20/2022    EGFRIFNONA 77 02/16/2022    BCR 15.9 09/20/2022    K 4.3 09/20/2022    CO2 29.0 09/20/2022    CALCIUM 9.0 09/20/2022    ALBUMIN 3.90 09/20/2022    AST 18 09/20/2022    ALT 14 09/20/2022         NM PET/CT Skull Base to Mid Thigh  Narrative: DATE OF EXAM: 7/12/2022 9:35 AM     PROCEDURE: NM PET/CT SKULL BASE TO MID THIGH-     INDICATIONS: breast cancer; C50.112-Malignant neoplasm of central  portion of left female breast; Z17.0-Estrogen receptor positive status  (ER+)     TECHNIQUE:  13.11 mCi of F-18 labeled FDG was administered intravenously  followed by PET imaging from the skull vertex through the mid thighs.  Low-dose non contrast CT imaging of the same body region was performed.  Fused PET-CT images and 3D MIP PET images were utilized for  interpretation. Blood glucose level at the time of injection was 118  mg/dl.     COMPARISON: Subsequent exam for follow-up with prior PET/CT dated  2/28/2022     HEAD AND NECK: Physiologic hypermetabolism of the aerodigestive tract  structures is present, without hypermetabolic cervical adenopathy or  aerodigestive tract mass. Normal hypermetabolism of the cerebral  hemispheres.     CHEST: There is physiologic hypermetabolism of the left ventricular  myocardium. There is evidence of interval treatment response with  resolved hypermetabolism noted involving the manubrium and anterior  chest wall. CT demonstrates persistent lytic appearance in  this area in  addition to some interstitial changes and groundglass opacity within the  anterior lung fields, likely representing some post radiation effects.  No new abnormal hypermetabolism is present in the chest.     ABDOMEN AND PELVIS: There is physiologic activity of the  gastrointestinal and genitourinary tracts, without focal hypermetabolism  concerning for acute or neoplastic process. Osseous structures are  unremarkable diffusely.     Impression: Evidence of interval treatment response with resolved hypermetabolism of  the previously noted manubrial and anterior chest wall lesion. CT  demonstrates persistent abnormal lytic appearance of the involved  osseous structures, and there is evidence of some impulse radiation  effects within the adjacent anterior lung fields.     PET/CT is otherwise negative, without evidence of new metastatic  involvement elsewhere.     This report was finalized on 7/12/2022 11:58 AM by Ayden Haji.                 Assessment & Plan   Alcira Phelan is a 57 y.o. female with a local regional recurrence of ER positive NE positive HER-2 negative invasive ductal carcinoma.      She continues on letrozole and Ibrance.  We did have to reduce the Ibrance to 100 mg daily days 1 through 21 every 28-day cycle due to neutropenia on 9/5/2022.  She is tolerating the medication well.  Treatment will likely be indefinite, as long as she has evidence of disease control.  We will plan on repeat PET scan prior to return in 2 months to evaluate response to treatment.  I will obtain labs today and contact her with results when available.      Follow-up in 2 months.                      Rasheeda Ramirez APRN  [facility]    10/11/2022          CC:

## 2022-10-11 NOTE — TELEPHONE ENCOUNTER
Caller: Urbano Phelan    Relationship: Emergency Contact    Best call back number: [phone]      What was the call regarding: MISSED CALL FROM THE OFFICE, NO MESSAGE

## 2022-10-11 NOTE — TELEPHONE ENCOUNTER
Reviewed lab results with patient.  ANC is 800.  She is midcycle of her Ibrance currently.  After consultation with Dr. Ruth Castro, I will have patient continue on Ibrance at 100 mg daily.  We will recheck a CBC prior to starting next cycle on 10/31/2022.

## 2022-10-12 ENCOUNTER — SPECIALTY PHARMACY (OUTPATIENT)
Dept: ONCOLOGY | Facility: HOSPITAL | Age: 57
End: 2022-10-12

## 2022-10-12 LAB — CANCER AG27-29 SERPL-ACNC: 14.5 U/ML (ref 0–38.6)

## 2022-11-11 RX ORDER — IBUPROFEN 800 MG/1
TABLET ORAL
Qty: 60 TABLET | Refills: 1 | Status: SHIPPED | OUTPATIENT
Start: 2022-11-11 | End: 2023-01-10

## 2022-12-03 DIAGNOSIS — F32.9 MAJOR DEPRESSIVE DISORDER, REMISSION STATUS UNSPECIFIED, UNSPECIFIED WHETHER RECURRENT: ICD-10-CM

## 2022-12-03 DIAGNOSIS — K21.9 GASTROESOPHAGEAL REFLUX DISEASE: ICD-10-CM

## 2022-12-03 RX ORDER — VENLAFAXINE HYDROCHLORIDE 150 MG/1
CAPSULE, EXTENDED RELEASE ORAL
Qty: 90 CAPSULE | Refills: 0 | Status: SHIPPED | OUTPATIENT
Start: 2022-12-03 | End: 2023-02-07 | Stop reason: SDUPTHER

## 2022-12-03 RX ORDER — OMEPRAZOLE 20 MG/1
CAPSULE, DELAYED RELEASE ORAL
Qty: 90 CAPSULE | Refills: 0 | Status: SHIPPED | OUTPATIENT
Start: 2022-12-03 | End: 2023-02-07 | Stop reason: SDUPTHER

## 2022-12-08 ENCOUNTER — TELEPHONE (OUTPATIENT)
Dept: ONCOLOGY | Facility: CLINIC | Age: 57
End: 2022-12-08

## 2022-12-08 NOTE — TELEPHONE ENCOUNTER
Caller: Alcira Phelan    Relationship: Self    Best call back number: [phone]    What is the best time to reach you: ANYTIME    Who are you requesting to speak with (clinical staff, provider,  specific staff member): SCHEDULING    What was the call regarding: PT NEEDS TO R/S HER 12/15 F/U APPT TIME - SHE PREFERS AROUND 10 AM.     PLEASE CALL TO R/S.     Do you require a callback: YES

## 2022-12-13 ENCOUNTER — HOSPITAL ENCOUNTER (OUTPATIENT)
Dept: PET IMAGING | Facility: HOSPITAL | Age: 57
Discharge: HOME | End: 2022-12-13

## 2022-12-13 ENCOUNTER — LAB (OUTPATIENT)
Dept: LAB | Facility: HOSPITAL | Age: 57
End: 2022-12-13

## 2022-12-13 DIAGNOSIS — C79.51 BONE METASTASES: ICD-10-CM

## 2022-12-13 DIAGNOSIS — Z17.0 MALIGNANT NEOPLASM OF CENTRAL PORTION OF LEFT BREAST IN FEMALE, ESTROGEN RECEPTOR POSITIVE: ICD-10-CM

## 2022-12-13 DIAGNOSIS — C50.112 MALIGNANT NEOPLASM OF CENTRAL PORTION OF LEFT BREAST IN FEMALE, ESTROGEN RECEPTOR POSITIVE: ICD-10-CM

## 2022-12-13 LAB
ALBUMIN SERPL-MCNC: 4.3 G/DL (ref 3.5–5.2)
ALBUMIN/GLOB SERPL: 1.6 G/DL
ALP SERPL-CCNC: 73 U/L (ref 39–117)
ALT SERPL W P-5'-P-CCNC: 12 U/L (ref 1–33)
ANION GAP SERPL CALCULATED.3IONS-SCNC: 11 MMOL/L (ref 5–15)
AST SERPL-CCNC: 21 U/L (ref 1–32)
BASOPHILS # BLD AUTO: 0.03 10*3/MM3 (ref 0–0.2)
BASOPHILS NFR BLD AUTO: 0.9 % (ref 0–1.5)
BILIRUB SERPL-MCNC: 0.3 MG/DL (ref 0–1.2)
BUN SERPL-MCNC: 10 MG/DL (ref 6–20)
BUN/CREAT SERPL: 15.6 (ref 7–25)
CALCIUM SPEC-SCNC: 9.1 MG/DL (ref 8.6–10.5)
CANCER AG15-3 SERPL-ACNC: 15.1 U/ML
CHLORIDE SERPL-SCNC: 105 MMOL/L (ref 98–107)
CO2 SERPL-SCNC: 26 MMOL/L (ref 22–29)
CREAT SERPL-MCNC: 0.64 MG/DL (ref 0.57–1)
DEPRECATED RDW RBC AUTO: 45.7 FL (ref 37–54)
EGFRCR SERPLBLD CKD-EPI 2021: 101.9 ML/MIN/1.73
EOSINOPHIL # BLD AUTO: 0.09 10*3/MM3 (ref 0–0.4)
EOSINOPHIL NFR BLD AUTO: 2.8 % (ref 0.3–6.2)
ERYTHROCYTE [DISTWIDTH] IN BLOOD BY AUTOMATED COUNT: 13.4 % (ref 12.3–15.4)
GLOBULIN UR ELPH-MCNC: 2.7 GM/DL
GLUCOSE BLDC GLUCOMTR-MCNC: 93 MG/DL (ref 70–130)
GLUCOSE SERPL-MCNC: 95 MG/DL (ref 65–99)
HCT VFR BLD AUTO: 33.4 % (ref 34–46.6)
HGB BLD-MCNC: 11.7 G/DL (ref 12–15.9)
IMM GRANULOCYTES # BLD AUTO: 0.01 10*3/MM3 (ref 0–0.05)
IMM GRANULOCYTES NFR BLD AUTO: 0.3 % (ref 0–0.5)
LYMPHOCYTES # BLD AUTO: 0.66 10*3/MM3 (ref 0.7–3.1)
LYMPHOCYTES NFR BLD AUTO: 20.5 % (ref 19.6–45.3)
MCH RBC QN AUTO: 33.1 PG (ref 26.6–33)
MCHC RBC AUTO-ENTMCNC: 35 G/DL (ref 31.5–35.7)
MCV RBC AUTO: 94.4 FL (ref 79–97)
MONOCYTES # BLD AUTO: 0.21 10*3/MM3 (ref 0.1–0.9)
MONOCYTES NFR BLD AUTO: 6.5 % (ref 5–12)
NEUTROPHILS NFR BLD AUTO: 2.22 10*3/MM3 (ref 1.7–7)
NEUTROPHILS NFR BLD AUTO: 69 % (ref 42.7–76)
NRBC BLD AUTO-RTO: 0 /100 WBC (ref 0–0.2)
PLATELET # BLD AUTO: 138 10*3/MM3 (ref 140–450)
PMV BLD AUTO: 9.7 FL (ref 6–12)
POTASSIUM SERPL-SCNC: 3.9 MMOL/L (ref 3.5–5.2)
PROT SERPL-MCNC: 7 G/DL (ref 6–8.5)
RBC # BLD AUTO: 3.54 10*6/MM3 (ref 3.77–5.28)
SODIUM SERPL-SCNC: 142 MMOL/L (ref 136–145)
WBC NRBC COR # BLD: 3.22 10*3/MM3 (ref 3.4–10.8)

## 2022-12-13 PROCEDURE — 80053 COMPREHEN METABOLIC PANEL: CPT

## 2022-12-13 PROCEDURE — 36415 COLL VENOUS BLD VENIPUNCTURE: CPT

## 2022-12-13 PROCEDURE — 85025 COMPLETE CBC W/AUTO DIFF WBC: CPT

## 2022-12-13 PROCEDURE — 86300 IMMUNOASSAY TUMOR CA 15-3: CPT

## 2022-12-13 PROCEDURE — 82962 GLUCOSE BLOOD TEST: CPT

## 2022-12-13 PROCEDURE — A9552 F18 FDG: HCPCS | Performed by: NURSE PRACTITIONER

## 2022-12-13 PROCEDURE — 78815 PET IMAGE W/CT SKULL-THIGH: CPT

## 2022-12-13 PROCEDURE — 0 FLUDEOXYGLUCOSE F18 SOLUTION: Performed by: NURSE PRACTITIONER

## 2022-12-13 RX ADMIN — FLUDEOXYGLUCOSE F18 1 DOSE: 300 INJECTION INTRAVENOUS at 08:51

## 2022-12-14 LAB — CANCER AG27-29 SERPL-ACNC: 16.7 U/ML (ref 0–38.6)

## 2022-12-15 ENCOUNTER — OFFICE VISIT (OUTPATIENT)
Dept: ONCOLOGY | Facility: CLINIC | Age: 57
End: 2022-12-15

## 2022-12-15 ENCOUNTER — SPECIALTY PHARMACY (OUTPATIENT)
Dept: ONCOLOGY | Facility: HOSPITAL | Age: 57
End: 2022-12-15

## 2022-12-15 VITALS
HEART RATE: 73 BPM | OXYGEN SATURATION: 97 % | TEMPERATURE: 97.5 F | WEIGHT: 132 LBS | BODY MASS INDEX: 24.92 KG/M2 | DIASTOLIC BLOOD PRESSURE: 67 MMHG | SYSTOLIC BLOOD PRESSURE: 143 MMHG | RESPIRATION RATE: 17 BRPM | HEIGHT: 61 IN

## 2022-12-15 DIAGNOSIS — Z17.0 MALIGNANT NEOPLASM OF CENTRAL PORTION OF LEFT BREAST IN FEMALE, ESTROGEN RECEPTOR POSITIVE: Primary | ICD-10-CM

## 2022-12-15 DIAGNOSIS — C50.112 MALIGNANT NEOPLASM OF CENTRAL PORTION OF LEFT BREAST IN FEMALE, ESTROGEN RECEPTOR POSITIVE: Primary | ICD-10-CM

## 2022-12-15 PROCEDURE — 99214 OFFICE O/P EST MOD 30 MIN: CPT | Performed by: INTERNAL MEDICINE

## 2022-12-15 NOTE — PROGRESS NOTES
"      PROBLEM LIST:  1. Local recurrence of HR+ carcinoma of the left breast  A) history of left breast cancer in 2007.  Bilateral mastectomy 5/30/07.  pathology showed grade 2 IDC of the left breast measuring 1.8 cm.  ER+ MI+ Her2 negative.   0/2 SLN involved.  [id number].  B) adjuvant chemotherapy with TC x 4 cycles, completed September 2007 with Dr. De La Torre.  Tamoxifen October 2007 thru October 2012.  C) presented January 2022 with left chest wall mass.  CT chest 1/17/22 showed 4.6 cm lesion involving left manubrium with soft tissue extent extending posteriorly to the anterior aspect of mediastinum.  12 mm nodule right lung base.  D) ultrasound guided biopsy of chest wall mass on 2/10/22.  Pathology showed invasive ductal carcinoma of the breast.  ER positive (100%), MI positive (50%), HER-2 2+  E) letrozole started February 2022.  CyberKnife to the chest wall mass completed 4/15/2022.  Ibrance started 4/18/2022.  Ibrance decreased to 100 mg due to neutropenia 9/5/2022  2. Depression/anxiety  3. GERD    Subjective     CHIEF COMPLAINT: Breast cancer    HISTORY OF PRESENT ILLNESS:   Alcira Phelan returns for follow-up.  She continues on letrozole and ibrance.   She has been feeling pretty well.  She may have a little bit of hair thinning.  No other concerns or complaints.        Objective      /67   Pulse 73   Temp 97.5 °F (36.4 °C) (Temporal)   Resp 17   Ht 154.9 cm (60.98\")   Wt 59.9 kg (132 lb)   SpO2 97%   BMI 24.95 kg/m²    Vitals:    12/15/22 0938   PainSc: 0-No pain               ECOG score: 0           General: well appearing female in no acute distress  Neuro: alert and oriented  HEENT: sclera anicteric, oropharynx clear  Skin: no rashes, lesions, bruising, or petechiae  Psych: mood and affect appropriate            RECENT LABS:  Lab Results   Component Value Date    WBC 3.22 (L) 12/13/2022    HGB 11.7 (L) 12/13/2022    HCT 33.4 (L) 12/13/2022    MCV 94.4 12/13/2022     (L) 12/13/2022 "       Lab Results   Component Value Date    GLUCOSE 95 12/13/2022    BUN 10 12/13/2022    CREATININE 0.64 12/13/2022    EGFRIFNONA 77 02/16/2022    BCR 15.6 12/13/2022    K 3.9 12/13/2022    CO2 26.0 12/13/2022    CALCIUM 9.1 12/13/2022    ALBUMIN 4.30 12/13/2022    AST 21 12/13/2022    ALT 12 12/13/2022         NM PET/CT Skull Base to Mid Thigh  Narrative: DATE OF EXAM: 12/13/2022 8:57 AM     PROCEDURE: NM PET/CT SKULL BASE TO MID THIGH-     INDICATIONS: breast cancer with mets evaluation of treatment;  C50.112-Malignant neoplasm of central portion of left female breast;  Z17.0-Estrogen receptor positive status (ER+); C79.51-Secondary  malignant neoplasm of bone     COMPARISON: PET-CT 7/12/2022     TECHNIQUE: 13.2 mCi of F-18 labeled FDG was administered intravenously  followed by PET imaging from the skull vertex through the mid thighs.  Low-dose non contrast CT imaging of the same body region was performed.  Fused PET-CT images and 3D MIP PET images were utilized for  interpretation. Blood glucose level at the time of injection was 93  mg/dl.     FINDINGS:  Head and neck:  Symmetric FDG uptake within the brain. No hypermetabolic neck mass or  cervical lymph node. There is physiologic uptake within the oropharynx  and muscles of phonation.     Chest:  There is physiologic uptake within the left ventricular myocardium. No  focal hypermetabolic pulmonary parenchymal process. Peribronchovascular  and subpleural reticulations in the upper lobes appear improved from  prior exam. No discrete lung nodule noting limitations of low-dose CT  technique. No hypermetabolic mediastinal, hilar, or axillary lymph  nodes.     Abdomen and pelvis:  No focal hypermetabolism within the abdomen or pelvis to suggest primary  or metastatic visceral or rodriguez disease. There is physiologic uptake  within the GI and  tracts.     Bones and body wall soft tissues:  Redemonstration of a few punctate metallic densities within the  superior  midline and left chest soft tissues, likely fiducial markers. There is  similar post treatment change of the manubrium and adjacent left chest  walls soft tissue with redemonstration of mottled and somewhat lucent  appearance of the manubrium, without discrete adjacent soft tissue  lesion. There is background FDG uptake within this region, without  hypermetabolism. Elsewhere the bony structures are unremarkable without  evidence of focal marrow uptake. The body wall soft tissues are  unremarkable. Bilateral breast prostheses are redemonstrated.     Impression: Negative PET-CT.     Improved interstitial opacities in the lungs from prior, likely evolving  postradiation change.     This report was finalized on 12/13/2022 12:14 PM by Ronald Mendez MD.         I personally reviewed the imaging studies.        Assessment & Plan   Alcira Phelan is a 57 y.o. female with a local regional recurrence of ER positive MD positive HER-2 negative invasive ductal carcinoma.      She continues on letrozole and Ibrance.  We did have to reduce the Ibrance to 100 mg daily days 1 through 21 every 28-day cycle due to neutropenia on 9/5/2022.  She is doing well and labs are adequate to continue her next cycle of treatment.  We reviewed her PET scan which shows no hypermetabolic activity in the sternum, and no other sites of disease.  We will plan to continue treatment indefinitely as long as she has no evidence of progression.    Follow-up in 2 months.  Tentatively plan to repeat imaging in 6 months.                      Ruth Castro MD  [facility]    12/15/2022          CC:

## 2023-01-10 RX ORDER — IBUPROFEN 800 MG/1
TABLET ORAL
Qty: 60 TABLET | Refills: 1 | Status: SHIPPED | OUTPATIENT
Start: 2023-01-10 | End: 2023-02-07 | Stop reason: SDUPTHER

## 2023-01-13 ENCOUNTER — TELEPHONE (OUTPATIENT)
Dept: ONCOLOGY | Facility: CLINIC | Age: 58
End: 2023-01-13

## 2023-01-13 NOTE — TELEPHONE ENCOUNTER
Caller: Tapan Alciranilson Ugalde    Relationship: Self    Best call back number: [phone]    What is the best time to reach you: ANYTIME    Who are you requesting to speak with (clinical staff, provider,  specific staff member): CLINICAL    What was the call regarding: ALCIRA IS CALLING REGARDING HER HIGH COPAY TO GET HER IBRANCE FILLED, IT WILL BE 2000.00     SHE IS UNABLE TO AFFORD IT AND WOULD LIKE EITHER A NEW MEDICATION, OR ASSISTANCE FOR THE MEDICATION    Do you require a callback: YES

## 2023-02-07 ENCOUNTER — LAB (OUTPATIENT)
Dept: LAB | Facility: HOSPITAL | Age: 58
End: 2023-02-07
Payer: COMMERCIAL

## 2023-02-07 ENCOUNTER — OFFICE VISIT (OUTPATIENT)
Dept: INTERNAL MEDICINE | Facility: CLINIC | Age: 58
End: 2023-02-07
Payer: COMMERCIAL

## 2023-02-07 VITALS
WEIGHT: 136.2 LBS | HEART RATE: 73 BPM | OXYGEN SATURATION: 96 % | HEIGHT: 61 IN | BODY MASS INDEX: 25.71 KG/M2 | DIASTOLIC BLOOD PRESSURE: 82 MMHG | SYSTOLIC BLOOD PRESSURE: 140 MMHG | TEMPERATURE: 97 F

## 2023-02-07 DIAGNOSIS — G47.00 INSOMNIA, UNSPECIFIED TYPE: ICD-10-CM

## 2023-02-07 DIAGNOSIS — Z00.00 HEALTH CARE MAINTENANCE: ICD-10-CM

## 2023-02-07 DIAGNOSIS — Z00.00 HEALTH CARE MAINTENANCE: Primary | ICD-10-CM

## 2023-02-07 DIAGNOSIS — Z23 NEED FOR VACCINATION: ICD-10-CM

## 2023-02-07 DIAGNOSIS — F32.9 MAJOR DEPRESSIVE DISORDER, REMISSION STATUS UNSPECIFIED, UNSPECIFIED WHETHER RECURRENT: ICD-10-CM

## 2023-02-07 DIAGNOSIS — K21.9 GASTROESOPHAGEAL REFLUX DISEASE: ICD-10-CM

## 2023-02-07 DIAGNOSIS — Z23 NEED FOR INFLUENZA VACCINATION: ICD-10-CM

## 2023-02-07 DIAGNOSIS — Z78.0 POST-MENOPAUSAL: ICD-10-CM

## 2023-02-07 LAB
ALBUMIN SERPL-MCNC: 4.6 G/DL (ref 3.5–5.2)
ALBUMIN/GLOB SERPL: 2.1 G/DL
ALP SERPL-CCNC: 98 U/L (ref 39–117)
ALT SERPL W P-5'-P-CCNC: 14 U/L (ref 1–33)
ANION GAP SERPL CALCULATED.3IONS-SCNC: 7 MMOL/L (ref 5–15)
AST SERPL-CCNC: 21 U/L (ref 1–32)
BASOPHILS # BLD AUTO: 0.03 10*3/MM3 (ref 0–0.2)
BASOPHILS NFR BLD AUTO: 1.2 % (ref 0–1.5)
BILIRUB SERPL-MCNC: <0.2 MG/DL (ref 0–1.2)
BUN SERPL-MCNC: 13 MG/DL (ref 6–20)
BUN/CREAT SERPL: 17.1 (ref 7–25)
CALCIUM SPEC-SCNC: 9.6 MG/DL (ref 8.6–10.5)
CHLORIDE SERPL-SCNC: 105 MMOL/L (ref 98–107)
CHOLEST SERPL-MCNC: 184 MG/DL (ref 0–200)
CO2 SERPL-SCNC: 29 MMOL/L (ref 22–29)
CREAT SERPL-MCNC: 0.76 MG/DL (ref 0.57–1)
DEPRECATED RDW RBC AUTO: 46.4 FL (ref 37–54)
EGFRCR SERPLBLD CKD-EPI 2021: 90.4 ML/MIN/1.73
EOSINOPHIL # BLD AUTO: 0.17 10*3/MM3 (ref 0–0.4)
EOSINOPHIL NFR BLD AUTO: 7 % (ref 0.3–6.2)
ERYTHROCYTE [DISTWIDTH] IN BLOOD BY AUTOMATED COUNT: 13.5 % (ref 12.3–15.4)
GLOBULIN UR ELPH-MCNC: 2.2 GM/DL
GLUCOSE SERPL-MCNC: 93 MG/DL (ref 65–99)
HCT VFR BLD AUTO: 37.4 % (ref 34–46.6)
HDLC SERPL-MCNC: 75 MG/DL (ref 40–60)
HGB BLD-MCNC: 13 G/DL (ref 12–15.9)
IMM GRANULOCYTES # BLD AUTO: 0.01 10*3/MM3 (ref 0–0.05)
IMM GRANULOCYTES NFR BLD AUTO: 0.4 % (ref 0–0.5)
LDLC SERPL CALC-MCNC: 98 MG/DL (ref 0–100)
LDLC/HDLC SERPL: 1.3 {RATIO}
LYMPHOCYTES # BLD AUTO: 0.86 10*3/MM3 (ref 0.7–3.1)
LYMPHOCYTES NFR BLD AUTO: 35.5 % (ref 19.6–45.3)
MCH RBC QN AUTO: 32.6 PG (ref 26.6–33)
MCHC RBC AUTO-ENTMCNC: 34.8 G/DL (ref 31.5–35.7)
MCV RBC AUTO: 93.7 FL (ref 79–97)
MONOCYTES # BLD AUTO: 0.23 10*3/MM3 (ref 0.1–0.9)
MONOCYTES NFR BLD AUTO: 9.5 % (ref 5–12)
NEUTROPHILS NFR BLD AUTO: 1.12 10*3/MM3 (ref 1.7–7)
NEUTROPHILS NFR BLD AUTO: 46.4 % (ref 42.7–76)
NRBC BLD AUTO-RTO: 0.4 /100 WBC (ref 0–0.2)
PLATELET # BLD AUTO: 172 10*3/MM3 (ref 140–450)
PMV BLD AUTO: 9.5 FL (ref 6–12)
POTASSIUM SERPL-SCNC: 4.3 MMOL/L (ref 3.5–5.2)
PROT SERPL-MCNC: 6.8 G/DL (ref 6–8.5)
RBC # BLD AUTO: 3.99 10*6/MM3 (ref 3.77–5.28)
SODIUM SERPL-SCNC: 141 MMOL/L (ref 136–145)
TRIGL SERPL-MCNC: 56 MG/DL (ref 0–150)
TSH SERPL DL<=0.05 MIU/L-ACNC: 3.07 UIU/ML (ref 0.27–4.2)
VLDLC SERPL-MCNC: 11 MG/DL (ref 5–40)
WBC NRBC COR # BLD: 2.42 10*3/MM3 (ref 3.4–10.8)

## 2023-02-07 PROCEDURE — 80050 GENERAL HEALTH PANEL: CPT

## 2023-02-07 PROCEDURE — 91312 COVID-19 (PFIZER) BIVALENT BOOSTER 12+YRS: CPT | Performed by: PHYSICIAN ASSISTANT

## 2023-02-07 PROCEDURE — 80061 LIPID PANEL: CPT

## 2023-02-07 PROCEDURE — 90471 IMMUNIZATION ADMIN: CPT | Performed by: PHYSICIAN ASSISTANT

## 2023-02-07 PROCEDURE — 90686 IIV4 VACC NO PRSV 0.5 ML IM: CPT | Performed by: PHYSICIAN ASSISTANT

## 2023-02-07 PROCEDURE — 99396 PREV VISIT EST AGE 40-64: CPT | Performed by: PHYSICIAN ASSISTANT

## 2023-02-07 PROCEDURE — 99213 OFFICE O/P EST LOW 20 MIN: CPT | Performed by: PHYSICIAN ASSISTANT

## 2023-02-07 PROCEDURE — 0124A PR ADM SARSCOV2 30MCG/0.3ML BST: CPT | Performed by: PHYSICIAN ASSISTANT

## 2023-02-07 RX ORDER — VENLAFAXINE HYDROCHLORIDE 150 MG/1
150 CAPSULE, EXTENDED RELEASE ORAL DAILY
Qty: 90 CAPSULE | Refills: 3 | Status: SHIPPED | OUTPATIENT
Start: 2023-02-07

## 2023-02-07 RX ORDER — TRAZODONE HYDROCHLORIDE 50 MG/1
50 TABLET ORAL NIGHTLY PRN
Qty: 30 TABLET | Refills: 2 | Status: SHIPPED | OUTPATIENT
Start: 2023-02-07 | End: 2023-02-27

## 2023-02-07 RX ORDER — OMEPRAZOLE 20 MG/1
20 CAPSULE, DELAYED RELEASE ORAL DAILY
Qty: 90 CAPSULE | Refills: 3 | Status: SHIPPED | OUTPATIENT
Start: 2023-02-07

## 2023-02-07 RX ORDER — IBUPROFEN 800 MG/1
800 TABLET ORAL 2 TIMES DAILY PRN
Qty: 60 TABLET | Refills: 1 | Status: SHIPPED | OUTPATIENT
Start: 2023-02-07

## 2023-02-07 NOTE — PROGRESS NOTES
Immunization  Immunization performed in right deltoid by Monica Werner MA. Patient tolerated the procedure well without complications.  02/07/23   Monica Werner MA

## 2023-02-07 NOTE — ASSESSMENT & PLAN NOTE
Immunizations: influenza and Covid booster done today; will return for shingrix and prevnar 20  Eye exam: done 2022  Pap Smear: done today  Mammogram: no longer needed due to double mastectomy  Dexa: ordered  Colonoscopy: done 2017 by gwen Bhatia 2027  Labs: fasting labs ordered    Counseled patient regarding multimodal approach with healthy nutrition, healthy sleep, regular physical activity, social activities, counseling, safety measures and medications.

## 2023-02-07 NOTE — PROGRESS NOTES
Chief Complaint   Patient presents with   • Annual Exam   • Anxiety   • Depression       Subjective     Alcira Phelan is a 57 y.o. female.     The patient reports she is still on chemotherapy. She is following with Dr. Castro. She has to be on chemotherapy for the rest of her life with the Ibrance. She will take letrozole, a hormone blocker, as well for the rest of her life. She had a scan in 12/2022 and everything looked good. She follows up with Dr. Castro every 3 months with a scan repeated every 6 months.    She works third shift and has difficulty sleeping after she returns home. She has been taking Aleve PM. This has been an issue before that she has encountered. She has also been taking melatonin. The melatonin is working and Aleve makes her drowsy when she wakes up which is why she is inquiring if anything could be prescribed to help.    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 She takes it once a day or as needed for when her hands starts to hurt.    She is no longer taking Flexeril. She is no longer taking lorazepam.    The patient is no longer taking Myrbetriq because it stopped working. She is taking Effexor for her mood stabilizer. She is taking omeprazole for acid reflux.     She is currently fasting and agrees to have lab work repeated today. She will receive the influenza and COVID-19 booster today.        History of Present Illness  The patient is being seen for a health maintenance evaluation.  The last health maintenance was 1 year(s) ago.     Social History  Alcira  does not smoke cigarettes.   She drinks no alcohol.  She does not use illicit drugs.     General History  Alcira  does have regular dental visits.  She does complain of vision problems. Wears glasses. Last eye exam was 2023.  Immunizations are not up to date. The patient needs the following immunizations: shingrix recommended; influenza and Covid booster today; return prevnar  "20     Lifestyle  Alcira  consumes in general, an \"unhealthy\" diet.  She exercises intermittently. On her feet at work.     Reproductive Health  Alcira  is postmenopausal.  She reports periods are nonexistent since menopause in 2008.  She is not sexually active. Her contraceptive plan is no method.     Screening  Last pap was 2020 by me. History of abnormal pap smear or family history of gyn cancer: none  Last mammogram was  2007, no longer needed due to double mastectomy. Personal or family history of abnormal mammograms or breast cancer: breast cancer in 2008; aunt with breast cancer  Last colonoscopy was 2017 by Dr. Bhatia, repeat in 10 years. Family history of colon cancer: none  Last DEXA was 2016.               Current Outpatient Medications:   •  ibuprofen (ADVIL,MOTRIN) 800 MG tablet, Take 1 tablet by mouth 2 (Two) Times a Day As Needed for Mild Pain., Disp: 60 tablet, Rfl: 1  •  letrozole (FEMARA) 2.5 MG tablet, Take 1 tablet by mouth Daily., Disp: 30 tablet, Rfl: 11  •  Multiple Vitamins-Minerals (MULTI VITAMIN/MINERALS) tablet, Take  by mouth., Disp: , Rfl:   •  omeprazole (priLOSEC) 20 MG capsule, Take 1 capsule by mouth Daily., Disp: 90 capsule, Rfl: 3  •  Palbociclib 100 MG tablet tablet, Take 1 tablet by mouth Daily. with food on days 1-21, then off for 7 days in a 28-day cycle., Disp: 21 tablet, Rfl: 11  •  venlafaxine XR (EFFEXOR-XR) 150 MG 24 hr capsule, Take 1 capsule by mouth Daily., Disp: 90 capsule, Rfl: 3  •  traZODone (DESYREL) 50 MG tablet, Take 1 tablet by mouth At Night As Needed for Sleep., Disp: 30 tablet, Rfl: 2     PMFSH  The following portions of the patient's history were reviewed and updated as appropriate: allergies, current medications, past family history, past medical history, past social history, past surgical history and problem list.    Review of Systems   Constitutional: Negative for appetite change, fever and unexpected weight change.   HENT: Negative.  Negative for ear " "pain, facial swelling and sore throat.    Eyes: Negative for pain and visual disturbance.   Respiratory: Negative for chest tightness, shortness of breath and wheezing.         No nipple discharge or breast mass   Cardiovascular: Negative for chest pain and palpitations.   Gastrointestinal: Negative for abdominal pain and blood in stool.   Endocrine: Negative.    Genitourinary: Negative for difficulty urinating, dyspareunia, dysuria, frequency, hematuria, menstrual problem, pelvic pain, vaginal discharge and vaginal pain.   Musculoskeletal: Negative for joint swelling.   Skin: Negative for color change, rash and wound.   Allergic/Immunologic: Negative.    Neurological: Negative for dizziness, tremors, seizures and syncope.   Hematological: Negative for adenopathy.   Psychiatric/Behavioral: Negative.    All other systems reviewed and are negative.      Objective   /82   Pulse 73   Temp 97 °F (36.1 °C)   Ht 154.9 cm (61\")   Wt 61.8 kg (136 lb 3.2 oz)   SpO2 96%   BMI 25.73 kg/m²     Physical Exam  Vitals and nursing note reviewed.   Constitutional:       General: She is not in acute distress.     Appearance: She is well-developed. She is not diaphoretic.   HENT:      Head: Normocephalic and atraumatic. Hair is normal.      Right Ear: Hearing, tympanic membrane, ear canal and external ear normal. No decreased hearing noted. No drainage.      Left Ear: Hearing, tympanic membrane, ear canal and external ear normal. No decreased hearing noted.      Nose: No nasal deformity.   Eyes:      General: Lids are normal. Lids are everted, no foreign bodies appreciated.         Right eye: No discharge.         Left eye: No discharge.      Conjunctiva/sclera: Conjunctivae normal.      Pupils: Pupils are equal, round, and reactive to light.   Neck:      Thyroid: No thyromegaly.      Vascular: No JVD.      Trachea: No tracheal deviation.   Cardiovascular:      Rate and Rhythm: Normal rate and regular rhythm.      Pulses: " Normal pulses.      Heart sounds: Normal heart sounds. No murmur heard.    No friction rub. No gallop.   Pulmonary:      Effort: Pulmonary effort is normal. No respiratory distress.      Breath sounds: Normal breath sounds. No wheezing or rales.   Chest:      Chest wall: No tenderness.   Breasts:     Right: No mass, nipple discharge or skin change.      Left: No mass, nipple discharge or skin change.   Abdominal:      General: Bowel sounds are normal. There is no distension.      Palpations: Abdomen is soft. There is no mass.      Tenderness: There is no abdominal tenderness. There is no guarding or rebound.      Hernia: No hernia is present.   Genitourinary:     General: Normal vulva.      Exam position: Supine.      Richar stage (genital): 5.      Labia:         Right: No rash or lesion.         Left: No rash or lesion.       Urethra: No urethral lesion.      Vagina: Normal.      Cervix: Friability present.      Uterus: Normal.       Adnexa: Right adnexa normal and left adnexa normal.        Right: No mass, tenderness or fullness.          Left: No mass, tenderness or fullness.     Musculoskeletal:         General: No tenderness or deformity. Normal range of motion.      Cervical back: Normal range of motion and neck supple.   Lymphadenopathy:      Cervical: No cervical adenopathy.      Upper Body:      Right upper body: No axillary adenopathy.      Left upper body: No axillary adenopathy.      Lower Body: No right inguinal adenopathy. No left inguinal adenopathy.   Skin:     General: Skin is warm and dry.      Findings: No erythema or rash.   Neurological:      Mental Status: She is alert and oriented to person, place, and time.      Cranial Nerves: No cranial nerve deficit.      Motor: No abnormal muscle tone.      Coordination: Coordination normal.      Deep Tendon Reflexes: Reflexes are normal and symmetric. Reflexes normal.   Psychiatric:         Behavior: Behavior normal.         Thought Content: Thought  content normal.         Judgment: Judgment normal.              ASSESSMENT/PLAN    Diagnoses and all orders for this visit:    1. Health care maintenance (Primary)  Assessment & Plan:  Immunizations: influenza and Covid booster done today; will return for shingrix and prevnar 20  Eye exam: done 2022  Pap Smear: done today  Mammogram: no longer needed due to double mastectomy  Dexa: ordered  Colonoscopy: done 2017 by Jannette, repeat 2027  Labs: fasting labs ordered    Counseled patient regarding multimodal approach with healthy nutrition, healthy sleep, regular physical activity, social activities, counseling, safety measures and medications.     Orders:  -     ibuprofen (ADVIL,MOTRIN) 800 MG tablet; Take 1 tablet by mouth 2 (Two) Times a Day As Needed for Mild Pain.  Dispense: 60 tablet; Refill: 1  -     CBC & Differential; Future  -     Comprehensive Metabolic Panel; Future  -     Lipid Panel; Future  -     TSH; Future  -     LIQUID-BASED PAP SMEAR, [facility] ([facility]); Future  -     LIQUID-BASED PAP SMEAR, [facility] ([facility])    2. Insomnia, unspecified type  Comments:  Trial of trazodone to help with sleep. F/u in 4 weeks.  Orders:  -     traZODone (DESYREL) 50 MG tablet; Take 1 tablet by mouth At Night As Needed for Sleep.  Dispense: 30 tablet; Refill: 2    3. Gastroesophageal reflux disease  -     omeprazole (priLOSEC) 20 MG capsule; Take 1 capsule by mouth Daily.  Dispense: 90 capsule; Refill: 3    4. Major depressive disorder, remission status unspecified, unspecified whether recurrent  -     venlafaxine XR (EFFEXOR-XR) 150 MG 24 hr capsule; Take 1 capsule by mouth Daily.  Dispense: 90 capsule; Refill: 3    5. Post-menopausal  -     DEXA Bone Density Axial; Future    6. Need for vaccination  -     COVID-19 Bivalent Booster (Pfizer) 12+yrs    7. Need for influenza vaccination  -     FluLaval/Fluarix/Fluzone >6 Months           Return in about 4 weeks (around 3/7/2023) for Follow up, Annual with  fasting labs in 1 year.         Transcribed from ambient dictation for KIMI Carlisle by Eden Parada.  02/07/23   14:31 EST    Patient or patient representative verbalized consent to the visit recording.  I have personally performed the services described in this document as transcribed by the above individual, and it is both accurate and complete.

## 2023-02-09 ENCOUNTER — TELEPHONE (OUTPATIENT)
Dept: INTERNAL MEDICINE | Facility: CLINIC | Age: 58
End: 2023-02-09
Payer: COMMERCIAL

## 2023-02-09 NOTE — TELEPHONE ENCOUNTER
Caller: Tapan Acliranilson Ugalde    Relationship: Self    Best call back number: [phone]    What form or medical record are you requesting: DR WATTS     Who is requesting this form or medical record from you: PATIENT    How would you like to receive the form or medical records (pick-up, mail, fax):   If pick-up, provide patient with address and location details    Timeframe paperwork needed: AS SOON AS POSSIBLE    Additional notes: PATIENT STATES THAT SINCE RECEIVING A COVID BOOSTER AND FLU SHOT ON 2/7/23 SHE HAS BEEN VERY NAUSEATED AND EXPERIENCING BODY ACHES. [facility] REQUESTING A DR NOTE EXCUSING HER FROM WORK FOR TODAY 2/9/23 WITH A RETURN TO WORK ON Sunday 2/12/23    PLEASE ADVISE PATIENT IF THERE ARE ANY QUESTIONS/CONCERNS OR WHEN THIS DOCUMENT IS READY FOR .

## 2023-02-13 ENCOUNTER — TELEPHONE (OUTPATIENT)
Dept: INTERNAL MEDICINE | Facility: CLINIC | Age: 58
End: 2023-02-13
Payer: COMMERCIAL

## 2023-02-13 LAB — REF LAB TEST METHOD: NORMAL

## 2023-02-13 NOTE — PROGRESS NOTES
Please let her know that the swab from her pap smear did not have enough cells to evaluate properly. She was not charged for the test and we will need to repeat it whenever she has a chance to come back in.

## 2023-02-13 NOTE — TELEPHONE ENCOUNTER
Lvm for patient that her sxs may not be from the vaccine and Susana would like to see her back in the office, please call and schedule

## 2023-02-13 NOTE — TELEPHONE ENCOUNTER
Caller: Alcira Phelan    Relationship: Self    Best call back number: [phone]    What was the call regarding: PATIENT GOT THE FLU AND BOOSTER ALMOST A WEEK AGO AND HAD TO TAKE VACATION TIME TO DEAL WITH THE SYMPTOM THEY CAUSED HER. PATIENT WOULD A CALL BACK TO DISCUSS NEXT STEPS.     Do you require a callback: YES

## 2023-02-15 DIAGNOSIS — D72.819 LEUKOPENIA, UNSPECIFIED TYPE: Primary | ICD-10-CM

## 2023-02-16 ENCOUNTER — TELEPHONE (OUTPATIENT)
Dept: INTERNAL MEDICINE | Facility: CLINIC | Age: 58
End: 2023-02-16
Payer: COMMERCIAL

## 2023-02-16 NOTE — TELEPHONE ENCOUNTER
----- Message from KIMI Coker sent at 2/15/2023 10:23 PM EST -----  Please check on her and let her know that her labs show that her white count is low. This may be related to her oncology medication and I would like her to stop by to recheck this level. How is she feeling with her recent vaccine response or illness?    The rest of her labs look fine.

## 2023-02-16 NOTE — PROGRESS NOTES
Please check on her and let her know that her labs show that her white count is low. This may be related to her oncology medication and I would like her to stop by to recheck this level. How is she feeling with her recent vaccine response or illness?    The rest of her labs look fine.

## 2023-02-21 ENCOUNTER — OFFICE VISIT (OUTPATIENT)
Dept: ONCOLOGY | Facility: CLINIC | Age: 58
End: 2023-02-21
Payer: COMMERCIAL

## 2023-02-21 ENCOUNTER — SPECIALTY PHARMACY (OUTPATIENT)
Dept: ONCOLOGY | Facility: HOSPITAL | Age: 58
End: 2023-02-21
Payer: COMMERCIAL

## 2023-02-21 VITALS
HEIGHT: 61 IN | WEIGHT: 135 LBS | OXYGEN SATURATION: 98 % | SYSTOLIC BLOOD PRESSURE: 144 MMHG | BODY MASS INDEX: 25.49 KG/M2 | RESPIRATION RATE: 16 BRPM | HEART RATE: 80 BPM | DIASTOLIC BLOOD PRESSURE: 74 MMHG | TEMPERATURE: 97.5 F

## 2023-02-21 DIAGNOSIS — C50.112 MALIGNANT NEOPLASM OF CENTRAL PORTION OF LEFT BREAST IN FEMALE, ESTROGEN RECEPTOR POSITIVE: Primary | ICD-10-CM

## 2023-02-21 DIAGNOSIS — C79.51 BONE METASTASES: ICD-10-CM

## 2023-02-21 DIAGNOSIS — Z17.0 MALIGNANT NEOPLASM OF CENTRAL PORTION OF LEFT BREAST IN FEMALE, ESTROGEN RECEPTOR POSITIVE: Primary | ICD-10-CM

## 2023-02-21 PROCEDURE — 99214 OFFICE O/P EST MOD 30 MIN: CPT | Performed by: NURSE PRACTITIONER

## 2023-02-21 NOTE — PROGRESS NOTES
"      PROBLEM LIST:  1. Local recurrence of HR+ carcinoma of the left breast  A) history of left breast cancer in 2007.  Bilateral mastectomy 5/30/07.  pathology showed grade 2 IDC of the left breast measuring 1.8 cm.  ER+ NE+ Her2 negative.   0/2 SLN involved.  [id number].  B) adjuvant chemotherapy with TC x 4 cycles, completed September 2007 with Dr. De La Torre.  Tamoxifen October 2007 thru October 2012.  C) presented January 2022 with left chest wall mass.  CT chest 1/17/22 showed 4.6 cm lesion involving left manubrium with soft tissue extent extending posteriorly to the anterior aspect of mediastinum.  12 mm nodule right lung base.  D) ultrasound guided biopsy of chest wall mass on 2/10/22.  Pathology showed invasive ductal carcinoma of the breast.  ER positive (100%), NE positive (50%), HER-2 2+  E) letrozole started February 2022.  CyberKnife to the chest wall mass completed 4/15/2022.  Ibrance started 4/18/2022.  Ibrance decreased to 100 mg due to neutropenia 9/5/2022  2. Depression/anxiety  3. GERD    Subjective     CHIEF COMPLAINT: Breast cancer    HISTORY OF PRESENT ILLNESS:   Alcira Phelan returns for follow-up.  She continues on letrozole and ibrance.  She continues to have hair thinning.  Last week she had her influenza and COVID booster.  She said she was sick for approximately 5 days with diarrhea, chills, sweating, headaches and weakness.    She has been having headache daily for quite some time.  She does use ibuprofen for the headaches.  She denies any diplopia or dizziness.          Objective      /74 Comment: RUE  Pulse 80   Temp 97.5 °F (36.4 °C) (Infrared)   Resp 16   Ht 154.9 cm (61\")   Wt 61.2 kg (135 lb)   SpO2 98% Comment: RA  BMI 25.51 kg/m²    Vitals:    02/21/23 0944   PainSc: 0-No pain               ECOG score: 0           General: well appearing female in no acute distress  Neuro: alert and oriented  HEENT: sclera anicteric, oropharynx clear  Skin: no rashes, lesions, " bruising, or petechiae  Psych: mood and affect appropriate            RECENT LABS:  Lab Results   Component Value Date    WBC 2.42 (L) 02/07/2023    HGB 13.0 02/07/2023    HCT 37.4 02/07/2023    MCV 93.7 02/07/2023     02/07/2023       Lab Results   Component Value Date    GLUCOSE 93 02/07/2023    BUN 13 02/07/2023    CREATININE 0.76 02/07/2023    EGFRIFNONA 77 02/16/2022    BCR 17.1 02/07/2023    K 4.3 02/07/2023    CO2 29.0 02/07/2023    CALCIUM 9.6 02/07/2023    ALBUMIN 4.6 02/07/2023    AST 21 02/07/2023    ALT 14 02/07/2023         NM PET/CT Skull Base to Mid Thigh  Narrative: DATE OF EXAM: 12/13/2022 8:57 AM     PROCEDURE: NM PET/CT SKULL BASE TO MID THIGH-     INDICATIONS: breast cancer with mets evaluation of treatment;  C50.112-Malignant neoplasm of central portion of left female breast;  Z17.0-Estrogen receptor positive status (ER+); C79.51-Secondary  malignant neoplasm of bone     COMPARISON: PET-CT 7/12/2022     TECHNIQUE: 13.2 mCi of F-18 labeled FDG was administered intravenously  followed by PET imaging from the skull vertex through the mid thighs.  Low-dose non contrast CT imaging of the same body region was performed.  Fused PET-CT images and 3D MIP PET images were utilized for  interpretation. Blood glucose level at the time of injection was 93  mg/dl.     FINDINGS:  Head and neck:  Symmetric FDG uptake within the brain. No hypermetabolic neck mass or  cervical lymph node. There is physiologic uptake within the oropharynx  and muscles of phonation.     Chest:  There is physiologic uptake within the left ventricular myocardium. No  focal hypermetabolic pulmonary parenchymal process. Peribronchovascular  and subpleural reticulations in the upper lobes appear improved from  prior exam. No discrete lung nodule noting limitations of low-dose CT  technique. No hypermetabolic mediastinal, hilar, or axillary lymph  nodes.     Abdomen and pelvis:  No focal hypermetabolism within the abdomen or pelvis  to suggest primary  or metastatic visceral or rodriguez disease. There is physiologic uptake  within the GI and  tracts.     Bones and body wall soft tissues:  Redemonstration of a few punctate metallic densities within the superior  midline and left chest soft tissues, likely fiducial markers. There is  similar post treatment change of the manubrium and adjacent left chest  walls soft tissue with redemonstration of mottled and somewhat lucent  appearance of the manubrium, without discrete adjacent soft tissue  lesion. There is background FDG uptake within this region, without  hypermetabolism. Elsewhere the bony structures are unremarkable without  evidence of focal marrow uptake. The body wall soft tissues are  unremarkable. Bilateral breast prostheses are redemonstrated.     Impression: Negative PET-CT.     Improved interstitial opacities in the lungs from prior, likely evolving  postradiation change.     This report was finalized on 12/13/2022 12:14 PM by Ronald Mendez MD.                 Assessment & Plan   Alcira Phelan is a 57 y.o. female with a local regional recurrence of ER positive PA positive HER-2 negative invasive ductal carcinoma.      She continues on letrozole and Ibrance.  We did have to reduce the Ibrance to 100 mg daily days 1 through 21 every 28-day cycle due to neutropenia on 9/5/2022.  She is doing well and labs from 2/7/2023 are adequate to continue her next cycle of treatment.  We will plan to continue treatment indefinitely as long as she has no evidence of progression.    Headaches: If her headaches continue to be persistent and increase in intensity or frequency I would like to obtain an MRI of the brain with and without contrast.  She is going to call and let us know changes in her headaches.  Currently she does not want an MRI of the brain at this time.    Follow-up in 2 months.  Tentatively plan to repeat imaging in June 2023.                      Rasheeda Ramirez APRN [facility]  [facility] Hematology and Oncology    2/21/2023          CC:

## 2023-02-24 DIAGNOSIS — C50.112 MALIGNANT NEOPLASM OF CENTRAL PORTION OF LEFT FEMALE BREAST, UNSPECIFIED ESTROGEN RECEPTOR STATUS: ICD-10-CM

## 2023-02-24 RX ORDER — LETROZOLE 2.5 MG/1
TABLET, FILM COATED ORAL
Qty: 30 TABLET | Refills: 11 | Status: SHIPPED | OUTPATIENT
Start: 2023-02-24

## 2023-02-27 DIAGNOSIS — G47.00 INSOMNIA, UNSPECIFIED TYPE: ICD-10-CM

## 2023-02-27 RX ORDER — TRAZODONE HYDROCHLORIDE 50 MG/1
TABLET ORAL
Qty: 90 TABLET | Refills: 0 | Status: SHIPPED | OUTPATIENT
Start: 2023-02-27

## 2023-03-14 ENCOUNTER — OFFICE VISIT (OUTPATIENT)
Dept: INTERNAL MEDICINE | Facility: CLINIC | Age: 58
End: 2023-03-14
Payer: COMMERCIAL

## 2023-03-14 VITALS
HEART RATE: 71 BPM | SYSTOLIC BLOOD PRESSURE: 150 MMHG | BODY MASS INDEX: 26 KG/M2 | OXYGEN SATURATION: 98 % | DIASTOLIC BLOOD PRESSURE: 80 MMHG | WEIGHT: 137.6 LBS

## 2023-03-14 DIAGNOSIS — G47.09 OTHER INSOMNIA: ICD-10-CM

## 2023-03-14 DIAGNOSIS — Z12.4 PAP SMEAR FOR CERVICAL CANCER SCREENING: Primary | ICD-10-CM

## 2023-03-14 PROCEDURE — 99213 OFFICE O/P EST LOW 20 MIN: CPT | Performed by: PHYSICIAN ASSISTANT

## 2023-03-14 NOTE — PROGRESS NOTES
"Chief Complaint   Patient presents with   • Insomnia     Follow Up   • Gynecologic Exam       Subjective     Alcira Phelan is a 57 y.o. female.        History of Present Illness     The patient presents today to follow up.    She threw her sleep medicine away after she got it prescribed. She did not try one dose. She would like to try it, but it is not due again until 03/26/2023. The doctor put a note in to see if they could fill it early. She is still having the same sleeping issues since she has not been taking anything. She has been taking Aleve.    She had blood work done because she had to go to the oncologist afterwards. No updates were made. Her white count was low. She was sick for 1.5 weeks after she had the 2 COVID-19 vaccines administered. She was \"terribly\" sick with the Ibrance.    She has not had the shingles vaccine. She does not want to get sick again because she is going on vacation.      Current Outpatient Medications:   •  ibuprofen (ADVIL,MOTRIN) 800 MG tablet, Take 1 tablet by mouth 2 (Two) Times a Day As Needed for Mild Pain., Disp: 60 tablet, Rfl: 1  •  letrozole (FEMARA) 2.5 MG tablet, TAKE ONE TABLET BY MOUTH DAILY, Disp: 30 tablet, Rfl: 11  •  Multiple Vitamins-Minerals (MULTI VITAMIN/MINERALS) tablet, Take  by mouth., Disp: , Rfl:   •  omeprazole (priLOSEC) 20 MG capsule, Take 1 capsule by mouth Daily., Disp: 90 capsule, Rfl: 3  •  Palbociclib 100 MG tablet tablet, Take 1 tablet by mouth Daily. with food on days 1-21, then off for 7 days in a 28-day cycle., Disp: 21 tablet, Rfl: 11  •  venlafaxine XR (EFFEXOR-XR) 150 MG 24 hr capsule, Take 1 capsule by mouth Daily., Disp: 90 capsule, Rfl: 3  •  traZODone (DESYREL) 50 MG tablet, TAKE ONE TABLET BY MOUTH ONCE NIGHTLY AS NEEDED FOR SLEEP (Patient not taking: Reported on 3/14/2023), Disp: 90 tablet, Rfl: 0     PMFSH  The following portions of the patient's history were reviewed and updated as appropriate: allergies, current medications, " past family history, past medical history, past social history, past surgical history and problem list.    Review of Systems   Constitutional: Negative for activity change, appetite change, fatigue and unexpected weight change.   HENT: Negative.  Negative for congestion and rhinorrhea.    Eyes: Negative for pain and visual disturbance.   Respiratory: Negative for chest tightness and shortness of breath.         No nipple discharge or breast mass   Cardiovascular: Negative for chest pain and palpitations.   Gastrointestinal: Negative for abdominal pain and blood in stool.   Endocrine: Negative.    Genitourinary: Negative for dyspareunia, dysuria, frequency, hematuria, menstrual problem, pelvic pain, vaginal discharge and vaginal pain.   Musculoskeletal: Negative for arthralgias, joint swelling and myalgias.   Skin: Negative for color change, rash and wound.   Allergic/Immunologic: Negative.    Neurological: Negative for dizziness, syncope, weakness, light-headedness and headaches.   Hematological: Negative for adenopathy.   Psychiatric/Behavioral: Negative.  Negative for dysphoric mood. The patient is not nervous/anxious.    All other systems reviewed and are negative.      Objective   /80   Pulse 71   Wt 62.4 kg (137 lb 9.6 oz)   SpO2 98%   BMI 26.00 kg/m²     Physical Exam  Vitals and nursing note reviewed.   Constitutional:       Appearance: She is well-developed.   HENT:      Head: Normocephalic and atraumatic.      Right Ear: External ear normal.      Left Ear: External ear normal.   Eyes:      Conjunctiva/sclera: Conjunctivae normal.      Pupils: Pupils are equal, round, and reactive to light.   Neck:      Thyroid: No thyromegaly.   Cardiovascular:      Rate and Rhythm: Normal rate and regular rhythm.      Heart sounds: Normal heart sounds. No murmur heard.    No gallop.   Pulmonary:      Effort: Pulmonary effort is normal.      Breath sounds: Normal breath sounds. No wheezing or rales.   Chest:       Chest wall: No tenderness.   Breasts:     Right: No mass, nipple discharge or skin change.      Left: No mass, nipple discharge or skin change.   Abdominal:      Palpations: Abdomen is soft.      Tenderness: There is no abdominal tenderness.   Genitourinary:     General: Normal vulva.      Exam position: Supine.      Richar stage (genital): 5.      Labia:         Right: No rash or lesion.         Left: No rash or lesion.       Urethra: No urethral lesion.      Vagina: Normal.      Cervix: Normal.      Uterus: Normal.       Adnexa: Right adnexa normal and left adnexa normal.        Right: No mass, tenderness or fullness.          Left: No mass, tenderness or fullness.     Musculoskeletal:      Cervical back: Normal range of motion and neck supple.   Lymphadenopathy:      Upper Body:      Right upper body: No axillary adenopathy.      Left upper body: No axillary adenopathy.      Lower Body: No right inguinal adenopathy. No left inguinal adenopathy.   Skin:     General: Skin is warm.      Findings: No rash.   Neurological:      Mental Status: She is alert and oriented to person, place, and time.   Psychiatric:         Behavior: Behavior normal.         Thought Content: Thought content normal.         Judgment: Judgment normal.              ASSESSMENT/PLAN    Diagnoses and all orders for this visit:    1. Pap smear for cervical cancer screening (Primary)  Comments:  - Pap smear ordered.  Orders:  -     LIQUID-BASED PAP SMEAR WITH HPV GENOTYPING REGARDLESS OF INTERPRETATION ([facility]); Future  -     LIQUID-BASED PAP SMEAR WITH HPV GENOTYPING REGARDLESS OF INTERPRETATION ([facility])    2. Other insomnia  Comments:  Pt will try trazodone that was prescribed at last appointment. Start at 25 mg and increase to 50 mg if needed.           Return in about 3 months (around 6/14/2023) for Follow up, Annual with fasting labs in 11 months.     Transcribed from ambient dictation for KIMI Carlisle by Jeannine  Gage.  03/14/23   11:15 EDT    Patient or patient representative verbalized consent to the visit recording.  I have personally performed the services described in this document as transcribed by the above individual, and it is both accurate and complete.

## 2023-03-15 LAB — REF LAB TEST METHOD: NORMAL

## 2023-03-20 ENCOUNTER — TELEPHONE (OUTPATIENT)
Dept: INTERNAL MEDICINE | Facility: CLINIC | Age: 58
End: 2023-03-20
Payer: COMMERCIAL

## 2023-03-20 NOTE — TELEPHONE ENCOUNTER
----- Message from KIMI Coker sent at 3/20/2023 10:31 AM EDT -----  Please mail lab results to patient- it does not look like the My Chart result note was viewed.

## 2023-04-17 ENCOUNTER — LAB (OUTPATIENT)
Dept: LAB | Facility: HOSPITAL | Age: 58
End: 2023-04-17
Payer: COMMERCIAL

## 2023-04-17 DIAGNOSIS — C79.51 MALIGNANT NEOPLASM METASTATIC TO BONE: ICD-10-CM

## 2023-04-17 DIAGNOSIS — Z17.0 MALIGNANT NEOPLASM OF CENTRAL PORTION OF LEFT BREAST IN FEMALE, ESTROGEN RECEPTOR POSITIVE: ICD-10-CM

## 2023-04-17 DIAGNOSIS — C50.112 MALIGNANT NEOPLASM OF CENTRAL PORTION OF LEFT BREAST IN FEMALE, ESTROGEN RECEPTOR POSITIVE: ICD-10-CM

## 2023-04-17 LAB
ALBUMIN SERPL-MCNC: 4.4 G/DL (ref 3.5–5.2)
ALBUMIN/GLOB SERPL: 1.4 G/DL
ALP SERPL-CCNC: 99 U/L (ref 39–117)
ALT SERPL W P-5'-P-CCNC: 19 U/L (ref 1–33)
ANION GAP SERPL CALCULATED.3IONS-SCNC: 11 MMOL/L (ref 5–15)
AST SERPL-CCNC: 26 U/L (ref 1–32)
BASOPHILS # BLD AUTO: 0.04 10*3/MM3 (ref 0–0.2)
BASOPHILS NFR BLD AUTO: 1.5 % (ref 0–1.5)
BILIRUB SERPL-MCNC: 0.2 MG/DL (ref 0–1.2)
BUN SERPL-MCNC: 18 MG/DL (ref 8–23)
BUN/CREAT SERPL: 18.9 (ref 7–25)
CALCIUM SPEC-SCNC: 9.6 MG/DL (ref 8.6–10.5)
CANCER AG15-3 SERPL-ACNC: 16.9 U/ML
CHLORIDE SERPL-SCNC: 106 MMOL/L (ref 98–107)
CO2 SERPL-SCNC: 26 MMOL/L (ref 22–29)
CREAT SERPL-MCNC: 0.95 MG/DL (ref 0.57–1)
DEPRECATED RDW RBC AUTO: 44.6 FL (ref 37–54)
EGFRCR SERPLBLD CKD-EPI 2021: 68.7 ML/MIN/1.73
EOSINOPHIL # BLD AUTO: 0.14 10*3/MM3 (ref 0–0.4)
EOSINOPHIL NFR BLD AUTO: 5.2 % (ref 0.3–6.2)
ERYTHROCYTE [DISTWIDTH] IN BLOOD BY AUTOMATED COUNT: 13.1 % (ref 12.3–15.4)
ERYTHROCYTE [SEDIMENTATION RATE] IN BLOOD: 18 MM/HR (ref 0–30)
GLOBULIN UR ELPH-MCNC: 3.1 GM/DL
GLUCOSE SERPL-MCNC: 87 MG/DL (ref 65–99)
HCT VFR BLD AUTO: 33.8 % (ref 34–46.6)
HGB BLD-MCNC: 12 G/DL (ref 12–15.9)
IMM GRANULOCYTES # BLD AUTO: 0 10*3/MM3 (ref 0–0.05)
IMM GRANULOCYTES NFR BLD AUTO: 0 % (ref 0–0.5)
LYMPHOCYTES # BLD AUTO: 0.94 10*3/MM3 (ref 0.7–3.1)
LYMPHOCYTES NFR BLD AUTO: 34.9 % (ref 19.6–45.3)
MCH RBC QN AUTO: 33.3 PG (ref 26.6–33)
MCHC RBC AUTO-ENTMCNC: 35.5 G/DL (ref 31.5–35.7)
MCV RBC AUTO: 93.9 FL (ref 79–97)
MONOCYTES # BLD AUTO: 0.33 10*3/MM3 (ref 0.1–0.9)
MONOCYTES NFR BLD AUTO: 12.3 % (ref 5–12)
NEUTROPHILS NFR BLD AUTO: 1.24 10*3/MM3 (ref 1.7–7)
NEUTROPHILS NFR BLD AUTO: 46.1 % (ref 42.7–76)
PLATELET # BLD AUTO: 185 10*3/MM3 (ref 140–450)
PMV BLD AUTO: 9.1 FL (ref 6–12)
POTASSIUM SERPL-SCNC: 3.9 MMOL/L (ref 3.5–5.2)
PROT SERPL-MCNC: 7.5 G/DL (ref 6–8.5)
RBC # BLD AUTO: 3.6 10*6/MM3 (ref 3.77–5.28)
SODIUM SERPL-SCNC: 143 MMOL/L (ref 136–145)
WBC NRBC COR # BLD: 2.69 10*3/MM3 (ref 3.4–10.8)

## 2023-04-17 PROCEDURE — 36415 COLL VENOUS BLD VENIPUNCTURE: CPT

## 2023-04-17 PROCEDURE — 85652 RBC SED RATE AUTOMATED: CPT

## 2023-04-17 PROCEDURE — 86300 IMMUNOASSAY TUMOR CA 15-3: CPT

## 2023-04-17 PROCEDURE — 85025 COMPLETE CBC W/AUTO DIFF WBC: CPT

## 2023-04-17 PROCEDURE — 80053 COMPREHEN METABOLIC PANEL: CPT

## 2023-04-18 ENCOUNTER — SPECIALTY PHARMACY (OUTPATIENT)
Dept: ONCOLOGY | Facility: HOSPITAL | Age: 58
End: 2023-04-18
Payer: COMMERCIAL

## 2023-04-18 ENCOUNTER — OFFICE VISIT (OUTPATIENT)
Dept: ONCOLOGY | Facility: CLINIC | Age: 58
End: 2023-04-18
Payer: COMMERCIAL

## 2023-04-18 VITALS
BODY MASS INDEX: 26.43 KG/M2 | HEART RATE: 67 BPM | SYSTOLIC BLOOD PRESSURE: 139 MMHG | HEIGHT: 61 IN | OXYGEN SATURATION: 98 % | WEIGHT: 140 LBS | RESPIRATION RATE: 16 BRPM | TEMPERATURE: 98 F | DIASTOLIC BLOOD PRESSURE: 72 MMHG

## 2023-04-18 DIAGNOSIS — C79.51 MALIGNANT NEOPLASM METASTATIC TO BONE: ICD-10-CM

## 2023-04-18 DIAGNOSIS — C50.112 MALIGNANT NEOPLASM OF CENTRAL PORTION OF LEFT BREAST IN FEMALE, ESTROGEN RECEPTOR POSITIVE: Primary | ICD-10-CM

## 2023-04-18 DIAGNOSIS — Z17.0 MALIGNANT NEOPLASM OF CENTRAL PORTION OF LEFT BREAST IN FEMALE, ESTROGEN RECEPTOR POSITIVE: Primary | ICD-10-CM

## 2023-04-18 LAB — CANCER AG27-29 SERPL-ACNC: 19 U/ML (ref 0–38.6)

## 2023-04-18 PROCEDURE — 99214 OFFICE O/P EST MOD 30 MIN: CPT | Performed by: NURSE PRACTITIONER

## 2023-04-18 NOTE — PROGRESS NOTES
"      PROBLEM LIST:  1. Local recurrence of HR+ carcinoma of the left breast  A) history of left breast cancer in 2007.  Bilateral mastectomy 5/30/07.  pathology showed grade 2 IDC of the left breast measuring 1.8 cm.  ER+ [place]+ Her2 negative.   0/2 SLN involved.  [id number].  B) adjuvant chemotherapy with TC x 4 cycles, completed September 2007 with Dr. De La Torre.  Tamoxifen October 2007 thru October 2012.  C) presented January 2022 with left chest wall mass.  CT chest 1/17/22 showed 4.6 cm lesion involving left manubrium with soft tissue extent extending posteriorly to the anterior aspect of mediastinum.  12 mm nodule right lung base.  D) ultrasound guided biopsy of chest wall mass on 2/10/22.  Pathology showed invasive ductal carcinoma of the breast.  ER positive (100%), [place] positive (50%), HER-2 2+  E) letrozole started February 2022.  CyberKnife to the chest wall mass completed 4/15/2022.  Ibrance started 4/18/2022.  Ibrance decreased to 100 mg due to neutropenia 9/5/2022  2. Depression/anxiety  3. GERD    Subjective     CHIEF COMPLAINT: Breast cancer    HISTORY OF PRESENT ILLNESS:   Alcira Phelan returns for follow-up.  She continues on letrozole and ibrance.  She is tolerating the medication well.  She denies any side effects from the medications.  She denies any pain.  She has some mild fatigue but continues to work full-time.    She has chronic headaches.  They have not increased in intensity or frequency.  Occasionally she will take ibuprofen for the headaches.          Objective      /72 Comment: RUE  Pulse 67   Temp 98 °F (36.7 °C) (Temporal)   Resp 16   Ht 154.9 cm (61\")   Wt 63.5 kg (140 lb)   SpO2 98% Comment: RA  BMI 26.45 kg/m²    Vitals:    04/18/23 0902   PainSc: 0-No pain               ECOG score: 0           General: well appearing female in no acute distress  Neuro: alert and oriented  HEENT: sclera anicteric, oropharynx clear  Skin: no rashes, lesions, bruising, or petechiae  Psych: " mood and affect appropriate            RECENT LABS:  Lab Results   Component Value Date    WBC 2.69 (L) 04/17/2023    HGB 12.0 04/17/2023    HCT 33.8 (L) 04/17/2023    MCV 93.9 04/17/2023     04/17/2023       Lab Results   Component Value Date    GLUCOSE 87 04/17/2023    BUN 18 04/17/2023    CREATININE 0.95 04/17/2023    EGFRIFNONA 77 02/16/2022    BCR 18.9 04/17/2023    K 3.9 04/17/2023    CO2 26.0 04/17/2023    CALCIUM 9.6 04/17/2023    ALBUMIN 4.4 04/17/2023    AST 26 04/17/2023    ALT 19 04/17/2023         NM PET/CT Skull Base to Mid Thigh  Narrative: DATE OF EXAM: 12/13/2022 8:57 AM     PROCEDURE: NM PET/CT SKULL BASE TO MID THIGH-     INDICATIONS: breast cancer with mets evaluation of treatment;  C50.112-Malignant neoplasm of central portion of left female breast;  Z17.0-Estrogen receptor positive status (ER+); C79.51-Secondary  malignant neoplasm of bone     COMPARISON: PET-CT 7/12/2022     TECHNIQUE: 13.2 mCi of F-18 labeled FDG was administered intravenously  followed by PET imaging from the skull vertex through the mid thighs.  Low-dose non contrast CT imaging of the same body region was performed.  Fused PET-CT images and 3D MIP PET images were utilized for  interpretation. Blood glucose level at the time of injection was 93  mg/dl.     FINDINGS:  Head and neck:  Symmetric FDG uptake within the brain. No hypermetabolic neck mass or  cervical lymph node. There is physiologic uptake within the oropharynx  and muscles of phonation.     Chest:  There is physiologic uptake within the left ventricular myocardium. No  focal hypermetabolic pulmonary parenchymal process. Peribronchovascular  and subpleural reticulations in the upper lobes appear improved from  prior exam. No discrete lung nodule noting limitations of low-dose CT  technique. No hypermetabolic mediastinal, hilar, or axillary lymph  nodes.     Abdomen and pelvis:  No focal hypermetabolism within the abdomen or pelvis to suggest primary  or  metastatic visceral or rodriguez disease. There is physiologic uptake  within the GI and  tracts.     Bones and body wall soft tissues:  Redemonstration of a few punctate metallic densities within the superior  midline and left chest soft tissues, likely fiducial markers. There is  similar post treatment change of the manubrium and adjacent left chest  walls soft tissue with redemonstration of mottled and somewhat lucent  appearance of the manubrium, without discrete adjacent soft tissue  lesion. There is background FDG uptake within this region, without  hypermetabolism. Elsewhere the bony structures are unremarkable without  evidence of focal marrow uptake. The body wall soft tissues are  unremarkable. Bilateral breast prostheses are redemonstrated.     Impression: Negative PET-CT.     Improved interstitial opacities in the lungs from prior, likely evolving  postradiation change.     This report was finalized on 12/13/2022 12:14 PM by Ronald Mendez MD.                 Assessment & Plan   Alcira Phelan is a 58 y.o. female with a local regional recurrence of ER positive AL positive HER-2 negative invasive ductal carcinoma.      She continues on letrozole and Ibrance.  We did have to reduce the Ibrance to 100 mg daily days 1 through 21 every 28-day cycle due to neutropenia on 9/5/2022.  Clinically she is doing well.  Her labs from 4/17/2023 were reviewed and are stable.  Her ANC was 1.24.  We will continue with her next cycle of treatment.  We will plan to continue treatment indefinitely as long as she has no evidence of progression.    Follow-up in 2 months with PET scan and labs prior to return.            Rasheeda Ramirez APRN  [facility] Hematology and Oncology    4/18/2023          CC:

## 2023-06-02 ENCOUNTER — HOSPITAL ENCOUNTER (OUTPATIENT)
Dept: GENERAL RADIOLOGY | Facility: HOSPITAL | Age: 58
Discharge: HOME | End: 2023-06-02

## 2023-06-02 ENCOUNTER — OFFICE VISIT (OUTPATIENT)
Dept: INTERNAL MEDICINE | Facility: CLINIC | Age: 58
End: 2023-06-02

## 2023-06-02 VITALS
DIASTOLIC BLOOD PRESSURE: 82 MMHG | WEIGHT: 139.4 LBS | HEART RATE: 81 BPM | BODY MASS INDEX: 26.34 KG/M2 | OXYGEN SATURATION: 98 % | SYSTOLIC BLOOD PRESSURE: 140 MMHG

## 2023-06-02 DIAGNOSIS — M25.441 FINGER JOINT SWELLING, RIGHT: ICD-10-CM

## 2023-06-02 DIAGNOSIS — M25.441 FINGER JOINT SWELLING, RIGHT: Primary | ICD-10-CM

## 2023-06-02 PROCEDURE — 99213 OFFICE O/P EST LOW 20 MIN: CPT | Performed by: PHYSICIAN ASSISTANT

## 2023-06-02 PROCEDURE — 73130 X-RAY EXAM OF HAND: CPT

## 2023-06-02 RX ORDER — MELOXICAM 15 MG/1
15 TABLET ORAL DAILY
Qty: 30 TABLET | Refills: 0 | Status: SHIPPED | OUTPATIENT
Start: 2023-06-02

## 2023-06-02 NOTE — PROGRESS NOTES
Chief Complaint   Patient presents with    Swollen finger on right hand    Knot on left wrist     Acute        Subjective     Alcira Phelan is a 58 y.o. female.        History of Present Illness     Alcira Phelan is a 58-year-old female who presents to the clinic today for right middle finger pain.    The patient believes she injured her right middle finger approximately 1 month ago because she waited too long to take off a ring that was too tight. Her  cut it off. Her fingers swell in the morning, and she thought the ring was contributing because it was too tight. She was seen by a hand specialist in Dr. Castro's office in 2020. No x-rays or other diagnostics. Diagnosed with arthritis and prescribed ibuprofen 800 mg. Did not offer a steroid injection. This does not feel like the carpal tunnel she had in her left wrist. Diet has not changed. Taking ibuprofen occasionally - it makes her sleepy. Has not taken Mobic before. NSAIDs do not irritate her stomach.    She adds the sleeping pills work wonderfully.    She mentions that her left hand aches when she sits down at night. Does not want to see hand specialists at this time.        Current Outpatient Medications:     letrozole (FEMARA) 2.5 MG tablet, TAKE ONE TABLET BY MOUTH DAILY, Disp: 30 tablet, Rfl: 11    Multiple Vitamins-Minerals (MULTI VITAMIN/MINERALS) tablet, Take  by mouth., Disp: , Rfl:     omeprazole (priLOSEC) 20 MG capsule, Take 1 capsule by mouth Daily., Disp: 90 capsule, Rfl: 3    Palbociclib 100 MG tablet tablet, Take 1 tablet by mouth Daily. with food on days 1-21, then off for 7 days in a 28-day cycle., Disp: 21 tablet, Rfl: 11    traZODone (DESYREL) 50 MG tablet, TAKE ONE TABLET BY MOUTH ONCE NIGHTLY AS NEEDED FOR SLEEP, Disp: 90 tablet, Rfl: 0    venlafaxine XR (EFFEXOR-XR) 150 MG 24 hr capsule, Take 1 capsule by mouth Daily., Disp: 90 capsule, Rfl: 3    meloxicam (Mobic) 15 MG tablet, Take 1 tablet by mouth Daily., Disp: 30  tablet, Rfl: 0     PMFSH  The following portions of the patient's history were reviewed and updated as appropriate: allergies, current medications, past family history, past medical history, past social history, past surgical history and problem list.    Review of Systems   Constitutional:  Negative for activity change, appetite change and fatigue.   HENT:  Negative for congestion and rhinorrhea.    Respiratory:  Negative for chest tightness and shortness of breath.    Cardiovascular:  Negative for chest pain and palpitations.   Gastrointestinal:  Negative for abdominal pain.   Genitourinary:  Negative for dysuria.   Musculoskeletal:  Positive for arthralgias and joint swelling. Negative for myalgias.   Neurological:  Negative for dizziness, weakness, light-headedness and headaches.   Psychiatric/Behavioral:  Negative for dysphoric mood. The patient is not nervous/anxious.      Objective   /82   Pulse 81   Wt 63.2 kg (139 lb 6.4 oz)   SpO2 98%   BMI 26.34 kg/m²     Physical Exam  Vitals and nursing note reviewed.   Constitutional:       Appearance: She is well-developed.   HENT:      Head: Normocephalic and atraumatic.      Right Ear: External ear normal.      Left Ear: External ear normal.   Eyes:      Conjunctiva/sclera: Conjunctivae normal.   Cardiovascular:      Rate and Rhythm: Normal rate and regular rhythm.   Pulmonary:      Effort: Pulmonary effort is normal.      Breath sounds: Normal breath sounds.   Musculoskeletal:         General: Normal range of motion.      Right hand: Swelling (right middle finger) present. No tenderness. Decreased range of motion (right middle finger stiffness d/t swelling). Normal sensation. Normal capillary refill. Normal pulse.      Cervical back: Normal range of motion.   Skin:     General: Skin is warm and dry.   Psychiatric:         Behavior: Behavior normal.            ASSESSMENT/PLAN    Diagnoses and all orders for this visit:    1. Finger joint swelling, right  (Primary)  Comments:  - Will obtain an x-ray of the right hand.  - Will prescribe meloxicam. May take 0.5 tab. Cautioned to not take with ibuprofen.  - Will continue to monitor.  Orders:  -     XR Hand 3+ View Right; Future  -     meloxicam (Mobic) 15 MG tablet; Take 1 tablet by mouth Daily.  Dispense: 30 tablet; Refill: 0             Return in about 36 weeks (around 2/9/2024) for Annual with fasting labs.       Transcribed from ambient dictation for KIMI Carlisle by Shereen Austin.  06/02/23   12:01 EDT    Patient or patient representative verbalized consent to the visit recording.  I have personally performed the services described in this document as transcribed by the above individual, and it is both accurate and complete.

## 2023-06-06 ENCOUNTER — TELEPHONE (OUTPATIENT)
Dept: INTERNAL MEDICINE | Facility: CLINIC | Age: 58
End: 2023-06-06
Payer: COMMERCIAL

## 2023-06-06 NOTE — TELEPHONE ENCOUNTER
The xray of her hand did not show any bony abnormalities. They were able to see evidence of the swelling in her finger. If it does not improve with the mobic, we may need to do some more imaging.

## 2023-06-06 NOTE — TELEPHONE ENCOUNTER
Patient advised of results, she states she will stay on the mobic a few more days and if no improvement she will call back

## 2023-06-06 NOTE — TELEPHONE ENCOUNTER
Caller: Alcira Phelan    Relationship: Self    Best call back number: [phone]    Caller requesting test results: SELF    What test was performed: X RAY OF HAND    When was the test performed: 06/02/2023    Additional notes: PATIENT CALLED CHECKING THE STATUS OF HER XRAY RESULTS

## 2023-07-12 ENCOUNTER — TELEPHONE (OUTPATIENT)
Dept: ONCOLOGY | Facility: CLINIC | Age: 58
End: 2023-07-12

## 2023-07-12 NOTE — TELEPHONE ENCOUNTER
Caller: [facility]    Relationship:     Best call back number:[phone]    Who are you requesting to speak with (clinical staff, provider,  specific staff member): CLINICAL    What was the call regarding: MARY CARMEN WITH [facility] WANTING TO LET DR OTTO NO THAT PT'S LABS WILL BE RELEASED ON 7-18     Is it okay if the provider responds through MyChart: N/A

## 2023-07-20 ENCOUNTER — TELEPHONE (OUTPATIENT)
Dept: ONCOLOGY | Facility: CLINIC | Age: 58
End: 2023-07-20

## 2023-07-20 NOTE — TELEPHONE ENCOUNTER
Caller: [facility] ([facility] - [place], [place] - [address] - [phone]  - [phone] FX    Relationship: Pharmacy    Best call back number: [phone]    What is the best time to reach you: ANYTIME    Who are you requesting to speak with (clinical staff, provider,  specific staff member): CLINICAL     Do you know the name of the person who called: BRAXTON    What was the call regarding: BRAXTON CALLING FROM [facility] THEY NEED A PRIOR AUTH FOR PATIENT'S MEDICATION KAIQALI 200MG.    PLEASE FAX OVER THE CHART NOTES AND LABS -[phone]

## 2023-07-24 ENCOUNTER — DOCUMENTATION (OUTPATIENT)
Dept: ONCOLOGY | Facility: CLINIC | Age: 58
End: 2023-07-24
Payer: COMMERCIAL

## 2023-07-24 ENCOUNTER — TELEPHONE (OUTPATIENT)
Dept: ONCOLOGY | Facility: CLINIC | Age: 58
End: 2023-07-24

## 2023-07-24 NOTE — TELEPHONE ENCOUNTER
Patient brought in disability paperwork from [employer] 07/24/23 to be mailed back to mailed back to patient.          Put disability paperwork from [employer] 07/24/23 to be mailed back to mailed back to patient in MA's box.

## 2023-07-27 ENCOUNTER — OFFICE VISIT (OUTPATIENT)
Dept: INTERNAL MEDICINE | Facility: CLINIC | Age: 58
End: 2023-07-27
Payer: COMMERCIAL

## 2023-07-27 VITALS
DIASTOLIC BLOOD PRESSURE: 82 MMHG | HEIGHT: 60 IN | WEIGHT: 139.8 LBS | BODY MASS INDEX: 27.45 KG/M2 | OXYGEN SATURATION: 96 % | HEART RATE: 91 BPM | SYSTOLIC BLOOD PRESSURE: 124 MMHG

## 2023-07-27 DIAGNOSIS — M79.89 SWELLING OF RIGHT MIDDLE FINGER: Primary | ICD-10-CM

## 2023-07-27 DIAGNOSIS — C79.51 MALIGNANT NEOPLASM METASTATIC TO BONE: Primary | ICD-10-CM

## 2023-07-27 PROCEDURE — 99213 OFFICE O/P EST LOW 20 MIN: CPT | Performed by: PHYSICIAN ASSISTANT

## 2023-07-27 NOTE — PROGRESS NOTES
"Chief Complaint   Patient presents with    Right hand still swelling     Follow Up       Subjective     Alcira Phelan is a 58 y.o. female.        History of Present Illness     Ms. Alcira Phelan is a 58-year-old female who presents today for right middle finger pain and swelling.    She does not think it has changed. Yesterday, 07/26/2023, her finger \"killed her\" all day. Some days it swells more than others. It does not go down normal. Some days it will be more swollen than others. This is a medium day. Sometimes she pops it and pulls it to get relief. She has not been able to do that for a couple of days. She had a hand x-ray 1 month ago, and they saw the soft tissue swelling. There was no trauma or fracture, but they could see the soft tissue swelling. She denies any injury. She kept her ring on too long, and her  had to cut it off. It did go down a little bit after he cut it off. It still hurts, and she can not close her hand. She can not write for very long at a time. When she drives, it puts pressure on it.    She has been switched to a different chemo regimen because 1st med did not have results they hoped for. She is on an injection in each hip once a month until it stops working. She was told that they will move on to something else if not effective. There are new lesions on her chest, 4 lymph nodes. They had not swelled yet. The biggest is on the liver is 2.9 cm. She started the injections last Wednesday, 07/20/2022. Today, 07/27/2023, she started the chemo pill. She has to take 3 of those at one time. She is tolerating it well.    She is not working. She was told to stop working. She is going to do a little bit of traveling.     She has a family history of depression. She is on depression medication already. She finds herself wanting to sleep, so she does not think about it. She has to make a plan every day to keep busy. She is seeing a counselor at her next set of injections.      Current " "Outpatient Medications:     esomeprazole (nexIUM) 20 MG capsule, Daily., Disp: , Rfl:     Multiple Vitamins-Minerals (MULTI VITAMIN/MINERALS) tablet, Take  by mouth., Disp: , Rfl:     omeprazole (priLOSEC) 20 MG capsule, Take 1 capsule by mouth Daily., Disp: 90 capsule, Rfl: 3    ondansetron (ZOFRAN) 8 MG tablet, Take 1 tablet by mouth 3 (Three) Times a Day As Needed for Nausea or Vomiting., Disp: 30 tablet, Rfl: 5    ribociclib succinate 200 MG tablet therapy pack tablet, Take 3 tablets by mouth Daily. on days 1-21, then off 7 days in a 28-day cycle., Disp: 63 tablet, Rfl: 11    traZODone (DESYREL) 50 MG tablet, TAKE ONE TABLET BY MOUTH ONCE NIGHTLY AS NEEDED FOR SLEEP, Disp: 90 tablet, Rfl: 0    venlafaxine XR (EFFEXOR-XR) 150 MG 24 hr capsule, Take 1 capsule by mouth Daily., Disp: 90 capsule, Rfl: 3    meloxicam (Mobic) 15 MG tablet, Take 1 tablet by mouth Daily. (Patient not taking: Reported on 7/27/2023), Disp: 30 tablet, Rfl: 0     PMFSH  The following portions of the patient's history were reviewed and updated as appropriate: allergies, current medications, past family history, past medical history, past social history, past surgical history, and problem list.    Review of Systems   Constitutional:  Negative for fever and unexpected weight change.   HENT: Negative.     Eyes: Negative.    Respiratory:  Negative for chest tightness, shortness of breath and wheezing.    Cardiovascular: Negative.    Gastrointestinal:  Negative for abdominal pain.   Endocrine: Negative.    Genitourinary: Negative.    Musculoskeletal:  Positive for arthralgias.   Skin:  Negative for color change, rash and wound.   Allergic/Immunologic: Negative.    Neurological:  Negative for seizures, syncope and numbness.   Hematological:  Negative for adenopathy. Does not bruise/bleed easily.   Psychiatric/Behavioral:  Negative for confusion.      Objective   /82   Pulse 91   Ht 152.4 cm (60\")   Wt 63.4 kg (139 lb 12.8 oz)   SpO2 96%  "  BMI 27.30 kg/m²     Physical Exam  Vitals and nursing note reviewed.   Constitutional:       Appearance: She is well-developed.   HENT:      Head: Normocephalic and atraumatic.      Right Ear: External ear normal.      Left Ear: External ear normal.   Eyes:      Conjunctiva/sclera: Conjunctivae normal.   Cardiovascular:      Rate and Rhythm: Normal rate and regular rhythm.   Pulmonary:      Effort: Pulmonary effort is normal.      Breath sounds: Normal breath sounds.   Musculoskeletal:      Right hand: Swelling (right middle finger) present. Decreased range of motion (right middle finger).      Cervical back: Normal range of motion.   Skin:     General: Skin is warm and dry.   Psychiatric:         Behavior: Behavior normal.            ASSESSMENT/PLAN    Diagnoses and all orders for this visit:    1. Swelling of right middle finger (Primary)  Comments:  Refer to hand specialist for eval and treatment.  Orders:  -     Ambulatory Referral to Hand Surgery         Return if symptoms worsen or fail to improve, for Next scheduled follow up.    Transcribed from ambient dictation for KIMI Carlisle by Jessi Leung.  07/27/23   10:20 EDT    Patient or patient representative verbalized consent to the visit recording.  I have personally performed the services described in this document as transcribed by the above individual, and it is both accurate and complete.

## 2023-08-02 ENCOUNTER — HOSPITAL ENCOUNTER (OUTPATIENT)
Dept: ONCOLOGY | Facility: HOSPITAL | Age: 58
Discharge: HOME | End: 2023-08-02
Admitting: NURSE PRACTITIONER
Payer: COMMERCIAL

## 2023-08-02 ENCOUNTER — DOCUMENTATION (OUTPATIENT)
Dept: NUTRITION | Facility: HOSPITAL | Age: 58
End: 2023-08-02
Payer: COMMERCIAL

## 2023-08-02 ENCOUNTER — OFFICE VISIT (OUTPATIENT)
Dept: PSYCHIATRY | Facility: CLINIC | Age: 58
End: 2023-08-02
Payer: COMMERCIAL

## 2023-08-02 VITALS
BODY MASS INDEX: 27.48 KG/M2 | HEIGHT: 60 IN | HEART RATE: 79 BPM | SYSTOLIC BLOOD PRESSURE: 145 MMHG | WEIGHT: 140 LBS | DIASTOLIC BLOOD PRESSURE: 67 MMHG | TEMPERATURE: 98.4 F

## 2023-08-02 DIAGNOSIS — F43.23 ADJUSTMENT DISORDER WITH MIXED ANXIETY AND DEPRESSED MOOD: Primary | ICD-10-CM

## 2023-08-02 DIAGNOSIS — Z85.3 HISTORY OF LEFT BREAST CANCER: ICD-10-CM

## 2023-08-02 DIAGNOSIS — C79.51 MALIGNANT NEOPLASM METASTATIC TO BONE: Primary | ICD-10-CM

## 2023-08-02 DIAGNOSIS — C50.112 MALIGNANT NEOPLASM OF CENTRAL PORTION OF LEFT FEMALE BREAST, UNSPECIFIED ESTROGEN RECEPTOR STATUS: Primary | ICD-10-CM

## 2023-08-02 DIAGNOSIS — C79.51 MALIGNANT NEOPLASM METASTATIC TO BONE: ICD-10-CM

## 2023-08-02 LAB
ALBUMIN SERPL-MCNC: 4.1 G/DL (ref 3.5–5.2)
ALBUMIN/GLOB SERPL: 1.4 G/DL
ALP SERPL-CCNC: 134 U/L (ref 39–117)
ALT SERPL W P-5'-P-CCNC: 37 U/L (ref 1–33)
ANION GAP SERPL CALCULATED.3IONS-SCNC: 10 MMOL/L (ref 5–15)
AST SERPL-CCNC: 26 U/L (ref 1–32)
BASOPHILS # BLD AUTO: 0.02 10*3/MM3 (ref 0–0.2)
BASOPHILS NFR BLD AUTO: 0.5 % (ref 0–1.5)
BILIRUB SERPL-MCNC: 0.4 MG/DL (ref 0–1.2)
BUN SERPL-MCNC: 20 MG/DL (ref 8–23)
BUN/CREAT SERPL: 17.7 (ref 7–25)
CALCIUM SPEC-SCNC: 9.4 MG/DL (ref 8.6–10.5)
CHLORIDE SERPL-SCNC: 104 MMOL/L (ref 98–107)
CO2 SERPL-SCNC: 28 MMOL/L (ref 22–29)
CREAT SERPL-MCNC: 1.13 MG/DL (ref 0.57–1)
DEPRECATED RDW RBC AUTO: 41.2 FL (ref 37–54)
EGFRCR SERPLBLD CKD-EPI 2021: 55.8 ML/MIN/1.73
EOSINOPHIL # BLD AUTO: 0.32 10*3/MM3 (ref 0–0.4)
EOSINOPHIL NFR BLD AUTO: 8.3 % (ref 0.3–6.2)
ERYTHROCYTE [DISTWIDTH] IN BLOOD BY AUTOMATED COUNT: 11.7 % (ref 12.3–15.4)
GLOBULIN UR ELPH-MCNC: 3 GM/DL
GLUCOSE SERPL-MCNC: 103 MG/DL (ref 65–99)
HCT VFR BLD AUTO: 35.5 % (ref 34–46.6)
HGB BLD-MCNC: 12.4 G/DL (ref 12–15.9)
IMM GRANULOCYTES # BLD AUTO: 0 10*3/MM3 (ref 0–0.05)
IMM GRANULOCYTES NFR BLD AUTO: 0 % (ref 0–0.5)
LYMPHOCYTES # BLD AUTO: 0.72 10*3/MM3 (ref 0.7–3.1)
LYMPHOCYTES NFR BLD AUTO: 18.7 % (ref 19.6–45.3)
MCH RBC QN AUTO: 33.2 PG (ref 26.6–33)
MCHC RBC AUTO-ENTMCNC: 34.9 G/DL (ref 31.5–35.7)
MCV RBC AUTO: 94.9 FL (ref 79–97)
MONOCYTES # BLD AUTO: 0.15 10*3/MM3 (ref 0.1–0.9)
MONOCYTES NFR BLD AUTO: 3.9 % (ref 5–12)
NEUTROPHILS NFR BLD AUTO: 2.65 10*3/MM3 (ref 1.7–7)
NEUTROPHILS NFR BLD AUTO: 68.6 % (ref 42.7–76)
PLATELET # BLD AUTO: 211 10*3/MM3 (ref 140–450)
PMV BLD AUTO: 9.1 FL (ref 6–12)
POTASSIUM SERPL-SCNC: 4.4 MMOL/L (ref 3.5–5.2)
PROT SERPL-MCNC: 7.1 G/DL (ref 6–8.5)
RBC # BLD AUTO: 3.74 10*6/MM3 (ref 3.77–5.28)
SODIUM SERPL-SCNC: 142 MMOL/L (ref 136–145)
WBC NRBC COR # BLD: 3.86 10*3/MM3 (ref 3.4–10.8)

## 2023-08-02 PROCEDURE — 25010000002 FULVESTRANT PER 25 MG: Performed by: NURSE PRACTITIONER

## 2023-08-02 PROCEDURE — 90792 PSYCH DIAG EVAL W/MED SRVCS: CPT | Performed by: NURSE PRACTITIONER

## 2023-08-02 PROCEDURE — 85025 COMPLETE CBC W/AUTO DIFF WBC: CPT | Performed by: NURSE PRACTITIONER

## 2023-08-02 PROCEDURE — 96402 CHEMO HORMON ANTINEOPL SQ/IM: CPT

## 2023-08-02 PROCEDURE — 36415 COLL VENOUS BLD VENIPUNCTURE: CPT

## 2023-08-02 PROCEDURE — 96401 CHEMO ANTI-NEOPL SQ/IM: CPT

## 2023-08-02 PROCEDURE — 80053 COMPREHEN METABOLIC PANEL: CPT | Performed by: NURSE PRACTITIONER

## 2023-08-02 RX ORDER — LAMOTRIGINE 25 MG/1
500 TABLET ORAL ONCE
Status: COMPLETED | OUTPATIENT
Start: 2023-08-02 | End: 2023-08-02

## 2023-08-02 RX ADMIN — FULVESTRANT 500 MG: 50 INJECTION, SOLUTION INTRAMUSCULAR at 10:05

## 2023-08-08 ENCOUNTER — DOCUMENTATION (OUTPATIENT)
Dept: ONCOLOGY | Facility: CLINIC | Age: 58
End: 2023-08-08
Payer: COMMERCIAL

## 2023-08-09 ENCOUNTER — TELEPHONE (OUTPATIENT)
Dept: ONCOLOGY | Facility: CLINIC | Age: 58
End: 2023-08-09
Payer: COMMERCIAL

## 2023-08-09 NOTE — TELEPHONE ENCOUNTER
Caller: JV LOMBARDI/ HARVEY         Best call back number: [phone]    What was the call regarding: PATIENT FILED DISABILITY AND THEY ARE NEEDING TO CONFIRM DATES OF PATIENT BEING TREATED FOR HER CANCER DX FROM DATES OF SERVICE 8-15-22 THROUGH 2-13-23.    CLAIM # [id number]

## 2023-08-09 NOTE — TELEPHONE ENCOUNTER
LMOM that an appt schedule was sent to date, any future appts will may not be schedule as of today. Please fax request [phone] to my attention.  [id number]  ~Shell

## 2023-08-10 ENCOUNTER — DOCUMENTATION (OUTPATIENT)
Dept: ONCOLOGY | Facility: CLINIC | Age: 58
End: 2023-08-10
Payer: COMMERCIAL

## 2023-08-10 ENCOUNTER — LAB (OUTPATIENT)
Dept: LAB | Facility: HOSPITAL | Age: 58
End: 2023-08-10
Payer: COMMERCIAL

## 2023-08-10 ENCOUNTER — HOSPITAL ENCOUNTER (OUTPATIENT)
Dept: CARDIOLOGY | Facility: HOSPITAL | Age: 58
Discharge: HOME | End: 2023-08-10
Payer: COMMERCIAL

## 2023-08-10 ENCOUNTER — TELEPHONE (OUTPATIENT)
Dept: ONCOLOGY | Facility: CLINIC | Age: 58
End: 2023-08-10

## 2023-08-10 DIAGNOSIS — C79.51 MALIGNANT NEOPLASM METASTATIC TO BONE: ICD-10-CM

## 2023-08-10 LAB
ALBUMIN SERPL-MCNC: 4 G/DL (ref 3.5–5.2)
ALBUMIN/GLOB SERPL: 1.3 G/DL
ALP SERPL-CCNC: 172 U/L (ref 39–117)
ALT SERPL W P-5'-P-CCNC: 39 U/L (ref 1–33)
ANION GAP SERPL CALCULATED.3IONS-SCNC: 13 MMOL/L (ref 5–15)
AST SERPL-CCNC: 34 U/L (ref 1–32)
BASOPHILS # BLD AUTO: 0.01 10*3/MM3 (ref 0–0.2)
BASOPHILS NFR BLD AUTO: 0.5 % (ref 0–1.5)
BILIRUB SERPL-MCNC: <0.2 MG/DL (ref 0–1.2)
BUN SERPL-MCNC: 13 MG/DL (ref 8–23)
BUN/CREAT SERPL: 13.5 (ref 7–25)
CALCIUM SPEC-SCNC: 9 MG/DL (ref 8.6–10.5)
CHLORIDE SERPL-SCNC: 105 MMOL/L (ref 98–107)
CO2 SERPL-SCNC: 24 MMOL/L (ref 22–29)
CREAT SERPL-MCNC: 0.96 MG/DL (ref 0.57–1)
DEPRECATED RDW RBC AUTO: 41.8 FL (ref 37–54)
EGFRCR SERPLBLD CKD-EPI 2021: 67.9 ML/MIN/1.73
EOSINOPHIL # BLD AUTO: 0.18 10*3/MM3 (ref 0–0.4)
EOSINOPHIL NFR BLD AUTO: 9.5 % (ref 0.3–6.2)
ERYTHROCYTE [DISTWIDTH] IN BLOOD BY AUTOMATED COUNT: 12.2 % (ref 12.3–15.4)
GLOBULIN UR ELPH-MCNC: 3.2 GM/DL
GLUCOSE SERPL-MCNC: 105 MG/DL (ref 65–99)
HCT VFR BLD AUTO: 34.4 % (ref 34–46.6)
HGB BLD-MCNC: 12.3 G/DL (ref 12–15.9)
IMM GRANULOCYTES # BLD AUTO: 0 10*3/MM3 (ref 0–0.05)
IMM GRANULOCYTES NFR BLD AUTO: 0 % (ref 0–0.5)
LYMPHOCYTES # BLD AUTO: 0.62 10*3/MM3 (ref 0.7–3.1)
LYMPHOCYTES NFR BLD AUTO: 32.6 % (ref 19.6–45.3)
MCH RBC QN AUTO: 33.6 PG (ref 26.6–33)
MCHC RBC AUTO-ENTMCNC: 35.8 G/DL (ref 31.5–35.7)
MCV RBC AUTO: 94 FL (ref 79–97)
MONOCYTES # BLD AUTO: 0.15 10*3/MM3 (ref 0.1–0.9)
MONOCYTES NFR BLD AUTO: 7.9 % (ref 5–12)
NEUTROPHILS NFR BLD AUTO: 0.94 10*3/MM3 (ref 1.7–7)
NEUTROPHILS NFR BLD AUTO: 49.5 % (ref 42.7–76)
PLATELET # BLD AUTO: 141 10*3/MM3 (ref 140–450)
PMV BLD AUTO: 8.7 FL (ref 6–12)
POTASSIUM SERPL-SCNC: 4.2 MMOL/L (ref 3.5–5.2)
PROT SERPL-MCNC: 7.2 G/DL (ref 6–8.5)
QT INTERVAL: 394 MS
QTC INTERVAL: 428 MS
RBC # BLD AUTO: 3.66 10*6/MM3 (ref 3.77–5.28)
SODIUM SERPL-SCNC: 142 MMOL/L (ref 136–145)
WBC NRBC COR # BLD: 1.9 10*3/MM3 (ref 3.4–10.8)

## 2023-08-10 PROCEDURE — 85025 COMPLETE CBC W/AUTO DIFF WBC: CPT

## 2023-08-10 PROCEDURE — 36415 COLL VENOUS BLD VENIPUNCTURE: CPT

## 2023-08-10 PROCEDURE — 93005 ELECTROCARDIOGRAM TRACING: CPT | Performed by: INTERNAL MEDICINE

## 2023-08-10 PROCEDURE — 80053 COMPREHEN METABOLIC PANEL: CPT

## 2023-08-10 NOTE — TELEPHONE ENCOUNTER
Patent brought in disability paperwork from [employer] on 08/10/23 to be faxed back.      Put disability paperwork from [employer] on 08/10/23 to be faxed back put in MA's box.

## 2023-08-16 ENCOUNTER — DOCUMENTATION (OUTPATIENT)
Dept: ONCOLOGY | Facility: CLINIC | Age: 58
End: 2023-08-16
Payer: COMMERCIAL

## 2023-08-16 ENCOUNTER — OFFICE VISIT (OUTPATIENT)
Dept: ONCOLOGY | Facility: CLINIC | Age: 58
End: 2023-08-16
Payer: COMMERCIAL

## 2023-08-16 ENCOUNTER — TELEPHONE (OUTPATIENT)
Dept: ONCOLOGY | Facility: CLINIC | Age: 58
End: 2023-08-16

## 2023-08-16 ENCOUNTER — SPECIALTY PHARMACY (OUTPATIENT)
Dept: ONCOLOGY | Facility: HOSPITAL | Age: 58
End: 2023-08-16
Payer: COMMERCIAL

## 2023-08-16 ENCOUNTER — HOSPITAL ENCOUNTER (OUTPATIENT)
Dept: ONCOLOGY | Facility: HOSPITAL | Age: 58
Discharge: HOME | End: 2023-08-16
Admitting: NURSE PRACTITIONER
Payer: COMMERCIAL

## 2023-08-16 VITALS
TEMPERATURE: 97.4 F | WEIGHT: 141 LBS | BODY MASS INDEX: 27.68 KG/M2 | SYSTOLIC BLOOD PRESSURE: 131 MMHG | RESPIRATION RATE: 16 BRPM | DIASTOLIC BLOOD PRESSURE: 79 MMHG | HEIGHT: 60 IN | HEART RATE: 74 BPM | OXYGEN SATURATION: 97 %

## 2023-08-16 DIAGNOSIS — C79.51 MALIGNANT NEOPLASM METASTATIC TO BONE: Primary | ICD-10-CM

## 2023-08-16 DIAGNOSIS — Z85.3 HISTORY OF LEFT BREAST CANCER: ICD-10-CM

## 2023-08-16 DIAGNOSIS — C50.112 MALIGNANT NEOPLASM OF CENTRAL PORTION OF LEFT FEMALE BREAST, UNSPECIFIED ESTROGEN RECEPTOR STATUS: Primary | ICD-10-CM

## 2023-08-16 DIAGNOSIS — C79.51 MALIGNANT NEOPLASM METASTATIC TO BONE: ICD-10-CM

## 2023-08-16 DIAGNOSIS — C50.112 MALIGNANT NEOPLASM OF CENTRAL PORTION OF LEFT FEMALE BREAST, UNSPECIFIED ESTROGEN RECEPTOR STATUS: ICD-10-CM

## 2023-08-16 LAB
ALBUMIN SERPL-MCNC: 4.1 G/DL (ref 3.5–5.2)
ALBUMIN/GLOB SERPL: 1.4 G/DL
ALP SERPL-CCNC: 145 U/L (ref 39–117)
ALT SERPL W P-5'-P-CCNC: 26 U/L (ref 1–33)
ANION GAP SERPL CALCULATED.3IONS-SCNC: 8 MMOL/L (ref 5–15)
AST SERPL-CCNC: 21 U/L (ref 1–32)
BASOPHILS # BLD AUTO: 0.02 10*3/MM3 (ref 0–0.2)
BASOPHILS NFR BLD AUTO: 1 % (ref 0–1.5)
BILIRUB SERPL-MCNC: 0.3 MG/DL (ref 0–1.2)
BUN SERPL-MCNC: 12 MG/DL (ref 8–23)
BUN/CREAT SERPL: 13.2 (ref 7–25)
CALCIUM SPEC-SCNC: 9.5 MG/DL (ref 8.6–10.5)
CHLORIDE SERPL-SCNC: 103 MMOL/L (ref 98–107)
CO2 SERPL-SCNC: 29 MMOL/L (ref 22–29)
CREAT SERPL-MCNC: 0.91 MG/DL (ref 0.57–1)
DEPRECATED RDW RBC AUTO: 42.5 FL (ref 37–54)
EGFRCR SERPLBLD CKD-EPI 2021: 72.4 ML/MIN/1.73
EOSINOPHIL # BLD AUTO: 0.1 10*3/MM3 (ref 0–0.4)
EOSINOPHIL NFR BLD AUTO: 4.8 % (ref 0.3–6.2)
ERYTHROCYTE [DISTWIDTH] IN BLOOD BY AUTOMATED COUNT: 12.6 % (ref 12.3–15.4)
GLOBULIN UR ELPH-MCNC: 2.9 GM/DL
GLUCOSE SERPL-MCNC: 93 MG/DL (ref 65–99)
HCT VFR BLD AUTO: 32.3 % (ref 34–46.6)
HGB BLD-MCNC: 11.6 G/DL (ref 12–15.9)
IMM GRANULOCYTES # BLD AUTO: 0 10*3/MM3 (ref 0–0.05)
IMM GRANULOCYTES NFR BLD AUTO: 0 % (ref 0–0.5)
LYMPHOCYTES # BLD AUTO: 0.59 10*3/MM3 (ref 0.7–3.1)
LYMPHOCYTES NFR BLD AUTO: 28.2 % (ref 19.6–45.3)
MCH RBC QN AUTO: 33.9 PG (ref 26.6–33)
MCHC RBC AUTO-ENTMCNC: 35.9 G/DL (ref 31.5–35.7)
MCV RBC AUTO: 94.4 FL (ref 79–97)
MONOCYTES # BLD AUTO: 0.14 10*3/MM3 (ref 0.1–0.9)
MONOCYTES NFR BLD AUTO: 6.7 % (ref 5–12)
NEUTROPHILS NFR BLD AUTO: 1.24 10*3/MM3 (ref 1.7–7)
NEUTROPHILS NFR BLD AUTO: 59.3 % (ref 42.7–76)
PLATELET # BLD AUTO: 90 10*3/MM3 (ref 140–450)
PMV BLD AUTO: 8.8 FL (ref 6–12)
POTASSIUM SERPL-SCNC: 4.9 MMOL/L (ref 3.5–5.2)
PROT SERPL-MCNC: 7 G/DL (ref 6–8.5)
RBC # BLD AUTO: 3.42 10*6/MM3 (ref 3.77–5.28)
SODIUM SERPL-SCNC: 140 MMOL/L (ref 136–145)
WBC NRBC COR # BLD: 2.09 10*3/MM3 (ref 3.4–10.8)

## 2023-08-16 PROCEDURE — 85025 COMPLETE CBC W/AUTO DIFF WBC: CPT | Performed by: NURSE PRACTITIONER

## 2023-08-16 PROCEDURE — 99213 OFFICE O/P EST LOW 20 MIN: CPT | Performed by: NURSE PRACTITIONER

## 2023-08-16 PROCEDURE — 25010000002 FULVESTRANT PER 25 MG: Performed by: NURSE PRACTITIONER

## 2023-08-16 PROCEDURE — 96402 CHEMO HORMON ANTINEOPL SQ/IM: CPT

## 2023-08-16 PROCEDURE — 36415 COLL VENOUS BLD VENIPUNCTURE: CPT

## 2023-08-16 PROCEDURE — 80053 COMPREHEN METABOLIC PANEL: CPT | Performed by: NURSE PRACTITIONER

## 2023-08-16 PROCEDURE — 96372 THER/PROPH/DIAG INJ SC/IM: CPT

## 2023-08-16 RX ORDER — LAMOTRIGINE 25 MG/1
500 TABLET ORAL ONCE
OUTPATIENT
Start: 2023-09-13

## 2023-08-16 RX ORDER — LAMOTRIGINE 25 MG/1
500 TABLET ORAL ONCE
Status: CANCELLED | OUTPATIENT
Start: 2023-08-16

## 2023-08-16 RX ORDER — LAMOTRIGINE 25 MG/1
500 TABLET ORAL ONCE
Status: COMPLETED | OUTPATIENT
Start: 2023-08-16 | End: 2023-08-16

## 2023-08-16 RX ADMIN — FULVESTRANT 500 MG: 50 INJECTION, SOLUTION INTRAMUSCULAR at 15:03

## 2023-08-16 NOTE — TELEPHONE ENCOUNTER
Patient brought in Life and Disability Claim paperwork from [employer] to be faxed back.      Put Life and Disability Claim paperwork from [employer] to be faxed back MA's box

## 2023-08-16 NOTE — PROGRESS NOTES
"      PROBLEM LIST:  1. Local recurrence of HR+ carcinoma of the left breast  A) history of left breast cancer in 2007.  Bilateral mastectomy 5/30/07.  pathology showed grade 2 IDC of the left breast measuring 1.8 cm.  ER+ ID+ Her2 negative.   0/2 SLN involved.  [id number].  B) adjuvant chemotherapy with TC x 4 cycles, completed September 2007 with Dr. De La Torre.  Tamoxifen October 2007 thru October 2012.  C) presented January 2022 with left chest wall mass.  CT chest 1/17/22 showed 4.6 cm lesion involving left manubrium with soft tissue extent extending posteriorly to the anterior aspect of mediastinum.  12 mm nodule right lung base.  D) ultrasound guided biopsy of chest wall mass on 2/10/22.  Pathology showed invasive ductal carcinoma of the breast.  ER positive (100%), ID positive (50%), HER-2 2+  E) letrozole started February 2022.  CyberKnife to the chest wall mass completed 4/15/2022.  Ibrance started 4/18/2022.  Ibrance decreased to 100 mg due to neutropenia 9/5/2022  F) PET/CT 7/5/23 showed a new 2.9 cm hypermetabolic liver lesion, nonenlarged but hypermetabolic bilateral hilar and mediastinal lymph nodes.  [id number] testing showed a PI3 kinase mutation, no ESR 1 mutation.  Treatment changed to fulvestrant and ribociclib.  Fulvestrant started 7/19/2023.  Ribociclib started 7/27/2023.  2. Depression/anxiety  3. GERD    Subjective     CHIEF COMPLAINT: Breast cancer    HISTORY OF PRESENT ILLNESS:   Alcira Phelan returns for follow-up.  She continues on fulvestrant.  She started ribociclib 7/27/2023.  She is tolerating both medications relatively well.  She does have some nausea if she takes the ribociclib without food.  She has not required any Zofran.    She denies any pain, cough, headaches or diplopia.          Objective      /79   Pulse 74   Temp 97.4 øF (36.3 øC)   Resp 16   Ht 152.4 cm (60\")   Wt 64 kg (141 lb)   SpO2 97%   BMI 27.54 kg/mý    Vitals:    08/16/23 1258   PainSc: 0-No pain "                 ECOG score: 2           General: well appearing female in no acute distress  Neuro: alert and oriented  HEENT: sclera anicteric, oropharynx clear  Skin: no rashes, lesions, bruising, or petechiae  Psych: mood and affect appropriate            RECENT LABS:  Lab Results   Component Value Date    WBC 2.09 (L) 08/16/2023    HGB 11.6 (L) 08/16/2023    HCT 32.3 (L) 08/16/2023    MCV 94.4 08/16/2023    PLT 90 (L) 08/16/2023       Lab Results   Component Value Date    GLUCOSE 105 (H) 08/10/2023    BUN 13 08/10/2023    CREATININE 0.96 08/10/2023    EGFRIFNONA 77 02/16/2022    BCR 13.5 08/10/2023    K 4.2 08/10/2023    CO2 24.0 08/10/2023    CALCIUM 9.0 08/10/2023    ALBUMIN 4.0 08/10/2023    AST 34 (H) 08/10/2023    ALT 39 (H) 08/10/2023                     Assessment & Plan   Alcira Phelan is a 58 y.o. female with metastatic ER positive [place] positive HER-2 negative invasive ductal carcinoma, with involvement of lymph nodes and liver.    [id number] testing did not show an ESR 1 mutation.  Treatment changed to fulvestrant and ribociclib.  Fulvestrant started 7/19/2023.  Ribociclib started 7/27/2023.  She has had 2 EKGs that have shown normal QTc interval.  She will be due for a repeat EKG and labs 8/24/2023.    We will plan on repeat PET/CT after 2 months of treatment to assess her response.    Follow-up in 1 month with scans prior to return to evaluate response to treatment.                Rasheeda Ramirez APRN  [facility]    8/16/2023          CC:

## 2023-08-29 ENCOUNTER — TELEPHONE (OUTPATIENT)
Dept: ONCOLOGY | Facility: CLINIC | Age: 58
End: 2023-08-29
Payer: COMMERCIAL

## 2023-08-29 NOTE — TELEPHONE ENCOUNTER
Caller: JV    Relationship: [employer]      Best call back number: [phone]    CAN SPEAK TO FIRST AVAILABLE REPRESENTATIVE     What is the best time to reach you: ANYTIME    Who are you requesting to speak with (clinical staff, provider,  specific staff member): CLINICAL       What was the call regarding:     [employer] DISABILITY CLAIM FAX RECEIVED. TO GET CLARIFICATION ON THIS     CLAIM # [id number]

## 2023-08-30 ENCOUNTER — TELEPHONE (OUTPATIENT)
Dept: ONCOLOGY | Facility: CLINIC | Age: 58
End: 2023-08-30
Payer: COMMERCIAL

## 2023-08-30 DIAGNOSIS — J01.00 ACUTE NON-RECURRENT MAXILLARY SINUSITIS: ICD-10-CM

## 2023-08-30 DIAGNOSIS — C79.51 MALIGNANT NEOPLASM METASTATIC TO BONE: Primary | ICD-10-CM

## 2023-08-30 RX ORDER — DOXYCYCLINE HYCLATE 100 MG/1
100 CAPSULE ORAL 2 TIMES DAILY
Qty: 14 CAPSULE | Refills: 0 | Status: SHIPPED | OUTPATIENT
Start: 2023-08-30

## 2023-09-06 ENCOUNTER — HOSPITAL ENCOUNTER (OUTPATIENT)
Dept: PET IMAGING | Facility: HOSPITAL | Age: 58
Discharge: HOME | End: 2023-09-06
Payer: COMMERCIAL

## 2023-09-06 DIAGNOSIS — C50.112 MALIGNANT NEOPLASM OF CENTRAL PORTION OF LEFT FEMALE BREAST, UNSPECIFIED ESTROGEN RECEPTOR STATUS: ICD-10-CM

## 2023-09-06 DIAGNOSIS — C79.51 MALIGNANT NEOPLASM METASTATIC TO BONE: ICD-10-CM

## 2023-09-06 LAB — GLUCOSE BLDC GLUCOMTR-MCNC: 106 MG/DL (ref 70–130)

## 2023-09-06 PROCEDURE — A9552 F18 FDG: HCPCS | Performed by: NURSE PRACTITIONER

## 2023-09-06 PROCEDURE — 78815 PET IMAGE W/CT SKULL-THIGH: CPT

## 2023-09-06 PROCEDURE — 82948 REAGENT STRIP/BLOOD GLUCOSE: CPT

## 2023-09-06 PROCEDURE — 0 FLUDEOXYGLUCOSE F18 SOLUTION: Performed by: NURSE PRACTITIONER

## 2023-09-06 RX ADMIN — FLUDEOXYGLUCOSE F18 1 DOSE: 300 INJECTION INTRAVENOUS at 08:39

## 2023-09-07 ENCOUNTER — TELEPHONE (OUTPATIENT)
Dept: ONCOLOGY | Facility: CLINIC | Age: 58
End: 2023-09-07
Payer: COMMERCIAL

## 2023-09-07 NOTE — TELEPHONE ENCOUNTER
I called patient after review of PET per JOVAN Renteria and let the patient know that her PET was stable and no new areas of disease.  Some of the nodes had resolved.  Liver unchanged.  Patient was reminded about her appt next Thursday and labs.  She verbalized understanding.

## 2023-09-14 ENCOUNTER — TELEPHONE (OUTPATIENT)
Dept: ONCOLOGY | Facility: CLINIC | Age: 58
End: 2023-09-14

## 2023-09-14 ENCOUNTER — SPECIALTY PHARMACY (OUTPATIENT)
Dept: ONCOLOGY | Facility: HOSPITAL | Age: 58
End: 2023-09-14
Payer: COMMERCIAL

## 2023-09-14 ENCOUNTER — OFFICE VISIT (OUTPATIENT)
Dept: ONCOLOGY | Facility: CLINIC | Age: 58
End: 2023-09-14
Payer: COMMERCIAL

## 2023-09-14 ENCOUNTER — HOSPITAL ENCOUNTER (OUTPATIENT)
Dept: ONCOLOGY | Facility: HOSPITAL | Age: 58
Discharge: HOME | End: 2023-09-14
Payer: COMMERCIAL

## 2023-09-14 ENCOUNTER — LAB (OUTPATIENT)
Dept: LAB | Facility: HOSPITAL | Age: 58
End: 2023-09-14
Payer: COMMERCIAL

## 2023-09-14 ENCOUNTER — DOCUMENTATION (OUTPATIENT)
Dept: ONCOLOGY | Facility: CLINIC | Age: 58
End: 2023-09-14
Payer: COMMERCIAL

## 2023-09-14 VITALS
HEIGHT: 60 IN | TEMPERATURE: 97.3 F | DIASTOLIC BLOOD PRESSURE: 83 MMHG | WEIGHT: 140 LBS | BODY MASS INDEX: 27.48 KG/M2 | OXYGEN SATURATION: 98 % | RESPIRATION RATE: 16 BRPM | SYSTOLIC BLOOD PRESSURE: 144 MMHG | HEART RATE: 76 BPM

## 2023-09-14 DIAGNOSIS — C79.51 MALIGNANT NEOPLASM METASTATIC TO BONE: Primary | ICD-10-CM

## 2023-09-14 DIAGNOSIS — C50.112 MALIGNANT NEOPLASM OF CENTRAL PORTION OF LEFT FEMALE BREAST, UNSPECIFIED ESTROGEN RECEPTOR STATUS: ICD-10-CM

## 2023-09-14 DIAGNOSIS — Z85.3 HISTORY OF LEFT BREAST CANCER: ICD-10-CM

## 2023-09-14 DIAGNOSIS — C50.112 MALIGNANT NEOPLASM OF CENTRAL PORTION OF LEFT FEMALE BREAST, UNSPECIFIED ESTROGEN RECEPTOR STATUS: Primary | ICD-10-CM

## 2023-09-14 DIAGNOSIS — C79.51 MALIGNANT NEOPLASM METASTATIC TO BONE: ICD-10-CM

## 2023-09-14 LAB
ALBUMIN SERPL-MCNC: 4.1 G/DL (ref 3.5–5.2)
ALBUMIN/GLOB SERPL: 1.4 G/DL
ALP SERPL-CCNC: 160 U/L (ref 39–117)
ALT SERPL W P-5'-P-CCNC: 17 U/L (ref 1–33)
ANION GAP SERPL CALCULATED.3IONS-SCNC: 9 MMOL/L (ref 5–15)
AST SERPL-CCNC: 21 U/L (ref 1–32)
BASOPHILS # BLD AUTO: 0.02 10*3/MM3 (ref 0–0.2)
BASOPHILS NFR BLD AUTO: 1.4 % (ref 0–1.5)
BILIRUB SERPL-MCNC: 0.2 MG/DL (ref 0–1.2)
BUN SERPL-MCNC: 17 MG/DL (ref 8–23)
BUN/CREAT SERPL: 18.9 (ref 7–25)
CALCIUM SPEC-SCNC: 9.6 MG/DL (ref 8.6–10.5)
CANCER AG15-3 SERPL-ACNC: 22.9 U/ML
CHLORIDE SERPL-SCNC: 105 MMOL/L (ref 98–107)
CO2 SERPL-SCNC: 27 MMOL/L (ref 22–29)
CREAT SERPL-MCNC: 0.9 MG/DL (ref 0.57–1)
DEPRECATED RDW RBC AUTO: 50.4 FL (ref 37–54)
EGFRCR SERPLBLD CKD-EPI 2021: 73.3 ML/MIN/1.73
EOSINOPHIL # BLD AUTO: 0.07 10*3/MM3 (ref 0–0.4)
EOSINOPHIL NFR BLD AUTO: 4.7 % (ref 0.3–6.2)
ERYTHROCYTE [DISTWIDTH] IN BLOOD BY AUTOMATED COUNT: 14.5 % (ref 12.3–15.4)
GLOBULIN UR ELPH-MCNC: 2.9 GM/DL
GLUCOSE SERPL-MCNC: 91 MG/DL (ref 65–99)
HCT VFR BLD AUTO: 31.7 % (ref 34–46.6)
HGB BLD-MCNC: 11.3 G/DL (ref 12–15.9)
IMM GRANULOCYTES # BLD AUTO: 0 10*3/MM3 (ref 0–0.05)
IMM GRANULOCYTES NFR BLD AUTO: 0 % (ref 0–0.5)
LYMPHOCYTES # BLD AUTO: 0.47 10*3/MM3 (ref 0.7–3.1)
LYMPHOCYTES NFR BLD AUTO: 31.8 % (ref 19.6–45.3)
MCH RBC QN AUTO: 34.2 PG (ref 26.6–33)
MCHC RBC AUTO-ENTMCNC: 35.6 G/DL (ref 31.5–35.7)
MCV RBC AUTO: 96.1 FL (ref 79–97)
MONOCYTES # BLD AUTO: 0.11 10*3/MM3 (ref 0.1–0.9)
MONOCYTES NFR BLD AUTO: 7.4 % (ref 5–12)
NEUTROPHILS NFR BLD AUTO: 0.81 10*3/MM3 (ref 1.7–7)
NEUTROPHILS NFR BLD AUTO: 54.7 % (ref 42.7–76)
PLATELET # BLD AUTO: 140 10*3/MM3 (ref 140–450)
PMV BLD AUTO: 9.1 FL (ref 6–12)
POTASSIUM SERPL-SCNC: 5 MMOL/L (ref 3.5–5.2)
PROT SERPL-MCNC: 7 G/DL (ref 6–8.5)
RBC # BLD AUTO: 3.3 10*6/MM3 (ref 3.77–5.28)
SODIUM SERPL-SCNC: 141 MMOL/L (ref 136–145)
WBC NRBC COR # BLD: 1.48 10*3/MM3 (ref 3.4–10.8)

## 2023-09-14 PROCEDURE — 85025 COMPLETE CBC W/AUTO DIFF WBC: CPT

## 2023-09-14 PROCEDURE — 36415 COLL VENOUS BLD VENIPUNCTURE: CPT

## 2023-09-14 PROCEDURE — 99213 OFFICE O/P EST LOW 20 MIN: CPT | Performed by: NURSE PRACTITIONER

## 2023-09-14 PROCEDURE — 80053 COMPREHEN METABOLIC PANEL: CPT

## 2023-09-14 PROCEDURE — 86300 IMMUNOASSAY TUMOR CA 15-3: CPT | Performed by: NURSE PRACTITIONER

## 2023-09-14 PROCEDURE — 25010000002 FULVESTRANT PER 25 MG: Performed by: NURSE PRACTITIONER

## 2023-09-14 PROCEDURE — 96402 CHEMO HORMON ANTINEOPL SQ/IM: CPT

## 2023-09-14 RX ORDER — IBUPROFEN 800 MG/1
TABLET ORAL
COMMUNITY
Start: 2023-06-21

## 2023-09-14 RX ORDER — LEVOFLOXACIN 500 MG/1
TABLET, FILM COATED ORAL
COMMUNITY
Start: 2023-06-16

## 2023-09-14 RX ORDER — OXYCODONE HYDROCHLORIDE AND ACETAMINOPHEN 5; 325 MG/1; MG/1
TABLET ORAL
COMMUNITY
Start: 2023-06-16

## 2023-09-14 RX ORDER — LAMOTRIGINE 25 MG/1
500 TABLET ORAL ONCE
OUTPATIENT
Start: 2023-10-11

## 2023-09-14 RX ORDER — PSEUDOEPHED/ACETAMINOPH/DIPHEN 30MG-500MG
TABLET ORAL
COMMUNITY
Start: 2023-06-21

## 2023-09-14 RX ORDER — LAMOTRIGINE 25 MG/1
500 TABLET ORAL ONCE
Status: COMPLETED | OUTPATIENT
Start: 2023-09-14 | End: 2023-09-14

## 2023-09-14 RX ADMIN — FULVESTRANT 500 MG: 50 INJECTION, SOLUTION INTRAMUSCULAR at 10:47

## 2023-09-14 NOTE — TELEPHONE ENCOUNTER
Patient brought in paperwork from [facility] 09/14/2023 to be faxed.    Put paperwork from [facility] 09/14/2023 to be faxed in MA's box.

## 2023-09-14 NOTE — PROGRESS NOTES
"      PROBLEM LIST:  1. Local recurrence of HR+ carcinoma of the left breast  A) history of left breast cancer in 2007.  Bilateral mastectomy 5/30/07.  pathology showed grade 2 IDC of the left breast measuring 1.8 cm.  ER+ OK+ Her2 negative.   0/2 SLN involved.  [id number].  B) adjuvant chemotherapy with TC x 4 cycles, completed September 2007 with Dr. De La Torre.  Tamoxifen October 2007 thru October 2012.  C) presented January 2022 with left chest wall mass.  CT chest 1/17/22 showed 4.6 cm lesion involving left manubrium with soft tissue extent extending posteriorly to the anterior aspect of mediastinum.  12 mm nodule right lung base.  D) ultrasound guided biopsy of chest wall mass on 2/10/22.  Pathology showed invasive ductal carcinoma of the breast.  ER positive (100%), OK positive (50%), HER-2 2+  E) letrozole started February 2022.  CyberKnife to the chest wall mass completed 4/15/2022.  Ibrance started 4/18/2022.  Ibrance decreased to 100 mg due to neutropenia 9/5/2022  F) PET/CT 7/5/23 showed a new 2.9 cm hypermetabolic liver lesion, nonenlarged but hypermetabolic bilateral hilar and mediastinal lymph nodes.  [id number] testing showed a PI3 kinase mutation, no ESR 1 mutation.  Treatment changed to fulvestrant and ribociclib.  Fulvestrant started 7/19/2023.  Ribociclib started 7/27/2023.  2. Depression/anxiety  3. GERD    Subjective     CHIEF COMPLAINT: Breast cancer    HISTORY OF PRESENT ILLNESS:   Alcira Phelan returns for follow-up.  She continues on fulvestrant and ribociclib.  She is tolerating both medications relatively well.  Nausea is controlled if she eats before taking her ribociclib.  She did not require any Zofran recently.    She denies any pain, cough, headaches or diplopia.    She has increased fatigue related to her treatment.      Objective      /83   Pulse 76   Temp 97.3 °F (36.3 °C) (Infrared)   Resp 16   Ht 152.4 cm (60\")   Wt 63.5 kg (140 lb)   SpO2 98%   BMI 27.34 kg/m²  "   Vitals:    09/14/23 0920   PainSc: 0-No pain                   ECOG score: 0           General: well appearing female in no acute distress  Neuro: alert and oriented  HEENT: sclera anicteric, oropharynx clear  Skin: no rashes, lesions, bruising, or petechiae  Psych: mood and affect appropriate            RECENT LABS:  Lab Results   Component Value Date    WBC 2.09 (L) 08/16/2023    HGB 11.6 (L) 08/16/2023    HCT 32.3 (L) 08/16/2023    MCV 94.4 08/16/2023    PLT 90 (L) 08/16/2023       Lab Results   Component Value Date    GLUCOSE 93 08/16/2023    BUN 12 08/16/2023    CREATININE 0.91 08/16/2023    EGFRIFNONA 77 02/16/2022    BCR 13.2 08/16/2023    K 4.9 08/16/2023    CO2 29.0 08/16/2023    CALCIUM 9.5 08/16/2023    ALBUMIN 4.1 08/16/2023    AST 21 08/16/2023    ALT 26 08/16/2023                     Assessment & Plan   Alcira Phelan is a 58 y.o. female with metastatic ER positive [place] positive HER-2 negative invasive ductal carcinoma, with involvement of lymph nodes and liver.    [id number] testing did not show an ESR 1 mutation.  Treatment changed to fulvestrant and ribociclib.  Fulvestrant started 7/19/2023.  Ribociclib started 7/27/2023.  She has had 2 EKGs that have shown normal QTc interval.  Recent PET/CT scan is stable.  Her ANC today is 810.  We will proceed with fulvestrant but hold ribociclib for 1 week.  I will have her recheck a CBC next week prior to starting ribociclib.    Follow-up in 1 month.                  Rasheeda Ramirez APRN  [facility]ematology and On[facility]    9/14/2023          CC:

## 2023-09-15 LAB — CANCER AG27-29 SERPL-ACNC: 29.8 U/ML (ref 0–38.6)

## 2023-09-21 ENCOUNTER — TELEPHONE (OUTPATIENT)
Dept: ONCOLOGY | Facility: CLINIC | Age: 58
End: 2023-09-21
Payer: COMMERCIAL

## 2023-09-21 NOTE — TELEPHONE ENCOUNTER
I called patient back and she was wondering if she could go visit someone in the nursing home.  I advised that she should wear a mask and do good handwashing but she can visit as long as she does those interventions.  She verbalized understanding.

## 2023-09-25 ENCOUNTER — SPECIALTY PHARMACY (OUTPATIENT)
Dept: ONCOLOGY | Facility: HOSPITAL | Age: 58
End: 2023-09-25
Payer: COMMERCIAL

## 2023-09-26 ENCOUNTER — LAB (OUTPATIENT)
Dept: LAB | Facility: HOSPITAL | Age: 58
End: 2023-09-26
Payer: COMMERCIAL

## 2023-09-26 ENCOUNTER — TELEPHONE (OUTPATIENT)
Dept: ONCOLOGY | Facility: CLINIC | Age: 58
End: 2023-09-26
Payer: COMMERCIAL

## 2023-09-26 DIAGNOSIS — C79.51 MALIGNANT NEOPLASM METASTATIC TO BONE: ICD-10-CM

## 2023-09-26 DIAGNOSIS — C50.112 MALIGNANT NEOPLASM OF CENTRAL PORTION OF LEFT FEMALE BREAST, UNSPECIFIED ESTROGEN RECEPTOR STATUS: ICD-10-CM

## 2023-09-26 LAB
BASOPHILS # BLD AUTO: 0.07 10*3/MM3 (ref 0–0.2)
BASOPHILS NFR BLD AUTO: 2.7 % (ref 0–1.5)
DEPRECATED RDW RBC AUTO: 52.2 FL (ref 37–54)
EOSINOPHIL # BLD AUTO: 0.12 10*3/MM3 (ref 0–0.4)
EOSINOPHIL NFR BLD AUTO: 4.6 % (ref 0.3–6.2)
ERYTHROCYTE [DISTWIDTH] IN BLOOD BY AUTOMATED COUNT: 14.7 % (ref 12.3–15.4)
HCT VFR BLD AUTO: 32.9 % (ref 34–46.6)
HGB BLD-MCNC: 11.8 G/DL (ref 12–15.9)
IMM GRANULOCYTES # BLD AUTO: 0 10*3/MM3 (ref 0–0.05)
IMM GRANULOCYTES NFR BLD AUTO: 0 % (ref 0–0.5)
LYMPHOCYTES # BLD AUTO: 0.53 10*3/MM3 (ref 0.7–3.1)
LYMPHOCYTES NFR BLD AUTO: 20.4 % (ref 19.6–45.3)
MCH RBC QN AUTO: 34.4 PG (ref 26.6–33)
MCHC RBC AUTO-ENTMCNC: 35.9 G/DL (ref 31.5–35.7)
MCV RBC AUTO: 95.9 FL (ref 79–97)
MONOCYTES # BLD AUTO: 0.19 10*3/MM3 (ref 0.1–0.9)
MONOCYTES NFR BLD AUTO: 7.3 % (ref 5–12)
NEUTROPHILS NFR BLD AUTO: 1.69 10*3/MM3 (ref 1.7–7)
NEUTROPHILS NFR BLD AUTO: 65 % (ref 42.7–76)
PLATELET # BLD AUTO: 272 10*3/MM3 (ref 140–450)
PMV BLD AUTO: 8.5 FL (ref 6–12)
RBC # BLD AUTO: 3.43 10*6/MM3 (ref 3.77–5.28)
WBC NRBC COR # BLD: 2.6 10*3/MM3 (ref 3.4–10.8)

## 2023-09-26 PROCEDURE — 36415 COLL VENOUS BLD VENIPUNCTURE: CPT

## 2023-09-26 PROCEDURE — 85025 COMPLETE CBC W/AUTO DIFF WBC: CPT

## 2023-09-26 NOTE — TELEPHONE ENCOUNTER
Patient left a voicemail wants to know her lab results so she know what to do about her medication. Please call.

## 2023-09-26 NOTE — TELEPHONE ENCOUNTER
Per Dr. Castro, I called the patient and let her know that the counts have recovered and she can start her medication.

## 2023-10-11 ENCOUNTER — TELEPHONE (OUTPATIENT)
Dept: ONCOLOGY | Facility: CLINIC | Age: 58
End: 2023-10-11
Payer: COMMERCIAL

## 2023-10-11 NOTE — TELEPHONE ENCOUNTER
I called patient back and said she can get labs just prior to her appt.  I had to leave voicemail as patient did not answer.

## 2023-10-11 NOTE — TELEPHONE ENCOUNTER
Caller: Alcira Phelan    Relationship: Self    Best call back number: [phone]     What is the best time to reach you: ASAP    Who are you requesting to speak with (clinical staff, provider,  specific staff member): CLINICAL    What was the call regarding: PLEASE CALL PT TO ADVISE IF SHE NEEDS TO GET LAB DONE TODAY OR IF IT CAN WAIT UNTIL TOMORROW WHEN SHE COMES IN BEFORE HER APPT.  SHE IS SEEING DR. OTTO AT 8:45 TOMORROW.  HUB UNABLE TO WARM TRANSFER.

## 2023-10-12 ENCOUNTER — HOSPITAL ENCOUNTER (OUTPATIENT)
Dept: ONCOLOGY | Facility: HOSPITAL | Age: 58
Discharge: HOME | End: 2023-10-12
Admitting: NURSE PRACTITIONER
Payer: COMMERCIAL

## 2023-10-12 ENCOUNTER — SPECIALTY PHARMACY (OUTPATIENT)
Dept: ONCOLOGY | Facility: HOSPITAL | Age: 58
End: 2023-10-12
Payer: COMMERCIAL

## 2023-10-12 ENCOUNTER — OFFICE VISIT (OUTPATIENT)
Dept: ONCOLOGY | Facility: CLINIC | Age: 58
End: 2023-10-12
Payer: COMMERCIAL

## 2023-10-12 ENCOUNTER — LAB (OUTPATIENT)
Dept: LAB | Facility: HOSPITAL | Age: 58
End: 2023-10-12
Payer: COMMERCIAL

## 2023-10-12 VITALS
WEIGHT: 137.5 LBS | HEIGHT: 61 IN | BODY MASS INDEX: 25.96 KG/M2 | DIASTOLIC BLOOD PRESSURE: 80 MMHG | HEART RATE: 78 BPM | OXYGEN SATURATION: 98 % | SYSTOLIC BLOOD PRESSURE: 130 MMHG | TEMPERATURE: 97.7 F | RESPIRATION RATE: 18 BRPM

## 2023-10-12 DIAGNOSIS — C79.51 MALIGNANT NEOPLASM METASTATIC TO BONE: Primary | ICD-10-CM

## 2023-10-12 DIAGNOSIS — C50.112 MALIGNANT NEOPLASM OF CENTRAL PORTION OF LEFT FEMALE BREAST, UNSPECIFIED ESTROGEN RECEPTOR STATUS: ICD-10-CM

## 2023-10-12 DIAGNOSIS — C79.51 MALIGNANT NEOPLASM METASTATIC TO BONE: ICD-10-CM

## 2023-10-12 DIAGNOSIS — Z85.3 HISTORY OF LEFT BREAST CANCER: ICD-10-CM

## 2023-10-12 DIAGNOSIS — C50.112 MALIGNANT NEOPLASM OF CENTRAL PORTION OF LEFT FEMALE BREAST, UNSPECIFIED ESTROGEN RECEPTOR STATUS: Primary | ICD-10-CM

## 2023-10-12 LAB
ALBUMIN SERPL-MCNC: 4.2 G/DL (ref 3.5–5.2)
ALBUMIN/GLOB SERPL: 1.3 G/DL
ALP SERPL-CCNC: 147 U/L (ref 39–117)
ALT SERPL W P-5'-P-CCNC: 17 U/L (ref 1–33)
ANION GAP SERPL CALCULATED.3IONS-SCNC: 9 MMOL/L (ref 5–15)
AST SERPL-CCNC: 21 U/L (ref 1–32)
BASOPHILS # BLD AUTO: 0.03 10*3/MM3 (ref 0–0.2)
BASOPHILS NFR BLD AUTO: 1.9 % (ref 0–1.5)
BILIRUB SERPL-MCNC: 0.2 MG/DL (ref 0–1.2)
BUN SERPL-MCNC: 16 MG/DL (ref 8–23)
BUN/CREAT SERPL: 16.5 (ref 7–25)
CALCIUM SPEC-SCNC: 9.4 MG/DL (ref 8.6–10.5)
CHLORIDE SERPL-SCNC: 106 MMOL/L (ref 98–107)
CO2 SERPL-SCNC: 27 MMOL/L (ref 22–29)
CREAT SERPL-MCNC: 0.97 MG/DL (ref 0.57–1)
DEPRECATED RDW RBC AUTO: 53.9 FL (ref 37–54)
EGFRCR SERPLBLD CKD-EPI 2021: 67 ML/MIN/1.73
EOSINOPHIL # BLD AUTO: 0.09 10*3/MM3 (ref 0–0.4)
EOSINOPHIL NFR BLD AUTO: 5.8 % (ref 0.3–6.2)
ERYTHROCYTE [DISTWIDTH] IN BLOOD BY AUTOMATED COUNT: 14.9 % (ref 12.3–15.4)
GLOBULIN UR ELPH-MCNC: 3.2 GM/DL
GLUCOSE SERPL-MCNC: 114 MG/DL (ref 65–99)
HCT VFR BLD AUTO: 33.7 % (ref 34–46.6)
HGB BLD-MCNC: 11.8 G/DL (ref 12–15.9)
IMM GRANULOCYTES # BLD AUTO: 0 10*3/MM3 (ref 0–0.05)
IMM GRANULOCYTES NFR BLD AUTO: 0 % (ref 0–0.5)
LYMPHOCYTES # BLD AUTO: 0.53 10*3/MM3 (ref 0.7–3.1)
LYMPHOCYTES NFR BLD AUTO: 34.4 % (ref 19.6–45.3)
MCH RBC QN AUTO: 34.6 PG (ref 26.6–33)
MCHC RBC AUTO-ENTMCNC: 35 G/DL (ref 31.5–35.7)
MCV RBC AUTO: 98.8 FL (ref 79–97)
MONOCYTES # BLD AUTO: 0.13 10*3/MM3 (ref 0.1–0.9)
MONOCYTES NFR BLD AUTO: 8.4 % (ref 5–12)
NEUTROPHILS NFR BLD AUTO: 0.76 10*3/MM3 (ref 1.7–7)
NEUTROPHILS NFR BLD AUTO: 49.5 % (ref 42.7–76)
PLATELET # BLD AUTO: 107 10*3/MM3 (ref 140–450)
PMV BLD AUTO: 9.4 FL (ref 6–12)
POTASSIUM SERPL-SCNC: 4.5 MMOL/L (ref 3.5–5.2)
PROT SERPL-MCNC: 7.4 G/DL (ref 6–8.5)
RBC # BLD AUTO: 3.41 10*6/MM3 (ref 3.77–5.28)
SODIUM SERPL-SCNC: 142 MMOL/L (ref 136–145)
WBC NRBC COR # BLD: 1.54 10*3/MM3 (ref 3.4–10.8)

## 2023-10-12 PROCEDURE — 96401 CHEMO ANTI-NEOPL SQ/IM: CPT

## 2023-10-12 PROCEDURE — 80053 COMPREHEN METABOLIC PANEL: CPT

## 2023-10-12 PROCEDURE — 96402 CHEMO HORMON ANTINEOPL SQ/IM: CPT

## 2023-10-12 PROCEDURE — 36415 COLL VENOUS BLD VENIPUNCTURE: CPT

## 2023-10-12 PROCEDURE — 99214 OFFICE O/P EST MOD 30 MIN: CPT | Performed by: INTERNAL MEDICINE

## 2023-10-12 PROCEDURE — 85025 COMPLETE CBC W/AUTO DIFF WBC: CPT

## 2023-10-12 PROCEDURE — 25010000002 FULVESTRANT PER 25 MG: Performed by: NURSE PRACTITIONER

## 2023-10-12 RX ORDER — LAMOTRIGINE 25 MG/1
500 TABLET ORAL ONCE
OUTPATIENT
Start: 2023-11-09

## 2023-10-12 RX ORDER — LAMOTRIGINE 25 MG/1
500 TABLET ORAL ONCE
Status: COMPLETED | OUTPATIENT
Start: 2023-10-12 | End: 2023-10-12

## 2023-10-12 RX ADMIN — FULVESTRANT 500 MG: 50 INJECTION, SOLUTION INTRAMUSCULAR at 10:02

## 2023-10-12 NOTE — PROGRESS NOTES
"      PROBLEM LIST:  1. Local recurrence of HR+ carcinoma of the left breast  A) history of left breast cancer in 2007.  Bilateral mastectomy 5/30/07.  pathology showed grade 2 IDC of the left breast measuring 1.8 cm.  ER+ VA+ Her2 negative.   0/2 SLN involved.  [id number].  B) adjuvant chemotherapy with TC x 4 cycles, completed September 2007 with Dr. De La Torre.  Tamoxifen October 2007 thru October 2012.  C) presented January 2022 with left chest wall mass.  CT chest 1/17/22 showed 4.6 cm lesion involving left manubrium with soft tissue extent extending posteriorly to the anterior aspect of mediastinum.  12 mm nodule right lung base.  D) ultrasound guided biopsy of chest wall mass on 2/10/22.  Pathology showed invasive ductal carcinoma of the breast.  ER positive (100%), VA positive (50%), HER-2 2+  E) letrozole started February 2022.  CyberKnife to the chest wall mass completed 4/15/2022.  Ibrance started 4/18/2022.  Ibrance decreased to 100 mg due to neutropenia 9/5/2022  F) PET/CT 7/5/23 showed a new 2.9 cm hypermetabolic liver lesion, nonenlarged but hypermetabolic bilateral hilar and mediastinal lymph nodes.  [id number] testing showed a PI3 kinase mutation, no ESR 1 mutation.  Treatment changed to fulvestrant and ribociclib.  Fulvestrant started 7/19/2023.  Ribociclib started 7/27/2023.  2. Depression/anxiety  3. GERD    Subjective     CHIEF COMPLAINT: Breast cancer    HISTORY OF PRESENT ILLNESS:   Alcira Phelan returns for follow-up.  She continues on fulvestrant and ribociclib.  She is doing pretty well.  No new symptoms or complaints.    Objective      /80   Pulse 78   Temp 97.7 øF (36.5 øC) (Infrared)   Resp 18   Ht 154.9 cm (61\") Comment: per pt  Wt 62.4 kg (137 lb 8 oz)   SpO2 98%   BMI 25.98 kg/mý    Vitals:    10/12/23 0841   PainSc: 0-No pain                   ECOG score: 0           General: well appearing female in no acute distress  Neuro: alert and oriented  HEENT: sclera anicteric, " oropharynx clear  Cardiovascular: Regular rate and rhythm  Lungs: Clear to auscultation bilaterally  Skin: no rashes, lesions, bruising, or petechiae  Psych: mood and affect appropriate            RECENT LABS:  Lab Results   Component Value Date    WBC 1.54 (L) 10/12/2023    HGB 11.8 (L) 10/12/2023    HCT 33.7 (L) 10/12/2023    MCV 98.8 (H) 10/12/2023     (L) 10/12/2023       Lab Results   Component Value Date    GLUCOSE 91 09/14/2023    BUN 17 09/14/2023    CREATININE 0.90 09/14/2023    EGFRIFNONA 77 02/16/2022    BCR 18.9 09/14/2023    K 5.0 09/14/2023    CO2 27.0 09/14/2023    CALCIUM 9.6 09/14/2023    ALBUMIN 4.1 09/14/2023    AST 21 09/14/2023    ALT 17 09/14/2023                     Assessment & Plan   Alcira Phelan is a 58 y.o. female with metastatic ER positive VT positive HER-2 negative invasive ductal carcinoma, with involvement of lymph nodes and liver.    [id number] testing did not show an ESR 1 mutation.  Treatment changed to fulvestrant and ribociclib.  Fulvestrant started 7/19/2023.  Ribociclib started 7/27/2023.  She is overall doing well with no new symptoms or concerns.  Labs today show an ANC of 760.  She is not due to start her ribociclib until next week so we will have her recheck her labs next week and contact her with results.  If she remains neutropenic I will likely reduce her ribociclib dose.    We will plan to repeat imaging in early December.    Follow-up in 1 month.                  Ruth Castro MD  [facility] Hematology and Oncology    10/12/2023          CC:

## 2023-10-13 DIAGNOSIS — C50.112 MALIGNANT NEOPLASM OF CENTRAL PORTION OF LEFT FEMALE BREAST, UNSPECIFIED ESTROGEN RECEPTOR STATUS: Primary | ICD-10-CM

## 2023-10-16 ENCOUNTER — TELEPHONE (OUTPATIENT)
Dept: ONCOLOGY | Facility: CLINIC | Age: 58
End: 2023-10-16
Payer: COMMERCIAL

## 2023-10-16 ENCOUNTER — LAB (OUTPATIENT)
Dept: LAB | Facility: HOSPITAL | Age: 58
End: 2023-10-16
Payer: COMMERCIAL

## 2023-10-16 DIAGNOSIS — C50.112 MALIGNANT NEOPLASM OF CENTRAL PORTION OF LEFT FEMALE BREAST, UNSPECIFIED ESTROGEN RECEPTOR STATUS: Primary | ICD-10-CM

## 2023-10-16 DIAGNOSIS — C50.112 MALIGNANT NEOPLASM OF CENTRAL PORTION OF LEFT FEMALE BREAST, UNSPECIFIED ESTROGEN RECEPTOR STATUS: ICD-10-CM

## 2023-10-16 LAB
BASOPHILS # BLD AUTO: 0.04 10*3/MM3 (ref 0–0.2)
BASOPHILS NFR BLD AUTO: 2.2 % (ref 0–1.5)
DEPRECATED RDW RBC AUTO: 54.2 FL (ref 37–54)
EOSINOPHIL # BLD AUTO: 0.05 10*3/MM3 (ref 0–0.4)
EOSINOPHIL NFR BLD AUTO: 2.8 % (ref 0.3–6.2)
ERYTHROCYTE [DISTWIDTH] IN BLOOD BY AUTOMATED COUNT: 15.2 % (ref 12.3–15.4)
HCT VFR BLD AUTO: 37 % (ref 34–46.6)
HGB BLD-MCNC: 13.3 G/DL (ref 12–15.9)
IMM GRANULOCYTES # BLD AUTO: 0 10*3/MM3 (ref 0–0.05)
IMM GRANULOCYTES NFR BLD AUTO: 0 % (ref 0–0.5)
LYMPHOCYTES # BLD AUTO: 0.72 10*3/MM3 (ref 0.7–3.1)
LYMPHOCYTES NFR BLD AUTO: 39.8 % (ref 19.6–45.3)
MCH RBC QN AUTO: 35.1 PG (ref 26.6–33)
MCHC RBC AUTO-ENTMCNC: 35.9 G/DL (ref 31.5–35.7)
MCV RBC AUTO: 97.6 FL (ref 79–97)
MONOCYTES # BLD AUTO: 0.23 10*3/MM3 (ref 0.1–0.9)
MONOCYTES NFR BLD AUTO: 12.7 % (ref 5–12)
NEUTROPHILS NFR BLD AUTO: 0.77 10*3/MM3 (ref 1.7–7)
NEUTROPHILS NFR BLD AUTO: 42.5 % (ref 42.7–76)
PLATELET # BLD AUTO: 126 10*3/MM3 (ref 140–450)
PMV BLD AUTO: 9.5 FL (ref 6–12)
RBC # BLD AUTO: 3.79 10*6/MM3 (ref 3.77–5.28)
WBC NRBC COR # BLD: 1.81 10*3/MM3 (ref 3.4–10.8)

## 2023-10-16 PROCEDURE — 36415 COLL VENOUS BLD VENIPUNCTURE: CPT

## 2023-10-16 PROCEDURE — 85025 COMPLETE CBC W/AUTO DIFF WBC: CPT

## 2023-10-16 NOTE — TELEPHONE ENCOUNTER
I called patient per Dr Castro as her ANC is still low at .77.  Dr Castro wants to delay her start of ribociclib by another week and check and make sure counts have recovered and then we will reduce dose to 400mg daily for 21 days.  I had to leave voicemail for patient but asked her to come back in next Monday 10/23 for lab check.  I sent pharmacy team message that we are reducing dose of ribociclib to 400mg daily.

## 2023-10-17 ENCOUNTER — SPECIALTY PHARMACY (OUTPATIENT)
Dept: ONCOLOGY | Facility: HOSPITAL | Age: 58
End: 2023-10-17
Payer: COMMERCIAL

## 2023-10-17 DIAGNOSIS — C50.112 MALIGNANT NEOPLASM OF CENTRAL PORTION OF LEFT FEMALE BREAST, UNSPECIFIED ESTROGEN RECEPTOR STATUS: ICD-10-CM

## 2023-10-17 DIAGNOSIS — C79.51 MALIGNANT NEOPLASM METASTATIC TO BONE: ICD-10-CM

## 2023-10-17 DIAGNOSIS — Z85.3 HISTORY OF LEFT BREAST CANCER: ICD-10-CM

## 2023-10-17 NOTE — PROGRESS NOTES
Re: Refills of Oral Specialty Medication - Kisqali (ribociclib)    Drug-Drug Interactions: The current medication list was reviewed and there are no new relevant drug-drug interactions with the specialty medication.  Previously reviewed interaction with PRN trazodone.   Medication Allergies: The patient has no relevant allergies as it relates to their oral specialty medication  Review of Labs/Dose Adjustments: DOSE CHANGE - I reviewed the most recent note and labs. Due to persistent/recurrent neutropenia the dose is being lowered from 600mg to 400mg. I sent refills as described below. Note patient will have a repeat CBC on Monday 10/23 prior to starting this reduced dose.    Drug: Kisqali (ribociclib)  Strength: 200 mg  Directions: Take 2 tablets by mouth daily on days 1-21, then off for 7 days in each 28-day cycle  Quantity: 42  Refills: 11  Pharmacy prescription sent to: [facility]    Ann Cowden Mayer, PharmD, [facility]  Oncology Clinical Pharmacist  Phone [phone]    10/17/2023  13:02 EDT

## 2023-10-18 NOTE — PROGRESS NOTES
I completed an independent review of the medication order and prescription. I agree with Dr. Hawkins's assessment and what is in her note. Holding treatment and reducing the dose upon resumption (once neutropenia is grade 2 or lower) is appropriate per the package insert for grade 3 neutropenia.  I confirm she [facility] the [facility] to [facility] as described.      Ines Haro, PharmD, BCOP - Oncology Clinical Pharmacist [phone]    10/18/2023  08:02 EDT

## 2023-10-20 ENCOUNTER — TELEPHONE (OUTPATIENT)
Dept: ONCOLOGY | Facility: CLINIC | Age: 58
End: 2023-10-20
Payer: COMMERCIAL

## 2023-10-23 ENCOUNTER — TELEPHONE (OUTPATIENT)
Dept: ONCOLOGY | Facility: CLINIC | Age: 58
End: 2023-10-23
Payer: COMMERCIAL

## 2023-10-23 ENCOUNTER — LAB (OUTPATIENT)
Dept: LAB | Facility: HOSPITAL | Age: 58
End: 2023-10-23
Payer: COMMERCIAL

## 2023-10-23 DIAGNOSIS — C50.112 MALIGNANT NEOPLASM OF CENTRAL PORTION OF LEFT FEMALE BREAST, UNSPECIFIED ESTROGEN RECEPTOR STATUS: ICD-10-CM

## 2023-10-23 LAB
BASOPHILS # BLD AUTO: 0.09 10*3/MM3 (ref 0–0.2)
BASOPHILS NFR BLD AUTO: 2.9 % (ref 0–1.5)
DEPRECATED RDW RBC AUTO: 54.2 FL (ref 37–54)
EOSINOPHIL # BLD AUTO: 0.13 10*3/MM3 (ref 0–0.4)
EOSINOPHIL NFR BLD AUTO: 4.2 % (ref 0.3–6.2)
ERYTHROCYTE [DISTWIDTH] IN BLOOD BY AUTOMATED COUNT: 14.9 % (ref 12.3–15.4)
HCT VFR BLD AUTO: 35.7 % (ref 34–46.6)
HGB BLD-MCNC: 12.6 G/DL (ref 12–15.9)
IMM GRANULOCYTES # BLD AUTO: 0 10*3/MM3 (ref 0–0.05)
IMM GRANULOCYTES NFR BLD AUTO: 0 % (ref 0–0.5)
LYMPHOCYTES # BLD AUTO: 0.94 10*3/MM3 (ref 0.7–3.1)
LYMPHOCYTES NFR BLD AUTO: 30.4 % (ref 19.6–45.3)
MCH RBC QN AUTO: 34.6 PG (ref 26.6–33)
MCHC RBC AUTO-ENTMCNC: 35.3 G/DL (ref 31.5–35.7)
MCV RBC AUTO: 98.1 FL (ref 79–97)
MONOCYTES # BLD AUTO: 0.49 10*3/MM3 (ref 0.1–0.9)
MONOCYTES NFR BLD AUTO: 15.9 % (ref 5–12)
NEUTROPHILS NFR BLD AUTO: 1.44 10*3/MM3 (ref 1.7–7)
NEUTROPHILS NFR BLD AUTO: 46.6 % (ref 42.7–76)
PLATELET # BLD AUTO: 228 10*3/MM3 (ref 140–450)
PMV BLD AUTO: 8.6 FL (ref 6–12)
RBC # BLD AUTO: 3.64 10*6/MM3 (ref 3.77–5.28)
WBC NRBC COR # BLD: 3.09 10*3/MM3 (ref 3.4–10.8)

## 2023-10-23 PROCEDURE — 85025 COMPLETE CBC W/AUTO DIFF WBC: CPT

## 2023-10-23 PROCEDURE — 36415 COLL VENOUS BLD VENIPUNCTURE: CPT

## 2023-10-23 NOTE — TELEPHONE ENCOUNTER
I called patient per Dr. Castro to let her know that the counts have now recovered and she can take her reduced dose of ribociclib.  She verbalized understanding and said she will start 400mg tomorrow morning.

## 2023-10-23 NOTE — TELEPHONE ENCOUNTER
----- Message from Sharee Leo sent at 10/23/2023 10:10 AM EDT -----  Regarding: [facility]  Alcira stopped by and said that [facility] will be sending over a request for us to send the last 3 office notes/clinicals so she can get paid. Just sending to be on the look out for it! You may have already received it!     Thanks!     Sharee

## 2023-10-24 ENCOUNTER — SPECIALTY PHARMACY (OUTPATIENT)
Dept: ONCOLOGY | Facility: HOSPITAL | Age: 58
End: 2023-10-24
Payer: COMMERCIAL

## 2023-11-08 ENCOUNTER — OFFICE VISIT (OUTPATIENT)
Dept: ONCOLOGY | Facility: CLINIC | Age: 58
End: 2023-11-08
Payer: COMMERCIAL

## 2023-11-08 ENCOUNTER — APPOINTMENT (OUTPATIENT)
Dept: ONCOLOGY | Facility: HOSPITAL | Age: 58
End: 2023-11-08
Payer: COMMERCIAL

## 2023-11-08 ENCOUNTER — HOSPITAL ENCOUNTER (OUTPATIENT)
Dept: ONCOLOGY | Facility: HOSPITAL | Age: 58
Discharge: HOME | End: 2023-11-08
Admitting: INTERNAL MEDICINE
Payer: COMMERCIAL

## 2023-11-08 VITALS
TEMPERATURE: 97.4 F | SYSTOLIC BLOOD PRESSURE: 137 MMHG | BODY MASS INDEX: 27.68 KG/M2 | HEIGHT: 60 IN | OXYGEN SATURATION: 95 % | WEIGHT: 141 LBS | RESPIRATION RATE: 16 BRPM | DIASTOLIC BLOOD PRESSURE: 72 MMHG | HEART RATE: 86 BPM

## 2023-11-08 DIAGNOSIS — C50.112 MALIGNANT NEOPLASM OF CENTRAL PORTION OF LEFT BREAST IN FEMALE, ESTROGEN RECEPTOR POSITIVE: Primary | ICD-10-CM

## 2023-11-08 DIAGNOSIS — C50.112 MALIGNANT NEOPLASM OF CENTRAL PORTION OF LEFT FEMALE BREAST, UNSPECIFIED ESTROGEN RECEPTOR STATUS: Primary | ICD-10-CM

## 2023-11-08 DIAGNOSIS — Z17.0 MALIGNANT NEOPLASM OF CENTRAL PORTION OF LEFT BREAST IN FEMALE, ESTROGEN RECEPTOR POSITIVE: Primary | ICD-10-CM

## 2023-11-08 DIAGNOSIS — Z85.3 HISTORY OF LEFT BREAST CANCER: ICD-10-CM

## 2023-11-08 DIAGNOSIS — C79.51 MALIGNANT NEOPLASM METASTATIC TO BONE: ICD-10-CM

## 2023-11-08 DIAGNOSIS — C50.112 MALIGNANT NEOPLASM OF CENTRAL PORTION OF LEFT FEMALE BREAST, UNSPECIFIED ESTROGEN RECEPTOR STATUS: ICD-10-CM

## 2023-11-08 LAB
ALBUMIN SERPL-MCNC: 4.2 G/DL (ref 3.5–5.2)
ALBUMIN/GLOB SERPL: 1.5 G/DL
ALP SERPL-CCNC: 148 U/L (ref 39–117)
ALT SERPL W P-5'-P-CCNC: 24 U/L (ref 1–33)
ANION GAP SERPL CALCULATED.3IONS-SCNC: 8 MMOL/L (ref 5–15)
AST SERPL-CCNC: 23 U/L (ref 1–32)
BASOPHILS # BLD AUTO: 0.03 10*3/MM3 (ref 0–0.2)
BASOPHILS NFR BLD AUTO: 1.1 % (ref 0–1.5)
BILIRUB SERPL-MCNC: 0.2 MG/DL (ref 0–1.2)
BUN SERPL-MCNC: 15 MG/DL (ref 8–23)
BUN/CREAT SERPL: 15 (ref 7–25)
CALCIUM SPEC-SCNC: 9.2 MG/DL (ref 8.6–10.5)
CANCER AG15-3 SERPL-ACNC: 28.6 U/ML
CHLORIDE SERPL-SCNC: 104 MMOL/L (ref 98–107)
CO2 SERPL-SCNC: 29 MMOL/L (ref 22–29)
CREAT SERPL-MCNC: 1 MG/DL (ref 0.57–1)
DEPRECATED RDW RBC AUTO: 50 FL (ref 37–54)
EGFRCR SERPLBLD CKD-EPI 2021: 64.6 ML/MIN/1.73
EOSINOPHIL # BLD AUTO: 0.19 10*3/MM3 (ref 0–0.4)
EOSINOPHIL NFR BLD AUTO: 7.1 % (ref 0.3–6.2)
ERYTHROCYTE [DISTWIDTH] IN BLOOD BY AUTOMATED COUNT: 13.8 % (ref 12.3–15.4)
GLOBULIN UR ELPH-MCNC: 2.8 GM/DL
GLUCOSE SERPL-MCNC: 147 MG/DL (ref 65–99)
HCT VFR BLD AUTO: 35.5 % (ref 34–46.6)
HGB BLD-MCNC: 12.3 G/DL (ref 12–15.9)
IMM GRANULOCYTES # BLD AUTO: 0 10*3/MM3 (ref 0–0.05)
IMM GRANULOCYTES NFR BLD AUTO: 0 % (ref 0–0.5)
LYMPHOCYTES # BLD AUTO: 0.58 10*3/MM3 (ref 0.7–3.1)
LYMPHOCYTES NFR BLD AUTO: 21.6 % (ref 19.6–45.3)
MCH RBC QN AUTO: 34.3 PG (ref 26.6–33)
MCHC RBC AUTO-ENTMCNC: 34.6 G/DL (ref 31.5–35.7)
MCV RBC AUTO: 98.9 FL (ref 79–97)
MONOCYTES # BLD AUTO: 0.27 10*3/MM3 (ref 0.1–0.9)
MONOCYTES NFR BLD AUTO: 10.1 % (ref 5–12)
NEUTROPHILS NFR BLD AUTO: 1.61 10*3/MM3 (ref 1.7–7)
NEUTROPHILS NFR BLD AUTO: 60.1 % (ref 42.7–76)
PLATELET # BLD AUTO: 171 10*3/MM3 (ref 140–450)
PMV BLD AUTO: 8.4 FL (ref 6–12)
POTASSIUM SERPL-SCNC: 4.5 MMOL/L (ref 3.5–5.2)
PROT SERPL-MCNC: 7 G/DL (ref 6–8.5)
RBC # BLD AUTO: 3.59 10*6/MM3 (ref 3.77–5.28)
SODIUM SERPL-SCNC: 141 MMOL/L (ref 136–145)
WBC NRBC COR # BLD: 2.68 10*3/MM3 (ref 3.4–10.8)

## 2023-11-08 PROCEDURE — 86300 IMMUNOASSAY TUMOR CA 15-3: CPT | Performed by: INTERNAL MEDICINE

## 2023-11-08 PROCEDURE — 99214 OFFICE O/P EST MOD 30 MIN: CPT | Performed by: NURSE PRACTITIONER

## 2023-11-08 PROCEDURE — 85025 COMPLETE CBC W/AUTO DIFF WBC: CPT | Performed by: INTERNAL MEDICINE

## 2023-11-08 PROCEDURE — 25010000002 FULVESTRANT PER 25 MG: Performed by: INTERNAL MEDICINE

## 2023-11-08 PROCEDURE — 96402 CHEMO HORMON ANTINEOPL SQ/IM: CPT

## 2023-11-08 PROCEDURE — 80053 COMPREHEN METABOLIC PANEL: CPT | Performed by: INTERNAL MEDICINE

## 2023-11-08 RX ORDER — LAMOTRIGINE 25 MG/1
500 TABLET ORAL ONCE
Status: COMPLETED | OUTPATIENT
Start: 2023-11-08 | End: 2023-11-08

## 2023-11-08 RX ORDER — LAMOTRIGINE 25 MG/1
500 TABLET ORAL ONCE
OUTPATIENT
Start: 2023-12-06

## 2023-11-08 RX ADMIN — FULVESTRANT 500 MG: 50 INJECTION, SOLUTION INTRAMUSCULAR at 13:25

## 2023-11-08 NOTE — PROGRESS NOTES
"      PROBLEM LIST:  1. Local recurrence of HR+ carcinoma of the left breast  A) history of left breast cancer in 2007.  Bilateral mastectomy 5/30/07.  pathology showed grade 2 IDC of the left breast measuring 1.8 cm.  ER+ DE+ Her2 negative.   0/2 SLN involved.  [id number].  B) adjuvant chemotherapy with TC x 4 cycles, completed September 2007 with Dr. De La Torre.  Tamoxifen October 2007 thru October 2012.  C) presented January 2022 with left chest wall mass.  CT chest 1/17/22 showed 4.6 cm lesion involving left manubrium with soft tissue extent extending posteriorly to the anterior aspect of mediastinum.  12 mm nodule right lung base.  D) ultrasound guided biopsy of chest wall mass on 2/10/22.  Pathology showed invasive ductal carcinoma of the breast.  ER positive (100%), DE positive (50%), HER-2 2+  E) letrozole started February 2022.  CyberKnife to the chest wall mass completed 4/15/2022.  Ibrance started 4/18/2022.  Ibrance decreased to 100 mg due to neutropenia 9/5/2022  F) PET/CT 7/5/23 showed a new 2.9 cm hypermetabolic liver lesion, nonenlarged but hypermetabolic bilateral hilar and mediastinal lymph nodes.  [id number] testing showed a PI3 kinase mutation, no ESR 1 mutation.  Treatment changed to fulvestrant and ribociclib.  Fulvestrant started 7/19/2023.  Ribociclib started 7/27/2023.  2. Depression/anxiety  3. GERD    Subjective     CHIEF COMPLAINT: Breast cancer    HISTORY OF PRESENT ILLNESS:   Alcira Phelan returns for follow-up.  She continues on fulvestrant and ribociclib.  She is doing pretty well.  She has some dry skin that is pruritic but denies any rash.  Otherwise she denies any new medical concerns today.        Objective      /72   Pulse 86   Temp 97.4 °F (36.3 °C)   Resp 16   Ht 152.4 cm (60\")   Wt 64 kg (141 lb)   SpO2 95%   BMI 27.54 kg/m²    Vitals:    11/08/23 1112   PainSc: 0-No pain                     ECOG score: 0           General: well appearing female in no acute " distress  Neuro: alert and oriented  HEENT: sclera anicteric, oropharynx clear  Cardiovascular: Regular rate and rhythm  Lungs: Clear to auscultation bilaterally  Skin: no rashes, lesions, bruising, or petechiae  Psych: mood and affect appropriate            RECENT LABS:  Lab Results   Component Value Date    WBC 2.68 (L) 11/08/2023    HGB 12.3 11/08/2023    HCT 35.5 11/08/2023    MCV 98.9 (H) 11/08/2023     11/08/2023       Lab Results   Component Value Date    GLUCOSE 114 (H) 10/12/2023    BUN 16 10/12/2023    CREATININE 0.97 10/12/2023    EGFRIFNONA 77 02/16/2022    BCR 16.5 10/12/2023    K 4.5 10/12/2023    CO2 27.0 10/12/2023    CALCIUM 9.4 10/12/2023    ALBUMIN 4.2 10/12/2023    AST 21 10/12/2023    ALT 17 10/12/2023                     Assessment & Plan   Alcira Phelan is a 58 y.o. female with metastatic ER positive IN positive HER-2 negative invasive ductal carcinoma, with involvement of lymph nodes and liver.    [id number] testing did not show an ESR 1 mutation.  Treatment changed to fulvestrant and ribociclib.  Fulvestrant started 7/19/2023.  Ribociclib started 7/27/2023.  Ribociclib decreased to 400 mg due to neutropenia 10/2023.  She is overall doing well with no new symptoms or concerns.  Labs today show ANC 1.61.  We will continue treatment as planned.    We will plan to repeat imaging in early December.  Order for PET/CT placed today.    Follow-up in 1 month.                  Rasheeda Ramirez APRANTONY  [facility] Hematology and Oncology    11/8/2023          CC:

## 2023-11-09 LAB — CANCER AG27-29 SERPL-ACNC: 34.6 U/ML (ref 0–38.6)

## 2023-11-10 ENCOUNTER — SPECIALTY PHARMACY (OUTPATIENT)
Dept: ONCOLOGY | Facility: HOSPITAL | Age: 58
End: 2023-11-10
Payer: COMMERCIAL

## 2023-11-16 ENCOUNTER — TELEPHONE (OUTPATIENT)
Dept: ONCOLOGY | Facility: CLINIC | Age: 58
End: 2023-11-16
Payer: COMMERCIAL

## 2023-11-16 NOTE — TELEPHONE ENCOUNTER
Patient left a voicemail she accidentally took the Ribociclib Succinate Monday and Tuesday this is supposed to be her off week. Can she just stop taking this, and start it back next Monday? Please call.

## 2023-11-16 NOTE — TELEPHONE ENCOUNTER
I spoke with Dr Castro and she stated that the should now stop the medication for 7 days.  That will put her new start date at next Wednesday.  I had to leave a voicemail with the information as patient did not answer.

## 2023-11-29 ENCOUNTER — TELEPHONE (OUTPATIENT)
Dept: ONCOLOGY | Facility: CLINIC | Age: 58
End: 2023-11-29
Payer: COMMERCIAL

## 2023-11-29 NOTE — TELEPHONE ENCOUNTER
I called patient back and told her that we would hope to have results by Friday afternoon but it is dependent on radiology.  Patient verbalized understanding.

## 2023-11-29 NOTE — TELEPHONE ENCOUNTER
Patient left a voicemail she has a CT scan Friday,wants to know if she will be able to get the results Friday? Please call.

## 2023-12-01 ENCOUNTER — HOSPITAL ENCOUNTER (OUTPATIENT)
Dept: PET IMAGING | Facility: HOSPITAL | Age: 58
Discharge: HOME | End: 2023-12-01
Payer: COMMERCIAL

## 2023-12-01 DIAGNOSIS — Z17.0 MALIGNANT NEOPLASM OF CENTRAL PORTION OF LEFT BREAST IN FEMALE, ESTROGEN RECEPTOR POSITIVE: ICD-10-CM

## 2023-12-01 DIAGNOSIS — C50.112 MALIGNANT NEOPLASM OF CENTRAL PORTION OF LEFT BREAST IN FEMALE, ESTROGEN RECEPTOR POSITIVE: ICD-10-CM

## 2023-12-01 DIAGNOSIS — C79.51 MALIGNANT NEOPLASM METASTATIC TO BONE: ICD-10-CM

## 2023-12-01 LAB — GLUCOSE BLDC GLUCOMTR-MCNC: 105 MG/DL (ref 70–130)

## 2023-12-01 PROCEDURE — 0 FLUDEOXYGLUCOSE F18 SOLUTION: Performed by: NURSE PRACTITIONER

## 2023-12-01 PROCEDURE — 78815 PET IMAGE W/CT SKULL-THIGH: CPT

## 2023-12-01 PROCEDURE — A9552 F18 FDG: HCPCS | Performed by: NURSE PRACTITIONER

## 2023-12-01 PROCEDURE — 82948 REAGENT STRIP/BLOOD GLUCOSE: CPT

## 2023-12-01 RX ADMIN — FLUDEOXYGLUCOSE F 18 1 DOSE: 200 INJECTION, SOLUTION INTRAVENOUS at 08:32

## 2023-12-04 ENCOUNTER — TELEPHONE (OUTPATIENT)
Dept: ONCOLOGY | Facility: CLINIC | Age: 58
End: 2023-12-04
Payer: COMMERCIAL

## 2023-12-04 DIAGNOSIS — C50.112 MALIGNANT NEOPLASM OF CENTRAL PORTION OF LEFT BREAST IN FEMALE, ESTROGEN RECEPTOR POSITIVE: Primary | ICD-10-CM

## 2023-12-04 DIAGNOSIS — Z17.0 MALIGNANT NEOPLASM OF CENTRAL PORTION OF LEFT BREAST IN FEMALE, ESTROGEN RECEPTOR POSITIVE: Primary | ICD-10-CM

## 2023-12-04 NOTE — PROGRESS NOTES
Called patient re: pet scan.  Increase in size and uptake of liver lesion.     Will hold kisqali and fulvestrant.   Will plan for liver biopsy, repeat guardant 360.     Will see her in about 2 weeks.

## 2023-12-05 ENCOUNTER — LAB (OUTPATIENT)
Dept: LAB | Facility: HOSPITAL | Age: 58
End: 2023-12-05
Payer: COMMERCIAL

## 2023-12-05 DIAGNOSIS — C79.51 MALIGNANT NEOPLASM METASTATIC TO BONE: ICD-10-CM

## 2023-12-05 DIAGNOSIS — C50.112 MALIGNANT NEOPLASM OF CENTRAL PORTION OF LEFT FEMALE BREAST, UNSPECIFIED ESTROGEN RECEPTOR STATUS: ICD-10-CM

## 2023-12-05 DIAGNOSIS — Z85.3 HISTORY OF LEFT BREAST CANCER: ICD-10-CM

## 2023-12-05 LAB
ALBUMIN SERPL-MCNC: 4.1 G/DL (ref 3.5–5.2)
ALBUMIN/GLOB SERPL: 1.2 G/DL
ALP SERPL-CCNC: 133 U/L (ref 39–117)
ALT SERPL W P-5'-P-CCNC: 22 U/L (ref 1–33)
ANION GAP SERPL CALCULATED.3IONS-SCNC: 9 MMOL/L (ref 5–15)
AST SERPL-CCNC: 24 U/L (ref 1–32)
BASOPHILS # BLD AUTO: 0.04 10*3/MM3 (ref 0–0.2)
BASOPHILS NFR BLD AUTO: 1.9 % (ref 0–1.5)
BILIRUB SERPL-MCNC: 0.4 MG/DL (ref 0–1.2)
BUN SERPL-MCNC: 13 MG/DL (ref 8–23)
BUN/CREAT SERPL: 12.7 (ref 7–25)
CALCIUM SPEC-SCNC: 9.7 MG/DL (ref 8.6–10.5)
CANCER AG15-3 SERPL-ACNC: 31.9 U/ML
CHLORIDE SERPL-SCNC: 106 MMOL/L (ref 98–107)
CO2 SERPL-SCNC: 27 MMOL/L (ref 22–29)
CREAT SERPL-MCNC: 1.02 MG/DL (ref 0.57–1)
DEPRECATED RDW RBC AUTO: 45.4 FL (ref 37–54)
EGFRCR SERPLBLD CKD-EPI 2021: 63.1 ML/MIN/1.73
EOSINOPHIL # BLD AUTO: 0.12 10*3/MM3 (ref 0–0.4)
EOSINOPHIL NFR BLD AUTO: 5.8 % (ref 0.3–6.2)
ERYTHROCYTE [DISTWIDTH] IN BLOOD BY AUTOMATED COUNT: 12.6 % (ref 12.3–15.4)
GLOBULIN UR ELPH-MCNC: 3.4 GM/DL
GLUCOSE SERPL-MCNC: 96 MG/DL (ref 65–99)
HCT VFR BLD AUTO: 36 % (ref 34–46.6)
HGB BLD-MCNC: 12.9 G/DL (ref 12–15.9)
IMM GRANULOCYTES # BLD AUTO: 0 10*3/MM3 (ref 0–0.05)
IMM GRANULOCYTES NFR BLD AUTO: 0 % (ref 0–0.5)
LYMPHOCYTES # BLD AUTO: 0.76 10*3/MM3 (ref 0.7–3.1)
LYMPHOCYTES NFR BLD AUTO: 36.7 % (ref 19.6–45.3)
MCH RBC QN AUTO: 35.1 PG (ref 26.6–33)
MCHC RBC AUTO-ENTMCNC: 35.8 G/DL (ref 31.5–35.7)
MCV RBC AUTO: 97.8 FL (ref 79–97)
MONOCYTES # BLD AUTO: 0.12 10*3/MM3 (ref 0.1–0.9)
MONOCYTES NFR BLD AUTO: 5.8 % (ref 5–12)
NEUTROPHILS NFR BLD AUTO: 1.03 10*3/MM3 (ref 1.7–7)
NEUTROPHILS NFR BLD AUTO: 49.8 % (ref 42.7–76)
PLATELET # BLD AUTO: 184 10*3/MM3 (ref 140–450)
PMV BLD AUTO: 8.8 FL (ref 6–12)
POTASSIUM SERPL-SCNC: 4.6 MMOL/L (ref 3.5–5.2)
PROT SERPL-MCNC: 7.5 G/DL (ref 6–8.5)
RBC # BLD AUTO: 3.68 10*6/MM3 (ref 3.77–5.28)
SODIUM SERPL-SCNC: 142 MMOL/L (ref 136–145)
WBC NRBC COR # BLD AUTO: 2.07 10*3/MM3 (ref 3.4–10.8)

## 2023-12-05 PROCEDURE — 86300 IMMUNOASSAY TUMOR CA 15-3: CPT

## 2023-12-05 PROCEDURE — 80053 COMPREHEN METABOLIC PANEL: CPT

## 2023-12-05 PROCEDURE — 36415 COLL VENOUS BLD VENIPUNCTURE: CPT

## 2023-12-05 PROCEDURE — 85025 COMPLETE CBC W/AUTO DIFF WBC: CPT

## 2023-12-06 ENCOUNTER — HOSPITAL ENCOUNTER (OUTPATIENT)
Dept: ONCOLOGY | Facility: HOSPITAL | Age: 58
Discharge: HOME | End: 2023-12-06
Payer: COMMERCIAL

## 2023-12-06 LAB — CANCER AG27-29 SERPL-ACNC: 43 U/ML (ref 0–38.6)

## 2023-12-08 RX ORDER — CETIRIZINE HYDROCHLORIDE 5 MG/1
5 TABLET ORAL DAILY
COMMUNITY
End: 2023-12-14

## 2023-12-11 ENCOUNTER — HOSPITAL ENCOUNTER (OUTPATIENT)
Dept: ULTRASOUND IMAGING | Facility: HOSPITAL | Age: 58
Discharge: HOME | End: 2023-12-11
Admitting: INTERNAL MEDICINE
Payer: COMMERCIAL

## 2023-12-11 VITALS
DIASTOLIC BLOOD PRESSURE: 83 MMHG | OXYGEN SATURATION: 97 % | TEMPERATURE: 98.1 F | RESPIRATION RATE: 16 BRPM | HEART RATE: 73 BPM | WEIGHT: 138.8 LBS | SYSTOLIC BLOOD PRESSURE: 164 MMHG | BODY MASS INDEX: 26.21 KG/M2 | HEIGHT: 61 IN

## 2023-12-11 DIAGNOSIS — Z17.0 MALIGNANT NEOPLASM OF CENTRAL PORTION OF LEFT BREAST IN FEMALE, ESTROGEN RECEPTOR POSITIVE: ICD-10-CM

## 2023-12-11 DIAGNOSIS — C50.112 MALIGNANT NEOPLASM OF CENTRAL PORTION OF LEFT BREAST IN FEMALE, ESTROGEN RECEPTOR POSITIVE: ICD-10-CM

## 2023-12-11 LAB
BASOPHILS # BLD AUTO: 0.03 10*3/MM3 (ref 0–0.2)
BASOPHILS NFR BLD AUTO: 1.6 % (ref 0–1.5)
DEPRECATED RDW RBC AUTO: 44.5 FL (ref 37–54)
EOSINOPHIL # BLD AUTO: 0.1 10*3/MM3 (ref 0–0.4)
EOSINOPHIL NFR BLD AUTO: 5.4 % (ref 0.3–6.2)
ERYTHROCYTE [DISTWIDTH] IN BLOOD BY AUTOMATED COUNT: 12.5 % (ref 12.3–15.4)
HCT VFR BLD AUTO: 36.8 % (ref 34–46.6)
HGB BLD-MCNC: 13 G/DL (ref 12–15.9)
IMM GRANULOCYTES # BLD AUTO: 0.01 10*3/MM3 (ref 0–0.05)
IMM GRANULOCYTES NFR BLD AUTO: 0.5 % (ref 0–0.5)
INR PPP: 1.01 (ref 0.89–1.12)
LYMPHOCYTES # BLD AUTO: 0.64 10*3/MM3 (ref 0.7–3.1)
LYMPHOCYTES NFR BLD AUTO: 34.8 % (ref 19.6–45.3)
MCH RBC QN AUTO: 34.3 PG (ref 26.6–33)
MCHC RBC AUTO-ENTMCNC: 35.3 G/DL (ref 31.5–35.7)
MCV RBC AUTO: 97.1 FL (ref 79–97)
MONOCYTES # BLD AUTO: 0.32 10*3/MM3 (ref 0.1–0.9)
MONOCYTES NFR BLD AUTO: 17.4 % (ref 5–12)
NEUTROPHILS NFR BLD AUTO: 0.74 10*3/MM3 (ref 1.7–7)
NEUTROPHILS NFR BLD AUTO: 40.3 % (ref 42.7–76)
NRBC BLD AUTO-RTO: 0 /100 WBC (ref 0–0.2)
PLATELET # BLD AUTO: 167 10*3/MM3 (ref 140–450)
PMV BLD AUTO: 9.4 FL (ref 6–12)
PROTHROMBIN TIME: 13.4 SECONDS (ref 12.2–14.5)
RBC # BLD AUTO: 3.79 10*6/MM3 (ref 3.77–5.28)
WBC NRBC COR # BLD AUTO: 1.84 10*3/MM3 (ref 3.4–10.8)

## 2023-12-11 PROCEDURE — 76942 ECHO GUIDE FOR BIOPSY: CPT

## 2023-12-11 PROCEDURE — 88342 IMHCHEM/IMCYTCHM 1ST ANTB: CPT | Performed by: INTERNAL MEDICINE

## 2023-12-11 PROCEDURE — 25010000002 MIDAZOLAM PER 1 MG: Performed by: RADIOLOGY

## 2023-12-11 PROCEDURE — 88360 TUMOR IMMUNOHISTOCHEM/MANUAL: CPT | Performed by: INTERNAL MEDICINE

## 2023-12-11 PROCEDURE — 85025 COMPLETE CBC W/AUTO DIFF WBC: CPT | Performed by: RADIOLOGY

## 2023-12-11 PROCEDURE — 85610 PROTHROMBIN TIME: CPT | Performed by: RADIOLOGY

## 2023-12-11 PROCEDURE — 88307 TISSUE EXAM BY PATHOLOGIST: CPT | Performed by: INTERNAL MEDICINE

## 2023-12-11 PROCEDURE — 99152 MOD SED SAME PHYS/QHP 5/>YRS: CPT

## 2023-12-11 PROCEDURE — 88341 IMHCHEM/IMCYTCHM EA ADD ANTB: CPT | Performed by: INTERNAL MEDICINE

## 2023-12-11 PROCEDURE — 25010000002 FENTANYL CITRATE (PF) 50 MCG/ML SOLUTION: Performed by: RADIOLOGY

## 2023-12-11 RX ORDER — FENTANYL CITRATE 50 UG/ML
INJECTION, SOLUTION INTRAMUSCULAR; INTRAVENOUS AS NEEDED
Status: COMPLETED | OUTPATIENT
Start: 2023-12-11 | End: 2023-12-11

## 2023-12-11 RX ORDER — SODIUM CHLORIDE 0.9 % (FLUSH) 0.9 %
10 SYRINGE (ML) INJECTION EVERY 12 HOURS SCHEDULED
Status: DISCONTINUED | OUTPATIENT
Start: 2023-12-11 | End: 2023-12-12 | Stop reason: HOSPADM

## 2023-12-11 RX ORDER — SODIUM CHLORIDE 9 MG/ML
40 INJECTION, SOLUTION INTRAVENOUS AS NEEDED
Status: DISCONTINUED | OUTPATIENT
Start: 2023-12-11 | End: 2023-12-12 | Stop reason: HOSPADM

## 2023-12-11 RX ORDER — HYDROCODONE BITARTRATE AND ACETAMINOPHEN 5; 325 MG/1; MG/1
1 TABLET ORAL EVERY 4 HOURS PRN
Status: DISCONTINUED | OUTPATIENT
Start: 2023-12-11 | End: 2023-12-12 | Stop reason: HOSPADM

## 2023-12-11 RX ORDER — SODIUM CHLORIDE 0.9 % (FLUSH) 0.9 %
10 SYRINGE (ML) INJECTION AS NEEDED
Status: DISCONTINUED | OUTPATIENT
Start: 2023-12-11 | End: 2023-12-12 | Stop reason: HOSPADM

## 2023-12-11 RX ORDER — FENTANYL CITRATE 50 UG/ML
INJECTION, SOLUTION INTRAMUSCULAR; INTRAVENOUS
Status: DISPENSED
Start: 2023-12-11 | End: 2023-12-11

## 2023-12-11 RX ORDER — MIDAZOLAM HYDROCHLORIDE 1 MG/ML
INJECTION INTRAMUSCULAR; INTRAVENOUS
Status: DISPENSED
Start: 2023-12-11 | End: 2023-12-11

## 2023-12-11 RX ORDER — LIDOCAINE HYDROCHLORIDE 10 MG/ML
10 INJECTION, SOLUTION EPIDURAL; INFILTRATION; INTRACAUDAL; PERINEURAL ONCE
Status: COMPLETED | OUTPATIENT
Start: 2023-12-11 | End: 2023-12-11

## 2023-12-11 RX ORDER — MIDAZOLAM HYDROCHLORIDE 1 MG/ML
INJECTION INTRAMUSCULAR; INTRAVENOUS AS NEEDED
Status: COMPLETED | OUTPATIENT
Start: 2023-12-11 | End: 2023-12-11

## 2023-12-11 RX ADMIN — FENTANYL CITRATE 25 MCG: 50 INJECTION, SOLUTION INTRAMUSCULAR; INTRAVENOUS at 11:31

## 2023-12-11 RX ADMIN — LIDOCAINE HYDROCHLORIDE 10 ML: 10 INJECTION, SOLUTION EPIDURAL; INFILTRATION; INTRACAUDAL; PERINEURAL at 11:41

## 2023-12-11 RX ADMIN — MIDAZOLAM HYDROCHLORIDE 1 MG: 1 INJECTION, SOLUTION INTRAMUSCULAR; INTRAVENOUS at 11:19

## 2023-12-11 RX ADMIN — MIDAZOLAM HYDROCHLORIDE 0.5 MG: 1 INJECTION, SOLUTION INTRAMUSCULAR; INTRAVENOUS at 11:31

## 2023-12-11 RX ADMIN — FENTANYL CITRATE 50 MCG: 50 INJECTION, SOLUTION INTRAMUSCULAR; INTRAVENOUS at 11:19

## 2023-12-11 NOTE — NURSING NOTE
Pt discharged from ir dept s/p biopsy of liver lesion. Pt tolerated procedure without complications. Dressing to site remains clean dry and intact, site without evidence of hematoma at time of discharge. Discharge instructions reviewed with patient and her , both verbalized understanding. Pt transported to exit via wheelchair per nurse.
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
## 2023-12-11 NOTE — POST-PROCEDURE NOTE
The following procedure was performed: Liver mass bx    Please see corresponding Radiology report for in detail procedural information. The Radiology report will be dictated shortly, if not done so already. Please see the IR RN note for the information regarding medicines administered if any, segundo-procedural vitals and I/O information.

## 2023-12-11 NOTE — DISCHARGE INSTRUCTIONS
Avoid any strenuous activities, pulling, tugging, straining or lifting anything over 10 pounds for the next 7 days.    You may remove the bandaid (if you have one) tomorrow and shower tomorrow; but you will need to avoid tub baths or any situation where your are submersed in water for the next 4 or 5 days to avoid the risk of infection.     DO NOT DRIVE ON THE DAY OF YOUR PROCEDURE.    If you have any problems or concern please contact your physician's office.      SEEK IMMEDIATE MEDICAL ATTENTION FOR ANY UNCONTROLLED PAIN IN ABDOMEN/BACK ON PROCEDURAL SIDE, UNUSUAL BRUISING/BLEEDING AT OR NEAR BIOPSY SITE, PERSISTENT NAUSEA/VOMITING, LIGHTHEADEDNESS/DIZZINESS ON STANDING, OR DIFFICULTY BREATHING.

## 2023-12-11 NOTE — NURSING NOTE
Image guided liver biopsy performed by Dr. Ochoa, samples obtained & sent to lab per US tech. Fentanyl 75 mcg & Versed 1.5 mg given for a sedation time of 17 minutes. Dsg to site per protocol. Pt tolerated well. Report given to evonne Marcos in ju[place].

## 2023-12-12 ENCOUNTER — TELEPHONE (OUTPATIENT)
Dept: INFUSION THERAPY | Facility: HOSPITAL | Age: 58
End: 2023-12-12
Payer: COMMERCIAL

## 2023-12-13 LAB
CYTO UR: NORMAL
LAB AP CASE REPORT: NORMAL
LAB AP CLINICAL INFORMATION: NORMAL
LAB AP DIAGNOSIS COMMENT: NORMAL
LAB AP SPECIAL STAINS: NORMAL
PATH REPORT.FINAL DX SPEC: NORMAL
PATH REPORT.GROSS SPEC: NORMAL

## 2023-12-14 ENCOUNTER — TELEPHONE (OUTPATIENT)
Dept: ONCOLOGY | Facility: CLINIC | Age: 58
End: 2023-12-14
Payer: COMMERCIAL

## 2023-12-14 ENCOUNTER — SPECIALTY PHARMACY (OUTPATIENT)
Dept: ONCOLOGY | Facility: HOSPITAL | Age: 58
End: 2023-12-14
Payer: COMMERCIAL

## 2023-12-14 DIAGNOSIS — C50.112 MALIGNANT NEOPLASM OF CENTRAL PORTION OF LEFT FEMALE BREAST, UNSPECIFIED ESTROGEN RECEPTOR STATUS: ICD-10-CM

## 2023-12-14 DIAGNOSIS — C79.51 MALIGNANT NEOPLASM METASTATIC TO BONE: Primary | ICD-10-CM

## 2023-12-14 DIAGNOSIS — C79.51 MALIGNANT NEOPLASM METASTATIC TO BONE: ICD-10-CM

## 2023-12-14 DIAGNOSIS — Z85.3 HISTORY OF LEFT BREAST CANCER: ICD-10-CM

## 2023-12-14 DIAGNOSIS — Z85.3 HISTORY OF LEFT BREAST CANCER: Primary | ICD-10-CM

## 2023-12-14 RX ORDER — CAPECITABINE 500 MG/1
1500 TABLET, FILM COATED ORAL 2 TIMES DAILY
Qty: 84 TABLET | Refills: 11 | Status: SHIPPED | OUTPATIENT
Start: 2023-12-19

## 2023-12-14 NOTE — TELEPHONE ENCOUNTER
Called patient regarding her [id number] results as well as liver biopsy.  The liver biopsy does show breast cancer, ER/IL positive HER2 1+.  [id number] shows a PIK 3 CA mutation, no ESR 1 mutation.  Given that she has recently progressed on fulvestrant I recommend changing treatment to Xeloda.  We will start working on this and hope to have it ready to begin by next week.

## 2023-12-14 NOTE — PROGRESS NOTES
Oral Chemotherapy - New Referral    Received a referral from Dr. Castro    Treatment Plan: Xeloda (capecitabine)  Start date of treatment planned for: As soon as oral specialty medication is available.  Indication: metastatic ER/[place] positive, HER2 negative breast cancer  Relevant past treatments: bilateral mastectomy, adjuvant chemotherapy with TC x 4 cycles, tamoxifen, letrozole, Cyberknife therapy, palbociclib + letrozole, ribociclib + fulvestrant (held due to neutropenia, changing due to progression)  Is the therapy appropriate based on treatment guidelines and FDA labeling?: yes  Therapeutic Goals: Continue treatment until progression or intolerable toxicity  Patient can self-administer oral medications: Yes    Drug-Drug Interactions: The current medication list was reviewed and there are some relevant drug-drug interactions with the oral specialty medication that will be discussed during education, including:  Nexium can lower the therapeutic effect of Xeloda. Patient does take this everyday - will discuss during education and see if she may be willing to trial an H2RA such as Pepcid, instead.  Medication Allergies: The patient has no relevant allergies as it relates to their oral specialty medication.  Review of Labs/Dose Adjustments: The patient's most recent labs were reviewed and all are WNL to start treatment at this dose.   1000mg/m2 per dose x 1.62m2 = 1620mg, round to 1500mg (7.4% dose reduction) per dose  SCr 1.02 on 12/5 - est CrCl 57.8mL/min (CG)    A prescription was released to [facility] for   Drug: Xeloda (capecitabine)  Strength: 500 mg  Directions: Take 3 tablets by mouth twice a day for 7 days, then off for 7 days, then on for another 7 days, and off for another 7 days in each 28-day cycle. Take within 30 minutes of a meal.  Quantity: 84  Refills: 11    Pharmacy education is scheduled for Wewdnesday 12/20. Consent will be signed at that time. CCA has been signed  previously.    Ann Cowden Mayer, PharmD, BCPS  Oncology Clinical Pharmacist  Phone [phone]    12/14/2023  15:30 EST

## 2023-12-15 DIAGNOSIS — C50.112 MALIGNANT NEOPLASM OF CENTRAL PORTION OF LEFT FEMALE BREAST, UNSPECIFIED ESTROGEN RECEPTOR STATUS: ICD-10-CM

## 2023-12-15 DIAGNOSIS — C79.51 MALIGNANT NEOPLASM METASTATIC TO BONE: ICD-10-CM

## 2023-12-15 DIAGNOSIS — Z85.3 HISTORY OF LEFT BREAST CANCER: Primary | ICD-10-CM

## 2023-12-15 RX ORDER — ONDANSETRON HYDROCHLORIDE 8 MG/1
8 TABLET, FILM COATED ORAL 3 TIMES DAILY PRN
Qty: 30 TABLET | Refills: 5 | Status: SHIPPED | OUTPATIENT
Start: 2023-12-15

## 2023-12-15 NOTE — PROGRESS NOTES
Oral Chemotherapy - Double Check    Drug: capecitabine  Strength: 500 mg tablet  Directions: Take 3 tablets PO BID for 7 days then off for 7 days then on for another 7 days and off for another 7 days in each 28-day cycle  QTY: 84  RF:11    [facility]    Completed independent double check on medication order/RX.    Batool March PharmD, [facility]  Clinical Oncology Pharmacy Specialist  Phone: [phone]    12/15/2023  07:03 EST

## 2023-12-18 DIAGNOSIS — C79.51 MALIGNANT NEOPLASM METASTATIC TO BONE: Primary | ICD-10-CM

## 2023-12-18 DIAGNOSIS — Z85.3 HISTORY OF LEFT BREAST CANCER: ICD-10-CM

## 2023-12-18 DIAGNOSIS — C50.112 MALIGNANT NEOPLASM OF CENTRAL PORTION OF LEFT FEMALE BREAST, UNSPECIFIED ESTROGEN RECEPTOR STATUS: ICD-10-CM

## 2023-12-20 ENCOUNTER — LAB (OUTPATIENT)
Dept: LAB | Facility: HOSPITAL | Age: 58
End: 2023-12-20
Payer: COMMERCIAL

## 2023-12-20 ENCOUNTER — OFFICE VISIT (OUTPATIENT)
Dept: ONCOLOGY | Facility: CLINIC | Age: 58
End: 2023-12-20
Payer: COMMERCIAL

## 2023-12-20 ENCOUNTER — HOSPITAL ENCOUNTER (OUTPATIENT)
Dept: ONCOLOGY | Facility: HOSPITAL | Age: 58
Discharge: HOME | End: 2023-12-20
Payer: COMMERCIAL

## 2023-12-20 ENCOUNTER — SPECIALTY PHARMACY (OUTPATIENT)
Dept: ONCOLOGY | Facility: HOSPITAL | Age: 58
End: 2023-12-20
Payer: COMMERCIAL

## 2023-12-20 VITALS
DIASTOLIC BLOOD PRESSURE: 82 MMHG | WEIGHT: 135 LBS | RESPIRATION RATE: 18 BRPM | HEIGHT: 61 IN | TEMPERATURE: 97.6 F | HEART RATE: 94 BPM | OXYGEN SATURATION: 96 % | BODY MASS INDEX: 25.49 KG/M2 | SYSTOLIC BLOOD PRESSURE: 144 MMHG

## 2023-12-20 DIAGNOSIS — Z85.3 HISTORY OF LEFT BREAST CANCER: ICD-10-CM

## 2023-12-20 DIAGNOSIS — C50.112 MALIGNANT NEOPLASM OF CENTRAL PORTION OF LEFT FEMALE BREAST, UNSPECIFIED ESTROGEN RECEPTOR STATUS: ICD-10-CM

## 2023-12-20 DIAGNOSIS — C79.51 MALIGNANT NEOPLASM METASTATIC TO BONE: ICD-10-CM

## 2023-12-20 DIAGNOSIS — C79.51 MALIGNANT NEOPLASM METASTATIC TO BONE: Primary | ICD-10-CM

## 2023-12-20 LAB
ALBUMIN SERPL-MCNC: 4.2 G/DL (ref 3.5–5.2)
ALBUMIN/GLOB SERPL: 1.3 G/DL
ALP SERPL-CCNC: 131 U/L (ref 39–117)
ALT SERPL W P-5'-P-CCNC: 22 U/L (ref 1–33)
ANION GAP SERPL CALCULATED.3IONS-SCNC: 9 MMOL/L (ref 5–15)
AST SERPL-CCNC: 29 U/L (ref 1–32)
BASOPHILS # BLD AUTO: 0.04 10*3/MM3 (ref 0–0.2)
BASOPHILS NFR BLD AUTO: 1.3 % (ref 0–1.5)
BILIRUB SERPL-MCNC: 0.3 MG/DL (ref 0–1.2)
BUN SERPL-MCNC: 11 MG/DL (ref 8–23)
BUN/CREAT SERPL: 12.9 (ref 7–25)
CALCIUM SPEC-SCNC: 9.6 MG/DL (ref 8.6–10.5)
CHLORIDE SERPL-SCNC: 106 MMOL/L (ref 98–107)
CO2 SERPL-SCNC: 27 MMOL/L (ref 22–29)
CREAT SERPL-MCNC: 0.85 MG/DL (ref 0.57–1)
DEPRECATED RDW RBC AUTO: 43 FL (ref 37–54)
EGFRCR SERPLBLD CKD-EPI 2021: 78.5 ML/MIN/1.73
EOSINOPHIL # BLD AUTO: 0.14 10*3/MM3 (ref 0–0.4)
EOSINOPHIL NFR BLD AUTO: 4.4 % (ref 0.3–6.2)
ERYTHROCYTE [DISTWIDTH] IN BLOOD BY AUTOMATED COUNT: 12.1 % (ref 12.3–15.4)
GLOBULIN UR ELPH-MCNC: 3.2 GM/DL
GLUCOSE SERPL-MCNC: 103 MG/DL (ref 65–99)
HCT VFR BLD AUTO: 36.3 % (ref 34–46.6)
HGB BLD-MCNC: 13.1 G/DL (ref 12–15.9)
IMM GRANULOCYTES # BLD AUTO: 0 10*3/MM3 (ref 0–0.05)
IMM GRANULOCYTES NFR BLD AUTO: 0 % (ref 0–0.5)
LYMPHOCYTES # BLD AUTO: 1.05 10*3/MM3 (ref 0.7–3.1)
LYMPHOCYTES NFR BLD AUTO: 33 % (ref 19.6–45.3)
MCH RBC QN AUTO: 34.2 PG (ref 26.6–33)
MCHC RBC AUTO-ENTMCNC: 36.1 G/DL (ref 31.5–35.7)
MCV RBC AUTO: 94.8 FL (ref 79–97)
MONOCYTES # BLD AUTO: 0.42 10*3/MM3 (ref 0.1–0.9)
MONOCYTES NFR BLD AUTO: 13.2 % (ref 5–12)
NEUTROPHILS NFR BLD AUTO: 1.53 10*3/MM3 (ref 1.7–7)
NEUTROPHILS NFR BLD AUTO: 48.1 % (ref 42.7–76)
PLATELET # BLD AUTO: 235 10*3/MM3 (ref 140–450)
PMV BLD AUTO: 8.6 FL (ref 6–12)
POTASSIUM SERPL-SCNC: 4.1 MMOL/L (ref 3.5–5.2)
PROT SERPL-MCNC: 7.4 G/DL (ref 6–8.5)
RBC # BLD AUTO: 3.83 10*6/MM3 (ref 3.77–5.28)
SODIUM SERPL-SCNC: 142 MMOL/L (ref 136–145)
WBC NRBC COR # BLD AUTO: 3.18 10*3/MM3 (ref 3.4–10.8)

## 2023-12-20 PROCEDURE — 85025 COMPLETE CBC W/AUTO DIFF WBC: CPT

## 2023-12-20 PROCEDURE — 99214 OFFICE O/P EST MOD 30 MIN: CPT | Performed by: INTERNAL MEDICINE

## 2023-12-20 PROCEDURE — 36415 COLL VENOUS BLD VENIPUNCTURE: CPT

## 2023-12-20 PROCEDURE — 80053 COMPREHEN METABOLIC PANEL: CPT

## 2023-12-20 RX ORDER — IBUPROFEN 200 MG
200 TABLET ORAL EVERY 6 HOURS PRN
COMMUNITY

## 2023-12-20 RX ORDER — CETIRIZINE HYDROCHLORIDE 10 MG/1
10 TABLET ORAL DAILY
COMMUNITY

## 2023-12-20 NOTE — PROGRESS NOTES
"      PROBLEM LIST:  1. Local recurrence of HR+ carcinoma of the left breast  A) history of left breast cancer in 2007.  Bilateral mastectomy 5/30/07.  pathology showed grade 2 IDC of the left breast measuring 1.8 cm.  ER+ KY+ Her2 negative.   0/2 SLN involved.  [id number].  B) adjuvant chemotherapy with TC x 4 cycles, completed September 2007 with Dr. De La Torre.  Tamoxifen October 2007 thru October 2012.  C) presented January 2022 with left chest wall mass.  CT chest 1/17/22 showed 4.6 cm lesion involving left manubrium with soft tissue extent extending posteriorly to the anterior aspect of mediastinum.  12 mm nodule right lung base.  D) ultrasound guided biopsy of chest wall mass on 2/10/22.  Pathology showed invasive ductal carcinoma of the breast.  ER positive (100%), KY positive (50%), HER-2 2+  E) letrozole started February 2022.  CyberKnife to the chest wall mass completed 4/15/2022.  Ibrance started 4/18/2022.  Ibrance decreased to 100 mg due to neutropenia 9/5/2022  F) PET/CT 7/5/23 showed a new 2.9 cm hypermetabolic liver lesion, nonenlarged but hypermetabolic bilateral hilar and mediastinal lymph nodes.  [id number] testing showed a PI3 kinase mutation, no ESR 1 mutation.  Treatment changed to fulvestrant and ribociclib.  Fulvestrant started 7/19/2023.  Ribociclib started 7/27/2023.  G) PET/CT on 12/1/2023 showed increasing size of solitary liver lesion.  No additional sites of disease.  Liver biopsy 12/11/2023 showed metastatic breast carcinoma, ER positive KY positive HER2 1+.  Treatment with Xeloda started 12/20/2023.  2. Depression/anxiety  3. GERD    Subjective     CHIEF COMPLAINT: Breast cancer    HISTORY OF PRESENT ILLNESS:   Alcira Phelan returns for follow-up.  She is feeling okay.  She says she has not yet received the Xeloda.  She meets with pharmacy later today.        Objective      /82   Pulse 94   Temp 97.6 °F (36.4 °C) (Temporal)   Resp 18   Ht 154.9 cm (60.98\")   Wt 61.2 kg " (135 lb)   SpO2 96%   BMI 25.52 kg/m²    Vitals:    12/20/23 1336   PainSc: 0-No pain                     ECOG score: 0           General: well appearing female in no acute distress  Neuro: alert and oriented  HEENT: sclera anicteric, oropharynx clear  Cardiovascular: Regular rate and rhythm  Lungs: Clear to auscultation bilaterally  Skin: no rashes, lesions, bruising, or petechiae  Psych: mood and affect appropriate            RECENT LABS:  Lab Results   Component Value Date    WBC 1.84 (C) 12/11/2023    HGB 13.0 12/11/2023    HCT 36.8 12/11/2023    MCV 97.1 (H) 12/11/2023     12/11/2023       Lab Results   Component Value Date    GLUCOSE 96 12/05/2023    BUN 13 12/05/2023    CREATININE 1.02 (H) 12/05/2023    EGFRIFNONA 77 02/16/2022    BCR 12.7 12/05/2023    K 4.6 12/05/2023    CO2 27.0 12/05/2023    CALCIUM 9.7 12/05/2023    ALBUMIN 4.1 12/05/2023    AST 24 12/05/2023    ALT 22 12/05/2023                     Assessment & Plan   Alcira Phelan is a 58 y.o. female with metastatic ER positive WV positive HER-2 negative invasive ductal carcinoma, with involvement of lymph nodes and liver.    Her recent PET/CT showed an increase in the size of the liver lesion.  Liver biopsy shows breast cancer, ER/WV positive HER2 1+.  [id number] testing has shown a PIK 3 CA mutation, but since she has recently progressed on fulvestrant I do not think she is a great candidate for capivasertib.     We reviewed the potential side effects of capecitabine including myelosuppression, nausea, diarrhea, hand-foot syndrome, coronary vasospasm, and mucositis.    We will likely repeat imaging in about 3 months, or sooner if she has a change in symptoms.  We will check labs again in 2 weeks and then see her back in 4 weeks.    Total time of patient care on day of service including time prior to, face to face with patient, and following visit spent in reviewing records, lab results, imaging studies, discussion with patient, and  documentation/charting was > 33 minutes.                      Ruth Castro MD  [facility] Hematology and Oncology    12/20/2023          CC:
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
## 2023-12-26 ENCOUNTER — TELEPHONE (OUTPATIENT)
Dept: ONCOLOGY | Facility: CLINIC | Age: 58
End: 2023-12-26
Payer: COMMERCIAL

## 2023-12-26 NOTE — TELEPHONE ENCOUNTER
Pt called inquiring about her [employer] papers.  They  need to be completed and faxed back in on her behalf.  Said they was to have come on 12/20//23.

## 2023-12-26 NOTE — TELEPHONE ENCOUNTER
I called patient and told her that we had looked and there are not any papers from [employer] here.  Patient thought they were mailing them in.  She said that she would call them today to see if they could fax the paperwork to us.  She also asked about the xeloda and if she would be getting a call soon about the medication.  I told her I would reach out to our pharmacy team. She verbalized understanding.

## 2023-12-27 ENCOUNTER — TELEPHONE (OUTPATIENT)
Dept: ONCOLOGY | Facility: CLINIC | Age: 58
End: 2023-12-27

## 2023-12-27 ENCOUNTER — TELEPHONE (OUTPATIENT)
Dept: ONCOLOGY | Facility: CLINIC | Age: 58
End: 2023-12-27
Payer: COMMERCIAL

## 2023-12-27 NOTE — TELEPHONE ENCOUNTER
Pt wanted to let us know that she contacted [employer] and gave them a new fax number and they should be sending us paperwork to be filled out. Also, pt asks that we please send a copy of her most recent office note on 11/8/23 to [employer].     Please call her at [phone] with any questions.

## 2023-12-27 NOTE — TELEPHONE ENCOUNTER
Spoke with [facility] pharmacist, Sheryl, to clarify clinical questions. Their staff should be reaching out to the patient today to schedule delivery of Xeloda (capecitabine). Patient notified.

## 2023-12-27 NOTE — TELEPHONE ENCOUNTER
Clyde [phone] from [facility] specialty pharmacy called  requesting  return phone to clarify treatment plan about pt medications.

## 2023-12-27 NOTE — TELEPHONE ENCOUNTER
Received paperwork from [employer] and Cheryl Antonio MA is completing it and sending to [facility].

## 2023-12-27 NOTE — TELEPHONE ENCOUNTER
Dr. Tavera left a VM regarding this patient stating that she may not be a candidate for pikasso clinical trial, please call her back to discuss if there are any questions at . The message was a bit unclear but she mentioned progression and being HER2 positive would be disqualifications.

## 2023-12-27 NOTE — TELEPHONE ENCOUNTER
[facility] for [facility]. DANNI on file and form fee has been waived. Paperwork has been completed and faxed back to [facility] on pt behalf. Fax: [phone]

## 2024-01-08 ENCOUNTER — OFFICE VISIT (OUTPATIENT)
Dept: INTERNAL MEDICINE | Facility: CLINIC | Age: 59
End: 2024-01-08
Payer: COMMERCIAL

## 2024-01-08 VITALS
BODY MASS INDEX: 26.5 KG/M2 | OXYGEN SATURATION: 100 % | HEART RATE: 78 BPM | HEIGHT: 60 IN | DIASTOLIC BLOOD PRESSURE: 76 MMHG | TEMPERATURE: 97.8 F | WEIGHT: 135 LBS | SYSTOLIC BLOOD PRESSURE: 130 MMHG

## 2024-01-08 DIAGNOSIS — M19.032 ARTHRITIS OF LEFT WRIST: ICD-10-CM

## 2024-01-08 DIAGNOSIS — K21.9 GASTROESOPHAGEAL REFLUX DISEASE WITHOUT ESOPHAGITIS: ICD-10-CM

## 2024-01-08 DIAGNOSIS — H61.23 BILATERAL IMPACTED CERUMEN: ICD-10-CM

## 2024-01-08 DIAGNOSIS — C79.51 MALIGNANT NEOPLASM METASTATIC TO BONE: ICD-10-CM

## 2024-01-08 DIAGNOSIS — C78.7 CANCER, METASTATIC TO LIVER: ICD-10-CM

## 2024-01-08 DIAGNOSIS — Z00.00 ANNUAL PHYSICAL EXAM: Primary | ICD-10-CM

## 2024-01-08 DIAGNOSIS — M25.562 CHRONIC PAIN OF LEFT KNEE: ICD-10-CM

## 2024-01-08 DIAGNOSIS — F41.9 ANXIETY: ICD-10-CM

## 2024-01-08 DIAGNOSIS — Z23 NEED FOR COVID-19 VACCINE: ICD-10-CM

## 2024-01-08 DIAGNOSIS — Z17.0 MALIGNANT NEOPLASM OF CENTRAL PORTION OF LEFT BREAST IN FEMALE, ESTROGEN RECEPTOR POSITIVE: ICD-10-CM

## 2024-01-08 DIAGNOSIS — E78.2 MIXED HYPERLIPIDEMIA: ICD-10-CM

## 2024-01-08 DIAGNOSIS — Z13.29 SCREENING FOR THYROID DISORDER: ICD-10-CM

## 2024-01-08 DIAGNOSIS — G89.29 CHRONIC PAIN OF LEFT KNEE: ICD-10-CM

## 2024-01-08 DIAGNOSIS — F33.42 RECURRENT MAJOR DEPRESSIVE DISORDER, IN FULL REMISSION: ICD-10-CM

## 2024-01-08 DIAGNOSIS — C50.112 MALIGNANT NEOPLASM OF CENTRAL PORTION OF LEFT BREAST IN FEMALE, ESTROGEN RECEPTOR POSITIVE: ICD-10-CM

## 2024-01-08 DIAGNOSIS — Z13.1 SCREENING FOR DIABETES MELLITUS: ICD-10-CM

## 2024-01-08 PROBLEM — M77.9 TENDINITIS: Status: RESOLVED | Noted: 2019-06-20 | Resolved: 2024-01-08

## 2024-01-08 PROBLEM — S09.90XA INJURY OF HEAD: Status: RESOLVED | Noted: 2019-06-20 | Resolved: 2024-01-08

## 2024-01-08 PROCEDURE — 99396 PREV VISIT EST AGE 40-64: CPT | Performed by: INTERNAL MEDICINE

## 2024-01-08 PROCEDURE — 90480 ADMN SARSCOV2 VAC 1/ONLY CMP: CPT | Performed by: INTERNAL MEDICINE

## 2024-01-08 PROCEDURE — 91320 SARSCV2 VAC 30MCG TRS-SUC IM: CPT | Performed by: INTERNAL MEDICINE

## 2024-01-08 RX ORDER — IBUPROFEN 800 MG/1
800 TABLET ORAL 2 TIMES DAILY PRN
Qty: 180 TABLET | Refills: 0 | Status: SHIPPED | OUTPATIENT
Start: 2024-01-08

## 2024-01-08 RX ORDER — IBUPROFEN 200 MG
200 TABLET ORAL EVERY 6 HOURS PRN
Status: CANCELLED | OUTPATIENT
Start: 2024-01-08
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
## 2024-01-10 ENCOUNTER — TELEPHONE (OUTPATIENT)
Dept: ONCOLOGY | Facility: CLINIC | Age: 59
End: 2024-01-10

## 2024-01-10 ENCOUNTER — HOSPITAL ENCOUNTER (OUTPATIENT)
Dept: GENERAL RADIOLOGY | Facility: HOSPITAL | Age: 59
Discharge: HOME | End: 2024-01-10
Admitting: INTERNAL MEDICINE
Payer: COMMERCIAL

## 2024-01-10 ENCOUNTER — DOCUMENTATION (OUTPATIENT)
Dept: ONCOLOGY | Facility: CLINIC | Age: 59
End: 2024-01-10
Payer: COMMERCIAL

## 2024-01-10 DIAGNOSIS — M19.032 ARTHRITIS OF LEFT WRIST: ICD-10-CM

## 2024-01-10 PROCEDURE — 73110 X-RAY EXAM OF WRIST: CPT

## 2024-01-10 NOTE — TELEPHONE ENCOUNTER
Patient brought in paperwork from [facility] 01/10/2024 to be mailed or fax.     Put paperwork from [facility] 01/10/2024 to be mailed or fax in MA's box.

## 2024-01-16 ENCOUNTER — TELEPHONE (OUTPATIENT)
Dept: INTERNAL MEDICINE | Facility: CLINIC | Age: 59
End: 2024-01-16
Payer: COMMERCIAL

## 2024-01-16 NOTE — TELEPHONE ENCOUNTER
----- Message from Batool Blair MD sent at 1/15/2024  8:47 PM EST -----  Please call patient. The xray of her wrist shows increased arthritis and associated changes in the wrist which are producing the swelling and pain. I would recommend consultation with a hand surgeon if she is interested. Let me know and I can put in referral.

## 2024-01-17 ENCOUNTER — HOSPITAL ENCOUNTER (OUTPATIENT)
Dept: CT IMAGING | Facility: HOSPITAL | Age: 59
Discharge: HOME | End: 2024-01-17
Payer: COMMERCIAL

## 2024-01-17 ENCOUNTER — LAB (OUTPATIENT)
Dept: LAB | Facility: HOSPITAL | Age: 59
End: 2024-01-17
Payer: COMMERCIAL

## 2024-01-17 ENCOUNTER — SPECIALTY PHARMACY (OUTPATIENT)
Dept: ONCOLOGY | Facility: HOSPITAL | Age: 59
End: 2024-01-17
Payer: COMMERCIAL

## 2024-01-17 ENCOUNTER — OFFICE VISIT (OUTPATIENT)
Dept: ONCOLOGY | Facility: CLINIC | Age: 59
End: 2024-01-17
Payer: COMMERCIAL

## 2024-01-17 ENCOUNTER — TELEPHONE (OUTPATIENT)
Dept: ONCOLOGY | Facility: CLINIC | Age: 59
End: 2024-01-17

## 2024-01-17 VITALS
BODY MASS INDEX: 26.31 KG/M2 | DIASTOLIC BLOOD PRESSURE: 77 MMHG | RESPIRATION RATE: 18 BRPM | HEART RATE: 97 BPM | OXYGEN SATURATION: 98 % | SYSTOLIC BLOOD PRESSURE: 121 MMHG | TEMPERATURE: 97.3 F | HEIGHT: 60 IN | WEIGHT: 134 LBS

## 2024-01-17 DIAGNOSIS — R07.89 LEFT-SIDED CHEST WALL PAIN: ICD-10-CM

## 2024-01-17 DIAGNOSIS — C79.51 MALIGNANT NEOPLASM METASTATIC TO BONE: ICD-10-CM

## 2024-01-17 DIAGNOSIS — Z85.3 HISTORY OF LEFT BREAST CANCER: ICD-10-CM

## 2024-01-17 DIAGNOSIS — C50.112 MALIGNANT NEOPLASM OF CENTRAL PORTION OF LEFT FEMALE BREAST, UNSPECIFIED ESTROGEN RECEPTOR STATUS: ICD-10-CM

## 2024-01-17 DIAGNOSIS — Z17.0 MALIGNANT NEOPLASM OF CENTRAL PORTION OF LEFT BREAST IN FEMALE, ESTROGEN RECEPTOR POSITIVE: ICD-10-CM

## 2024-01-17 DIAGNOSIS — C50.112 MALIGNANT NEOPLASM OF CENTRAL PORTION OF LEFT BREAST IN FEMALE, ESTROGEN RECEPTOR POSITIVE: ICD-10-CM

## 2024-01-17 DIAGNOSIS — Z17.0 MALIGNANT NEOPLASM OF CENTRAL PORTION OF LEFT BREAST IN FEMALE, ESTROGEN RECEPTOR POSITIVE: Primary | ICD-10-CM

## 2024-01-17 DIAGNOSIS — C50.112 MALIGNANT NEOPLASM OF CENTRAL PORTION OF LEFT BREAST IN FEMALE, ESTROGEN RECEPTOR POSITIVE: Primary | ICD-10-CM

## 2024-01-17 LAB
ALBUMIN SERPL-MCNC: 4.3 G/DL (ref 3.5–5.2)
ALBUMIN/GLOB SERPL: 1.5 G/DL
ALP SERPL-CCNC: 113 U/L (ref 39–117)
ALT SERPL W P-5'-P-CCNC: 27 U/L (ref 1–33)
ANION GAP SERPL CALCULATED.3IONS-SCNC: 10 MMOL/L (ref 5–15)
AST SERPL-CCNC: 30 U/L (ref 1–32)
BASOPHILS # BLD AUTO: 0.02 10*3/MM3 (ref 0–0.2)
BASOPHILS NFR BLD AUTO: 0.4 % (ref 0–1.5)
BILIRUB SERPL-MCNC: 0.4 MG/DL (ref 0–1.2)
BUN SERPL-MCNC: 13 MG/DL (ref 8–23)
BUN/CREAT SERPL: 14.8 (ref 7–25)
CALCIUM SPEC-SCNC: 9.7 MG/DL (ref 8.6–10.5)
CANCER AG15-3 SERPL-ACNC: 37.6 U/ML
CHLORIDE SERPL-SCNC: 101 MMOL/L (ref 98–107)
CO2 SERPL-SCNC: 28 MMOL/L (ref 22–29)
CREAT SERPL-MCNC: 0.88 MG/DL (ref 0.57–1)
DEPRECATED RDW RBC AUTO: 41.9 FL (ref 37–54)
EGFRCR SERPLBLD CKD-EPI 2021: 75.3 ML/MIN/1.73
EOSINOPHIL # BLD AUTO: 0.29 10*3/MM3 (ref 0–0.4)
EOSINOPHIL NFR BLD AUTO: 6.5 % (ref 0.3–6.2)
ERYTHROCYTE [DISTWIDTH] IN BLOOD BY AUTOMATED COUNT: 12.4 % (ref 12.3–15.4)
GLOBULIN UR ELPH-MCNC: 2.9 GM/DL
GLUCOSE SERPL-MCNC: 124 MG/DL (ref 65–99)
HCT VFR BLD AUTO: 38.3 % (ref 34–46.6)
HGB BLD-MCNC: 13.7 G/DL (ref 12–15.9)
IMM GRANULOCYTES # BLD AUTO: 0 10*3/MM3 (ref 0–0.05)
IMM GRANULOCYTES NFR BLD AUTO: 0 % (ref 0–0.5)
LYMPHOCYTES # BLD AUTO: 0.93 10*3/MM3 (ref 0.7–3.1)
LYMPHOCYTES NFR BLD AUTO: 20.8 % (ref 19.6–45.3)
MCH RBC QN AUTO: 33.3 PG (ref 26.6–33)
MCHC RBC AUTO-ENTMCNC: 35.8 G/DL (ref 31.5–35.7)
MCV RBC AUTO: 93 FL (ref 79–97)
MONOCYTES # BLD AUTO: 0.31 10*3/MM3 (ref 0.1–0.9)
MONOCYTES NFR BLD AUTO: 6.9 % (ref 5–12)
NEUTROPHILS NFR BLD AUTO: 2.92 10*3/MM3 (ref 1.7–7)
NEUTROPHILS NFR BLD AUTO: 65.4 % (ref 42.7–76)
PLATELET # BLD AUTO: 167 10*3/MM3 (ref 140–450)
PMV BLD AUTO: 8.7 FL (ref 6–12)
POTASSIUM SERPL-SCNC: 4.2 MMOL/L (ref 3.5–5.2)
PROT SERPL-MCNC: 7.2 G/DL (ref 6–8.5)
RBC # BLD AUTO: 4.12 10*6/MM3 (ref 3.77–5.28)
SODIUM SERPL-SCNC: 139 MMOL/L (ref 136–145)
WBC NRBC COR # BLD AUTO: 4.47 10*3/MM3 (ref 3.4–10.8)

## 2024-01-17 PROCEDURE — 36415 COLL VENOUS BLD VENIPUNCTURE: CPT

## 2024-01-17 PROCEDURE — 25510000001 IOPAMIDOL 61 % SOLUTION: Performed by: NURSE PRACTITIONER

## 2024-01-17 PROCEDURE — 86300 IMMUNOASSAY TUMOR CA 15-3: CPT

## 2024-01-17 PROCEDURE — 85025 COMPLETE CBC W/AUTO DIFF WBC: CPT

## 2024-01-17 PROCEDURE — 71260 CT THORAX DX C+: CPT

## 2024-01-17 PROCEDURE — 80053 COMPREHEN METABOLIC PANEL: CPT

## 2024-01-17 RX ADMIN — IOPAMIDOL 85 ML: 612 INJECTION, SOLUTION INTRAVENOUS at 13:44

## 2024-01-17 NOTE — TELEPHONE ENCOUNTER
Patient called back and I read her the message from Dr Blair.  She said she will wait on the referral because she doesn't think her pain level is at that point but will call back if she changes her mind

## 2024-01-17 NOTE — TELEPHONE ENCOUNTER
Called patient to review CT scan results from today.  No evidence of disease progression noted on CT scan.  I did asked patient to pay attention to see if the left chest wall pain improves when she is off the Xeloda.  I have asked her to contact us next week to let us know if the pain has improved on her off days.

## 2024-01-17 NOTE — PROGRESS NOTES
PROBLEM LIST:  1. Local recurrence of HR+ carcinoma of the left breast  A) history of left breast cancer in 2007.  Bilateral mastectomy 5/30/07.  pathology showed grade 2 IDC of the left breast measuring 1.8 cm.  ER+ MN+ Her2 negative.   0/2 SLN involved.  [id number].  B) adjuvant chemotherapy with TC x 4 cycles, completed September 2007 with Dr. De La Torre.  Tamoxifen October 2007 thru October 2012.  C) presented January 2022 with left chest wall mass.  CT chest 1/17/22 showed 4.6 cm lesion involving left manubrium with soft tissue extent extending posteriorly to the anterior aspect of mediastinum.  12 mm nodule right lung base.  D) ultrasound guided biopsy of chest wall mass on 2/10/22.  Pathology showed invasive ductal carcinoma of the breast.  ER positive (100%), MN positive (50%), HER-2 2+  E) letrozole started February 2022.  CyberKnife to the chest wall mass completed 4/15/2022.  Ibrance started 4/18/2022.  Ibrance decreased to 100 mg due to neutropenia 9/5/2022  F) PET/CT 7/5/23 showed a new 2.9 cm hypermetabolic liver lesion, nonenlarged but hypermetabolic bilateral hilar and mediastinal lymph nodes.  [id number] testing showed a PI3 kinase mutation, no ESR 1 mutation.  Treatment changed to fulvestrant and ribociclib.  Fulvestrant started 7/19/2023.  Ribociclib started 7/27/2023.  G) PET/CT on 12/1/2023 showed increasing size of solitary liver lesion.  No additional sites of disease.  Liver biopsy 12/11/2023 showed metastatic breast carcinoma, ER positive MN positive HER2 1+.  Treatment with Xeloda started 12/29/2023.  2. Depression/anxiety  3. GERD    Subjective     CHIEF COMPLAINT: Breast cancer    HISTORY OF PRESENT ILLNESS:   Alcira Phelan returns for follow-up.  She started Xeloda 12/29/2023.  She has been tolerating the Xeloda well up until about 4 days ago she developed sharp shooting pain that last for few seconds in her left chest wall.  The sharp shooting pain feels like a knife and last for  "a few seconds and then is gone.  The pain occurs at rest and with activity.  Yesterday she noticed a new fullness in her left chest wall under her left clavicle.  She is tender in this area.  She denies any shortness of breath.  She has a chronic cough that has not changed.  She denies any fevers or chills.    She denies any diarrhea, nausea, or mouth sores.  She also denies any peeling, cracking or blistering of the bottom of her hands or feet.        Objective      /77   Pulse 97   Temp 97.3 °F (36.3 °C)   Resp 18   Ht 152.4 cm (60\")   Wt 60.8 kg (134 lb)   SpO2 98%   BMI 26.17 kg/m²    Vitals:    01/17/24 1043   PainSc: 0-No pain                       ECOG score: 0           General: well appearing female in no acute distress  Neuro: alert and oriented  HEENT: sclera anicteric, oropharynx clear  Cardiovascular: Regular rate and rhythm  Lungs: Clear to auscultation bilaterally  Skin: no rashes, lesions, bruising, or petechiae.  Area of fullness noted under left clavicle close to the sternum that is firm.   Psych: mood and affect appropriate            RECENT LABS:  Lab Results   Component Value Date    WBC 4.47 01/17/2024    HGB 13.7 01/17/2024    HCT 38.3 01/17/2024    MCV 93.0 01/17/2024     01/17/2024       Lab Results   Component Value Date    GLUCOSE 103 (H) 12/20/2023    BUN 11 12/20/2023    CREATININE 0.85 12/20/2023    EGFRIFNONA 77 02/16/2022    BCR 12.9 12/20/2023    K 4.1 12/20/2023    CO2 27.0 12/20/2023    CALCIUM 9.6 12/20/2023    ALBUMIN 4.2 12/20/2023    AST 29 12/20/2023    ALT 22 12/20/2023                     Assessment & Plan   Alcira Phelan is a 58 y.o. female with metastatic ER positive HI positive HER-2 negative invasive ductal carcinoma, with involvement of lymph nodes and liver.    PET/CT from 12/1/2023 showed an increase in size of the liver lesion.  Liver biopsy showed breast cancer, ER/HI positive HER2 1+.  [id number] testing showed PIK 3 CA mutation.  She " started capecitabine 12/29/2023.  She is tolerating the medication relatively well.  Over the last 4 days she has developed some sharp shooting pain in her left chest wall as well as fullness under the left clavicle.  After consultation with Dr. Ruth Castro, we will obtain a stat CT of the chest today for evaluation of the left chest wall fullness.  I have asked the patient to notice if the sharp shooting left chest wall pain improves once she is on her off week starting Friday.    I will contact patient with results of CT of chest results today.  If no evidence of progression we will continue with Xeloda unchanged.    Follow-up in 1 month.      I spent 45 minutes caring for Alcira on this date of service. This time includes time spent by me in the following activities: preparing for the visit, reviewing tests, obtaining and/or reviewing a separately obtained history, performing a medically appropriate examination and/or evaluation, counseling and educating the patient/family/caregiver, ordering medications, tests, or procedures, referring and communicating with other health care professionals, and documenting information in the medical record                        Rasheeda Ramirez APRN  [facility]    1/17/2024          CC:

## 2024-01-18 LAB — CANCER AG27-29 SERPL-ACNC: 44.2 U/ML (ref 0–38.6)

## 2024-01-19 ENCOUNTER — TELEPHONE (OUTPATIENT)
Dept: ONCOLOGY | Facility: CLINIC | Age: 59
End: 2024-01-19
Payer: COMMERCIAL

## 2024-01-19 NOTE — TELEPHONE ENCOUNTER
Patient needs a note stating she will be off work until 3/31/24, she will pick this up Monday at the .

## 2024-01-23 ENCOUNTER — TELEPHONE (OUTPATIENT)
Dept: ONCOLOGY | Facility: CLINIC | Age: 59
End: 2024-01-23

## 2024-01-23 ENCOUNTER — DOCUMENTATION (OUTPATIENT)
Dept: ONCOLOGY | Facility: CLINIC | Age: 59
End: 2024-01-23
Payer: COMMERCIAL

## 2024-01-23 NOTE — TELEPHONE ENCOUNTER
Patient brought in [facility] paperwork from [facility] 01/23/24 to be fax.    Put [facility] paperwork from [facility] 01/23/24 to be fax in MA's box

## 2024-02-08 ENCOUNTER — TELEPHONE (OUTPATIENT)
Dept: ONCOLOGY | Facility: CLINIC | Age: 59
End: 2024-02-08
Payer: COMMERCIAL

## 2024-02-08 NOTE — TELEPHONE ENCOUNTER
Caller: Alcira Phelan    Relationship: Self    Best call back number: [phone]    What is the best time to reach you: ANYTIME    Who are you requesting to speak with (clinical staff, provider,  specific staff member): CLINICAL    What was the call regarding: PT CALLING TO SEE IF THE PAPERWORK WAS RECEIVED YESTERDAY FROM [employer], REQUESTING TO FAX BACK WITH DOCUMENTS STATING PT IS STILL OFF WORK

## 2024-02-14 ENCOUNTER — LAB (OUTPATIENT)
Dept: LAB | Facility: HOSPITAL | Age: 59
End: 2024-02-14
Payer: COMMERCIAL

## 2024-02-14 ENCOUNTER — OFFICE VISIT (OUTPATIENT)
Dept: ONCOLOGY | Facility: CLINIC | Age: 59
End: 2024-02-14
Payer: COMMERCIAL

## 2024-02-14 ENCOUNTER — SPECIALTY PHARMACY (OUTPATIENT)
Dept: ONCOLOGY | Facility: HOSPITAL | Age: 59
End: 2024-02-14
Payer: COMMERCIAL

## 2024-02-14 VITALS
HEIGHT: 61 IN | WEIGHT: 131 LBS | OXYGEN SATURATION: 97 % | DIASTOLIC BLOOD PRESSURE: 72 MMHG | TEMPERATURE: 97.6 F | BODY MASS INDEX: 24.73 KG/M2 | HEART RATE: 85 BPM | RESPIRATION RATE: 18 BRPM | SYSTOLIC BLOOD PRESSURE: 139 MMHG

## 2024-02-14 DIAGNOSIS — Z17.0 MALIGNANT NEOPLASM OF CENTRAL PORTION OF LEFT BREAST IN FEMALE, ESTROGEN RECEPTOR POSITIVE: ICD-10-CM

## 2024-02-14 DIAGNOSIS — C50.112 MALIGNANT NEOPLASM OF CENTRAL PORTION OF LEFT BREAST IN FEMALE, ESTROGEN RECEPTOR POSITIVE: ICD-10-CM

## 2024-02-14 DIAGNOSIS — Z85.3 HISTORY OF LEFT BREAST CANCER: ICD-10-CM

## 2024-02-14 DIAGNOSIS — C79.51 MALIGNANT NEOPLASM METASTATIC TO BONE: ICD-10-CM

## 2024-02-14 DIAGNOSIS — C50.112 MALIGNANT NEOPLASM OF CENTRAL PORTION OF LEFT FEMALE BREAST, UNSPECIFIED ESTROGEN RECEPTOR STATUS: ICD-10-CM

## 2024-02-14 DIAGNOSIS — C50.112 MALIGNANT NEOPLASM OF CENTRAL PORTION OF LEFT BREAST IN FEMALE, ESTROGEN RECEPTOR POSITIVE: Primary | ICD-10-CM

## 2024-02-14 DIAGNOSIS — Z17.0 MALIGNANT NEOPLASM OF CENTRAL PORTION OF LEFT BREAST IN FEMALE, ESTROGEN RECEPTOR POSITIVE: Primary | ICD-10-CM

## 2024-02-14 LAB
ALBUMIN SERPL-MCNC: 4.2 G/DL (ref 3.5–5.2)
ALBUMIN/GLOB SERPL: 1.5 G/DL
ALP SERPL-CCNC: 111 U/L (ref 39–117)
ALT SERPL W P-5'-P-CCNC: 30 U/L (ref 1–33)
ANION GAP SERPL CALCULATED.3IONS-SCNC: 11 MMOL/L (ref 5–15)
AST SERPL-CCNC: 33 U/L (ref 1–32)
BASOPHILS # BLD AUTO: 0.03 10*3/MM3 (ref 0–0.2)
BASOPHILS NFR BLD AUTO: 0.6 % (ref 0–1.5)
BILIRUB SERPL-MCNC: 0.4 MG/DL (ref 0–1.2)
BUN SERPL-MCNC: 15 MG/DL (ref 8–23)
BUN/CREAT SERPL: 19.2 (ref 7–25)
CALCIUM SPEC-SCNC: 9.4 MG/DL (ref 8.6–10.5)
CANCER AG15-3 SERPL-ACNC: 29.1 U/ML
CHLORIDE SERPL-SCNC: 103 MMOL/L (ref 98–107)
CO2 SERPL-SCNC: 27 MMOL/L (ref 22–29)
CREAT SERPL-MCNC: 0.78 MG/DL (ref 0.57–1)
DEPRECATED RDW RBC AUTO: 48.7 FL (ref 37–54)
EGFRCR SERPLBLD CKD-EPI 2021: 87.1 ML/MIN/1.73
EOSINOPHIL # BLD AUTO: 0.18 10*3/MM3 (ref 0–0.4)
EOSINOPHIL NFR BLD AUTO: 3.7 % (ref 0.3–6.2)
ERYTHROCYTE [DISTWIDTH] IN BLOOD BY AUTOMATED COUNT: 14.1 % (ref 12.3–15.4)
GLOBULIN UR ELPH-MCNC: 2.8 GM/DL
GLUCOSE SERPL-MCNC: 113 MG/DL (ref 65–99)
HCT VFR BLD AUTO: 35.4 % (ref 34–46.6)
HGB BLD-MCNC: 12.7 G/DL (ref 12–15.9)
IMM GRANULOCYTES # BLD AUTO: 0 10*3/MM3 (ref 0–0.05)
IMM GRANULOCYTES NFR BLD AUTO: 0 % (ref 0–0.5)
LYMPHOCYTES # BLD AUTO: 0.85 10*3/MM3 (ref 0.7–3.1)
LYMPHOCYTES NFR BLD AUTO: 17.7 % (ref 19.6–45.3)
MCH RBC QN AUTO: 33.9 PG (ref 26.6–33)
MCHC RBC AUTO-ENTMCNC: 35.9 G/DL (ref 31.5–35.7)
MCV RBC AUTO: 94.4 FL (ref 79–97)
MONOCYTES # BLD AUTO: 0.44 10*3/MM3 (ref 0.1–0.9)
MONOCYTES NFR BLD AUTO: 9.1 % (ref 5–12)
NEUTROPHILS NFR BLD AUTO: 3.31 10*3/MM3 (ref 1.7–7)
NEUTROPHILS NFR BLD AUTO: 68.9 % (ref 42.7–76)
PLATELET # BLD AUTO: 152 10*3/MM3 (ref 140–450)
PMV BLD AUTO: 8.6 FL (ref 6–12)
POTASSIUM SERPL-SCNC: 4.5 MMOL/L (ref 3.5–5.2)
PROT SERPL-MCNC: 7 G/DL (ref 6–8.5)
RBC # BLD AUTO: 3.75 10*6/MM3 (ref 3.77–5.28)
SODIUM SERPL-SCNC: 141 MMOL/L (ref 136–145)
WBC NRBC COR # BLD AUTO: 4.81 10*3/MM3 (ref 3.4–10.8)

## 2024-02-14 PROCEDURE — 86300 IMMUNOASSAY TUMOR CA 15-3: CPT

## 2024-02-14 PROCEDURE — 85025 COMPLETE CBC W/AUTO DIFF WBC: CPT

## 2024-02-14 PROCEDURE — 36415 COLL VENOUS BLD VENIPUNCTURE: CPT

## 2024-02-14 PROCEDURE — 80053 COMPREHEN METABOLIC PANEL: CPT

## 2024-02-14 PROCEDURE — 99214 OFFICE O/P EST MOD 30 MIN: CPT | Performed by: INTERNAL MEDICINE

## 2024-02-14 RX ORDER — CAPECITABINE 500 MG/1
1000 TABLET, FILM COATED ORAL 2 TIMES DAILY
Qty: 56 TABLET | Refills: 11 | Status: SHIPPED | OUTPATIENT
Start: 2024-02-14

## 2024-02-15 LAB — CANCER AG27-29 SERPL-ACNC: 35.9 U/ML (ref 0–38.6)

## 2024-02-23 ENCOUNTER — TELEPHONE (OUTPATIENT)
Dept: ONCOLOGY | Facility: CLINIC | Age: 59
End: 2024-02-23
Payer: COMMERCIAL

## 2024-02-23 NOTE — TELEPHONE ENCOUNTER
I called patient back and she said that her feet are feeling better but she does have a couple of healed places that are kind of peeling but not uncomfortable.  She is restarting xeloda at 2 pills twice a day for 7 days on and 7 days off.  She also reported that she is trying to see a podiatrist as she has an ingrown toenail that needs to be addressed.

## 2024-03-02 DIAGNOSIS — F32.9 MAJOR DEPRESSIVE DISORDER, REMISSION STATUS UNSPECIFIED, UNSPECIFIED WHETHER RECURRENT: ICD-10-CM

## 2024-03-02 DIAGNOSIS — K21.9 GASTROESOPHAGEAL REFLUX DISEASE: ICD-10-CM

## 2024-03-04 RX ORDER — OMEPRAZOLE 20 MG/1
20 CAPSULE, DELAYED RELEASE ORAL DAILY
Qty: 90 CAPSULE | Refills: 3 | OUTPATIENT
Start: 2024-03-04

## 2024-03-04 RX ORDER — VENLAFAXINE HYDROCHLORIDE 150 MG/1
150 CAPSULE, EXTENDED RELEASE ORAL DAILY
Qty: 90 CAPSULE | Refills: 3 | Status: SHIPPED | OUTPATIENT
Start: 2024-03-04

## 2024-03-07 ENCOUNTER — DOCUMENTATION (OUTPATIENT)
Dept: ONCOLOGY | Facility: CLINIC | Age: 59
End: 2024-03-07
Payer: COMMERCIAL

## 2024-03-11 ENCOUNTER — LAB (OUTPATIENT)
Dept: LAB | Facility: HOSPITAL | Age: 59
End: 2024-03-11
Payer: COMMERCIAL

## 2024-03-11 ENCOUNTER — HOSPITAL ENCOUNTER (OUTPATIENT)
Dept: PET IMAGING | Facility: HOSPITAL | Age: 59
Discharge: HOME | End: 2024-03-11
Payer: COMMERCIAL

## 2024-03-11 DIAGNOSIS — Z17.0 MALIGNANT NEOPLASM OF CENTRAL PORTION OF LEFT BREAST IN FEMALE, ESTROGEN RECEPTOR POSITIVE: ICD-10-CM

## 2024-03-11 DIAGNOSIS — C50.112 MALIGNANT NEOPLASM OF CENTRAL PORTION OF LEFT BREAST IN FEMALE, ESTROGEN RECEPTOR POSITIVE: ICD-10-CM

## 2024-03-11 LAB
ALBUMIN SERPL-MCNC: 4.6 G/DL (ref 3.5–5.2)
ALBUMIN/GLOB SERPL: 1.5 G/DL
ALP SERPL-CCNC: 109 U/L (ref 39–117)
ALT SERPL W P-5'-P-CCNC: 28 U/L (ref 1–33)
ANION GAP SERPL CALCULATED.3IONS-SCNC: 8 MMOL/L (ref 5–15)
AST SERPL-CCNC: 33 U/L (ref 1–32)
BASOPHILS # BLD AUTO: 0.04 10*3/MM3 (ref 0–0.2)
BASOPHILS NFR BLD AUTO: 1.1 % (ref 0–1.5)
BILIRUB SERPL-MCNC: 0.4 MG/DL (ref 0–1.2)
BUN SERPL-MCNC: 13 MG/DL (ref 8–23)
BUN/CREAT SERPL: 16.9 (ref 7–25)
CALCIUM SPEC-SCNC: 9.7 MG/DL (ref 8.6–10.5)
CANCER AG15-3 SERPL-ACNC: 32.9 U/ML
CHLORIDE SERPL-SCNC: 101 MMOL/L (ref 98–107)
CO2 SERPL-SCNC: 31 MMOL/L (ref 22–29)
CREAT SERPL-MCNC: 0.77 MG/DL (ref 0.57–1)
DEPRECATED RDW RBC AUTO: 58.7 FL (ref 37–54)
EGFRCR SERPLBLD CKD-EPI 2021: 88.4 ML/MIN/1.73
EOSINOPHIL # BLD AUTO: 0.26 10*3/MM3 (ref 0–0.4)
EOSINOPHIL NFR BLD AUTO: 7.3 % (ref 0.3–6.2)
ERYTHROCYTE [DISTWIDTH] IN BLOOD BY AUTOMATED COUNT: 15.8 % (ref 12.3–15.4)
GLOBULIN UR ELPH-MCNC: 3 GM/DL
GLUCOSE BLDC GLUCOMTR-MCNC: 88 MG/DL (ref 70–130)
GLUCOSE SERPL-MCNC: 93 MG/DL (ref 65–99)
HCT VFR BLD AUTO: 39.6 % (ref 34–46.6)
HGB BLD-MCNC: 13.5 G/DL (ref 12–15.9)
IMM GRANULOCYTES # BLD AUTO: 0 10*3/MM3 (ref 0–0.05)
IMM GRANULOCYTES NFR BLD AUTO: 0 % (ref 0–0.5)
LYMPHOCYTES # BLD AUTO: 0.99 10*3/MM3 (ref 0.7–3.1)
LYMPHOCYTES NFR BLD AUTO: 27.8 % (ref 19.6–45.3)
MCH RBC QN AUTO: 34.2 PG (ref 26.6–33)
MCHC RBC AUTO-ENTMCNC: 34.1 G/DL (ref 31.5–35.7)
MCV RBC AUTO: 100.3 FL (ref 79–97)
MONOCYTES # BLD AUTO: 0.39 10*3/MM3 (ref 0.1–0.9)
MONOCYTES NFR BLD AUTO: 11 % (ref 5–12)
NEUTROPHILS NFR BLD AUTO: 1.88 10*3/MM3 (ref 1.7–7)
NEUTROPHILS NFR BLD AUTO: 52.8 % (ref 42.7–76)
NRBC BLD AUTO-RTO: 0 /100 WBC (ref 0–0.2)
PLATELET # BLD AUTO: 181 10*3/MM3 (ref 140–450)
PMV BLD AUTO: 9.1 FL (ref 6–12)
POTASSIUM SERPL-SCNC: 4.5 MMOL/L (ref 3.5–5.2)
PROT SERPL-MCNC: 7.6 G/DL (ref 6–8.5)
RBC # BLD AUTO: 3.95 10*6/MM3 (ref 3.77–5.28)
SODIUM SERPL-SCNC: 140 MMOL/L (ref 136–145)
WBC NRBC COR # BLD AUTO: 3.56 10*3/MM3 (ref 3.4–10.8)

## 2024-03-11 PROCEDURE — 78815 PET IMAGE W/CT SKULL-THIGH: CPT

## 2024-03-11 PROCEDURE — 86300 IMMUNOASSAY TUMOR CA 15-3: CPT

## 2024-03-11 PROCEDURE — 0 FLUDEOXYGLUCOSE F18 SOLUTION: Performed by: INTERNAL MEDICINE

## 2024-03-11 PROCEDURE — 80053 COMPREHEN METABOLIC PANEL: CPT

## 2024-03-11 PROCEDURE — 36415 COLL VENOUS BLD VENIPUNCTURE: CPT

## 2024-03-11 PROCEDURE — 85025 COMPLETE CBC W/AUTO DIFF WBC: CPT

## 2024-03-11 PROCEDURE — A9552 F18 FDG: HCPCS | Performed by: INTERNAL MEDICINE

## 2024-03-11 PROCEDURE — 82948 REAGENT STRIP/BLOOD GLUCOSE: CPT

## 2024-03-11 RX ADMIN — FLUDEOXYGLUCOSE F 18 1 DOSE: 200 INJECTION, SOLUTION INTRAVENOUS at 09:15

## 2024-03-12 ENCOUNTER — TELEPHONE (OUTPATIENT)
Dept: ONCOLOGY | Facility: CLINIC | Age: 59
End: 2024-03-12
Payer: COMMERCIAL

## 2024-03-12 LAB — CANCER AG27-29 SERPL-ACNC: 36.8 U/ML (ref 0–38.6)

## 2024-03-12 NOTE — TELEPHONE ENCOUNTER
I called patient to let her know that radiology has not read the PET scan yet but we will discuss it at her appt tomorrow.  Patient verbalized understanding.

## 2024-03-13 ENCOUNTER — SPECIALTY PHARMACY (OUTPATIENT)
Dept: ONCOLOGY | Facility: HOSPITAL | Age: 59
End: 2024-03-13
Payer: COMMERCIAL

## 2024-03-13 ENCOUNTER — OFFICE VISIT (OUTPATIENT)
Dept: ONCOLOGY | Facility: CLINIC | Age: 59
End: 2024-03-13
Payer: COMMERCIAL

## 2024-03-13 VITALS
RESPIRATION RATE: 18 BRPM | BODY MASS INDEX: 25.11 KG/M2 | TEMPERATURE: 97.5 F | HEART RATE: 80 BPM | WEIGHT: 133 LBS | SYSTOLIC BLOOD PRESSURE: 135 MMHG | OXYGEN SATURATION: 96 % | HEIGHT: 61 IN | DIASTOLIC BLOOD PRESSURE: 74 MMHG

## 2024-03-13 DIAGNOSIS — Z17.0 MALIGNANT NEOPLASM OF CENTRAL PORTION OF LEFT BREAST IN FEMALE, ESTROGEN RECEPTOR POSITIVE: Primary | ICD-10-CM

## 2024-03-13 DIAGNOSIS — C50.112 MALIGNANT NEOPLASM OF CENTRAL PORTION OF LEFT BREAST IN FEMALE, ESTROGEN RECEPTOR POSITIVE: Primary | ICD-10-CM

## 2024-03-13 PROCEDURE — 99214 OFFICE O/P EST MOD 30 MIN: CPT | Performed by: INTERNAL MEDICINE

## 2024-03-19 ENCOUNTER — TELEPHONE (OUTPATIENT)
Dept: ONCOLOGY | Facility: CLINIC | Age: 59
End: 2024-03-19
Payer: COMMERCIAL

## 2024-03-19 NOTE — TELEPHONE ENCOUNTER
I called patient back to tell her that she can use the desitin but advised that it is very drying and she would need to use hydrating lotions as well.  She verbalized understanding.

## 2024-03-19 NOTE — TELEPHONE ENCOUNTER
----- Message from Vikram Goodson sent at 3/19/2024 11:35 AM EDT -----  Regarding: phone call  Alcira wants to check, she was told by podiatrist to stop taking the medicine for bottom of her feet because it contains steroids.  He told her to use sammy instead. Is this ok?    [phone]

## 2024-03-25 ENCOUNTER — TELEPHONE (OUTPATIENT)
Dept: ONCOLOGY | Facility: CLINIC | Age: 59
End: 2024-03-25
Payer: COMMERCIAL

## 2024-03-25 NOTE — TELEPHONE ENCOUNTER
Patient called and asked if we could send a note to [employer] [facility] [employer] [facility] [employer] stating she would not be able to return to work in the next 6 months.  Paperworks was scanned in chart which indicated that patient would not be able to return to work and was dated through Jan 2025.  That paperwork has already been sent to [facility].

## 2024-04-15 ENCOUNTER — LAB (OUTPATIENT)
Dept: LAB | Facility: HOSPITAL | Age: 59
End: 2024-04-15
Payer: COMMERCIAL

## 2024-04-15 DIAGNOSIS — Z17.0 MALIGNANT NEOPLASM OF CENTRAL PORTION OF LEFT BREAST IN FEMALE, ESTROGEN RECEPTOR POSITIVE: ICD-10-CM

## 2024-04-15 DIAGNOSIS — C50.112 MALIGNANT NEOPLASM OF CENTRAL PORTION OF LEFT BREAST IN FEMALE, ESTROGEN RECEPTOR POSITIVE: ICD-10-CM

## 2024-04-15 LAB
ALBUMIN SERPL-MCNC: 4.1 G/DL (ref 3.5–5.2)
ALBUMIN/GLOB SERPL: 1.4 G/DL
ALP SERPL-CCNC: 124 U/L (ref 39–117)
ALT SERPL W P-5'-P-CCNC: 24 U/L (ref 1–33)
ANION GAP SERPL CALCULATED.3IONS-SCNC: 8 MMOL/L (ref 5–15)
AST SERPL-CCNC: 30 U/L (ref 1–32)
BASOPHILS # BLD AUTO: 0.01 10*3/MM3 (ref 0–0.2)
BASOPHILS NFR BLD AUTO: 0.3 % (ref 0–1.5)
BILIRUB SERPL-MCNC: <0.2 MG/DL (ref 0–1.2)
BUN SERPL-MCNC: 17 MG/DL (ref 8–23)
BUN/CREAT SERPL: 22.7 (ref 7–25)
CALCIUM SPEC-SCNC: 9.5 MG/DL (ref 8.6–10.5)
CANCER AG15-3 SERPL-ACNC: 26.8 U/ML
CHLORIDE SERPL-SCNC: 106 MMOL/L (ref 98–107)
CO2 SERPL-SCNC: 29 MMOL/L (ref 22–29)
CREAT SERPL-MCNC: 0.75 MG/DL (ref 0.57–1)
DEPRECATED RDW RBC AUTO: 53.8 FL (ref 37–54)
EGFRCR SERPLBLD CKD-EPI 2021: 90.7 ML/MIN/1.73
EOSINOPHIL # BLD AUTO: 0.26 10*3/MM3 (ref 0–0.4)
EOSINOPHIL NFR BLD AUTO: 7.6 % (ref 0.3–6.2)
ERYTHROCYTE [DISTWIDTH] IN BLOOD BY AUTOMATED COUNT: 15 % (ref 12.3–15.4)
GLOBULIN UR ELPH-MCNC: 2.9 GM/DL
GLUCOSE SERPL-MCNC: 97 MG/DL (ref 65–99)
HCT VFR BLD AUTO: 32.7 % (ref 34–46.6)
HGB BLD-MCNC: 11.9 G/DL (ref 12–15.9)
IMM GRANULOCYTES # BLD AUTO: 0.01 10*3/MM3 (ref 0–0.05)
IMM GRANULOCYTES NFR BLD AUTO: 0.3 % (ref 0–0.5)
LYMPHOCYTES # BLD AUTO: 0.93 10*3/MM3 (ref 0.7–3.1)
LYMPHOCYTES NFR BLD AUTO: 27.1 % (ref 19.6–45.3)
MCH RBC QN AUTO: 36 PG (ref 26.6–33)
MCHC RBC AUTO-ENTMCNC: 36.4 G/DL (ref 31.5–35.7)
MCV RBC AUTO: 98.8 FL (ref 79–97)
MONOCYTES # BLD AUTO: 0.4 10*3/MM3 (ref 0.1–0.9)
MONOCYTES NFR BLD AUTO: 11.7 % (ref 5–12)
NEUTROPHILS NFR BLD AUTO: 1.82 10*3/MM3 (ref 1.7–7)
NEUTROPHILS NFR BLD AUTO: 53 % (ref 42.7–76)
PLATELET # BLD AUTO: 182 10*3/MM3 (ref 140–450)
PMV BLD AUTO: 8.9 FL (ref 6–12)
POTASSIUM SERPL-SCNC: 4.9 MMOL/L (ref 3.5–5.2)
PROT SERPL-MCNC: 7 G/DL (ref 6–8.5)
RBC # BLD AUTO: 3.31 10*6/MM3 (ref 3.77–5.28)
SODIUM SERPL-SCNC: 143 MMOL/L (ref 136–145)
WBC NRBC COR # BLD AUTO: 3.43 10*3/MM3 (ref 3.4–10.8)

## 2024-04-15 PROCEDURE — 86300 IMMUNOASSAY TUMOR CA 15-3: CPT

## 2024-04-15 PROCEDURE — 80053 COMPREHEN METABOLIC PANEL: CPT

## 2024-04-15 PROCEDURE — 85025 COMPLETE CBC W/AUTO DIFF WBC: CPT

## 2024-04-15 PROCEDURE — 36415 COLL VENOUS BLD VENIPUNCTURE: CPT

## 2024-04-16 LAB — CANCER AG27-29 SERPL-ACNC: 35.9 U/ML (ref 0–38.6)

## 2024-04-17 ENCOUNTER — OFFICE VISIT (OUTPATIENT)
Dept: ONCOLOGY | Facility: CLINIC | Age: 59
End: 2024-04-17
Payer: COMMERCIAL

## 2024-04-17 ENCOUNTER — SPECIALTY PHARMACY (OUTPATIENT)
Dept: ONCOLOGY | Facility: HOSPITAL | Age: 59
End: 2024-04-17
Payer: COMMERCIAL

## 2024-04-17 VITALS
TEMPERATURE: 97 F | BODY MASS INDEX: 24.92 KG/M2 | HEIGHT: 61 IN | OXYGEN SATURATION: 97 % | RESPIRATION RATE: 18 BRPM | HEART RATE: 75 BPM | WEIGHT: 132 LBS | SYSTOLIC BLOOD PRESSURE: 144 MMHG | DIASTOLIC BLOOD PRESSURE: 82 MMHG

## 2024-04-17 DIAGNOSIS — C78.7 CANCER, METASTATIC TO LIVER: ICD-10-CM

## 2024-04-17 DIAGNOSIS — Z17.0 MALIGNANT NEOPLASM OF CENTRAL PORTION OF LEFT BREAST IN FEMALE, ESTROGEN RECEPTOR POSITIVE: Primary | ICD-10-CM

## 2024-04-17 DIAGNOSIS — C50.112 MALIGNANT NEOPLASM OF CENTRAL PORTION OF LEFT BREAST IN FEMALE, ESTROGEN RECEPTOR POSITIVE: Primary | ICD-10-CM

## 2024-04-17 PROCEDURE — 99214 OFFICE O/P EST MOD 30 MIN: CPT | Performed by: NURSE PRACTITIONER

## 2024-04-17 NOTE — PROGRESS NOTES
PROBLEM LIST:  1. Local recurrence of HR+ carcinoma of the left breast  A) history of left breast cancer in 2007.  Bilateral mastectomy 5/30/07.  pathology showed grade 2 IDC of the left breast measuring 1.8 cm.  ER+ KY+ Her2 negative.   0/2 SLN involved.  [id number].  B) adjuvant chemotherapy with TC x 4 cycles, completed September 2007 with Dr. De La Torre.  Tamoxifen October 2007 thru October 2012.  C) presented January 2022 with left chest wall mass.  CT chest 1/17/22 showed 4.6 cm lesion involving left manubrium with soft tissue extent extending posteriorly to the anterior aspect of mediastinum.  12 mm nodule right lung base.  D) ultrasound guided biopsy of chest wall mass on 2/10/22.  Pathology showed invasive ductal carcinoma of the breast.  ER positive (100%), KY positive (50%), HER-2 2+  E) letrozole started February 2022.  CyberKnife to the chest wall mass completed 4/15/2022.  Ibrance started 4/18/2022.  Ibrance decreased to 100 mg due to neutropenia 9/5/2022  F) PET/CT 7/5/23 showed a new 2.9 cm hypermetabolic liver lesion, nonenlarged but hypermetabolic bilateral hilar and mediastinal lymph nodes.  [id number] testing showed a PI3 kinase mutation, no ESR 1 mutation.  Treatment changed to fulvestrant and ribociclib.  Fulvestrant started 7/19/2023.  Ribociclib started 7/27/2023.  G) PET/CT on 12/1/2023 showed increasing size of solitary liver lesion.  No additional sites of disease.  Liver biopsy 12/11/2023 showed metastatic breast carcinoma, ER positive KY positive HER2 1+.  Treatment with Xeloda started 12/29/2023.  2. Depression/anxiety  3. GERD    Subjective     CHIEF COMPLAINT: Breast cancer    HISTORY OF PRESENT ILLNESS:   Alcira Phelan returns for follow-up.  She started Xeloda 12/29/2023.      Tolerating reduced dose well.  She is not having any further peeling and using moisturizing cream frequently.  She is following with a podiatrist.  Otherwise doing well with no issues or concerns.      She  "is planning a vacation to [place] next month.      Objective      /82   Pulse 75   Temp 97 °F (36.1 °C) (Temporal)   Resp 18   Ht 154.9 cm (60.98\")   Wt 59.9 kg (132 lb)   SpO2 97%   BMI 24.95 kg/m²    Vitals:    04/17/24 1131   PainSc: 0-No pain     ECOG score: 0         General: well appearing female in no acute distress  Neuro: alert and oriented  HEENT: sclera anicteric, oropharynx clear  Skin: per patient no further erythema or peeling of feet  Psych: mood and affect appropriate    RECENT LABS:  Lab Results   Component Value Date    WBC 3.43 04/15/2024    HGB 11.9 (L) 04/15/2024    HCT 32.7 (L) 04/15/2024    MCV 98.8 (H) 04/15/2024     04/15/2024       Lab Results   Component Value Date    GLUCOSE 97 04/15/2024    BUN 17 04/15/2024    CREATININE 0.75 04/15/2024    EGFRIFNONA 77 02/16/2022    BCR 22.7 04/15/2024    K 4.9 04/15/2024    CO2 29.0 04/15/2024    CALCIUM 9.5 04/15/2024    ALBUMIN 4.1 04/15/2024    AST 30 04/15/2024    ALT 24 04/15/2024       NM PET/CT Skull Base to Mid Thigh  Narrative: NM PET/CT SKULL BASE TO MID THIGH    Date of Exam: 3/11/2024 8:55 AM EDT    Indication: breast cancer.    Comparison: PET/CT 12/1/2023    Technique: 13.6 mCi of F-18 FDG was administered intravenously. PET imaging was obtained from skull base to mid-thigh approximately 60 minutes after radiotracer injection. A low dose non contrast CT was obtained for attenuation correction and anatomic   localization. Fused PET-CT and 3D MIP reconstructions were utilized for image interpretation.  Fasting blood glucose level: 88 mg/dl.    Reference uptake values:  Mediastinum: 1.8 SUVmax  Liver: 3 SUVmax  Normalization method: Body Weight    Findings:  Head/neck: No suspicious FDG avid masses or cervical adenopathy.    Chest: No suspicious FDG avid mass or adenopathy. No infectious appearing airspace consolidation. Nonspecific bandlike opacities in the anterior left upper lobe stable from prior likely posttreatment " related changes. Grossly intact bilateral breast   implants.    ABDOMEN: Hypermetabolic left hepatic lobe mass has decreased in size now measuring 3.3 x 3.9 cm previously 3.7 x 4.6 cm. Max SUV now 6.2 previously 7.9. No new suspicious FDG avid masses or adenopathy in the abdomen or pelvis. No bowel obstruction. No   acute infectious/inflammatory process.    Bones: No suspicious FDG avid bone lesion. Ill-defined mixed sclerosis within the manubrium is not metabolically active suggesting treated lesion.  Impression: Impression:  1. Partial treatment response since 12/1/2023. Decreased size and metabolic activity within the isolated left hepatic lobe metastasis.  2. No signs of disease progression.  3. Treated manubrial lesion is not metabolically active.    Electronically Signed: Yogesh Terry MD    3/13/2024 8:01 AM EDT    Workstation ID: [id number]    I personally reviewed the imaging studies            Assessment & Plan   Alcira Phelan is a 59 y.o. female with metastatic ER positive NC positive HER-2 negative invasive ductal carcinoma, with involvement of lymph nodes and liver.    She continues on Xeloda.  PET scan results in March shows evidence of response, with decrease in size and metabolic activity of the liver metastasis.  We will continue Xeloda at a reduced dose of 2 pills twice daily.  Plan to repeat imaging in June.      She is planning a vacation to [place] next month.  We reviewed skin safety since being on Xeloda.  I encouraged her to avoid direct sunlight, wear sunscreen and protective clothing.   states they will have a tent for shade.  I also instructed her to not walk barefoot on the sand or any hot surface.      Follow-up in 1 month with labs.    Total time of patient care on day of service including time prior to, face to face with patient, and following visit spent in reviewing records, lab results, imaging studies, discussion with patient, and documentation/charting was > 32  minutes.      Hilary Haro, JOVAN  [facility]    4/17/2024          CC:

## 2024-05-13 ENCOUNTER — TELEPHONE (OUTPATIENT)
Dept: ONCOLOGY | Facility: CLINIC | Age: 59
End: 2024-05-13
Payer: COMMERCIAL

## 2024-05-13 NOTE — TELEPHONE ENCOUNTER
Spoke with JOVAN Vega and she said that patient should not use the tanning bed.  She can use lotions or bronzers but avoid UV light and sun while on xeloda.  I had to leave voicemail as patient did not answer.

## 2024-05-13 NOTE — TELEPHONE ENCOUNTER
Caller: Alcira Phelan    Relationship: Self    Best call back number: [phone] CAN L/M     What is the best time to reach you: ANYTIME    Who are you requesting to speak with (clinical staff, provider,  specific staff member): CLINICAL     What was the call regarding: PATIENT IS GOING TO [place], SHE WANTS TO KNOW IF SHE CAN GET INTO A TANNING BED?  CALL TO [facility] WANTS TO GO TODAY IF POSSIBLE.

## 2024-05-14 ENCOUNTER — TELEPHONE (OUTPATIENT)
Dept: ONCOLOGY | Facility: CLINIC | Age: 59
End: 2024-05-14
Payer: COMMERCIAL

## 2024-05-14 ENCOUNTER — LAB (OUTPATIENT)
Dept: LAB | Facility: HOSPITAL | Age: 59
End: 2024-05-14
Payer: COMMERCIAL

## 2024-05-14 DIAGNOSIS — C50.112 MALIGNANT NEOPLASM OF CENTRAL PORTION OF LEFT BREAST IN FEMALE, ESTROGEN RECEPTOR POSITIVE: ICD-10-CM

## 2024-05-14 DIAGNOSIS — C78.7 CANCER, METASTATIC TO LIVER: ICD-10-CM

## 2024-05-14 DIAGNOSIS — Z17.0 MALIGNANT NEOPLASM OF CENTRAL PORTION OF LEFT BREAST IN FEMALE, ESTROGEN RECEPTOR POSITIVE: ICD-10-CM

## 2024-05-14 LAB
ALBUMIN SERPL-MCNC: 4 G/DL (ref 3.5–5.2)
ALBUMIN/GLOB SERPL: 1.3 G/DL
ALP SERPL-CCNC: 113 U/L (ref 39–117)
ALT SERPL W P-5'-P-CCNC: 18 U/L (ref 1–33)
ANION GAP SERPL CALCULATED.3IONS-SCNC: 8 MMOL/L (ref 5–15)
AST SERPL-CCNC: 26 U/L (ref 1–32)
BASOPHILS # BLD AUTO: 0.01 10*3/MM3 (ref 0–0.2)
BASOPHILS NFR BLD AUTO: 0.3 % (ref 0–1.5)
BILIRUB SERPL-MCNC: 0.2 MG/DL (ref 0–1.2)
BUN SERPL-MCNC: 12 MG/DL (ref 8–23)
BUN/CREAT SERPL: 17.4 (ref 7–25)
CALCIUM SPEC-SCNC: 9.4 MG/DL (ref 8.6–10.5)
CANCER AG15-3 SERPL-ACNC: 32 U/ML
CHLORIDE SERPL-SCNC: 103 MMOL/L (ref 98–107)
CO2 SERPL-SCNC: 27 MMOL/L (ref 22–29)
CREAT SERPL-MCNC: 0.69 MG/DL (ref 0.57–1)
DEPRECATED RDW RBC AUTO: 51.9 FL (ref 37–54)
EGFRCR SERPLBLD CKD-EPI 2021: 98.9 ML/MIN/1.73
EOSINOPHIL # BLD AUTO: 0.27 10*3/MM3 (ref 0–0.4)
EOSINOPHIL NFR BLD AUTO: 7.5 % (ref 0.3–6.2)
ERYTHROCYTE [DISTWIDTH] IN BLOOD BY AUTOMATED COUNT: 14.4 % (ref 12.3–15.4)
GLOBULIN UR ELPH-MCNC: 3 GM/DL
GLUCOSE SERPL-MCNC: 100 MG/DL (ref 65–99)
HCT VFR BLD AUTO: 36.2 % (ref 34–46.6)
HGB BLD-MCNC: 13.1 G/DL (ref 12–15.9)
IMM GRANULOCYTES # BLD AUTO: 0 10*3/MM3 (ref 0–0.05)
IMM GRANULOCYTES NFR BLD AUTO: 0 % (ref 0–0.5)
LYMPHOCYTES # BLD AUTO: 0.97 10*3/MM3 (ref 0.7–3.1)
LYMPHOCYTES NFR BLD AUTO: 26.9 % (ref 19.6–45.3)
MCH RBC QN AUTO: 36.1 PG (ref 26.6–33)
MCHC RBC AUTO-ENTMCNC: 36.2 G/DL (ref 31.5–35.7)
MCV RBC AUTO: 99.7 FL (ref 79–97)
MONOCYTES # BLD AUTO: 0.42 10*3/MM3 (ref 0.1–0.9)
MONOCYTES NFR BLD AUTO: 11.6 % (ref 5–12)
NEUTROPHILS NFR BLD AUTO: 1.94 10*3/MM3 (ref 1.7–7)
NEUTROPHILS NFR BLD AUTO: 53.7 % (ref 42.7–76)
PLATELET # BLD AUTO: 178 10*3/MM3 (ref 140–450)
PMV BLD AUTO: 8.6 FL (ref 6–12)
POTASSIUM SERPL-SCNC: 4.6 MMOL/L (ref 3.5–5.2)
PROT SERPL-MCNC: 7 G/DL (ref 6–8.5)
RBC # BLD AUTO: 3.63 10*6/MM3 (ref 3.77–5.28)
SODIUM SERPL-SCNC: 138 MMOL/L (ref 136–145)
WBC NRBC COR # BLD AUTO: 3.61 10*3/MM3 (ref 3.4–10.8)

## 2024-05-14 PROCEDURE — 36415 COLL VENOUS BLD VENIPUNCTURE: CPT

## 2024-05-14 PROCEDURE — 85025 COMPLETE CBC W/AUTO DIFF WBC: CPT

## 2024-05-14 PROCEDURE — 80053 COMPREHEN METABOLIC PANEL: CPT

## 2024-05-14 PROCEDURE — 86300 IMMUNOASSAY TUMOR CA 15-3: CPT

## 2024-05-14 NOTE — TELEPHONE ENCOUNTER
PATIENT STOPPED BY THE OFFICE TODAY AND SAID SHE LAID OUT IN THE SUN AND NOW HAS BLISTERS UNDER HER CHIN, WHAT SHOULD SHE DO?    REQUESTING A RETURN PHONE CALL

## 2024-05-14 NOTE — TELEPHONE ENCOUNTER
I called patient back and told her that the xeloda does add to the sun sensitivity and that at this point, she can use aloe creams and sunburn ointments on her blisters.  She said that she did not go to the tanning bed yesterday but she was outside and got too much sun.  I had checked with pharmacy about recommendations and was told sunburn ointments and aloe.  Patient has appt tomorrow with Dr. Castro so I told her we would evaluate her blisters tomorrow.  Patient verbalized understanding.

## 2024-05-15 ENCOUNTER — OFFICE VISIT (OUTPATIENT)
Dept: ONCOLOGY | Facility: CLINIC | Age: 59
End: 2024-05-15
Payer: COMMERCIAL

## 2024-05-15 ENCOUNTER — SPECIALTY PHARMACY (OUTPATIENT)
Dept: ONCOLOGY | Facility: HOSPITAL | Age: 59
End: 2024-05-15
Payer: COMMERCIAL

## 2024-05-15 VITALS
OXYGEN SATURATION: 98 % | TEMPERATURE: 98.1 F | HEART RATE: 72 BPM | RESPIRATION RATE: 18 BRPM | SYSTOLIC BLOOD PRESSURE: 132 MMHG | WEIGHT: 133 LBS | BODY MASS INDEX: 25.11 KG/M2 | HEIGHT: 61 IN | DIASTOLIC BLOOD PRESSURE: 75 MMHG

## 2024-05-15 DIAGNOSIS — C50.112 MALIGNANT NEOPLASM OF CENTRAL PORTION OF LEFT BREAST IN FEMALE, ESTROGEN RECEPTOR POSITIVE: Primary | ICD-10-CM

## 2024-05-15 DIAGNOSIS — Z17.0 MALIGNANT NEOPLASM OF CENTRAL PORTION OF LEFT BREAST IN FEMALE, ESTROGEN RECEPTOR POSITIVE: Primary | ICD-10-CM

## 2024-05-15 LAB — CANCER AG27-29 SERPL-ACNC: 31.4 U/ML (ref 0–38.6)

## 2024-05-15 PROCEDURE — 99214 OFFICE O/P EST MOD 30 MIN: CPT | Performed by: INTERNAL MEDICINE

## 2024-05-15 NOTE — PROGRESS NOTES
PROBLEM LIST:  1. Local recurrence of HR+ carcinoma of the left breast  A) history of left breast cancer in 2007.  Bilateral mastectomy 5/30/07.  pathology showed grade 2 IDC of the left breast measuring 1.8 cm.  ER+ KY+ Her2 negative.   0/2 SLN involved.  [id number].  B) adjuvant chemotherapy with TC x 4 cycles, completed September 2007 with Dr. De La Torre.  Tamoxifen October 2007 thru October 2012.  C) presented January 2022 with left chest wall mass.  CT chest 1/17/22 showed 4.6 cm lesion involving left manubrium with soft tissue extent extending posteriorly to the anterior aspect of mediastinum.  12 mm nodule right lung base.  D) ultrasound guided biopsy of chest wall mass on 2/10/22.  Pathology showed invasive ductal carcinoma of the breast.  ER positive (100%), KY positive (50%), HER-2 2+  E) letrozole started February 2022.  CyberKnife to the chest wall mass completed 4/15/2022.  Ibrance started 4/18/2022.  Ibrance decreased to 100 mg due to neutropenia 9/5/2022  F) PET/CT 7/5/23 showed a new 2.9 cm hypermetabolic liver lesion, nonenlarged but hypermetabolic bilateral hilar and mediastinal lymph nodes.  [id number] testing showed a PI3 kinase mutation, no ESR 1 mutation.  Treatment changed to fulvestrant and ribociclib.  Fulvestrant started 7/19/2023.  Ribociclib started 7/27/2023.  G) PET/CT on 12/1/2023 showed increasing size of solitary liver lesion.  No additional sites of disease.  Liver biopsy 12/11/2023 showed metastatic breast carcinoma, ER positive KY positive HER2 1+.  Treatment with Xeloda started 12/29/2023.  2. Depression/anxiety  3. GERD    Subjective     CHIEF COMPLAINT: Breast cancer    HISTORY OF PRESENT ILLNESS:   Alcira Phelan returns for follow-up.  She started Xeloda 12/29/2023.  She says she is doing pretty well.  [place] had [place] pretty bad sunburn.  They are going to [place] next week and she has bought more sun protective wear.  She otherwise is feeling  "okay.          Objective      /75   Pulse 72   Temp 98.1 °F (36.7 °C)   Resp 18   Ht 154.9 cm (61\")   Wt 60.3 kg (133 lb)   SpO2 98%   BMI 25.13 kg/m²    Vitals:    05/15/24 0922   PainSc: 0-No pain       ECOG score: 0         General: well appearing female in no acute distress  Neuro: alert and oriented  HEENT: sclera anicteric, oropharynx clear  Cardiovascular: Regular rate and rhythm no murmurs  Lungs: Clear to auscultation bilaterally  Skin: Erythema on the face and lower extremities  Psych: mood and affect appropriate    RECENT LABS:  Lab Results   Component Value Date    WBC 3.61 05/14/2024    HGB 13.1 05/14/2024    HCT 36.2 05/14/2024    MCV 99.7 (H) 05/14/2024     05/14/2024       Lab Results   Component Value Date    GLUCOSE 100 (H) 05/14/2024    BUN 12 05/14/2024    CREATININE 0.69 05/14/2024    EGFRIFNONA 77 02/16/2022    BCR 17.4 05/14/2024    K 4.6 05/14/2024    CO2 27.0 05/14/2024    CALCIUM 9.4 05/14/2024    ALBUMIN 4.0 05/14/2024    AST 26 05/14/2024    ALT 18 05/14/2024       NM PET/CT Skull Base to Mid Thigh  Narrative: NM PET/CT SKULL BASE TO MID THIGH    Date of Exam: 3/11/2024 8:55 AM EDT    Indication: breast cancer.    Comparison: PET/CT 12/1/2023    Technique: 13.6 mCi of F-18 FDG was administered intravenously. PET imaging was obtained from skull base to mid-thigh approximately 60 minutes after radiotracer injection. A low dose non contrast CT was obtained for attenuation correction and anatomic   localization. Fused PET-CT and 3D MIP reconstructions were utilized for image interpretation.  Fasting blood glucose level: 88 mg/dl.    Reference uptake values:  Mediastinum: 1.8 SUVmax  Liver: 3 SUVmax  Normalization method: Body Weight    Findings:  Head/neck: No suspicious FDG avid masses or cervical adenopathy.    Chest: No suspicious FDG avid mass or adenopathy. No infectious appearing airspace consolidation. Nonspecific bandlike opacities in the anterior left upper lobe " stable from prior likely posttreatment related changes. Grossly intact bilateral breast   implants.    ABDOMEN: Hypermetabolic left hepatic lobe mass has decreased in size now measuring 3.3 x 3.9 cm previously 3.7 x 4.6 cm. Max SUV now 6.2 previously 7.9. No new suspicious FDG avid masses or adenopathy in the abdomen or pelvis. No bowel obstruction. No   acute infectious/inflammatory process.    Bones: No suspicious FDG avid bone lesion. Ill-defined mixed sclerosis within the manubrium is not metabolically active suggesting treated lesion.  Impression: Impression:  1. Partial treatment response since 12/1/2023. Decreased size and metabolic activity within the isolated left hepatic lobe metastasis.  2. No signs of disease progression.  3. Treated manubrial lesion is not metabolically active.    Electronically Signed: Yogesh Terry MD    3/13/2024 8:01 AM EDT    Workstation ID: [id number]    I personally reviewed the imaging studies            Assessment & Plan   Alcira Phelan is a 59 y.o. female with metastatic ER positive SD positive HER-2 negative invasive ductal carcinoma, with involvement of lymph nodes and liver.    She is doing well overall.  We will continue Xeloda at a reduced dose of 2 pills twice daily.  Plan to repeat imaging in June -PET/CT ordered today.    Sun sensitivity: Recommended using SPF of at least 50 in addition to sun protective clothing and wide brim hat.  Reapply sunscreen once an hour while outside.    Follow-up in 1 month with labs and imaging.          Ruth Castro MD  [facility]    5/15/2024          CC:

## 2024-06-10 ENCOUNTER — HOSPITAL ENCOUNTER (OUTPATIENT)
Facility: HOSPITAL | Age: 59
Discharge: HOME | End: 2024-06-10
Payer: COMMERCIAL

## 2024-06-10 DIAGNOSIS — C50.112 MALIGNANT NEOPLASM OF CENTRAL PORTION OF LEFT BREAST IN FEMALE, ESTROGEN RECEPTOR POSITIVE: ICD-10-CM

## 2024-06-10 DIAGNOSIS — Z17.0 MALIGNANT NEOPLASM OF CENTRAL PORTION OF LEFT BREAST IN FEMALE, ESTROGEN RECEPTOR POSITIVE: ICD-10-CM

## 2024-06-10 LAB — GLUCOSE BLDC GLUCOMTR-MCNC: 101 MG/DL (ref 70–130)

## 2024-06-10 PROCEDURE — 82948 REAGENT STRIP/BLOOD GLUCOSE: CPT

## 2024-06-10 PROCEDURE — A9552 F18 FDG: HCPCS | Performed by: INTERNAL MEDICINE

## 2024-06-10 PROCEDURE — 78815 PET IMAGE W/CT SKULL-THIGH: CPT

## 2024-06-10 PROCEDURE — 0 FLUDEOXYGLUCOSE F18 SOLUTION: Performed by: INTERNAL MEDICINE

## 2024-06-10 RX ADMIN — FLUDEOXYGLUCOSE F18 1 DOSE: 300 INJECTION INTRAVENOUS at 12:02

## 2024-06-12 ENCOUNTER — OFFICE VISIT (OUTPATIENT)
Dept: ONCOLOGY | Facility: CLINIC | Age: 59
End: 2024-06-12
Payer: COMMERCIAL

## 2024-06-12 ENCOUNTER — SPECIALTY PHARMACY (OUTPATIENT)
Dept: ONCOLOGY | Facility: HOSPITAL | Age: 59
End: 2024-06-12
Payer: COMMERCIAL

## 2024-06-12 VITALS
DIASTOLIC BLOOD PRESSURE: 77 MMHG | TEMPERATURE: 97.8 F | BODY MASS INDEX: 24.35 KG/M2 | SYSTOLIC BLOOD PRESSURE: 139 MMHG | OXYGEN SATURATION: 98 % | RESPIRATION RATE: 16 BRPM | HEART RATE: 77 BPM | WEIGHT: 129 LBS | HEIGHT: 61 IN

## 2024-06-12 DIAGNOSIS — T45.1X5A CHEMOTHERAPY INDUCED CARDIOMYOPATHY: Primary | ICD-10-CM

## 2024-06-12 DIAGNOSIS — C79.51 MALIGNANT NEOPLASM METASTATIC TO BONE: ICD-10-CM

## 2024-06-12 DIAGNOSIS — C50.112 MALIGNANT NEOPLASM OF CENTRAL PORTION OF LEFT BREAST IN FEMALE, ESTROGEN RECEPTOR POSITIVE: ICD-10-CM

## 2024-06-12 DIAGNOSIS — I42.7 CHEMOTHERAPY INDUCED CARDIOMYOPATHY: Primary | ICD-10-CM

## 2024-06-12 DIAGNOSIS — C50.112 MALIGNANT NEOPLASM OF CENTRAL PORTION OF LEFT FEMALE BREAST, UNSPECIFIED ESTROGEN RECEPTOR STATUS: ICD-10-CM

## 2024-06-12 DIAGNOSIS — Z17.0 MALIGNANT NEOPLASM OF CENTRAL PORTION OF LEFT BREAST IN FEMALE, ESTROGEN RECEPTOR POSITIVE: ICD-10-CM

## 2024-06-12 DIAGNOSIS — Z85.3 HISTORY OF LEFT BREAST CANCER: Primary | ICD-10-CM

## 2024-06-12 DIAGNOSIS — Z85.3 HISTORY OF LEFT BREAST CANCER: ICD-10-CM

## 2024-06-12 RX ORDER — PALONOSETRON 0.05 MG/ML
0.25 INJECTION, SOLUTION INTRAVENOUS ONCE
OUTPATIENT
Start: 2024-06-19

## 2024-06-12 RX ORDER — ONDANSETRON HYDROCHLORIDE 8 MG/1
8 TABLET, FILM COATED ORAL 3 TIMES DAILY PRN
Qty: 30 TABLET | Refills: 5 | Status: SHIPPED | OUTPATIENT
Start: 2024-06-12

## 2024-06-12 RX ORDER — OLANZAPINE 5 MG/1
5 TABLET ORAL NIGHTLY
Qty: 4 TABLET | Refills: 11 | Status: SHIPPED | OUTPATIENT
Start: 2024-06-12

## 2024-06-12 RX ORDER — LIDOCAINE AND PRILOCAINE 25; 25 MG/G; MG/G
1 CREAM TOPICAL AS NEEDED
Qty: 30 G | Refills: 3 | Status: SHIPPED | OUTPATIENT
Start: 2024-06-12

## 2024-06-12 RX ORDER — FAMOTIDINE 10 MG/ML
20 INJECTION, SOLUTION INTRAVENOUS AS NEEDED
OUTPATIENT
Start: 2024-06-19

## 2024-06-12 RX ORDER — DEXTROSE MONOHYDRATE 50 MG/ML
20 INJECTION, SOLUTION INTRAVENOUS ONCE
OUTPATIENT
Start: 2024-06-19

## 2024-06-12 RX ORDER — DIPHENHYDRAMINE HYDROCHLORIDE 50 MG/ML
50 INJECTION INTRAMUSCULAR; INTRAVENOUS AS NEEDED
OUTPATIENT
Start: 2024-06-19

## 2024-06-12 NOTE — PROGRESS NOTES
PROBLEM LIST:  1. Local recurrence of HR+ carcinoma of the left breast  A) history of left breast cancer in 2007.  Bilateral mastectomy 5/30/07.  pathology showed grade 2 IDC of the left breast measuring 1.8 cm.  ER+ TN+ Her2 negative.   0/2 SLN involved.  [id number].  B) adjuvant chemotherapy with TC x 4 cycles, completed September 2007 with Dr. De La Torre.  Tamoxifen October 2007 thru October 2012.  C) presented January 2022 with left chest wall mass.  CT chest 1/17/22 showed 4.6 cm lesion involving left manubrium with soft tissue extent extending posteriorly to the anterior aspect of mediastinum.  12 mm nodule right lung base.  D) ultrasound guided biopsy of chest wall mass on 2/10/22.  Pathology showed invasive ductal carcinoma of the breast.  ER positive (100%), TN positive (50%), HER-2 2+  E) letrozole started February 2022.  CyberKnife to the chest wall mass completed 4/15/2022.  Ibrance started 4/18/2022.  Ibrance decreased to 100 mg due to neutropenia 9/5/2022  F) PET/CT 7/5/23 showed a new 2.9 cm hypermetabolic liver lesion, nonenlarged but hypermetabolic bilateral hilar and mediastinal lymph nodes.  [id number] testing showed a PI3 kinase mutation, no ESR 1 mutation.  Treatment changed to fulvestrant and ribociclib.  Fulvestrant started 7/19/2023.  Ribociclib started 7/27/2023.  G) PET/CT on 12/1/2023 showed increasing size of solitary liver lesion.  No additional sites of disease.  Liver biopsy 12/11/2023 showed metastatic breast carcinoma, ER positive TN positive HER2 1+.  Treatment with Xeloda started 12/29/2023.  H) PET/CT 6/10/2024 showed enlargement in size and metabolic activity of the solitary liver metastasis.  Treatment changed to Enhertu, starting 6/19/2024.  2. Depression/anxiety  3. GERD    Subjective     CHIEF COMPLAINT: Breast cancer    HISTORY OF PRESENT ILLNESS:   Alcira Phelan returns for follow-up.  She started Xeloda 12/29/2023.  She has been feeling okay.  No new symptoms or  "complaints.          Objective      /77   Pulse 77   Temp 97.8 °F (36.6 °C)   Resp 16   Ht 154.9 cm (61\")   Wt 58.5 kg (129 lb)   SpO2 98%   BMI 24.37 kg/m²    Vitals:    06/12/24 0931   PainSc: 0-No pain       ECOG score: 0         General: well appearing female in no acute distress  Neuro: alert and oriented  HEENT: sclera anicteric, oropharynx clear  Cardiovascular: Regular rate and rhythm no murmurs  Lungs: Clear to auscultation bilaterally  Skin: Erythema on the face and lower extremities  Psych: mood and affect appropriate    RECENT LABS:  Lab Results   Component Value Date    WBC 3.61 05/14/2024    HGB 13.1 05/14/2024    HCT 36.2 05/14/2024    MCV 99.7 (H) 05/14/2024     05/14/2024       Lab Results   Component Value Date    GLUCOSE 100 (H) 05/14/2024    BUN 12 05/14/2024    CREATININE 0.69 05/14/2024    EGFRIFNONA 77 02/16/2022    BCR 17.4 05/14/2024    K 4.6 05/14/2024    CO2 27.0 05/14/2024    CALCIUM 9.4 05/14/2024    ALBUMIN 4.0 05/14/2024    AST 26 05/14/2024    ALT 18 05/14/2024         I personally reviewed the imaging studies and discussed with radiology.            Assessment & Plan   Alcira Phelan is a 59 y.o. female with metastatic ER positive SC positive HER-2 negative invasive ductal carcinoma, with involvement of lymph nodes and liver.    She has disease progression in the liver metastasis with increase in the size and metabolic activity.  This remains her only obvious site of disease.  Her tumor was originally HER2 2+, 1+ on recent liver biopsy.  We discussed treatment with Enhertu.  We reviewed potential side effects including pneumonitis, cytopenias, nausea/vomiting, cardiotoxicity, hair loss, diarrhea, and anorexia.    I did discuss port placement.  We do not need to have this to start but I do think she would benefit from it in the long-term.    Follow-up in 1 month with cycle 2.    Total time of patient care on day of service including time prior to, face to face " with patient, and following visit spent in reviewing records, lab results, imaging studies, discussion with other providers, discussion with patient, and documentation/charting was > 40 minutes.            Ruth Castro MD  [facility]    6/12/2024          CC:

## 2024-06-17 ENCOUNTER — SPECIALTY PHARMACY (OUTPATIENT)
Dept: ONCOLOGY | Facility: HOSPITAL | Age: 59
End: 2024-06-17
Payer: COMMERCIAL

## 2024-06-17 ENCOUNTER — HOSPITAL ENCOUNTER (OUTPATIENT)
Dept: CARDIOLOGY | Facility: HOSPITAL | Age: 59
Discharge: HOME | End: 2024-06-17
Payer: COMMERCIAL

## 2024-06-17 ENCOUNTER — HOSPITAL ENCOUNTER (OUTPATIENT)
Dept: ONCOLOGY | Facility: HOSPITAL | Age: 59
Discharge: HOME | End: 2024-06-17
Payer: COMMERCIAL

## 2024-06-17 VITALS
DIASTOLIC BLOOD PRESSURE: 65 MMHG | SYSTOLIC BLOOD PRESSURE: 126 MMHG | HEIGHT: 61 IN | BODY MASS INDEX: 24.56 KG/M2 | WEIGHT: 130.07 LBS

## 2024-06-17 VITALS
HEIGHT: 61 IN | RESPIRATION RATE: 16 BRPM | TEMPERATURE: 97.6 F | DIASTOLIC BLOOD PRESSURE: 74 MMHG | BODY MASS INDEX: 24.55 KG/M2 | HEART RATE: 75 BPM | SYSTOLIC BLOOD PRESSURE: 137 MMHG | WEIGHT: 130 LBS

## 2024-06-17 DIAGNOSIS — C79.51 MALIGNANT NEOPLASM METASTATIC TO BONE: ICD-10-CM

## 2024-06-17 DIAGNOSIS — Z85.3 HISTORY OF LEFT BREAST CANCER: ICD-10-CM

## 2024-06-17 DIAGNOSIS — T45.1X5A CHEMOTHERAPY INDUCED CARDIOMYOPATHY: ICD-10-CM

## 2024-06-17 DIAGNOSIS — I42.7 CHEMOTHERAPY INDUCED CARDIOMYOPATHY: ICD-10-CM

## 2024-06-17 DIAGNOSIS — C50.112 MALIGNANT NEOPLASM OF CENTRAL PORTION OF LEFT FEMALE BREAST, UNSPECIFIED ESTROGEN RECEPTOR STATUS: ICD-10-CM

## 2024-06-17 LAB
ALBUMIN SERPL-MCNC: 4.1 G/DL (ref 3.5–5.2)
ALBUMIN/GLOB SERPL: 1.5 G/DL
ALP SERPL-CCNC: 133 U/L (ref 39–117)
ALT SERPL W P-5'-P-CCNC: 19 U/L (ref 1–33)
ANION GAP SERPL CALCULATED.3IONS-SCNC: 9 MMOL/L (ref 5–15)
AST SERPL-CCNC: 27 U/L (ref 1–32)
BASOPHILS # BLD AUTO: 0.02 10*3/MM3 (ref 0–0.2)
BASOPHILS NFR BLD AUTO: 0.5 % (ref 0–1.5)
BH CV ECHO LEFT VENTRICLE GLOBAL LONGITUDINAL STRAIN: -14.5 %
BH CV ECHO MEAS - AO MAX PG: 7.8 MMHG
BH CV ECHO MEAS - AO MEAN PG: 4 MMHG
BH CV ECHO MEAS - AO ROOT DIAM: 2.5 CM
BH CV ECHO MEAS - AO V2 MAX: 139.3 CM/SEC
BH CV ECHO MEAS - AO V2 VTI: 28 CM
BH CV ECHO MEAS - EF(MOD-BP): 51 %
BH CV ECHO MEAS - IVS/LVPW: 1.11 CM
BH CV ECHO MEAS - IVSD: 1 CM
BH CV ECHO MEAS - LA DIMENSION: 3.8 CM
BH CV ECHO MEAS - LAT PEAK E' VEL: 8.4 CM/SEC
BH CV ECHO MEAS - LV MAX PG: 4.2 MMHG
BH CV ECHO MEAS - LV MEAN PG: 1.8 MMHG
BH CV ECHO MEAS - LV V1 MAX: 102 CM/SEC
BH CV ECHO MEAS - LV V1 VTI: 21.2 CM
BH CV ECHO MEAS - LVIDD: 4 CM
BH CV ECHO MEAS - LVIDS: 2.9 CM
BH CV ECHO MEAS - LVOT DIAM: 1.9 CM
BH CV ECHO MEAS - LVPWD: 0.9 CM
BH CV ECHO MEAS - MED PEAK E' VEL: 6.6 CM/SEC
BH CV ECHO MEAS - MV A MAX VEL: 78.4 CM/SEC
BH CV ECHO MEAS - MV DEC SLOPE: 234 CM/SEC2
BH CV ECHO MEAS - MV DEC TIME: 20 SEC
BH CV ECHO MEAS - MV E MAX VEL: 61.6 CM/SEC
BH CV ECHO MEAS - MV E/A: 0.79
BH CV ECHO MEAS - MV MAX PG: 3 MMHG
BH CV ECHO MEAS - MV MEAN PG: 1.1 MMHG
BH CV ECHO MEAS - MV P1/2T: 82 MSEC
BH CV ECHO MEAS - MV V2 VTI: 18.9 CM
BH CV ECHO MEAS - MVA(P1/2T): 2.7 CM2
BH CV ECHO MEAS - PA ACC TIME: 0.1 SEC
BH CV ECHO MEAS - PA V2 MAX: 76.8 CM/SEC
BH CV ECHO MEAS - RAP SYSTOLE: 3 MMHG
BH CV ECHO MEAS - RVSP: 17 MMHG
BH CV ECHO MEAS - TR MAX PG: 14 MMHG
BH CV ECHO MEAS - TR MAX VEL: 183.4 CM/SEC
BH CV ECHO MEASUREMENTS AVERAGE E/E' RATIO: 8.21
BH CV XLRA - RV BASE: 3.1 CM
BH CV XLRA - RV LENGTH: 6.4 CM
BH CV XLRA - RV MID: 2.28 CM
BILIRUB SERPL-MCNC: 0.2 MG/DL (ref 0–1.2)
BUN SERPL-MCNC: 12 MG/DL (ref 8–23)
BUN/CREAT SERPL: 14.8 (ref 7–25)
CALCIUM SPEC-SCNC: 9.3 MG/DL (ref 8.6–10.5)
CHLORIDE SERPL-SCNC: 103 MMOL/L (ref 98–107)
CO2 SERPL-SCNC: 28 MMOL/L (ref 22–29)
CREAT SERPL-MCNC: 0.81 MG/DL (ref 0.57–1)
DEPRECATED RDW RBC AUTO: 52.1 FL (ref 37–54)
EGFRCR SERPLBLD CKD-EPI 2021: 82.7 ML/MIN/1.73
EOSINOPHIL # BLD AUTO: 0.3 10*3/MM3 (ref 0–0.4)
EOSINOPHIL NFR BLD AUTO: 7.2 % (ref 0.3–6.2)
ERYTHROCYTE [DISTWIDTH] IN BLOOD BY AUTOMATED COUNT: 14 % (ref 12.3–15.4)
GLOBULIN UR ELPH-MCNC: 2.8 GM/DL
GLUCOSE SERPL-MCNC: 125 MG/DL (ref 65–99)
HCT VFR BLD AUTO: 37.1 % (ref 34–46.6)
HGB BLD-MCNC: 13 G/DL (ref 12–15.9)
IMM GRANULOCYTES # BLD AUTO: 0.01 10*3/MM3 (ref 0–0.05)
IMM GRANULOCYTES NFR BLD AUTO: 0.2 % (ref 0–0.5)
LEFT ATRIUM VOLUME INDEX: 17.1 ML/M2
LYMPHOCYTES # BLD AUTO: 1.22 10*3/MM3 (ref 0.7–3.1)
LYMPHOCYTES NFR BLD AUTO: 29.3 % (ref 19.6–45.3)
MCH RBC QN AUTO: 35.1 PG (ref 26.6–33)
MCHC RBC AUTO-ENTMCNC: 35 G/DL (ref 31.5–35.7)
MCV RBC AUTO: 100.3 FL (ref 79–97)
MONOCYTES # BLD AUTO: 0.45 10*3/MM3 (ref 0.1–0.9)
MONOCYTES NFR BLD AUTO: 10.8 % (ref 5–12)
NEUTROPHILS NFR BLD AUTO: 2.16 10*3/MM3 (ref 1.7–7)
NEUTROPHILS NFR BLD AUTO: 52 % (ref 42.7–76)
PLATELET # BLD AUTO: 166 10*3/MM3 (ref 140–450)
PMV BLD AUTO: 8.7 FL (ref 6–12)
POTASSIUM SERPL-SCNC: 4.5 MMOL/L (ref 3.5–5.2)
PROT SERPL-MCNC: 6.9 G/DL (ref 6–8.5)
RBC # BLD AUTO: 3.7 10*6/MM3 (ref 3.77–5.28)
SODIUM SERPL-SCNC: 140 MMOL/L (ref 136–145)
WBC NRBC COR # BLD AUTO: 4.16 10*3/MM3 (ref 3.4–10.8)

## 2024-06-17 PROCEDURE — 85025 COMPLETE CBC W/AUTO DIFF WBC: CPT | Performed by: INTERNAL MEDICINE

## 2024-06-17 PROCEDURE — 93306 TTE W/DOPPLER COMPLETE: CPT

## 2024-06-17 PROCEDURE — 80053 COMPREHEN METABOLIC PANEL: CPT | Performed by: INTERNAL MEDICINE

## 2024-06-17 PROCEDURE — G0463 HOSPITAL OUTPT CLINIC VISIT: HCPCS

## 2024-06-17 PROCEDURE — 93356 MYOCRD STRAIN IMG SPCKL TRCK: CPT | Performed by: INTERNAL MEDICINE

## 2024-06-17 PROCEDURE — 93306 TTE W/DOPPLER COMPLETE: CPT | Performed by: INTERNAL MEDICINE

## 2024-06-17 PROCEDURE — 93356 MYOCRD STRAIN IMG SPCKL TRCK: CPT

## 2024-06-17 NOTE — PROGRESS NOTES
Outpatient Infusion  [address] [id number]  [phone]      CHEMOTHERAPY EDUCATION    NAME:  Alcira Phelan      : 1965           DATE: 24    Medication Education Sheets: (select all that apply)  Fam-trastuzumab deruxtecan (Enhertu)    Other Education Sheets: (select all that apply)  CINV, Constipation, Diarrhea, and Symptom Tracker Sheet and MARSHALL Information    Chemotherapy Regimen:   OP BREAST Fam-Trastuzumab Deruxtecan-nxki every 21 days      TOPICS EDUCATION PROVIDED COMMENTS   ANEMIA:  role of RBC, cause, s/s, ways to manage, role of transfusion [x] Reviewed the role of RBC and the use of transfusions if hemoglobin decreases too much.  Patient to notify us if she experiences shortness of breath, dizziness, or palpitations.  Also let patient know she could feel more tired than usual and to try to stay active, but rest if she needs to.    THROMBOCYTOPENIA:  role of platelet, cause, s/s, ways to prevent bleeding, things to avoid, when to seek help [x] Reviewed the role of platelets in blood clotting and when to call clinic (bloody nose that bleeds for 5 minutes despite pressure, a cut that won't stop bleeding despite pressure, gums that bleed excessively with brushing or flossing). Recommended using an electric razor, soft bristle toothbrush, and blowing the nose gently.    NEUTROPENIA:  role of WBC, cause, infection precautions, s/s of infection, when to call MD [x] Reviewed the role of WBC, good infection prevention practices, and when to call the clinic (temperature 100.4F, sore throat, burning urination, etc)   NUTRITION & APPETITE CHANGES:  importance of maintaining healthy diet & weight, ways to manage to improve intake, dietary consult, exercise regimen, electrolyte and/or blood glucose abnormalities [x] Decreased Appetite: Discussed the risk of decreased appetite. Recommended eating smaller, more frequent meals. Instructed the patient to contact  the clinic if losing weight or having difficulty eating enough to maintain energy level., Electrolyte Abnormalities:  Explained that the oncology therapy may lead to abnormalities in electrolytes, specifically: low potassium   DIARRHEA:  causes, s/s of dehydration, ways to manage, dietary changes, when to call MD [x] Chemotherapy : Discussed the risk of diarrhea. Instructed patient on use OTC loperamide with diarrhea onset, but emphasized the importance of calling the clinic if 4-6 episodes in 24 hours not relieved by OTC loperamide.   CONSTIPATION:  causes, ways to manage, dietary changes, when to call MD [x] Provided supplementary handout with instructions for use of docusate and other OTC therapies to manage constipation.  Instructed patient to call us if medications aren't working.    NAUSEA & VOMITING:  cause, use of antiemetics, dietary changes, when to call MD [x] Emetic risk: High  Premeds: Dexamethasone, Fosaprepitant, and Palonosetron  Scheduled home meds: Olanzapine 5 mg by mouth at night on days 1-4 after chemotherapy to prevent delayed nausea  PRN home meds: Ondansetron 8 mg PO TID PRN N/V  Pharmacy home meds sent to: [facility] [place]    Instructed the patient to take the scheduled olanazpine even if she does not feel nauseous.  I explained this is to prevent delayed nausea.    Instructed the patient to take a dose of the PRN medication at the first onset of nausea and if it's not working to call us for additional medications.  Also provided non-drug measures to mitigate nausea.   ALOPECIA:  cause, ways to manage, resources [x] Discussed the possibility of hair loss with the patient. Informed patient that she could request a prescription for a wig if desired and most of the cost is usually covered by insurance. Recommended covering the head with a hat and/or protecting the skin on the head with SPF 30 or higher.    PAIN:  causes, ways to manage [x] Chemo: Discussed muscle and joint aches/pains with  chemotherapy, and recommended the use of OTC pain relief with ibuprofen or acetaminophen if needed.   ORGAN TOXICITIES:  cause, s/s, need for diagnostic tests, labs, when to notify MD [x] Discussed potential effects on organ systems, monitoring, diagnostic tests, labs, and when to notify the clinic. Discussed the signs/symptoms of the following: cardiotoxicity, hepatotoxicity, and lung changes. Baseline ECHO today after chemo edu 6/17/24, let patient know this will be repeated approximately every 3 months.   MISCELLANEOUS:  drug interactions, administration, labs, etc. [x] Discussed chemotherapy schedule, lab draws, infusion times, and total expected visit time.   DDIs: No significant DDIs. Olanzapine and Effexor both carry a risk of serotonin syndrome, but due to the low dose and short duration of use with olanzapine, this risk is minimal.  Drug-food interactions: None  Lab draws: On or before day 1 of each cycle, no sooner than 3 days early.  Discussed proper disposal of remaining Xeloda, but she has already gotten rid of that.   INFERTILITY & SEXUALITY:    causes, fertility preservation options, sexuality changes, ways to manage, importance of birth control [x] IV Oncology Therapy: Reviewed safe sex practices and the importance of minimizing exposure to body fluids for 48 hours after each dose of IV oncology therapy., The patient is not of childbearing potential.   HOME CARE:  storing of oral chemo, how to manage bodily fluids [x] IV - Counseled on management of soiled linens and proper flush technique.  Discussed how to manage all the side effects at home and advised when to contact the MD office     Medications:  Prior to Admission medications    Medication Sig Start Date End Date Taking? Authorizing Provider   cephalexin (KEFLEX) 500 MG capsule Take 1 capsule by mouth 3 (Three) Times a Day. 2/22/24   Maury Anglin MD   cetirizine (zyrTEC) 10 MG tablet Take 1 tablet by mouth Daily.    Provider,  MD Rubens   Diclofenac Sodium (VOLTAREN) 1 % gel gel Apply 4 g topically to the appropriate area as directed 2 (Two) Times a Day. Apply to the palmar and dorsal surface of each hand twice daily 12/15/23   Ruth Castro MD   Famotidine (PEPCID PO) Take 1 tablet by mouth Daily.    ProviderRubens MD   ibuprofen (ADVIL,MOTRIN) 800 MG tablet Take 1 tablet by mouth 2 (Two) Times a Day As Needed for Mild Pain. 1/8/24   Batool Blair MD   lidocaine-prilocaine (EMLA) 2.5-2.5 % cream Apply 1 Application topically to the appropriate area as directed As Needed (45-60 minutes prior to port access.  Cover with saran/plastic wrap.). 6/12/24   Ruth Castro MD   Multiple Vitamins-Minerals (MULTI VITAMIN/MINERALS) tablet Take 1 tablet by mouth Daily. 2/20/13   ProviderRubens MD   OLANZapine (zyPREXA) 5 MG tablet Take 1 tablet by mouth Every Night. Take nightly x 4 starting night of chemotherapy. 6/12/24   Ruth Castro MD   ondansetron (ZOFRAN) 8 MG tablet Take 1 tablet by mouth 3 (Three) Times a Day As Needed for Nausea or Vomiting. 6/12/24   Ruth Castro MD   venlafaxine XR (EFFEXOR-XR) 150 MG 24 hr capsule TAKE ONE CAPSULE BY MOUTH DAILY 3/4/24   Batool Blair MD       Notes: All questions and concerns were addressed. Provided a personalized treatment calendar to patient (includes treatment and lab schedule). Provided patient with contact information for the pharmacist and clinic while instructing them to call if any questions or concerns arise. Informed consent for treatment was obtained. Patient and her  were receptive to information and expressed understanding.     Ines Haro, Candy, [facility]  Oncology Clinical Pharmacist   Phone: [phone]    6/17/2024  15:02 EDT

## 2024-06-19 ENCOUNTER — HOSPITAL ENCOUNTER (OUTPATIENT)
Dept: ONCOLOGY | Facility: HOSPITAL | Age: 59
Discharge: HOME | End: 2024-06-19
Admitting: INTERNAL MEDICINE
Payer: COMMERCIAL

## 2024-06-19 VITALS
HEART RATE: 69 BPM | HEIGHT: 61 IN | DIASTOLIC BLOOD PRESSURE: 74 MMHG | BODY MASS INDEX: 24.92 KG/M2 | RESPIRATION RATE: 18 BRPM | TEMPERATURE: 97 F | WEIGHT: 132 LBS | SYSTOLIC BLOOD PRESSURE: 140 MMHG

## 2024-06-19 DIAGNOSIS — Z85.3 HISTORY OF LEFT BREAST CANCER: Primary | ICD-10-CM

## 2024-06-19 DIAGNOSIS — C50.112 MALIGNANT NEOPLASM OF CENTRAL PORTION OF LEFT FEMALE BREAST, UNSPECIFIED ESTROGEN RECEPTOR STATUS: ICD-10-CM

## 2024-06-19 DIAGNOSIS — C79.51 MALIGNANT NEOPLASM METASTATIC TO BONE: ICD-10-CM

## 2024-06-19 PROCEDURE — 25010000002 FOSAPREPITANT PER 1 MG: Performed by: INTERNAL MEDICINE

## 2024-06-19 PROCEDURE — 96367 TX/PROPH/DG ADDL SEQ IV INF: CPT

## 2024-06-19 PROCEDURE — 96375 TX/PRO/DX INJ NEW DRUG ADDON: CPT

## 2024-06-19 PROCEDURE — 96366 THER/PROPH/DIAG IV INF ADDON: CPT

## 2024-06-19 PROCEDURE — 96413 CHEMO IV INFUSION 1 HR: CPT

## 2024-06-19 PROCEDURE — 25010000002 PALONOSETRON PER 25 MCG: Performed by: INTERNAL MEDICINE

## 2024-06-19 PROCEDURE — 0 DEXTROSE 5 % SOLUTION: Performed by: INTERNAL MEDICINE

## 2024-06-19 PROCEDURE — 96415 CHEMO IV INFUSION ADDL HR: CPT

## 2024-06-19 PROCEDURE — 25010000002 DEXAMETHASONE SODIUM PHOSPHATE 100 MG/10ML SOLUTION: Performed by: INTERNAL MEDICINE

## 2024-06-19 PROCEDURE — 25010000002 FAM-TRASTUZUMAB DERUXTEC-NXKI 100 MG RECONSTITUTED SOLUTION 1 EACH VIAL: Performed by: INTERNAL MEDICINE

## 2024-06-19 RX ORDER — DEXTROSE MONOHYDRATE 50 MG/ML
20 INJECTION, SOLUTION INTRAVENOUS ONCE
Status: COMPLETED | OUTPATIENT
Start: 2024-06-19 | End: 2024-06-19

## 2024-06-19 RX ORDER — PALONOSETRON 0.05 MG/ML
0.25 INJECTION, SOLUTION INTRAVENOUS ONCE
Status: COMPLETED | OUTPATIENT
Start: 2024-06-19 | End: 2024-06-19

## 2024-06-19 RX ADMIN — FAM-TRASTUZUMAB DERUXTECAN-NXKI 300 MG: 100 INJECTION, POWDER, LYOPHILIZED, FOR SOLUTION INTRAVENOUS at 13:45

## 2024-06-19 RX ADMIN — FOSAPREPITANT 150 MG: 150 INJECTION, POWDER, LYOPHILIZED, FOR SOLUTION INTRAVENOUS at 13:05

## 2024-06-19 RX ADMIN — DEXTROSE MONOHYDRATE 20 ML/HR: 50 INJECTION, SOLUTION INTRAVENOUS at 13:44

## 2024-06-19 RX ADMIN — PALONOSETRON HYDROCHLORIDE 0.25 MG: 0.25 INJECTION INTRAVENOUS at 13:03

## 2024-06-19 RX ADMIN — DEXAMETHASONE SODIUM PHOSPHATE 12 MG: 10 INJECTION, SOLUTION INTRAMUSCULAR; INTRAVENOUS at 13:07

## 2024-06-20 ENCOUNTER — PREP FOR SURGERY (OUTPATIENT)
Dept: OTHER | Facility: HOSPITAL | Age: 59
End: 2024-06-20
Payer: COMMERCIAL

## 2024-06-20 RX ORDER — SODIUM CHLORIDE 0.9 % (FLUSH) 0.9 %
10 SYRINGE (ML) INJECTION AS NEEDED
Status: CANCELLED | OUTPATIENT
Start: 2024-06-20

## 2024-06-20 RX ORDER — SODIUM CHLORIDE 9 MG/ML
40 INJECTION, SOLUTION INTRAVENOUS AS NEEDED
Status: CANCELLED | OUTPATIENT
Start: 2024-06-20

## 2024-06-20 RX ORDER — SODIUM CHLORIDE 0.9 % (FLUSH) 0.9 %
3 SYRINGE (ML) INJECTION EVERY 12 HOURS SCHEDULED
Status: CANCELLED | OUTPATIENT
Start: 2024-06-20

## 2024-06-24 ENCOUNTER — TELEPHONE (OUTPATIENT)
Dept: ONCOLOGY | Facility: CLINIC | Age: 59
End: 2024-06-24
Payer: COMMERCIAL

## 2024-06-24 ENCOUNTER — HOSPITAL ENCOUNTER (OUTPATIENT)
Dept: INTERVENTIONAL RADIOLOGY/VASCULAR | Facility: HOSPITAL | Age: 59
Discharge: HOME | End: 2024-06-24
Admitting: RADIOLOGY
Payer: COMMERCIAL

## 2024-06-24 VITALS — WEIGHT: 132.8 LBS | HEIGHT: 61 IN | BODY MASS INDEX: 25.07 KG/M2 | TEMPERATURE: 97.6 F | RESPIRATION RATE: 16 BRPM

## 2024-06-24 DIAGNOSIS — Z17.0 MALIGNANT NEOPLASM OF CENTRAL PORTION OF LEFT BREAST IN FEMALE, ESTROGEN RECEPTOR POSITIVE: ICD-10-CM

## 2024-06-24 DIAGNOSIS — I42.7 CHEMOTHERAPY INDUCED CARDIOMYOPATHY: ICD-10-CM

## 2024-06-24 DIAGNOSIS — T45.1X5A CHEMOTHERAPY INDUCED CARDIOMYOPATHY: ICD-10-CM

## 2024-06-24 DIAGNOSIS — C50.112 MALIGNANT NEOPLASM OF CENTRAL PORTION OF LEFT BREAST IN FEMALE, ESTROGEN RECEPTOR POSITIVE: ICD-10-CM

## 2024-06-24 PROCEDURE — C1894 INTRO/SHEATH, NON-LASER: HCPCS | Performed by: RADIOLOGY

## 2024-06-24 RX ORDER — LIDOCAINE HYDROCHLORIDE 10 MG/ML
INJECTION, SOLUTION EPIDURAL; INFILTRATION; INTRACAUDAL; PERINEURAL
Status: DISCONTINUED
Start: 2024-06-24 | End: 2024-06-24 | Stop reason: WASHOUT

## 2024-06-24 RX ORDER — SODIUM CHLORIDE 0.9 % (FLUSH) 0.9 %
3 SYRINGE (ML) INJECTION EVERY 12 HOURS SCHEDULED
Status: DISCONTINUED | OUTPATIENT
Start: 2024-06-24 | End: 2024-06-25 | Stop reason: HOSPADM

## 2024-06-24 RX ORDER — SODIUM CHLORIDE 0.9 % (FLUSH) 0.9 %
10 SYRINGE (ML) INJECTION AS NEEDED
Status: DISCONTINUED | OUTPATIENT
Start: 2024-06-24 | End: 2024-06-25 | Stop reason: HOSPADM

## 2024-06-24 RX ORDER — SODIUM CHLORIDE 9 MG/ML
40 INJECTION, SOLUTION INTRAVENOUS AS NEEDED
Status: DISCONTINUED | OUTPATIENT
Start: 2024-06-24 | End: 2024-06-25 | Stop reason: HOSPADM

## 2024-06-24 RX ORDER — HEPARIN SODIUM 200 [USP'U]/100ML
INJECTION, SOLUTION INTRAVENOUS
Status: DISCONTINUED
Start: 2024-06-24 | End: 2024-06-25 | Stop reason: HOSPADM

## 2024-06-24 RX ORDER — HEPARIN SODIUM (PORCINE) LOCK FLUSH IV SOLN 100 UNIT/ML 100 UNIT/ML
SOLUTION INTRAVENOUS
Status: DISCONTINUED
Start: 2024-06-24 | End: 2024-06-24 | Stop reason: WASHOUT

## 2024-06-24 NOTE — DISCHARGE INSTRUCTIONS
Keep dressing clean and dry for 48 hours. After 48 hours, you may removed dressing and leave open to air. Leave the skin adhesive in place as it will come off on it's own, in the next couple of weeks. You may shower after 48 hours. Avoid rubbing the area and pat it dry gently.     Avoid lifting anything over 10 pounds or any other strenuous activities such as pulling, pushing or tugging for the next 48 hours.     Monitor the incision and puncture site for any redness, drainage, or separation of the incision. Contact the provider for any issues with site or if you develop a fever above 101.     Some bruising and soreness is to be expected. You may take Tylenol or Ibuprofen for discomfort/soreness or utilize an ice pack for 20 minutes at a time for the first 24 hours.      Seek immediate medical attention for any uncontrolled pain in shoulder/chest/neck on procedural side, any difficulty breathing or excessive bruising/bleeding.     You may use the implanted port immediately. If not in use for extended period of time, port must be accessed and flushed once every month.

## 2024-06-24 NOTE — NURSING NOTE
Patient arrived for port placement. Stated she received IV therapy every three weeks and is uncertain of the necessity of this port. She is willing to be stuck peripherally for access at this time. I suggested she and her  speak with Dr Castro with these concerns. The patient will stop by the office on her way out today. Oncology office contacted and message left with Ana Luisa in the HUB. Also secure chat sent to Dr Castro and TESS Murillo RN. Port placement cancelled for today.

## 2024-06-24 NOTE — TELEPHONE ENCOUNTER
Caller: ES      Relationship:     Best call back number: [phone] EXT [phone]    What is the best time to reach you: ANYTIME    Who are you requesting to speak with (clinical staff, provider,  specific staff member):CLINICAL     Do you know the name of the person who called: ELIU NAVARRO    What was the call regarding: CALLING TO MAKE SURE PATIENT IS IN COMPLIANCE AND IS SHE UNDER ACTIVE TREATMENT.    FAX NUMBER [phone]

## 2024-06-24 NOTE — TELEPHONE ENCOUNTER
Patient came by oncology clinic and stated that she would prefer not to get the port at this time and said that she would let us know if the peripheral IV access becomes challenging.  I told he that I would let Dr. Castro know and that I had received the message from interventional radiology.  She verbalized understanding.

## 2024-06-24 NOTE — TELEPHONE ENCOUNTER
Caller: ALEXANDER    Relationship:     Best call back number: [phone]      Who are you requesting to speak with (clinical staff, provider,  specific staff member): CLINICAL      What was the call regarding: ALEXANDER FROM INTERVENTIONAL RADIOLOGY CALLING TO ADVISE PT DECLINED PORT PLACEMENT AT HER APPT THIS MORNING BECAUSE SHE HAS QUESTIONS REGARDING THE NECESSITY.

## 2024-06-24 NOTE — PROGRESS NOTES
The patient called and left a voicemail on Friday evening on 6/21/24 requesting a call back. I called the patient back this morning. The patient reports she started taking Senna-Lax OTC twice daily for constipation. The patient reports she is having bowel movements but feels bloated. The patient wanted to discuss other OTC options available for constipation from her chemo education session last week. I discussed Miralax powder is another OTC product available for constipation with the patient. The patient reports she will purchase Miralax and see if this helps her bloating. I encouraged the patient to call Dr. Castro's office if her bloating does not improve. The patient verbalized understanding.    Janeen Watts, PharmD  Clinic Oncology Pharmacy Specialist  [phone]

## 2024-06-28 ENCOUNTER — TELEPHONE (OUTPATIENT)
Dept: ONCOLOGY | Facility: CLINIC | Age: 59
End: 2024-06-28
Payer: COMMERCIAL

## 2024-06-28 NOTE — TELEPHONE ENCOUNTER
I called patient back and instructed her to use senekot two tablets bid, stool softener twice a day and miralax at night.  I also advised patient to get a couple of bottles of magnesium citrate to use.  Instructed 1/2 bottle and to use the other half if no results in two hours.  Patient verbalized understanding.

## 2024-06-28 NOTE — TELEPHONE ENCOUNTER
Patient called she is terribly constipated and everything that has been suggested for her to try is not working. Please call.

## 2024-07-03 DIAGNOSIS — C79.51 MALIGNANT NEOPLASM METASTATIC TO BONE: Primary | ICD-10-CM

## 2024-07-03 DIAGNOSIS — Z85.3 HISTORY OF LEFT BREAST CANCER: ICD-10-CM

## 2024-07-03 DIAGNOSIS — C50.112 MALIGNANT NEOPLASM OF CENTRAL PORTION OF LEFT FEMALE BREAST, UNSPECIFIED ESTROGEN RECEPTOR STATUS: ICD-10-CM

## 2024-07-09 ENCOUNTER — APPOINTMENT (OUTPATIENT)
Dept: ONCOLOGY | Facility: HOSPITAL | Age: 59
End: 2024-07-09
Payer: COMMERCIAL

## 2024-07-09 ENCOUNTER — TELEPHONE (OUTPATIENT)
Dept: ONCOLOGY | Facility: CLINIC | Age: 59
End: 2024-07-09

## 2024-07-09 ENCOUNTER — LAB (OUTPATIENT)
Dept: LAB | Facility: HOSPITAL | Age: 59
End: 2024-07-09
Payer: COMMERCIAL

## 2024-07-09 DIAGNOSIS — Z13.29 SCREENING FOR THYROID DISORDER: ICD-10-CM

## 2024-07-09 DIAGNOSIS — Z13.1 SCREENING FOR DIABETES MELLITUS: ICD-10-CM

## 2024-07-09 DIAGNOSIS — Z85.3 HISTORY OF LEFT BREAST CANCER: ICD-10-CM

## 2024-07-09 DIAGNOSIS — C50.112 MALIGNANT NEOPLASM OF CENTRAL PORTION OF LEFT FEMALE BREAST, UNSPECIFIED ESTROGEN RECEPTOR STATUS: ICD-10-CM

## 2024-07-09 DIAGNOSIS — C79.51 MALIGNANT NEOPLASM METASTATIC TO BONE: ICD-10-CM

## 2024-07-09 LAB
ALBUMIN SERPL-MCNC: 4.2 G/DL (ref 3.5–5.2)
ALBUMIN/GLOB SERPL: 1.6 G/DL
ALP SERPL-CCNC: 127 U/L (ref 39–117)
ALT SERPL W P-5'-P-CCNC: 22 U/L (ref 1–33)
ANION GAP SERPL CALCULATED.3IONS-SCNC: 9 MMOL/L (ref 5–15)
AST SERPL-CCNC: 31 U/L (ref 1–32)
BASOPHILS # BLD AUTO: 0.02 10*3/MM3 (ref 0–0.2)
BASOPHILS NFR BLD AUTO: 0.6 % (ref 0–1.5)
BILIRUB SERPL-MCNC: <0.2 MG/DL (ref 0–1.2)
BUN SERPL-MCNC: 13 MG/DL (ref 8–23)
BUN/CREAT SERPL: 13.8 (ref 7–25)
CALCIUM SPEC-SCNC: 9.3 MG/DL (ref 8.6–10.5)
CHLORIDE SERPL-SCNC: 105 MMOL/L (ref 98–107)
CO2 SERPL-SCNC: 29 MMOL/L (ref 22–29)
CREAT SERPL-MCNC: 0.94 MG/DL (ref 0.57–1)
DEPRECATED RDW RBC AUTO: 49 FL (ref 37–54)
EGFRCR SERPLBLD CKD-EPI 2021: 69.2 ML/MIN/1.73
EOSINOPHIL # BLD AUTO: 0.63 10*3/MM3 (ref 0–0.4)
EOSINOPHIL NFR BLD AUTO: 18.2 % (ref 0.3–6.2)
ERYTHROCYTE [DISTWIDTH] IN BLOOD BY AUTOMATED COUNT: 13.2 % (ref 12.3–15.4)
GLOBULIN UR ELPH-MCNC: 2.6 GM/DL
GLUCOSE SERPL-MCNC: 102 MG/DL (ref 65–99)
HBA1C MFR BLD: 4.8 % (ref 4.8–5.6)
HCT VFR BLD AUTO: 36.6 % (ref 34–46.6)
HGB BLD-MCNC: 12.7 G/DL (ref 12–15.9)
IMM GRANULOCYTES # BLD AUTO: 0 10*3/MM3 (ref 0–0.05)
IMM GRANULOCYTES NFR BLD AUTO: 0 % (ref 0–0.5)
LYMPHOCYTES # BLD AUTO: 1.06 10*3/MM3 (ref 0.7–3.1)
LYMPHOCYTES NFR BLD AUTO: 30.5 % (ref 19.6–45.3)
MCH RBC QN AUTO: 34.3 PG (ref 26.6–33)
MCHC RBC AUTO-ENTMCNC: 34.7 G/DL (ref 31.5–35.7)
MCV RBC AUTO: 98.9 FL (ref 79–97)
MONOCYTES # BLD AUTO: 0.4 10*3/MM3 (ref 0.1–0.9)
MONOCYTES NFR BLD AUTO: 11.5 % (ref 5–12)
NEUTROPHILS NFR BLD AUTO: 1.36 10*3/MM3 (ref 1.7–7)
NEUTROPHILS NFR BLD AUTO: 39.2 % (ref 42.7–76)
PLATELET # BLD AUTO: 219 10*3/MM3 (ref 140–450)
PMV BLD AUTO: 8.8 FL (ref 6–12)
POTASSIUM SERPL-SCNC: 5.4 MMOL/L (ref 3.5–5.2)
PROT SERPL-MCNC: 6.8 G/DL (ref 6–8.5)
RBC # BLD AUTO: 3.7 10*6/MM3 (ref 3.77–5.28)
SODIUM SERPL-SCNC: 143 MMOL/L (ref 136–145)
TSH SERPL DL<=0.05 MIU/L-ACNC: 2.83 UIU/ML (ref 0.27–4.2)
WBC NRBC COR # BLD AUTO: 3.47 10*3/MM3 (ref 3.4–10.8)

## 2024-07-09 PROCEDURE — 83036 HEMOGLOBIN GLYCOSYLATED A1C: CPT

## 2024-07-09 PROCEDURE — 80050 GENERAL HEALTH PANEL: CPT

## 2024-07-09 PROCEDURE — 36415 COLL VENOUS BLD VENIPUNCTURE: CPT

## 2024-07-09 NOTE — TELEPHONE ENCOUNTER
Patient brought in [place] paperwork from [facility] on 07/09/24, she would like them faxed back.      Put [facility] paperwork from [facility] on 07/09/24, she would like them faxed back.

## 2024-07-10 ENCOUNTER — HOSPITAL ENCOUNTER (OUTPATIENT)
Dept: ONCOLOGY | Facility: HOSPITAL | Age: 59
Discharge: HOME | End: 2024-07-10
Admitting: INTERNAL MEDICINE
Payer: COMMERCIAL

## 2024-07-10 ENCOUNTER — OFFICE VISIT (OUTPATIENT)
Dept: ONCOLOGY | Facility: CLINIC | Age: 59
End: 2024-07-10
Payer: COMMERCIAL

## 2024-07-10 ENCOUNTER — DOCUMENTATION (OUTPATIENT)
Dept: ONCOLOGY | Facility: CLINIC | Age: 59
End: 2024-07-10
Payer: COMMERCIAL

## 2024-07-10 ENCOUNTER — DOCUMENTATION (OUTPATIENT)
Dept: NUTRITION | Facility: HOSPITAL | Age: 59
End: 2024-07-10
Payer: COMMERCIAL

## 2024-07-10 VITALS
RESPIRATION RATE: 18 BRPM | HEART RATE: 79 BPM | TEMPERATURE: 97.4 F | DIASTOLIC BLOOD PRESSURE: 75 MMHG | BODY MASS INDEX: 24.55 KG/M2 | SYSTOLIC BLOOD PRESSURE: 134 MMHG | WEIGHT: 130 LBS | HEIGHT: 61 IN | OXYGEN SATURATION: 97 %

## 2024-07-10 DIAGNOSIS — C50.112 MALIGNANT NEOPLASM OF CENTRAL PORTION OF LEFT FEMALE BREAST, UNSPECIFIED ESTROGEN RECEPTOR STATUS: ICD-10-CM

## 2024-07-10 DIAGNOSIS — C79.51 MALIGNANT NEOPLASM METASTATIC TO BONE: ICD-10-CM

## 2024-07-10 DIAGNOSIS — Z85.3 HISTORY OF LEFT BREAST CANCER: ICD-10-CM

## 2024-07-10 DIAGNOSIS — Z85.3 HISTORY OF LEFT BREAST CANCER: Primary | ICD-10-CM

## 2024-07-10 DIAGNOSIS — C79.51 MALIGNANT NEOPLASM METASTATIC TO BONE: Primary | ICD-10-CM

## 2024-07-10 PROCEDURE — 96367 TX/PROPH/DG ADDL SEQ IV INF: CPT

## 2024-07-10 PROCEDURE — 0 DEXTROSE 5 % SOLUTION: Performed by: INTERNAL MEDICINE

## 2024-07-10 PROCEDURE — 99214 OFFICE O/P EST MOD 30 MIN: CPT | Performed by: INTERNAL MEDICINE

## 2024-07-10 PROCEDURE — 25010000002 DEXAMETHASONE SODIUM PHOSPHATE 100 MG/10ML SOLUTION: Performed by: INTERNAL MEDICINE

## 2024-07-10 PROCEDURE — 96413 CHEMO IV INFUSION 1 HR: CPT

## 2024-07-10 PROCEDURE — 96375 TX/PRO/DX INJ NEW DRUG ADDON: CPT

## 2024-07-10 PROCEDURE — 25010000002 PALONOSETRON PER 25 MCG: Performed by: INTERNAL MEDICINE

## 2024-07-10 PROCEDURE — 25010000002 FOSAPREPITANT PER 1 MG: Performed by: INTERNAL MEDICINE

## 2024-07-10 PROCEDURE — 25010000002 FAM-TRASTUZUMAB DERUXTEC-NXKI 100 MG RECONSTITUTED SOLUTION 1 EACH VIAL: Performed by: INTERNAL MEDICINE

## 2024-07-10 RX ORDER — DEXTROSE MONOHYDRATE 50 MG/ML
20 INJECTION, SOLUTION INTRAVENOUS ONCE
Status: CANCELLED | OUTPATIENT
Start: 2024-07-10

## 2024-07-10 RX ORDER — DEXTROSE MONOHYDRATE 50 MG/ML
20 INJECTION, SOLUTION INTRAVENOUS ONCE
OUTPATIENT
Start: 2024-07-31

## 2024-07-10 RX ORDER — PALONOSETRON 0.05 MG/ML
0.25 INJECTION, SOLUTION INTRAVENOUS ONCE
Status: CANCELLED | OUTPATIENT
Start: 2024-07-10

## 2024-07-10 RX ORDER — PALONOSETRON 0.05 MG/ML
0.25 INJECTION, SOLUTION INTRAVENOUS ONCE
Status: COMPLETED | OUTPATIENT
Start: 2024-07-10 | End: 2024-07-10

## 2024-07-10 RX ORDER — PALONOSETRON 0.05 MG/ML
0.25 INJECTION, SOLUTION INTRAVENOUS ONCE
OUTPATIENT
Start: 2024-07-31

## 2024-07-10 RX ORDER — DEXTROSE MONOHYDRATE 50 MG/ML
20 INJECTION, SOLUTION INTRAVENOUS ONCE
Status: COMPLETED | OUTPATIENT
Start: 2024-07-10 | End: 2024-07-10

## 2024-07-10 RX ADMIN — FOSAPREPITANT DIMEGLUMINE 150 MG: 150 INJECTION, POWDER, LYOPHILIZED, FOR SOLUTION INTRAVENOUS at 09:47

## 2024-07-10 RX ADMIN — PALONOSETRON HYDROCHLORIDE 0.25 MG: 0.25 INJECTION INTRAVENOUS at 09:41

## 2024-07-10 RX ADMIN — FAM-TRASTUZUMAB DERUXTECAN-NXKI 300 MG: 100 INJECTION, POWDER, LYOPHILIZED, FOR SOLUTION INTRAVENOUS at 10:50

## 2024-07-10 RX ADMIN — DEXTROSE MONOHYDRATE 20 ML/HR: 50 INJECTION, SOLUTION INTRAVENOUS at 10:45

## 2024-07-10 RX ADMIN — DEXAMETHASONE SODIUM PHOSPHATE 12 MG: 10 INJECTION, SOLUTION INTRAMUSCULAR; INTRAVENOUS at 09:45

## 2024-07-10 NOTE — PROGRESS NOTES
Onc Nutrition    Patient: Alcira Phelan  YOB: 1965    Diagnosis: recurrent ER positive GA positive HER-2 negative invasive ductal carcinoma left breast / original diagnosis 2007 / recurrence diagnosis 1/2022     Surgery: Bilateral mastectomy 5/30/07   Adjuvant Treatment: TC x 4 cycles (completed 9/2007) followed by Tamoxifen 10/2007 - 10/2012   Radiation: SBRT on the CyberKnife - 50 Gy in 5 fractions delivered to the recurrent sternal mass  (completed 4/15/2022)     Treatment: Letrozole / Ibrance (2/2022 - 7/2023 - d/c'd due to progression)  Treatment: OP BREAST Fulvestrant / Ribociclib (7/2023 - 12/2023)  Treatment: Xeloda (12/2023 - 6/2024)  Current Treatment: OP BREAST Fam-Trastuzumab Deruxtecan-nxki - every 21 days (cycle 2)    Weight - 130# / stable range past ~6 months     Follow up with patient during her chemotherapy infusion appointment.  Note patient recently started on a new treatment regimen due to progressive disease, familiar with patient from previous treatment.  Patient complains of constipation and taste changes but overall states she tolerated her new treatment pretty well.  She states her oral intake is good and reports she continues to exercise.    Reviewed the importance of good nutrition during her treatment course focusing on adequate calorie, protein, nutrient and fluid intake.  Advised her to continue consuming 3 meals daily but to consider eating smaller more frequent meals/snacks throughout the day if she experiences nausea to aid with symptom management.  Emphasized the importance of protein and its role in the diet; reviewed high protein foods; and recommended she have a protein source at each meal/snack.  Also emphasized the importance of hydration; reviewed good hydrating fluid options; and recommended she continue to drink at least 64 ounces daily.  Discussed tips to aid with constipation including increasing fiber intake and adequate hydration.  Offered tips to aid  with taste changes including using the baking soda / salt water mouth rinse before eating.  Encouraged her to continue with her aerobic exercises and to resume strength training to aid with maintaining / building muscle mass.    Answered her questions and she voiced understanding of information discussed.  RD's contact information provided and encouraged to call with questions.  Will follow up as indicated.     Carlita Watson RD  07/10/24

## 2024-07-10 NOTE — PROGRESS NOTES
PROBLEM LIST:  1. Local recurrence of HR+ carcinoma of the left breast  A) history of left breast cancer in 2007.  Bilateral mastectomy 5/30/07.  pathology showed grade 2 IDC of the left breast measuring 1.8 cm.  ER+ AL+ Her2 negative.   0/2 SLN involved.  [id number].  B) adjuvant chemotherapy with TC x 4 cycles, completed September 2007 with Dr. De La Torre.  Tamoxifen October 2007 thru October 2012.  C) presented January 2022 with left chest wall mass.  CT chest 1/17/22 showed 4.6 cm lesion involving left manubrium with soft tissue extent extending posteriorly to the anterior aspect of mediastinum.  12 mm nodule right lung base.  D) ultrasound guided biopsy of chest wall mass on 2/10/22.  Pathology showed invasive ductal carcinoma of the breast.  ER positive (100%), AL positive (50%), HER-2 2+  E) letrozole started February 2022.  CyberKnife to the chest wall mass completed 4/15/2022.  Ibrance started 4/18/2022.  Ibrance decreased to 100 mg due to neutropenia 9/5/2022  F) PET/CT 7/5/23 showed a new 2.9 cm hypermetabolic liver lesion, nonenlarged but hypermetabolic bilateral hilar and mediastinal lymph nodes.  [id number] testing showed a PI3 kinase mutation, no ESR 1 mutation.  Treatment changed to fulvestrant and ribociclib.  Fulvestrant started 7/19/2023.  Ribociclib started 7/27/2023.  G) PET/CT on 12/1/2023 showed increasing size of solitary liver lesion.  No additional sites of disease.  Liver biopsy 12/11/2023 showed metastatic breast carcinoma, ER positive AL positive HER2 1+.  Treatment with Xeloda started 12/29/2023.  H) PET/CT 6/10/2024 showed enlargement in size and metabolic activity of the solitary liver metastasis.  Treatment changed to Enhertu, starting 6/19/2024.  2. Depression/anxiety  3. GERD    Subjective     CHIEF COMPLAINT: Breast cancer    HISTORY OF PRESENT ILLNESS:   Alcira Phelan returns for follow-up.  She has had her first cycle of Enhertu.  Her worst side effect was constipation.  She  "had a little fatigue.  She was able to get her bowels moving again and is going to be more proactive going forward.  No significant nausea.        Objective      /75   Pulse 79   Temp 97.4 °F (36.3 °C)   Resp 18   Ht 154.9 cm (61\")   Wt 59 kg (130 lb)   SpO2 97%   BMI 24.56 kg/m²    Vitals:    07/10/24 0835   PainSc: 0-No pain       ECOG score: 0         General: well appearing female in no acute distress  Neuro: alert and oriented  HEENT: sclera anicteric, oropharynx clear  Cardiovascular: Regular rate and rhythm no murmurs  Lungs: Clear to auscultation bilaterally  Lungs: Clear to auscultation bilaterally  Skin: No rashes  Psych: mood and affect appropriate    RECENT LABS:  Lab Results   Component Value Date    WBC 3.47 07/09/2024    HGB 12.7 07/09/2024    HCT 36.6 07/09/2024    MCV 98.9 (H) 07/09/2024     07/09/2024       Lab Results   Component Value Date    GLUCOSE 102 (H) 07/09/2024    BUN 13 07/09/2024    CREATININE 0.94 07/09/2024    EGFRIFNONA 77 02/16/2022    BCR 13.8 07/09/2024    K 5.4 (H) 07/09/2024    CO2 29.0 07/09/2024    CALCIUM 9.3 07/09/2024    ALBUMIN 4.2 07/09/2024    AST 31 07/09/2024    ALT 22 07/09/2024                 Assessment & Plan   Alcira Phelan is a 59 y.o. female with metastatic ER positive TN positive HER-2 negative invasive ductal carcinoma, with involvement of lymph nodes and liver.    She has disease progression in the liver metastasis with increase in the size and metabolic activity.  She has had her first cycle of Enhertu and overall tolerated it fairly well other than constipation.  We will proceed with cycle 2.  Her labs are acceptable for treatment but she does have borderline neutropenia.  We will repeat a PET/CT following the third cycle.    Constipation: Continue over-the-counter stool softeners and laxatives as needed.    She has had a port placed.    Follow-up in 3 weeks with cycle 3.    Total time of patient care on day of service including time " prior to, face to face with patient, and following visit spent in reviewing records, lab results, discussion with other providers, discussion with patient, and documentation/charting was > 33 minutes.            Ruth Castro MD  [facility]    7/10/2024          CC:

## 2024-07-18 ENCOUNTER — TELEPHONE (OUTPATIENT)
Dept: ONCOLOGY | Facility: CLINIC | Age: 59
End: 2024-07-18
Payer: COMMERCIAL

## 2024-07-18 DIAGNOSIS — C79.51 MALIGNANT NEOPLASM METASTATIC TO BONE: Primary | ICD-10-CM

## 2024-07-18 NOTE — TELEPHONE ENCOUNTER
Called patient and got her voicemail and told her I know she has called twice and I am trying to reach her.    
Patient left a voicemail she wants Raegan to call her back.   
not examined

## 2024-07-18 NOTE — TELEPHONE ENCOUNTER
"I spoke with patient and she is \"exhausted\" after getting her infusion last week.  She was hopeful that she would feel better after a week but she is napping and tired.  No other complaints other than profound fatigue.  I talked with JOVAN Renteria and she said we can give IV fluids.  I told patient and she would prefer to receive fluids tomorrow morning.  I called infusion and they have an opening at 9:30am.  Patient verbalized understanding.    "

## 2024-07-18 NOTE — TELEPHONE ENCOUNTER
Caller: Alcira Phelan    Relationship: Self    Best call back number: [phone]    What is the best time to reach you: ANYTIME      What was the call regarding: PT REQUESTING CALLBACK FROM DR OTTO'S NURSE

## 2024-07-19 ENCOUNTER — HOSPITAL ENCOUNTER (OUTPATIENT)
Dept: ONCOLOGY | Facility: HOSPITAL | Age: 59
Discharge: HOME | End: 2024-07-19
Payer: COMMERCIAL

## 2024-07-19 VITALS
BODY MASS INDEX: 24.75 KG/M2 | WEIGHT: 131 LBS | RESPIRATION RATE: 16 BRPM | SYSTOLIC BLOOD PRESSURE: 126 MMHG | HEART RATE: 75 BPM | TEMPERATURE: 97.5 F | DIASTOLIC BLOOD PRESSURE: 77 MMHG

## 2024-07-19 DIAGNOSIS — C79.51 MALIGNANT NEOPLASM METASTATIC TO BONE: Primary | ICD-10-CM

## 2024-07-19 PROCEDURE — 96361 HYDRATE IV INFUSION ADD-ON: CPT

## 2024-07-19 PROCEDURE — 96374 THER/PROPH/DIAG INJ IV PUSH: CPT

## 2024-07-19 PROCEDURE — 96360 HYDRATION IV INFUSION INIT: CPT

## 2024-07-30 ENCOUNTER — LAB (OUTPATIENT)
Dept: LAB | Facility: HOSPITAL | Age: 59
End: 2024-07-30
Payer: COMMERCIAL

## 2024-07-30 DIAGNOSIS — Z85.3 HISTORY OF LEFT BREAST CANCER: ICD-10-CM

## 2024-07-30 DIAGNOSIS — C50.112 MALIGNANT NEOPLASM OF CENTRAL PORTION OF LEFT FEMALE BREAST, UNSPECIFIED ESTROGEN RECEPTOR STATUS: ICD-10-CM

## 2024-07-30 DIAGNOSIS — C79.51 MALIGNANT NEOPLASM METASTATIC TO BONE: ICD-10-CM

## 2024-07-30 LAB
ALBUMIN SERPL-MCNC: 4.1 G/DL (ref 3.5–5.2)
ALBUMIN/GLOB SERPL: 1.4 G/DL
ALP SERPL-CCNC: 114 U/L (ref 39–117)
ALT SERPL W P-5'-P-CCNC: 21 U/L (ref 1–33)
ANION GAP SERPL CALCULATED.3IONS-SCNC: 7 MMOL/L (ref 5–15)
AST SERPL-CCNC: 26 U/L (ref 1–32)
BASOPHILS # BLD AUTO: 0.03 10*3/MM3 (ref 0–0.2)
BASOPHILS NFR BLD AUTO: 0.9 % (ref 0–1.5)
BILIRUB SERPL-MCNC: 0.2 MG/DL (ref 0–1.2)
BUN SERPL-MCNC: 15 MG/DL (ref 8–23)
BUN/CREAT SERPL: 15 (ref 7–25)
CALCIUM SPEC-SCNC: 9.7 MG/DL (ref 8.6–10.5)
CHLORIDE SERPL-SCNC: 106 MMOL/L (ref 98–107)
CO2 SERPL-SCNC: 30 MMOL/L (ref 22–29)
CREAT SERPL-MCNC: 1 MG/DL (ref 0.57–1)
DEPRECATED RDW RBC AUTO: 48 FL (ref 37–54)
EGFRCR SERPLBLD CKD-EPI 2021: 64.2 ML/MIN/1.73
EOSINOPHIL # BLD AUTO: 0.38 10*3/MM3 (ref 0–0.4)
EOSINOPHIL NFR BLD AUTO: 11 % (ref 0.3–6.2)
ERYTHROCYTE [DISTWIDTH] IN BLOOD BY AUTOMATED COUNT: 13.2 % (ref 12.3–15.4)
GLOBULIN UR ELPH-MCNC: 2.9 GM/DL
GLUCOSE SERPL-MCNC: 92 MG/DL (ref 65–99)
HCT VFR BLD AUTO: 36.5 % (ref 34–46.6)
HGB BLD-MCNC: 12.8 G/DL (ref 12–15.9)
IMM GRANULOCYTES # BLD AUTO: 0 10*3/MM3 (ref 0–0.05)
IMM GRANULOCYTES NFR BLD AUTO: 0 % (ref 0–0.5)
LYMPHOCYTES # BLD AUTO: 1.13 10*3/MM3 (ref 0.7–3.1)
LYMPHOCYTES NFR BLD AUTO: 32.8 % (ref 19.6–45.3)
MCH RBC QN AUTO: 34.2 PG (ref 26.6–33)
MCHC RBC AUTO-ENTMCNC: 35.1 G/DL (ref 31.5–35.7)
MCV RBC AUTO: 97.6 FL (ref 79–97)
MONOCYTES # BLD AUTO: 0.44 10*3/MM3 (ref 0.1–0.9)
MONOCYTES NFR BLD AUTO: 12.8 % (ref 5–12)
NEUTROPHILS NFR BLD AUTO: 1.47 10*3/MM3 (ref 1.7–7)
NEUTROPHILS NFR BLD AUTO: 42.5 % (ref 42.7–76)
PLATELET # BLD AUTO: 210 10*3/MM3 (ref 140–450)
PMV BLD AUTO: 8.5 FL (ref 6–12)
POTASSIUM SERPL-SCNC: 4.7 MMOL/L (ref 3.5–5.2)
PROT SERPL-MCNC: 7 G/DL (ref 6–8.5)
RBC # BLD AUTO: 3.74 10*6/MM3 (ref 3.77–5.28)
SODIUM SERPL-SCNC: 143 MMOL/L (ref 136–145)
WBC NRBC COR # BLD AUTO: 3.45 10*3/MM3 (ref 3.4–10.8)

## 2024-07-30 PROCEDURE — 36415 COLL VENOUS BLD VENIPUNCTURE: CPT

## 2024-07-30 PROCEDURE — 80053 COMPREHEN METABOLIC PANEL: CPT

## 2024-07-30 PROCEDURE — 85025 COMPLETE CBC W/AUTO DIFF WBC: CPT

## 2024-07-31 ENCOUNTER — HOSPITAL ENCOUNTER (OUTPATIENT)
Dept: ONCOLOGY | Facility: HOSPITAL | Age: 59
Discharge: HOME | End: 2024-07-31
Admitting: INTERNAL MEDICINE
Payer: COMMERCIAL

## 2024-07-31 ENCOUNTER — OFFICE VISIT (OUTPATIENT)
Dept: ONCOLOGY | Facility: CLINIC | Age: 59
End: 2024-07-31
Payer: COMMERCIAL

## 2024-07-31 ENCOUNTER — APPOINTMENT (OUTPATIENT)
Dept: ONCOLOGY | Facility: HOSPITAL | Age: 59
End: 2024-07-31
Payer: COMMERCIAL

## 2024-07-31 VITALS
SYSTOLIC BLOOD PRESSURE: 132 MMHG | WEIGHT: 132 LBS | BODY MASS INDEX: 24.92 KG/M2 | HEART RATE: 75 BPM | DIASTOLIC BLOOD PRESSURE: 66 MMHG | TEMPERATURE: 97.3 F | RESPIRATION RATE: 16 BRPM | HEIGHT: 61 IN | OXYGEN SATURATION: 97 %

## 2024-07-31 DIAGNOSIS — C78.7 CANCER, METASTATIC TO LIVER: ICD-10-CM

## 2024-07-31 DIAGNOSIS — C79.51 MALIGNANT NEOPLASM METASTATIC TO BONE: ICD-10-CM

## 2024-07-31 DIAGNOSIS — Z85.3 HISTORY OF LEFT BREAST CANCER: ICD-10-CM

## 2024-07-31 DIAGNOSIS — Z85.3 HISTORY OF LEFT BREAST CANCER: Primary | ICD-10-CM

## 2024-07-31 DIAGNOSIS — Z17.0 MALIGNANT NEOPLASM OF CENTRAL PORTION OF LEFT BREAST IN FEMALE, ESTROGEN RECEPTOR POSITIVE: Primary | ICD-10-CM

## 2024-07-31 DIAGNOSIS — C50.112 MALIGNANT NEOPLASM OF CENTRAL PORTION OF LEFT FEMALE BREAST, UNSPECIFIED ESTROGEN RECEPTOR STATUS: ICD-10-CM

## 2024-07-31 DIAGNOSIS — C50.112 MALIGNANT NEOPLASM OF CENTRAL PORTION OF LEFT BREAST IN FEMALE, ESTROGEN RECEPTOR POSITIVE: Primary | ICD-10-CM

## 2024-07-31 DIAGNOSIS — C79.51 MALIGNANT NEOPLASM METASTATIC TO BONE: Primary | ICD-10-CM

## 2024-07-31 PROCEDURE — 25010000002 FAM-TRASTUZUMAB DERUXTEC-NXKI 100 MG RECONSTITUTED SOLUTION 1 EACH VIAL: Performed by: INTERNAL MEDICINE

## 2024-07-31 PROCEDURE — 25010000002 FOSAPREPITANT PER 1 MG: Performed by: INTERNAL MEDICINE

## 2024-07-31 PROCEDURE — 96375 TX/PRO/DX INJ NEW DRUG ADDON: CPT

## 2024-07-31 PROCEDURE — 96367 TX/PROPH/DG ADDL SEQ IV INF: CPT

## 2024-07-31 PROCEDURE — 0 DEXTROSE 5 % SOLUTION: Performed by: INTERNAL MEDICINE

## 2024-07-31 PROCEDURE — 25010000002 DEXAMETHASONE SODIUM PHOSPHATE 100 MG/10ML SOLUTION: Performed by: INTERNAL MEDICINE

## 2024-07-31 PROCEDURE — 25010000002 PALONOSETRON PER 25 MCG: Performed by: INTERNAL MEDICINE

## 2024-07-31 PROCEDURE — 99213 OFFICE O/P EST LOW 20 MIN: CPT | Performed by: NURSE PRACTITIONER

## 2024-07-31 PROCEDURE — 96413 CHEMO IV INFUSION 1 HR: CPT

## 2024-07-31 RX ORDER — DEXTROSE MONOHYDRATE 50 MG/ML
20 INJECTION, SOLUTION INTRAVENOUS ONCE
Status: COMPLETED | OUTPATIENT
Start: 2024-07-31 | End: 2024-07-31

## 2024-07-31 RX ORDER — PALONOSETRON 0.05 MG/ML
0.25 INJECTION, SOLUTION INTRAVENOUS ONCE
Status: COMPLETED | OUTPATIENT
Start: 2024-07-31 | End: 2024-07-31

## 2024-07-31 RX ORDER — PALONOSETRON 0.05 MG/ML
0.25 INJECTION, SOLUTION INTRAVENOUS ONCE
OUTPATIENT
Start: 2024-08-21

## 2024-07-31 RX ORDER — PROCHLORPERAZINE MALEATE 5 MG/1
5 TABLET ORAL EVERY 6 HOURS PRN
Qty: 90 TABLET | Refills: 3 | Status: SHIPPED | OUTPATIENT
Start: 2024-07-31

## 2024-07-31 RX ORDER — DEXTROSE MONOHYDRATE 50 MG/ML
20 INJECTION, SOLUTION INTRAVENOUS ONCE
OUTPATIENT
Start: 2024-08-21

## 2024-07-31 RX ADMIN — DEXTROSE MONOHYDRATE 20 ML/HR: 50 INJECTION, SOLUTION INTRAVENOUS at 12:49

## 2024-07-31 RX ADMIN — FOSAPREPITANT DIMEGLUMINE 150 MG: 150 INJECTION, POWDER, LYOPHILIZED, FOR SOLUTION INTRAVENOUS at 12:05

## 2024-07-31 RX ADMIN — PALONOSETRON HYDROCHLORIDE 0.25 MG: 0.25 INJECTION INTRAVENOUS at 12:01

## 2024-07-31 RX ADMIN — FAM-TRASTUZUMAB DERUXTECAN-NXKI 300 MG: 100 INJECTION, POWDER, LYOPHILIZED, FOR SOLUTION INTRAVENOUS at 12:54

## 2024-07-31 RX ADMIN — DEXAMETHASONE SODIUM PHOSPHATE 12 MG: 10 INJECTION, SOLUTION INTRAMUSCULAR; INTRAVENOUS at 12:05

## 2024-07-31 NOTE — PROGRESS NOTES
PROBLEM LIST:  1. Local recurrence of HR+ carcinoma of the left breast  A) history of left breast cancer in 2007.  Bilateral mastectomy 5/30/07.  pathology showed grade 2 IDC of the left breast measuring 1.8 cm.  ER+ HI+ Her2 negative.   0/2 SLN involved.  [id number].  B) adjuvant chemotherapy with TC x 4 cycles, completed September 2007 with Dr. De La Torre.  Tamoxifen October 2007 thru October 2012.  C) presented January 2022 with left chest wall mass.  CT chest 1/17/22 showed 4.6 cm lesion involving left manubrium with soft tissue extent extending posteriorly to the anterior aspect of mediastinum.  12 mm nodule right lung base.  D) ultrasound guided biopsy of chest wall mass on 2/10/22.  Pathology showed invasive ductal carcinoma of the breast.  ER positive (100%), HI positive (50%), HER-2 2+  E) letrozole started February 2022.  CyberKnife to the chest wall mass completed 4/15/2022.  Ibrance started 4/18/2022.  Ibrance decreased to 100 mg due to neutropenia 9/5/2022  F) PET/CT 7/5/23 showed a new 2.9 cm hypermetabolic liver lesion, nonenlarged but hypermetabolic bilateral hilar and mediastinal lymph nodes.  [id number] testing showed a PI3 kinase mutation, no ESR 1 mutation.  Treatment changed to fulvestrant and ribociclib.  Fulvestrant started 7/19/2023.  Ribociclib started 7/27/2023.  G) PET/CT on 12/1/2023 showed increasing size of solitary liver lesion.  No additional sites of disease.  Liver biopsy 12/11/2023 showed metastatic breast carcinoma, ER positive HI positive HER2 1+.  Treatment with Xeloda started 12/29/2023.  H) PET/CT 6/10/2024 showed enlargement in size and metabolic activity of the solitary liver metastasis.  Treatment changed to Enhertu, starting 6/19/2024.  2. Depression/anxiety  3. GERD    Subjective     CHIEF COMPLAINT: Breast cancer    HISTORY OF PRESENT ILLNESS:   Alcira Phelan returns for follow-up.  She has completed 2 cycles of Enhertu. Following cycle 2 she had significant fatigue  "and weakness. She felt better after receiving IV fluids and dexamethasone. She has had some nausea. The nausea is not controlled with Zofran.  She has had some constipation but states it is better than with the first cycle.          Objective      /66   Pulse 75   Temp 97.3 °F (36.3 °C)   Resp 16   Ht 154.9 cm (60.98\")   Wt 59.9 kg (132 lb)   SpO2 97%   BMI 24.95 kg/m²    Vitals:    07/31/24 1039   PainSc: 0-No pain       ECOG score: 0         General: well appearing female in no acute distress  Neuro: alert and oriented  HEENT: sclera anicteric, oropharynx clear  Cardiovascular: Regular rate and rhythm no murmurs  Lungs: Clear to auscultation bilaterally  Lungs: Clear to auscultation bilaterally  Skin: No rashes  Psych: mood and affect appropriate    RECENT LABS:  Lab Results   Component Value Date    WBC 3.45 07/30/2024    HGB 12.8 07/30/2024    HCT 36.5 07/30/2024    MCV 97.6 (H) 07/30/2024     07/30/2024       Lab Results   Component Value Date    GLUCOSE 92 07/30/2024    BUN 15 07/30/2024    CREATININE 1.00 07/30/2024    EGFRIFNONA 77 02/16/2022    BCR 15.0 07/30/2024    K 4.7 07/30/2024    CO2 30.0 (H) 07/30/2024    CALCIUM 9.7 07/30/2024    ALBUMIN 4.1 07/30/2024    AST 26 07/30/2024    ALT 21 07/30/2024                 Assessment & Plan   Alcira Phelan is a 59 y.o. female with metastatic ER positive MO positive HER-2 negative invasive ductal carcinoma, with involvement of lymph nodes and liver.    She has disease progression in the liver metastasis with increase in the size and metabolic activity.  She has completed 2 cycles of Enhertu.  Following cycle 2 she did have significant fatigue and weakness.  She felt better after IV fluids so we will set her up for IV fluids with IV dexamethasone for 8/2/2024.  We will proceed with cycle 3.  Labs from today were reviewed and are adequate for treatment.  Order placed for repeat PET/CT prior to return in 3 weeks.    Constipation: Continue " over-the-counter stool softeners and laxatives as needed.    Chemotherapy induced nausea: continue Zofran as needed. Will add Compazine 5 mg every 6 hours as needed for breakthrough nausea.    Follow-up in 3 weeks with cycle 4. With PET/CT prior to return.                 Rasheeda Ramirez, JOVAN  [facility] Hematology and Oncology    7/31/2024          CC:

## 2024-08-02 ENCOUNTER — HOSPITAL ENCOUNTER (OUTPATIENT)
Dept: ONCOLOGY | Facility: HOSPITAL | Age: 59
Discharge: HOME | End: 2024-08-02
Payer: COMMERCIAL

## 2024-08-02 VITALS
HEIGHT: 61 IN | WEIGHT: 134 LBS | BODY MASS INDEX: 25.3 KG/M2 | SYSTOLIC BLOOD PRESSURE: 135 MMHG | TEMPERATURE: 96.8 F | RESPIRATION RATE: 16 BRPM | HEART RATE: 69 BPM | DIASTOLIC BLOOD PRESSURE: 79 MMHG

## 2024-08-02 DIAGNOSIS — C79.51 MALIGNANT NEOPLASM METASTATIC TO BONE: Primary | ICD-10-CM

## 2024-08-02 PROCEDURE — 25010000002 DEXAMETHASONE SODIUM PHOSPHATE 100 MG/10ML SOLUTION: Performed by: NURSE PRACTITIONER

## 2024-08-02 PROCEDURE — 96361 HYDRATE IV INFUSION ADD-ON: CPT

## 2024-08-02 PROCEDURE — 96360 HYDRATION IV INFUSION INIT: CPT

## 2024-08-02 PROCEDURE — 96374 THER/PROPH/DIAG INJ IV PUSH: CPT

## 2024-08-02 PROCEDURE — 25810000003 SODIUM CHLORIDE 0.9 % SOLUTION: Performed by: NURSE PRACTITIONER

## 2024-08-02 RX ADMIN — DEXAMETHASONE SODIUM PHOSPHATE 12 MG: 10 INJECTION, SOLUTION INTRAMUSCULAR; INTRAVENOUS at 14:35

## 2024-08-02 RX ADMIN — SODIUM CHLORIDE 1000 ML: 9 INJECTION, SOLUTION INTRAVENOUS at 14:32

## 2024-08-09 ENCOUNTER — TELEPHONE (OUTPATIENT)
Dept: ONCOLOGY | Facility: CLINIC | Age: 59
End: 2024-08-09
Payer: COMMERCIAL

## 2024-08-09 NOTE — TELEPHONE ENCOUNTER
Caller: Alcira Phelan    Relationship: Self    Best call back number: [phone]    What is the best time to reach you: ANYTIME    Who are you requesting to speak with (clinical staff, provider,  specific staff member): CLINICAL    What was the call regarding: PT IS REQUESTING A NOTE FOR WORK TO EXTEND OUT TILL 7-7-2025, PLEASE CALL TO ADVISE IF SHE CAN PICK THIS UP AT THE OFFICE ON MONDAY     PLEASE STATE THE REASON IS FOR CANCER

## 2024-08-19 ENCOUNTER — PREP FOR SURGERY (OUTPATIENT)
Dept: OTHER | Facility: HOSPITAL | Age: 59
End: 2024-08-19
Payer: COMMERCIAL

## 2024-08-19 ENCOUNTER — LAB (OUTPATIENT)
Facility: HOSPITAL | Age: 59
End: 2024-08-19
Payer: COMMERCIAL

## 2024-08-19 ENCOUNTER — HOSPITAL ENCOUNTER (OUTPATIENT)
Facility: HOSPITAL | Age: 59
Discharge: HOME | End: 2024-08-19
Payer: COMMERCIAL

## 2024-08-19 ENCOUNTER — TELEPHONE (OUTPATIENT)
Dept: INFUSION THERAPY | Facility: HOSPITAL | Age: 59
End: 2024-08-19
Payer: COMMERCIAL

## 2024-08-19 DIAGNOSIS — C50.112 MALIGNANT NEOPLASM OF CENTRAL PORTION OF LEFT BREAST IN FEMALE, ESTROGEN RECEPTOR POSITIVE: ICD-10-CM

## 2024-08-19 DIAGNOSIS — C79.51 MALIGNANT NEOPLASM METASTATIC TO BONE: ICD-10-CM

## 2024-08-19 DIAGNOSIS — Z85.3 HISTORY OF LEFT BREAST CANCER: ICD-10-CM

## 2024-08-19 DIAGNOSIS — Z17.0 MALIGNANT NEOPLASM OF CENTRAL PORTION OF LEFT BREAST IN FEMALE, ESTROGEN RECEPTOR POSITIVE: ICD-10-CM

## 2024-08-19 DIAGNOSIS — C50.112 MALIGNANT NEOPLASM OF CENTRAL PORTION OF LEFT FEMALE BREAST, UNSPECIFIED ESTROGEN RECEPTOR STATUS: ICD-10-CM

## 2024-08-19 DIAGNOSIS — C78.7 CANCER, METASTATIC TO LIVER: ICD-10-CM

## 2024-08-19 LAB
ALBUMIN SERPL-MCNC: 3.8 G/DL (ref 3.5–5.2)
ALBUMIN/GLOB SERPL: 1.4 G/DL
ALP SERPL-CCNC: 115 U/L (ref 39–117)
ALT SERPL W P-5'-P-CCNC: 22 U/L (ref 1–33)
ANION GAP SERPL CALCULATED.3IONS-SCNC: 7 MMOL/L (ref 5–15)
AST SERPL-CCNC: 25 U/L (ref 1–32)
BASOPHILS # BLD AUTO: 0.04 10*3/MM3 (ref 0–0.2)
BASOPHILS NFR BLD AUTO: 1.1 % (ref 0–1.5)
BILIRUB SERPL-MCNC: <0.2 MG/DL (ref 0–1.2)
BUN SERPL-MCNC: 14 MG/DL (ref 8–23)
BUN/CREAT SERPL: 15.7 (ref 7–25)
CALCIUM SPEC-SCNC: 9.6 MG/DL (ref 8.6–10.5)
CHLORIDE SERPL-SCNC: 105 MMOL/L (ref 98–107)
CO2 SERPL-SCNC: 30 MMOL/L (ref 22–29)
CREAT SERPL-MCNC: 0.89 MG/DL (ref 0.57–1)
DEPRECATED RDW RBC AUTO: 48.1 FL (ref 37–54)
EGFRCR SERPLBLD CKD-EPI 2021: 73.9 ML/MIN/1.73
EOSINOPHIL # BLD AUTO: 0.38 10*3/MM3 (ref 0–0.4)
EOSINOPHIL NFR BLD AUTO: 10.3 % (ref 0.3–6.2)
ERYTHROCYTE [DISTWIDTH] IN BLOOD BY AUTOMATED COUNT: 13.5 % (ref 12.3–15.4)
GLOBULIN UR ELPH-MCNC: 2.8 GM/DL
GLUCOSE BLDC GLUCOMTR-MCNC: 106 MG/DL (ref 70–130)
GLUCOSE SERPL-MCNC: 95 MG/DL (ref 65–99)
HCT VFR BLD AUTO: 36.3 % (ref 34–46.6)
HGB BLD-MCNC: 12.6 G/DL (ref 12–15.9)
IMM GRANULOCYTES # BLD AUTO: 0 10*3/MM3 (ref 0–0.05)
IMM GRANULOCYTES NFR BLD AUTO: 0 % (ref 0–0.5)
LYMPHOCYTES # BLD AUTO: 1.17 10*3/MM3 (ref 0.7–3.1)
LYMPHOCYTES NFR BLD AUTO: 31.8 % (ref 19.6–45.3)
MCH RBC QN AUTO: 34.1 PG (ref 26.6–33)
MCHC RBC AUTO-ENTMCNC: 34.7 G/DL (ref 31.5–35.7)
MCV RBC AUTO: 98.1 FL (ref 79–97)
MONOCYTES # BLD AUTO: 0.38 10*3/MM3 (ref 0.1–0.9)
MONOCYTES NFR BLD AUTO: 10.3 % (ref 5–12)
NEUTROPHILS NFR BLD AUTO: 1.71 10*3/MM3 (ref 1.7–7)
NEUTROPHILS NFR BLD AUTO: 46.5 % (ref 42.7–76)
NRBC BLD AUTO-RTO: 0 /100 WBC (ref 0–0.2)
PLATELET # BLD AUTO: 228 10*3/MM3 (ref 140–450)
PMV BLD AUTO: 9.5 FL (ref 6–12)
POTASSIUM SERPL-SCNC: 5.2 MMOL/L (ref 3.5–5.2)
PROT SERPL-MCNC: 6.6 G/DL (ref 6–8.5)
RBC # BLD AUTO: 3.7 10*6/MM3 (ref 3.77–5.28)
SODIUM SERPL-SCNC: 142 MMOL/L (ref 136–145)
WBC NRBC COR # BLD AUTO: 3.68 10*3/MM3 (ref 3.4–10.8)

## 2024-08-19 PROCEDURE — 85025 COMPLETE CBC W/AUTO DIFF WBC: CPT

## 2024-08-19 PROCEDURE — 82948 REAGENT STRIP/BLOOD GLUCOSE: CPT

## 2024-08-19 PROCEDURE — 80053 COMPREHEN METABOLIC PANEL: CPT

## 2024-08-19 PROCEDURE — A9552 F18 FDG: HCPCS | Performed by: NURSE PRACTITIONER

## 2024-08-19 PROCEDURE — 78815 PET IMAGE W/CT SKULL-THIGH: CPT

## 2024-08-19 PROCEDURE — 0 FLUDEOXYGLUCOSE F18 SOLUTION: Performed by: NURSE PRACTITIONER

## 2024-08-19 PROCEDURE — 36415 COLL VENOUS BLD VENIPUNCTURE: CPT

## 2024-08-19 RX ORDER — SODIUM CHLORIDE 0.9 % (FLUSH) 0.9 %
10 SYRINGE (ML) INJECTION AS NEEDED
Status: CANCELLED | OUTPATIENT
Start: 2024-08-19

## 2024-08-19 RX ADMIN — FLUDEOXYGLUCOSE F18 1 DOSE: 300 INJECTION INTRAVENOUS at 08:41

## 2024-08-19 NOTE — TELEPHONE ENCOUNTER
Attempted to contact patient as pre-procedure phone call prior to planned Port placement for 8/20/24. Left voice message with patient arrival time, location, nothing to eat or drink by mouth after MN, okay to take blood pressure medications morning of procedure with a small sip of water,  needed.

## 2024-08-20 ENCOUNTER — HOSPITAL ENCOUNTER (OUTPATIENT)
Dept: INTERVENTIONAL RADIOLOGY/VASCULAR | Facility: HOSPITAL | Age: 59
Discharge: HOME | End: 2024-08-20
Payer: COMMERCIAL

## 2024-08-20 VITALS
WEIGHT: 133.4 LBS | OXYGEN SATURATION: 98 % | HEART RATE: 65 BPM | RESPIRATION RATE: 11 BRPM | HEIGHT: 61 IN | SYSTOLIC BLOOD PRESSURE: 131 MMHG | DIASTOLIC BLOOD PRESSURE: 72 MMHG | BODY MASS INDEX: 25.19 KG/M2

## 2024-08-20 LAB
INR PPP: 1.05 (ref 0.89–1.12)
PROTHROMBIN TIME: 13.8 SECONDS (ref 12.2–14.5)

## 2024-08-20 PROCEDURE — 25010000002 HEPARIN LOCK FLUSH PER 10 UNITS

## 2024-08-20 PROCEDURE — C1788 PORT, INDWELLING, IMP: HCPCS

## 2024-08-20 PROCEDURE — 99152 MOD SED SAME PHYS/QHP 5/>YRS: CPT

## 2024-08-20 PROCEDURE — 25010000002 MIDAZOLAM PER 1 MG: Performed by: RADIOLOGY

## 2024-08-20 PROCEDURE — C1894 INTRO/SHEATH, NON-LASER: HCPCS

## 2024-08-20 PROCEDURE — 25010000002 FENTANYL CITRATE (PF) 50 MCG/ML SOLUTION: Performed by: RADIOLOGY

## 2024-08-20 PROCEDURE — 76937 US GUIDE VASCULAR ACCESS: CPT

## 2024-08-20 PROCEDURE — 85610 PROTHROMBIN TIME: CPT | Performed by: NURSE PRACTITIONER

## 2024-08-20 RX ORDER — HEPARIN SODIUM 200 [USP'U]/100ML
INJECTION, SOLUTION INTRAVENOUS
Status: DISCONTINUED
Start: 2024-08-20 | End: 2024-08-21 | Stop reason: HOSPADM

## 2024-08-20 RX ORDER — HEPARIN SODIUM (PORCINE) LOCK FLUSH IV SOLN 100 UNIT/ML 100 UNIT/ML
SOLUTION INTRAVENOUS
Status: COMPLETED
Start: 2024-08-20 | End: 2024-08-20

## 2024-08-20 RX ORDER — MIDAZOLAM HYDROCHLORIDE 1 MG/ML
INJECTION INTRAMUSCULAR; INTRAVENOUS
Status: DISCONTINUED
Start: 2024-08-20 | End: 2024-08-21 | Stop reason: HOSPADM

## 2024-08-20 RX ORDER — FENTANYL CITRATE 50 UG/ML
INJECTION, SOLUTION INTRAMUSCULAR; INTRAVENOUS
Status: DISCONTINUED
Start: 2024-08-20 | End: 2024-08-21 | Stop reason: HOSPADM

## 2024-08-20 RX ORDER — MIDAZOLAM HYDROCHLORIDE 1 MG/ML
INJECTION INTRAMUSCULAR; INTRAVENOUS AS NEEDED
Status: COMPLETED | OUTPATIENT
Start: 2024-08-20 | End: 2024-08-20

## 2024-08-20 RX ORDER — FENTANYL CITRATE 50 UG/ML
INJECTION, SOLUTION INTRAMUSCULAR; INTRAVENOUS AS NEEDED
Status: COMPLETED | OUTPATIENT
Start: 2024-08-20 | End: 2024-08-20

## 2024-08-20 RX ORDER — SODIUM CHLORIDE 0.9 % (FLUSH) 0.9 %
10 SYRINGE (ML) INJECTION AS NEEDED
Status: DISCONTINUED | OUTPATIENT
Start: 2024-08-20 | End: 2024-08-21 | Stop reason: HOSPADM

## 2024-08-20 RX ADMIN — FENTANYL CITRATE 50 MCG: 50 INJECTION, SOLUTION INTRAMUSCULAR; INTRAVENOUS at 14:01

## 2024-08-20 RX ADMIN — MIDAZOLAM HYDROCHLORIDE 1 MG: 1 INJECTION, SOLUTION INTRAMUSCULAR; INTRAVENOUS at 14:05

## 2024-08-20 RX ADMIN — HEPARIN 500 UNITS: 100 SYRINGE at 14:09

## 2024-08-20 RX ADMIN — MIDAZOLAM HYDROCHLORIDE 1 MG: 1 INJECTION, SOLUTION INTRAMUSCULAR; INTRAVENOUS at 14:01

## 2024-08-20 RX ADMIN — LIDOCAINE HYDROCHLORIDE 10 ML: 10; .005 INJECTION, SOLUTION EPIDURAL; INFILTRATION; INTRACAUDAL; PERINEURAL at 14:09

## 2024-08-20 RX ADMIN — FENTANYL CITRATE 50 MCG: 50 INJECTION, SOLUTION INTRAMUSCULAR; INTRAVENOUS at 14:05

## 2024-08-20 NOTE — PRE-PROCEDURE NOTE
[facility]   Vascular Interventional Radiology  History & Physicial        Patient Name:Alcira Phelan    : 1965    MRN: [id number]    Primary Care Physician: Batool Blair MD    Referring Physician: Ruth Castro MD     Date of admission: 2024    Subjective     Reason for Consult: Port pl    History of Present Illness     Alcira Phelan is a 59 y.o. female referred to IR as noted above.      Active Hospital Problems:  There are no active hospital problems to display for this patient.      Personal History     Past Medical History:   Diagnosis Date    Anxiety and depression     Arthritis     Bone cancer     Breast cancer     Cancer     breast cancer    Cellulitis     GERD (gastroesophageal reflux disease)     Head injuries     hx of trauma to head with coke bottle    Headache     History of breast problem     malignant carcinoma    Joint pain, knee     pt denies this    Localized swelling, mass and lump, neck     pt denies this    PONV (postoperative nausea and vomiting)     Urinary frequency     Wears glasses        Past Surgical History:   Procedure Laterality Date    BREAST CAPSULOTOMY, IMPLANT REVISION Bilateral 2019    Procedure: BREAST CAPSULOTOMY, REMOVAL OLD IMPLANTS, REPLACEMENT OF NEW IMPLANTS FOR BREAST RECONSTRUCTION BILATERAL;  Surgeon: Melanie Sanford MD;  Location: [facility];  Service: Plastics    CARPAL TUNNEL RELEASE Right     COLONOSCOPY  2017    Dr Johnson Repeat in     CYBERKNIFE  04/15/2022    sternal mass    MASTECTOMY Bilateral 2007    ROOT CANAL  2023       Family History: Her family history includes Breast cancer in her maternal aunt; Diabetes in her mother and another family member; Emphysema in her paternal grandmother; Heart attack (age of onset: 72) in her father; Hypertension in her father; No Known Problems in her brother, maternal grandfather, maternal grandmother, paternal grandfather, and sister.     Social History: She  " reports that she has never smoked. She has never used smokeless tobacco. She reports that she does not drink alcohol and does not use drugs.    Home Medications:  Diclofenac Sodium, Famotidine, cetirizine, ibuprofen, lidocaine-prilocaine, multivitamin with minerals, ondansetron, prochlorperazine, and venlafaxine XR    Current Medications:    fentaNYL citrate (PF)    heparin (porcine)    heparin    lidocaine-EPINEPHrine    midazolam    sodium chloride     Allergies:  Allergies   Allergen Reactions    Penicillins Other (See Comments)     Headache       Review of Systems    IR Procedure pertinent significant findings are mentioned in the PMH and PSH above.    Objective     Visit Vitals  /77 (BP Location: Right arm, Patient Position: Lying)   Pulse 70   Resp 17   Ht 154.9 cm (61\")   Wt 60.5 kg (133 lb 6.4 oz)   SpO2 100%   BMI 25.21 kg/m²        Physical Exam    A&Ox3.   Able to communicate  No Apparent Distress  Average physique   CVS: VS as noted. Chart reviewed. Stable for the procedure.  Respiratory: Non labored breathing. No signs of respiratory compromise.    Result Review      I have personally reviewed the results from the time of this admission to 8/20/2024 14:01 EDT and agree with these findings.  [x]  Laboratory  []  Microbiology  [x]  Radiology  []  EKG/Telemetry   []  Cardiology/Vascular   []  Pathology  []  Old records  []  Other:    Most notable findings include: As noted:    Results from last 7 days   Lab Units 08/20/24  1204 08/19/24  0945   INR  1.05  --    WBC 10*3/mm3  --  3.68   HEMOGLOBIN g/dL  --  12.6   HEMATOCRIT %  --  36.3   PLATELETS 10*3/mm3  --  228       Results from last 7 days   Lab Units 08/19/24  0945   SODIUM mmol/L 142   POTASSIUM mmol/L 5.2   CHLORIDE mmol/L 105   CO2 mmol/L 30.0*   BUN mg/dL 14   CREATININE mg/dL 0.89   EGFR mL/min/1.73 73.9   GLUCOSE mg/dL 95           Lab 08/19/24  0945   TOTAL PROTEIN 6.6   ALBUMIN 3.8   GLOBULIN 2.8   ALT (SGPT) 22   AST (SGOT) 25 " "  BILIRUBIN <0.2   ALK PHOS 115       Estimated Creatinine Clearance: 55.4 mL/min (by C-G formula based on SCr of 0.89 mg/dL).   Creatinine   Date Value Ref Range Status   08/19/2024 0.89 0.57 - 1.00 mg/dL Final       COVID19   Date Value Ref Range Status   06/23/2023 Not Detected Not Detected - Ref. Range Final        No results found for: \"PREGTESTUR\", \"PREGSERUM\", \"HCG\", \"HCGQUANT\"     ASA SCALE ASSESSMENT (applicable ONLY if sedation planned):   3     MALLAMPATI CLASSIFICATION (applicable ONLY if sedation planned):   1    Assessment / Plan     Alcira Phelan is a 59 y.o. female referred to the IR service with above problem.    Plan:   As above.    Notice: The note was created before the performance of the procedure. It might have been left in the pending status and signed off after the procedure. The time stamp on the note may be misleading.    Dominguez Ochoa MD   Vascular Interventional Radiology  08/20/24   2:01 PM EDT     "

## 2024-08-20 NOTE — POST-PROCEDURE NOTE
The following procedure was performed: Port placement    Please see corresponding Radiology report for in detail procedural information. The Radiology report will be dictated shortly, if not done so already. Please see the IR RN note for the information regarding medicines administered if any, segundo-procedural vitals and I/O information.

## 2024-08-20 NOTE — NURSING NOTE
Right chest port a cath placed via Dr. Ochoa. 100 mcg Fentanyl, 2 mg versed IVP. Sedation time 15 min. Tolerated well.

## 2024-08-21 ENCOUNTER — TELEPHONE (OUTPATIENT)
Dept: INFUSION THERAPY | Facility: HOSPITAL | Age: 59
End: 2024-08-21
Payer: COMMERCIAL

## 2024-08-22 ENCOUNTER — OFFICE VISIT (OUTPATIENT)
Dept: ONCOLOGY | Facility: CLINIC | Age: 59
End: 2024-08-22
Payer: COMMERCIAL

## 2024-08-22 ENCOUNTER — DOCUMENTATION (OUTPATIENT)
Dept: OTHER | Facility: HOSPITAL | Age: 59
End: 2024-08-22
Payer: COMMERCIAL

## 2024-08-22 ENCOUNTER — HOSPITAL ENCOUNTER (OUTPATIENT)
Dept: ONCOLOGY | Facility: HOSPITAL | Age: 59
Discharge: HOME | End: 2024-08-22
Payer: COMMERCIAL

## 2024-08-22 VITALS
SYSTOLIC BLOOD PRESSURE: 132 MMHG | HEIGHT: 61 IN | DIASTOLIC BLOOD PRESSURE: 75 MMHG | TEMPERATURE: 97.4 F | RESPIRATION RATE: 16 BRPM | OXYGEN SATURATION: 97 % | BODY MASS INDEX: 25.11 KG/M2 | HEART RATE: 78 BPM | WEIGHT: 133 LBS

## 2024-08-22 DIAGNOSIS — C79.51 MALIGNANT NEOPLASM METASTATIC TO BONE: Primary | ICD-10-CM

## 2024-08-22 DIAGNOSIS — Z85.3 HISTORY OF LEFT BREAST CANCER: ICD-10-CM

## 2024-08-22 DIAGNOSIS — Z45.2 ENCOUNTER FOR CARE RELATED TO VASCULAR ACCESS PORT: ICD-10-CM

## 2024-08-22 DIAGNOSIS — C50.112 MALIGNANT NEOPLASM OF CENTRAL PORTION OF LEFT FEMALE BREAST, UNSPECIFIED ESTROGEN RECEPTOR STATUS: ICD-10-CM

## 2024-08-22 DIAGNOSIS — C79.51 MALIGNANT NEOPLASM METASTATIC TO BONE: ICD-10-CM

## 2024-08-22 DIAGNOSIS — Z85.3 HISTORY OF LEFT BREAST CANCER: Primary | ICD-10-CM

## 2024-08-22 PROCEDURE — 25010000002 FAM-TRASTUZUMAB DERUXTEC-NXKI 100 MG RECONSTITUTED SOLUTION 1 EACH VIAL: Performed by: INTERNAL MEDICINE

## 2024-08-22 PROCEDURE — 0 DEXTROSE 5 % SOLUTION: Performed by: NURSE PRACTITIONER

## 2024-08-22 PROCEDURE — 25010000002 HEPARIN LOCK FLUSH PER 10 UNITS: Performed by: INTERNAL MEDICINE

## 2024-08-22 PROCEDURE — 99214 OFFICE O/P EST MOD 30 MIN: CPT | Performed by: INTERNAL MEDICINE

## 2024-08-22 PROCEDURE — 96367 TX/PROPH/DG ADDL SEQ IV INF: CPT

## 2024-08-22 PROCEDURE — 25010000002 PALONOSETRON PER 25 MCG: Performed by: NURSE PRACTITIONER

## 2024-08-22 PROCEDURE — 96375 TX/PRO/DX INJ NEW DRUG ADDON: CPT

## 2024-08-22 PROCEDURE — 25010000002 DEXAMETHASONE SODIUM PHOSPHATE 100 MG/10ML SOLUTION: Performed by: NURSE PRACTITIONER

## 2024-08-22 PROCEDURE — 25010000002 FOSAPREPITANT PER 1 MG: Performed by: NURSE PRACTITIONER

## 2024-08-22 PROCEDURE — 96413 CHEMO IV INFUSION 1 HR: CPT

## 2024-08-22 RX ORDER — HEPARIN SODIUM (PORCINE) LOCK FLUSH IV SOLN 100 UNIT/ML 100 UNIT/ML
500 SOLUTION INTRAVENOUS AS NEEDED
Status: CANCELLED | OUTPATIENT
Start: 2024-08-22

## 2024-08-22 RX ORDER — DEXTROSE MONOHYDRATE 50 MG/ML
20 INJECTION, SOLUTION INTRAVENOUS ONCE
Status: COMPLETED | OUTPATIENT
Start: 2024-08-22 | End: 2024-08-22

## 2024-08-22 RX ORDER — PALONOSETRON 0.05 MG/ML
0.25 INJECTION, SOLUTION INTRAVENOUS ONCE
Status: COMPLETED | OUTPATIENT
Start: 2024-08-22 | End: 2024-08-22

## 2024-08-22 RX ORDER — HEPARIN SODIUM (PORCINE) LOCK FLUSH IV SOLN 100 UNIT/ML 100 UNIT/ML
500 SOLUTION INTRAVENOUS AS NEEDED
Status: DISCONTINUED | OUTPATIENT
Start: 2024-08-22 | End: 2024-08-23 | Stop reason: HOSPADM

## 2024-08-22 RX ORDER — PALONOSETRON 0.05 MG/ML
0.25 INJECTION, SOLUTION INTRAVENOUS ONCE
OUTPATIENT
Start: 2024-09-11

## 2024-08-22 RX ORDER — DEXTROSE MONOHYDRATE 50 MG/ML
20 INJECTION, SOLUTION INTRAVENOUS ONCE
OUTPATIENT
Start: 2024-09-11

## 2024-08-22 RX ADMIN — FOSAPREPITANT DIMEGLUMINE 150 MG: 150 INJECTION, POWDER, LYOPHILIZED, FOR SOLUTION INTRAVENOUS at 10:20

## 2024-08-22 RX ADMIN — PALONOSETRON HYDROCHLORIDE 0.25 MG: 0.25 INJECTION INTRAVENOUS at 10:20

## 2024-08-22 RX ADMIN — DEXTROSE MONOHYDRATE 20 ML/HR: 50 INJECTION, SOLUTION INTRAVENOUS at 10:19

## 2024-08-22 RX ADMIN — HEPARIN 500 UNITS: 100 SYRINGE at 11:49

## 2024-08-22 RX ADMIN — DEXAMETHASONE SODIUM PHOSPHATE 12 MG: 10 INJECTION, SOLUTION INTRAMUSCULAR; INTRAVENOUS at 10:20

## 2024-08-22 RX ADMIN — FAM-TRASTUZUMAB DERUXTECAN-NXKI 253 MG: 100 INJECTION, POWDER, LYOPHILIZED, FOR SOLUTION INTRAVENOUS at 11:04

## 2024-08-22 NOTE — PROGRESS NOTES
PROBLEM LIST:  1. Local recurrence of HR+ carcinoma of the left breast  A) history of left breast cancer in 2007.  Bilateral mastectomy 5/30/07.  pathology showed grade 2 IDC of the left breast measuring 1.8 cm.  ER+ ND+ Her2 negative.   0/2 SLN involved.  [id number].  B) adjuvant chemotherapy with TC x 4 cycles, completed September 2007 with Dr. De La Torre.  Tamoxifen October 2007 thru October 2012.  C) presented January 2022 with left chest wall mass.  CT chest 1/17/22 showed 4.6 cm lesion involving left manubrium with soft tissue extent extending posteriorly to the anterior aspect of mediastinum.  12 mm nodule right lung base.  D) ultrasound guided biopsy of chest wall mass on 2/10/22.  Pathology showed invasive ductal carcinoma of the breast.  ER positive (100%), ND positive (50%), HER-2 2+  E) letrozole started February 2022.  CyberKnife to the chest wall mass completed 4/15/2022.  Ibrance started 4/18/2022.  Ibrance decreased to 100 mg due to neutropenia 9/5/2022  F) PET/CT 7/5/23 showed a new 2.9 cm hypermetabolic liver lesion, nonenlarged but hypermetabolic bilateral hilar and mediastinal lymph nodes.  [id number] testing showed a PI3 kinase mutation, no ESR 1 mutation.  Treatment changed to fulvestrant and ribociclib.  Fulvestrant started 7/19/2023.  Ribociclib started 7/27/2023.  G) PET/CT on 12/1/2023 showed increasing size of solitary liver lesion.  No additional sites of disease.  Liver biopsy 12/11/2023 showed metastatic breast carcinoma, ER positive ND positive HER2 1+.  Treatment with Xeloda started 12/29/2023.  H) PET/CT 6/10/2024 showed enlargement in size and metabolic activity of the solitary liver metastasis.  Treatment changed to Enhertu, starting 6/19/2024.  2. Depression/anxiety  3. GERD    Subjective     CHIEF COMPLAINT: Breast cancer    HISTORY OF PRESENT ILLNESS:   Alcira Phelan returns for follow-up.  She has completed 3 cycles of Enhertu.  She says she did a little bit better this  "last cycle but her main complaint is bloating and constipation.  She has been using Senokot.  She denies any significant nausea.  She is having fatigue but says that fatigue is tolerable.          Objective      /75   Pulse 78   Temp 97.4 °F (36.3 °C)   Resp 16   Ht 154.9 cm (60.98\")   Wt 60.3 kg (133 lb)   SpO2 97%   BMI 25.14 kg/m²    Vitals:    08/22/24 0847   PainSc: 0-No pain                 General: well appearing female in no acute distress  Neuro: alert and oriented  HEENT: sclera anicteric, oropharynx clear  Cardiovascular: Regular rate and rhythm no murmurs  Lungs: Clear to auscultation bilaterally  Skin: No rashes  Psych: mood and affect appropriate    RECENT LABS:  Lab Results   Component Value Date    WBC 3.68 08/19/2024    HGB 12.6 08/19/2024    HCT 36.3 08/19/2024    MCV 98.1 (H) 08/19/2024     08/19/2024       Lab Results   Component Value Date    GLUCOSE 95 08/19/2024    BUN 14 08/19/2024    CREATININE 0.89 08/19/2024    EGFRIFNONA 77 02/16/2022    BCR 15.7 08/19/2024    K 5.2 08/19/2024    CO2 30.0 (H) 08/19/2024    CALCIUM 9.6 08/19/2024    ALBUMIN 3.8 08/19/2024    AST 25 08/19/2024    ALT 22 08/19/2024                 Assessment & Plan   Alcira Phelan is a 59 y.o. female with metastatic ER positive CA positive HER-2 negative invasive ductal carcinoma, with involvement of lymph nodes and liver.    She has had 3 cycles of Enhertu and her PET/CT shows evidence of response with decreased size and resolution of metabolic activity in the liver, as well as resolution of the previously seen adenopathy.  She is having trouble with bloating and constipation as well as fatigue.  We will reduce her dose to 80% in hopes of minimizing side effects.  We reviewed over-the-counter medications to use for constipation.    Chemotherapy induced nausea: continue Zofran and Compazine as needed.    Follow-up in 3 weeks with cycle 5.                Ruth Castro MD  [facility] Hematology and " Oncology    8/22/2024          CC:

## 2024-08-23 ENCOUNTER — HOSPITAL ENCOUNTER (OUTPATIENT)
Dept: ONCOLOGY | Facility: HOSPITAL | Age: 59
Discharge: HOME | End: 2024-08-23
Payer: COMMERCIAL

## 2024-08-23 VITALS
DIASTOLIC BLOOD PRESSURE: 73 MMHG | HEIGHT: 61 IN | BODY MASS INDEX: 25.3 KG/M2 | SYSTOLIC BLOOD PRESSURE: 142 MMHG | HEART RATE: 77 BPM | RESPIRATION RATE: 16 BRPM | TEMPERATURE: 97.1 F | WEIGHT: 134 LBS

## 2024-08-23 DIAGNOSIS — C79.51 MALIGNANT NEOPLASM METASTATIC TO BONE: Primary | ICD-10-CM

## 2024-08-23 DIAGNOSIS — Z45.2 ENCOUNTER FOR CARE RELATED TO VASCULAR ACCESS PORT: ICD-10-CM

## 2024-08-23 PROCEDURE — 25010000002 HEPARIN LOCK FLUSH PER 10 UNITS: Performed by: INTERNAL MEDICINE

## 2024-08-23 PROCEDURE — 25010000002 DEXAMETHASONE SODIUM PHOSPHATE 100 MG/10ML SOLUTION: Performed by: INTERNAL MEDICINE

## 2024-08-23 PROCEDURE — 96360 HYDRATION IV INFUSION INIT: CPT

## 2024-08-23 PROCEDURE — 25810000003 SODIUM CHLORIDE 0.9 % SOLUTION: Performed by: INTERNAL MEDICINE

## 2024-08-23 PROCEDURE — 96374 THER/PROPH/DIAG INJ IV PUSH: CPT

## 2024-08-23 RX ORDER — HEPARIN SODIUM (PORCINE) LOCK FLUSH IV SOLN 100 UNIT/ML 100 UNIT/ML
500 SOLUTION INTRAVENOUS AS NEEDED
Status: DISCONTINUED | OUTPATIENT
Start: 2024-08-23 | End: 2024-08-24 | Stop reason: HOSPADM

## 2024-08-23 RX ORDER — HEPARIN SODIUM (PORCINE) LOCK FLUSH IV SOLN 100 UNIT/ML 100 UNIT/ML
500 SOLUTION INTRAVENOUS AS NEEDED
OUTPATIENT
Start: 2024-08-23

## 2024-08-23 RX ADMIN — HEPARIN 500 UNITS: 100 SYRINGE at 13:06

## 2024-08-23 RX ADMIN — SODIUM CHLORIDE 1000 ML: 9 INJECTION, SOLUTION INTRAVENOUS at 11:56

## 2024-08-23 RX ADMIN — DEXAMETHASONE SODIUM PHOSPHATE 12 MG: 10 INJECTION, SOLUTION INTRAMUSCULAR; INTRAVENOUS at 11:57

## 2024-08-26 ENCOUNTER — DOCUMENTATION (OUTPATIENT)
Dept: ONCOLOGY | Facility: CLINIC | Age: 59
End: 2024-08-26
Payer: COMMERCIAL

## 2024-08-26 ENCOUNTER — TELEPHONE (OUTPATIENT)
Dept: ONCOLOGY | Facility: CLINIC | Age: 59
End: 2024-08-26

## 2024-09-12 ENCOUNTER — OFFICE VISIT (OUTPATIENT)
Dept: ONCOLOGY | Facility: CLINIC | Age: 59
End: 2024-09-12
Payer: COMMERCIAL

## 2024-09-12 ENCOUNTER — LAB (OUTPATIENT)
Dept: LAB | Facility: HOSPITAL | Age: 59
End: 2024-09-12
Payer: COMMERCIAL

## 2024-09-12 ENCOUNTER — HOSPITAL ENCOUNTER (OUTPATIENT)
Dept: ONCOLOGY | Facility: HOSPITAL | Age: 59
Discharge: HOME | End: 2024-09-12
Payer: COMMERCIAL

## 2024-09-12 VITALS
TEMPERATURE: 97.7 F | DIASTOLIC BLOOD PRESSURE: 79 MMHG | SYSTOLIC BLOOD PRESSURE: 127 MMHG | WEIGHT: 133 LBS | HEIGHT: 61 IN | OXYGEN SATURATION: 98 % | BODY MASS INDEX: 25.11 KG/M2 | HEART RATE: 74 BPM | RESPIRATION RATE: 24 BRPM

## 2024-09-12 DIAGNOSIS — Z85.3 HISTORY OF LEFT BREAST CANCER: Primary | ICD-10-CM

## 2024-09-12 DIAGNOSIS — C79.51 MALIGNANT NEOPLASM METASTATIC TO BONE: ICD-10-CM

## 2024-09-12 DIAGNOSIS — Z45.2 ENCOUNTER FOR CARE RELATED TO VASCULAR ACCESS PORT: ICD-10-CM

## 2024-09-12 DIAGNOSIS — C79.51 MALIGNANT NEOPLASM METASTATIC TO BONE: Primary | ICD-10-CM

## 2024-09-12 DIAGNOSIS — Z85.3 HISTORY OF LEFT BREAST CANCER: ICD-10-CM

## 2024-09-12 DIAGNOSIS — C50.112 MALIGNANT NEOPLASM OF CENTRAL PORTION OF LEFT FEMALE BREAST, UNSPECIFIED ESTROGEN RECEPTOR STATUS: ICD-10-CM

## 2024-09-12 LAB
ALBUMIN SERPL-MCNC: 3.7 G/DL (ref 3.5–5.2)
ALBUMIN/GLOB SERPL: 1.5 G/DL
ALP SERPL-CCNC: 127 U/L (ref 39–117)
ALT SERPL W P-5'-P-CCNC: 12 U/L (ref 1–33)
ANION GAP SERPL CALCULATED.3IONS-SCNC: 9 MMOL/L (ref 5–15)
AST SERPL-CCNC: 21 U/L (ref 1–32)
BASOPHILS # BLD AUTO: 0.04 10*3/MM3 (ref 0–0.2)
BASOPHILS NFR BLD AUTO: 1.1 % (ref 0–1.5)
BILIRUB SERPL-MCNC: <0.2 MG/DL (ref 0–1.2)
BUN SERPL-MCNC: 16 MG/DL (ref 8–23)
BUN/CREAT SERPL: 19.3 (ref 7–25)
CALCIUM SPEC-SCNC: 8.9 MG/DL (ref 8.6–10.5)
CHLORIDE SERPL-SCNC: 106 MMOL/L (ref 98–107)
CO2 SERPL-SCNC: 25 MMOL/L (ref 22–29)
CREAT SERPL-MCNC: 0.83 MG/DL (ref 0.57–1)
DEPRECATED RDW RBC AUTO: 45.3 FL (ref 37–54)
EGFRCR SERPLBLD CKD-EPI 2021: 80.3 ML/MIN/1.73
EOSINOPHIL # BLD AUTO: 0.41 10*3/MM3 (ref 0–0.4)
EOSINOPHIL NFR BLD AUTO: 10.8 % (ref 0.3–6.2)
ERYTHROCYTE [DISTWIDTH] IN BLOOD BY AUTOMATED COUNT: 13 % (ref 12.3–15.4)
GLOBULIN UR ELPH-MCNC: 2.4 GM/DL
GLUCOSE SERPL-MCNC: 96 MG/DL (ref 65–99)
HCT VFR BLD AUTO: 35.3 % (ref 34–46.6)
HGB BLD-MCNC: 12.5 G/DL (ref 12–15.9)
IMM GRANULOCYTES # BLD AUTO: 0 10*3/MM3 (ref 0–0.05)
IMM GRANULOCYTES NFR BLD AUTO: 0 % (ref 0–0.5)
LYMPHOCYTES # BLD AUTO: 1.01 10*3/MM3 (ref 0.7–3.1)
LYMPHOCYTES NFR BLD AUTO: 26.7 % (ref 19.6–45.3)
MCH RBC QN AUTO: 33.6 PG (ref 26.6–33)
MCHC RBC AUTO-ENTMCNC: 35.4 G/DL (ref 31.5–35.7)
MCV RBC AUTO: 94.9 FL (ref 79–97)
MONOCYTES # BLD AUTO: 0.48 10*3/MM3 (ref 0.1–0.9)
MONOCYTES NFR BLD AUTO: 12.7 % (ref 5–12)
NEUTROPHILS NFR BLD AUTO: 1.84 10*3/MM3 (ref 1.7–7)
NEUTROPHILS NFR BLD AUTO: 48.7 % (ref 42.7–76)
PLATELET # BLD AUTO: 195 10*3/MM3 (ref 140–450)
PMV BLD AUTO: 8.9 FL (ref 6–12)
POTASSIUM SERPL-SCNC: 4.5 MMOL/L (ref 3.5–5.2)
PROT SERPL-MCNC: 6.1 G/DL (ref 6–8.5)
RBC # BLD AUTO: 3.72 10*6/MM3 (ref 3.77–5.28)
SODIUM SERPL-SCNC: 140 MMOL/L (ref 136–145)
WBC NRBC COR # BLD AUTO: 3.78 10*3/MM3 (ref 3.4–10.8)

## 2024-09-12 PROCEDURE — 25010000002 FOSAPREPITANT PER 1 MG: Performed by: INTERNAL MEDICINE

## 2024-09-12 PROCEDURE — 85025 COMPLETE CBC W/AUTO DIFF WBC: CPT

## 2024-09-12 PROCEDURE — 96413 CHEMO IV INFUSION 1 HR: CPT

## 2024-09-12 PROCEDURE — 25010000002 DEXAMETHASONE SODIUM PHOSPHATE 100 MG/10ML SOLUTION: Performed by: INTERNAL MEDICINE

## 2024-09-12 PROCEDURE — 36415 COLL VENOUS BLD VENIPUNCTURE: CPT

## 2024-09-12 PROCEDURE — 25010000002 FAM-TRASTUZUMAB DERUXTEC-NXKI 100 MG RECONSTITUTED SOLUTION 1 EACH VIAL: Performed by: INTERNAL MEDICINE

## 2024-09-12 PROCEDURE — 80053 COMPREHEN METABOLIC PANEL: CPT

## 2024-09-12 PROCEDURE — 99214 OFFICE O/P EST MOD 30 MIN: CPT | Performed by: NURSE PRACTITIONER

## 2024-09-12 PROCEDURE — 25010000002 PALONOSETRON PER 25 MCG: Performed by: INTERNAL MEDICINE

## 2024-09-12 PROCEDURE — 0 DEXTROSE 5 % SOLUTION: Performed by: INTERNAL MEDICINE

## 2024-09-12 PROCEDURE — 96375 TX/PRO/DX INJ NEW DRUG ADDON: CPT

## 2024-09-12 PROCEDURE — 25010000002 HEPARIN LOCK FLUSH PER 10 UNITS: Performed by: INTERNAL MEDICINE

## 2024-09-12 PROCEDURE — 96367 TX/PROPH/DG ADDL SEQ IV INF: CPT

## 2024-09-12 RX ORDER — DEXTROSE MONOHYDRATE 50 MG/ML
20 INJECTION, SOLUTION INTRAVENOUS ONCE
Status: COMPLETED | OUTPATIENT
Start: 2024-09-12 | End: 2024-09-12

## 2024-09-12 RX ORDER — PALONOSETRON 0.05 MG/ML
0.25 INJECTION, SOLUTION INTRAVENOUS ONCE
Status: COMPLETED | OUTPATIENT
Start: 2024-09-12 | End: 2024-09-12

## 2024-09-12 RX ORDER — HEPARIN SODIUM (PORCINE) LOCK FLUSH IV SOLN 100 UNIT/ML 100 UNIT/ML
500 SOLUTION INTRAVENOUS AS NEEDED
Status: CANCELLED | OUTPATIENT
Start: 2024-09-12

## 2024-09-12 RX ORDER — HEPARIN SODIUM (PORCINE) LOCK FLUSH IV SOLN 100 UNIT/ML 100 UNIT/ML
500 SOLUTION INTRAVENOUS AS NEEDED
Status: DISCONTINUED | OUTPATIENT
Start: 2024-09-12 | End: 2024-09-13 | Stop reason: HOSPADM

## 2024-09-12 RX ADMIN — FOSAPREPITANT DIMEGLUMINE 150 MG: 150 INJECTION, POWDER, LYOPHILIZED, FOR SOLUTION INTRAVENOUS at 11:24

## 2024-09-12 RX ADMIN — PALONOSETRON HYDROCHLORIDE 0.25 MG: 0.25 INJECTION INTRAVENOUS at 11:21

## 2024-09-12 RX ADMIN — DEXAMETHASONE SODIUM PHOSPHATE 12 MG: 10 INJECTION, SOLUTION INTRAMUSCULAR; INTRAVENOUS at 11:25

## 2024-09-12 RX ADMIN — FAM-TRASTUZUMAB DERUXTECAN-NXKI 253 MG: 100 INJECTION, POWDER, LYOPHILIZED, FOR SOLUTION INTRAVENOUS at 12:18

## 2024-09-12 RX ADMIN — HEPARIN 500 UNITS: 100 SYRINGE at 13:01

## 2024-09-12 RX ADMIN — DEXTROSE MONOHYDRATE 20 ML/HR: 50 INJECTION, SOLUTION INTRAVENOUS at 11:21

## 2024-09-12 NOTE — PROGRESS NOTES
PROBLEM LIST:  1. Local recurrence of HR+ carcinoma of the left breast  A) history of left breast cancer in 2007.  Bilateral mastectomy 5/30/07.  pathology showed grade 2 IDC of the left breast measuring 1.8 cm.  ER+ NC+ Her2 negative.   0/2 SLN involved.  [id number].  B) adjuvant chemotherapy with TC x 4 cycles, completed September 2007 with Dr. De La Torre.  Tamoxifen October 2007 thru October 2012.  C) presented January 2022 with left chest wall mass.  CT chest 1/17/22 showed 4.6 cm lesion involving left manubrium with soft tissue extent extending posteriorly to the anterior aspect of mediastinum.  12 mm nodule right lung base.  D) ultrasound guided biopsy of chest wall mass on 2/10/22.  Pathology showed invasive ductal carcinoma of the breast.  ER positive (100%), NC positive (50%), HER-2 2+  E) letrozole started February 2022.  CyberKnife to the chest wall mass completed 4/15/2022.  Ibrance started 4/18/2022.  Ibrance decreased to 100 mg due to neutropenia 9/5/2022  F) PET/CT 7/5/23 showed a new 2.9 cm hypermetabolic liver lesion, nonenlarged but hypermetabolic bilateral hilar and mediastinal lymph nodes.  [id number] testing showed a PI3 kinase mutation, no ESR 1 mutation.  Treatment changed to fulvestrant and ribociclib.  Fulvestrant started 7/19/2023.  Ribociclib started 7/27/2023.  G) PET/CT on 12/1/2023 showed increasing size of solitary liver lesion.  No additional sites of disease.  Liver biopsy 12/11/2023 showed metastatic breast carcinoma, ER positive NC positive HER2 1+.  Treatment with Xeloda started 12/29/2023.  H) PET/CT 6/10/2024 showed enlargement in size and metabolic activity of the solitary liver metastasis.  Treatment changed to Enhertu, starting 6/19/2024.  2. Depression/anxiety  3. GERD    Subjective     CHIEF COMPLAINT: Breast cancer    HISTORY OF PRESENT ILLNESS:   Alcira Phelan returns for follow-up.  She has completed 4 cycles of Enhertu with dose reduction by 20% last cycle.  She  "can't tell much difference since the reduction.  She has fatigue but it is stable.  She denies any significant nausea.  She does have decreased appetite and has to force herself to eat.  She does have constipation but is controlled with Senokot and MiraLAX.      Objective      /79 Comment: RUE  Pulse 74   Temp 97.7 °F (36.5 °C) (Infrared)   Resp 24   Ht 154.9 cm (61\")   Wt 60.3 kg (133 lb)   SpO2 98% Comment: RA  BMI 25.13 kg/m²    Vitals:    09/12/24 1003   PainSc: 0-No pain       ECOG score: 0         General: well appearing female in no acute distress  Neuro: alert and oriented  HEENT: sclera anicteric, oropharynx clear  Cardiovascular: Regular rate and rhythm no murmurs  Lungs: Clear to auscultation bilaterally  Skin: No rashes  Psych: mood and affect appropriate    RECENT LABS:  Lab Results   Component Value Date    WBC 3.78 09/12/2024    HGB 12.5 09/12/2024    HCT 35.3 09/12/2024    MCV 94.9 09/12/2024     09/12/2024       Lab Results   Component Value Date    GLUCOSE 96 09/12/2024    BUN 16 09/12/2024    CREATININE 0.83 09/12/2024    EGFRIFNONA 77 02/16/2022    BCR 19.3 09/12/2024    K 4.5 09/12/2024    CO2 25.0 09/12/2024    CALCIUM 8.9 09/12/2024    ALBUMIN 3.7 09/12/2024    AST 21 09/12/2024    ALT 12 09/12/2024           Assessment & Plan   Alcira Phelan is a 59 y.o. female with metastatic ER positive NH positive HER-2 negative invasive ductal carcinoma, with involvement of lymph nodes and liver.    She has had 4 cycles of Enhertu.  Her recent PET/CT shows evidence of response with decreased size and resolution of metabolic activity in the liver, as well as resolution of the previously seen adenopathy.  Dose was reduced by 20% last cycle to help minimize side effects of fatigue and constipation.  She is using Senokot and MiraLAX as needed.  I reviewed her labs from today that are appropriate for treatment.  We will proceed with treatment today, continue with 20% dose reduction.  "     Chemotherapy induced nausea: continue Zofran and Compazine as needed.    Follow-up in 3 weeks with cycle 6.    Hilary Haro, [facility] Hematology and Oncology    9/12/2024

## 2024-09-13 ENCOUNTER — HOSPITAL ENCOUNTER (OUTPATIENT)
Dept: ONCOLOGY | Facility: HOSPITAL | Age: 59
Discharge: HOME | End: 2024-09-13
Payer: COMMERCIAL

## 2024-09-13 VITALS
WEIGHT: 134 LBS | DIASTOLIC BLOOD PRESSURE: 78 MMHG | RESPIRATION RATE: 16 BRPM | HEART RATE: 77 BPM | BODY MASS INDEX: 25.3 KG/M2 | TEMPERATURE: 97 F | HEIGHT: 61 IN | SYSTOLIC BLOOD PRESSURE: 145 MMHG

## 2024-09-13 DIAGNOSIS — Z45.2 ENCOUNTER FOR CARE RELATED TO VASCULAR ACCESS PORT: ICD-10-CM

## 2024-09-13 DIAGNOSIS — C79.51 MALIGNANT NEOPLASM METASTATIC TO BONE: Primary | ICD-10-CM

## 2024-09-13 PROCEDURE — 25810000003 SODIUM CHLORIDE 0.9 % SOLUTION: Performed by: NURSE PRACTITIONER

## 2024-09-13 PROCEDURE — 25010000002 HEPARIN LOCK FLUSH PER 10 UNITS: Performed by: INTERNAL MEDICINE

## 2024-09-13 PROCEDURE — 96360 HYDRATION IV INFUSION INIT: CPT

## 2024-09-13 PROCEDURE — 96375 TX/PRO/DX INJ NEW DRUG ADDON: CPT

## 2024-09-13 PROCEDURE — 96361 HYDRATE IV INFUSION ADD-ON: CPT

## 2024-09-13 PROCEDURE — 25010000002 DEXAMETHASONE SODIUM PHOSPHATE 100 MG/10ML SOLUTION: Performed by: NURSE PRACTITIONER

## 2024-09-13 PROCEDURE — 96374 THER/PROPH/DIAG INJ IV PUSH: CPT

## 2024-09-13 RX ORDER — HEPARIN SODIUM (PORCINE) LOCK FLUSH IV SOLN 100 UNIT/ML 100 UNIT/ML
500 SOLUTION INTRAVENOUS AS NEEDED
Status: DISCONTINUED | OUTPATIENT
Start: 2024-09-13 | End: 2024-09-14 | Stop reason: HOSPADM

## 2024-09-13 RX ORDER — HEPARIN SODIUM (PORCINE) LOCK FLUSH IV SOLN 100 UNIT/ML 100 UNIT/ML
500 SOLUTION INTRAVENOUS AS NEEDED
OUTPATIENT
Start: 2024-09-13

## 2024-09-13 RX ADMIN — SODIUM CHLORIDE 1000 ML: 9 INJECTION, SOLUTION INTRAVENOUS at 09:12

## 2024-09-13 RX ADMIN — HEPARIN 500 UNITS: 100 SYRINGE at 10:33

## 2024-09-13 RX ADMIN — DEXAMETHASONE SODIUM PHOSPHATE 12 MG: 10 INJECTION, SOLUTION INTRAMUSCULAR; INTRAVENOUS at 09:22

## 2024-10-02 ENCOUNTER — LAB (OUTPATIENT)
Dept: LAB | Facility: HOSPITAL | Age: 59
End: 2024-10-02
Payer: COMMERCIAL

## 2024-10-02 ENCOUNTER — HOSPITAL ENCOUNTER (OUTPATIENT)
Dept: CARDIOLOGY | Facility: HOSPITAL | Age: 59
Discharge: HOME | End: 2024-10-02
Payer: COMMERCIAL

## 2024-10-02 VITALS
WEIGHT: 134 LBS | BODY MASS INDEX: 25.3 KG/M2 | SYSTOLIC BLOOD PRESSURE: 148 MMHG | HEIGHT: 61 IN | DIASTOLIC BLOOD PRESSURE: 82 MMHG

## 2024-10-02 DIAGNOSIS — C50.112 MALIGNANT NEOPLASM OF CENTRAL PORTION OF LEFT FEMALE BREAST, UNSPECIFIED ESTROGEN RECEPTOR STATUS: ICD-10-CM

## 2024-10-02 DIAGNOSIS — Z85.3 HISTORY OF LEFT BREAST CANCER: ICD-10-CM

## 2024-10-02 DIAGNOSIS — C79.51 MALIGNANT NEOPLASM METASTATIC TO BONE: ICD-10-CM

## 2024-10-02 LAB
ALBUMIN SERPL-MCNC: 4 G/DL (ref 3.5–5.2)
ALBUMIN/GLOB SERPL: 1.4 G/DL
ALP SERPL-CCNC: 132 U/L (ref 39–117)
ALT SERPL W P-5'-P-CCNC: 19 U/L (ref 1–33)
ANION GAP SERPL CALCULATED.3IONS-SCNC: 12 MMOL/L (ref 5–15)
ASCENDING AORTA: 2.6 CM
AST SERPL-CCNC: 26 U/L (ref 1–32)
BASOPHILS # BLD AUTO: 0.03 10*3/MM3 (ref 0–0.2)
BASOPHILS NFR BLD AUTO: 0.8 % (ref 0–1.5)
BH CV ECHO LEFT VENTRICLE GLOBAL LONGITUDINAL STRAIN: -23.1 %
BH CV ECHO MEAS - AO ROOT DIAM: 2.8 CM
BH CV ECHO MEAS - EF(MOD-BP): 61.4 %
BH CV ECHO MEAS - IVS/LVPW: 1 CM
BH CV ECHO MEAS - IVSD: 0.9 CM
BH CV ECHO MEAS - LA DIMENSION: 3.4 CM
BH CV ECHO MEAS - LVIDD: 3.7 CM
BH CV ECHO MEAS - LVIDS: 2.7 CM
BH CV ECHO MEAS - LVOT DIAM: 2 CM
BH CV ECHO MEAS - LVPWD: 0.9 CM
BH CV VAS BP LEFT ARM: NORMAL MMHG
BILIRUB SERPL-MCNC: 0.4 MG/DL (ref 0–1.2)
BUN SERPL-MCNC: 11 MG/DL (ref 8–23)
BUN/CREAT SERPL: 10.9 (ref 7–25)
CALCIUM SPEC-SCNC: 9.6 MG/DL (ref 8.6–10.5)
CHLORIDE SERPL-SCNC: 106 MMOL/L (ref 98–107)
CO2 SERPL-SCNC: 25 MMOL/L (ref 22–29)
CREAT SERPL-MCNC: 1.01 MG/DL (ref 0.57–1)
DEPRECATED RDW RBC AUTO: 44.8 FL (ref 37–54)
EGFRCR SERPLBLD CKD-EPI 2021: 63.5 ML/MIN/1.73
EOSINOPHIL # BLD AUTO: 0.38 10*3/MM3 (ref 0–0.4)
EOSINOPHIL NFR BLD AUTO: 10 % (ref 0.3–6.2)
ERYTHROCYTE [DISTWIDTH] IN BLOOD BY AUTOMATED COUNT: 13 % (ref 12.3–15.4)
GLOBULIN UR ELPH-MCNC: 2.9 GM/DL
GLUCOSE SERPL-MCNC: 111 MG/DL (ref 65–99)
HCT VFR BLD AUTO: 40.3 % (ref 34–46.6)
HGB BLD-MCNC: 14.3 G/DL (ref 12–15.9)
IMM GRANULOCYTES # BLD AUTO: 0 10*3/MM3 (ref 0–0.05)
IMM GRANULOCYTES NFR BLD AUTO: 0 % (ref 0–0.5)
LV EF 2D ECHO EST: 65 %
LYMPHOCYTES # BLD AUTO: 0.98 10*3/MM3 (ref 0.7–3.1)
LYMPHOCYTES NFR BLD AUTO: 25.7 % (ref 19.6–45.3)
MCH RBC QN AUTO: 33.3 PG (ref 26.6–33)
MCHC RBC AUTO-ENTMCNC: 35.5 G/DL (ref 31.5–35.7)
MCV RBC AUTO: 93.7 FL (ref 79–97)
MONOCYTES # BLD AUTO: 0.42 10*3/MM3 (ref 0.1–0.9)
MONOCYTES NFR BLD AUTO: 11 % (ref 5–12)
NEUTROPHILS NFR BLD AUTO: 2 10*3/MM3 (ref 1.7–7)
NEUTROPHILS NFR BLD AUTO: 52.5 % (ref 42.7–76)
PLATELET # BLD AUTO: 220 10*3/MM3 (ref 140–450)
PMV BLD AUTO: 8.8 FL (ref 6–12)
POTASSIUM SERPL-SCNC: 4.8 MMOL/L (ref 3.5–5.2)
PROT SERPL-MCNC: 6.9 G/DL (ref 6–8.5)
RBC # BLD AUTO: 4.3 10*6/MM3 (ref 3.77–5.28)
SODIUM SERPL-SCNC: 143 MMOL/L (ref 136–145)
WBC NRBC COR # BLD AUTO: 3.81 10*3/MM3 (ref 3.4–10.8)

## 2024-10-02 PROCEDURE — 93325 DOPPLER ECHO COLOR FLOW MAPG: CPT

## 2024-10-02 PROCEDURE — 93308 TTE F-UP OR LMTD: CPT

## 2024-10-02 PROCEDURE — 25010000002 SULFUR HEXAFLUORIDE MICROSPH 60.7-25 MG RECONSTITUTED SUSPENSION: Performed by: NURSE PRACTITIONER

## 2024-10-02 PROCEDURE — 36415 COLL VENOUS BLD VENIPUNCTURE: CPT

## 2024-10-02 PROCEDURE — 93356 MYOCRD STRAIN IMG SPCKL TRCK: CPT

## 2024-10-02 PROCEDURE — 80053 COMPREHEN METABOLIC PANEL: CPT

## 2024-10-02 PROCEDURE — 85025 COMPLETE CBC W/AUTO DIFF WBC: CPT

## 2024-10-02 PROCEDURE — 93321 DOPPLER ECHO F-UP/LMTD STD: CPT

## 2024-10-02 RX ADMIN — SULFUR HEXAFLUORIDE 2 ML: KIT at 10:53

## 2024-10-03 ENCOUNTER — HOSPITAL ENCOUNTER (OUTPATIENT)
Dept: ONCOLOGY | Facility: HOSPITAL | Age: 59
Discharge: HOME | End: 2024-10-03
Payer: COMMERCIAL

## 2024-10-03 ENCOUNTER — OFFICE VISIT (OUTPATIENT)
Dept: ONCOLOGY | Facility: CLINIC | Age: 59
End: 2024-10-03
Payer: COMMERCIAL

## 2024-10-03 VITALS
BODY MASS INDEX: 24.17 KG/M2 | RESPIRATION RATE: 16 BRPM | SYSTOLIC BLOOD PRESSURE: 161 MMHG | WEIGHT: 128 LBS | HEART RATE: 76 BPM | OXYGEN SATURATION: 97 % | HEIGHT: 61 IN | DIASTOLIC BLOOD PRESSURE: 78 MMHG | TEMPERATURE: 97.6 F

## 2024-10-03 DIAGNOSIS — Z85.3 HISTORY OF LEFT BREAST CANCER: ICD-10-CM

## 2024-10-03 DIAGNOSIS — Z17.0 MALIGNANT NEOPLASM OF CENTRAL PORTION OF LEFT BREAST IN FEMALE, ESTROGEN RECEPTOR POSITIVE: Primary | ICD-10-CM

## 2024-10-03 DIAGNOSIS — C79.51 MALIGNANT NEOPLASM METASTATIC TO BONE: Primary | ICD-10-CM

## 2024-10-03 DIAGNOSIS — C79.51 MALIGNANT NEOPLASM METASTATIC TO BONE: ICD-10-CM

## 2024-10-03 DIAGNOSIS — C50.112 MALIGNANT NEOPLASM OF CENTRAL PORTION OF LEFT BREAST IN FEMALE, ESTROGEN RECEPTOR POSITIVE: Primary | ICD-10-CM

## 2024-10-03 DIAGNOSIS — C50.112 MALIGNANT NEOPLASM OF CENTRAL PORTION OF LEFT FEMALE BREAST, UNSPECIFIED ESTROGEN RECEPTOR STATUS: ICD-10-CM

## 2024-10-03 DIAGNOSIS — Z45.2 ENCOUNTER FOR CARE RELATED TO VASCULAR ACCESS PORT: Primary | ICD-10-CM

## 2024-10-03 PROCEDURE — 25010000002 FOSAPREPITANT PER 1 MG: Performed by: NURSE PRACTITIONER

## 2024-10-03 PROCEDURE — 96375 TX/PRO/DX INJ NEW DRUG ADDON: CPT

## 2024-10-03 PROCEDURE — 25010000002 HEPARIN LOCK FLUSH PER 10 UNITS: Performed by: INTERNAL MEDICINE

## 2024-10-03 PROCEDURE — 96413 CHEMO IV INFUSION 1 HR: CPT

## 2024-10-03 PROCEDURE — 25010000002 PALONOSETRON PER 25 MCG: Performed by: NURSE PRACTITIONER

## 2024-10-03 PROCEDURE — 25010000002 FAM-TRASTUZUMAB DERUXTEC-NXKI 100 MG RECONSTITUTED SOLUTION 1 EACH VIAL: Performed by: NURSE PRACTITIONER

## 2024-10-03 PROCEDURE — 96367 TX/PROPH/DG ADDL SEQ IV INF: CPT

## 2024-10-03 PROCEDURE — 25010000002 DEXAMETHASONE SODIUM PHOSPHATE 100 MG/10ML SOLUTION: Performed by: NURSE PRACTITIONER

## 2024-10-03 PROCEDURE — 0 DEXTROSE 5 % SOLUTION: Performed by: NURSE PRACTITIONER

## 2024-10-03 PROCEDURE — 99213 OFFICE O/P EST LOW 20 MIN: CPT | Performed by: NURSE PRACTITIONER

## 2024-10-03 RX ORDER — DEXTROSE MONOHYDRATE 50 MG/ML
20 INJECTION, SOLUTION INTRAVENOUS ONCE
Status: COMPLETED | OUTPATIENT
Start: 2024-10-03 | End: 2024-10-03

## 2024-10-03 RX ORDER — HEPARIN SODIUM (PORCINE) LOCK FLUSH IV SOLN 100 UNIT/ML 100 UNIT/ML
500 SOLUTION INTRAVENOUS AS NEEDED
Status: CANCELLED | OUTPATIENT
Start: 2024-10-03

## 2024-10-03 RX ORDER — HEPARIN SODIUM (PORCINE) LOCK FLUSH IV SOLN 100 UNIT/ML 100 UNIT/ML
500 SOLUTION INTRAVENOUS AS NEEDED
Status: DISCONTINUED | OUTPATIENT
Start: 2024-10-03 | End: 2024-10-04 | Stop reason: HOSPADM

## 2024-10-03 RX ORDER — PALONOSETRON 0.05 MG/ML
0.25 INJECTION, SOLUTION INTRAVENOUS ONCE
Status: COMPLETED | OUTPATIENT
Start: 2024-10-03 | End: 2024-10-03

## 2024-10-03 RX ORDER — PALONOSETRON 0.05 MG/ML
0.25 INJECTION, SOLUTION INTRAVENOUS ONCE
Status: CANCELLED | OUTPATIENT
Start: 2024-10-03

## 2024-10-03 RX ORDER — DEXTROSE MONOHYDRATE 50 MG/ML
20 INJECTION, SOLUTION INTRAVENOUS ONCE
Status: CANCELLED | OUTPATIENT
Start: 2024-10-03

## 2024-10-03 RX ADMIN — HEPARIN 500 UNITS: 100 SYRINGE at 12:26

## 2024-10-03 RX ADMIN — FOSAPREPITANT DIMEGLUMINE 150 MG: 150 INJECTION, POWDER, LYOPHILIZED, FOR SOLUTION INTRAVENOUS at 10:55

## 2024-10-03 RX ADMIN — DEXAMETHASONE SODIUM PHOSPHATE 12 MG: 10 INJECTION, SOLUTION INTRAMUSCULAR; INTRAVENOUS at 10:54

## 2024-10-03 RX ADMIN — PALONOSETRON HYDROCHLORIDE 0.25 MG: 0.25 INJECTION INTRAVENOUS at 10:49

## 2024-10-03 RX ADMIN — DEXTROSE MONOHYDRATE 20 ML/HR: 50 INJECTION, SOLUTION INTRAVENOUS at 10:49

## 2024-10-03 RX ADMIN — FAM-TRASTUZUMAB DERUXTECAN-NXKI 200 MG: 100 INJECTION, POWDER, LYOPHILIZED, FOR SOLUTION INTRAVENOUS at 11:42

## 2024-10-03 NOTE — PROGRESS NOTES
PROBLEM LIST:  1. Local recurrence of HR+ carcinoma of the left breast  A) history of left breast cancer in 2007.  Bilateral mastectomy 5/30/07.  pathology showed grade 2 IDC of the left breast measuring 1.8 cm.  ER+ MA+ Her2 negative.   0/2 SLN involved.  [id number].  B) adjuvant chemotherapy with TC x 4 cycles, completed September 2007 with Dr. De La Torre.  Tamoxifen October 2007 thru October 2012.  C) presented January 2022 with left chest wall mass.  CT chest 1/17/22 showed 4.6 cm lesion involving left manubrium with soft tissue extent extending posteriorly to the anterior aspect of mediastinum.  12 mm nodule right lung base.  D) ultrasound guided biopsy of chest wall mass on 2/10/22.  Pathology showed invasive ductal carcinoma of the breast.  ER positive (100%), MA positive (50%), HER-2 2+  E) letrozole started February 2022.  CyberKnife to the chest wall mass completed 4/15/2022.  Ibrance started 4/18/2022.  Ibrance decreased to 100 mg due to neutropenia 9/5/2022  F) PET/CT 7/5/23 showed a new 2.9 cm hypermetabolic liver lesion, nonenlarged but hypermetabolic bilateral hilar and mediastinal lymph nodes.  [id number] testing showed a PI3 kinase mutation, no ESR 1 mutation.  Treatment changed to fulvestrant and ribociclib.  Fulvestrant started 7/19/2023.  Ribociclib started 7/27/2023.  G) PET/CT on 12/1/2023 showed increasing size of solitary liver lesion.  No additional sites of disease.  Liver biopsy 12/11/2023 showed metastatic breast carcinoma, ER positive MA positive HER2 1+.  Treatment with Xeloda started 12/29/2023.  H) PET/CT 6/10/2024 showed enlargement in size and metabolic activity of the solitary liver metastasis.  Treatment changed to Enhertu, starting 6/19/2024.  2. Depression/anxiety  3. GERD    Subjective     CHIEF COMPLAINT: Breast cancer    HISTORY OF PRESENT ILLNESS:   Alcira Phelan returns for follow-up.  She has completed 5 cycles of Enhertu with dose reduction by 20% since cycle 4.  Side  "effects from cycle 5 were significant.  She had very significant fatigue for 7 days.  She was either in the bed or on the couch for 75% of the time during the 7 days.  She felt like this significantly impacted her quality of life.  She did have some mild nausea but did not require Zofran.  She has a decreased appetite but is making herself eat.  She denies any fevers or chills.  She did have some constipation that improved with senna 2 tablets twice a day and MiraLAX.  Patient feels like she cannot continue this medication at the same dose due to decreased quality of life because of the severe fatigue.        Objective      /78   Pulse 76   Temp 97.6 °F (36.4 °C) (Temporal)   Resp 16   Ht 154.9 cm (60.98\")   Wt 58.1 kg (128 lb)   SpO2 97%   BMI 24.20 kg/m²    Vitals:    10/03/24 0853   PainSc: 0-No pain       ECOG score: 0         General: well appearing female in no acute distress  Neuro: alert and oriented  HEENT: sclera anicteric, oropharynx clear  Cardiovascular: Regular rate and rhythm no murmurs  Lungs: Clear to auscultation bilaterally  Skin: No rashes  Psych: mood and affect appropriate    RECENT LABS:  Lab Results   Component Value Date    WBC 3.81 10/02/2024    HGB 14.3 10/02/2024    HCT 40.3 10/02/2024    MCV 93.7 10/02/2024     10/02/2024       Lab Results   Component Value Date    GLUCOSE 111 (H) 10/02/2024    BUN 11 10/02/2024    CREATININE 1.01 (H) 10/02/2024    EGFRIFNONA 77 02/16/2022    BCR 10.9 10/02/2024    K 4.8 10/02/2024    CO2 25.0 10/02/2024    CALCIUM 9.6 10/02/2024    ALBUMIN 4.0 10/02/2024    AST 26 10/02/2024    ALT 19 10/02/2024           Assessment & Plan   Alcira Phelan is a 59 y.o. female with metastatic ER positive CA positive HER-2 negative invasive ductal carcinoma, with involvement of lymph nodes and liver.    She has had 5 cycles of Enhertu.  Dose was reduced by 20% for cycle 4 and cycle 5 to help minimize side effects of fatigue and constipation.  She " feels that her constipation is currently controlled with the current regimen but the fatigue is unmanageable.  Dose was reduced to 60% for cycle 6 today.  Labs from today were reviewed and are appropriate for treatment.  She will be due for repeat PET/CT scan prior to return in 3 weeks.    We will continue to bring her back the day after treatment for IV fluids to help with hydration.    Chemotherapy induced nausea: continue Zofran and Compazine as needed.    Follow-up in 3 weeks with cycle 7.     Rasheeda Ramirez APRN  [facility] Hematology and Oncology    10/3/2024

## 2024-10-04 ENCOUNTER — HOSPITAL ENCOUNTER (OUTPATIENT)
Dept: ONCOLOGY | Facility: HOSPITAL | Age: 59
Discharge: HOME | End: 2024-10-04
Payer: COMMERCIAL

## 2024-10-04 VITALS
WEIGHT: 130 LBS | RESPIRATION RATE: 16 BRPM | SYSTOLIC BLOOD PRESSURE: 155 MMHG | BODY MASS INDEX: 24.55 KG/M2 | TEMPERATURE: 97.2 F | DIASTOLIC BLOOD PRESSURE: 72 MMHG | HEIGHT: 61 IN | HEART RATE: 87 BPM

## 2024-10-04 DIAGNOSIS — Z45.2 ENCOUNTER FOR CARE RELATED TO VASCULAR ACCESS PORT: ICD-10-CM

## 2024-10-04 DIAGNOSIS — C79.51 MALIGNANT NEOPLASM METASTATIC TO BONE: Primary | ICD-10-CM

## 2024-10-04 PROCEDURE — 25010000002 HEPARIN LOCK FLUSH PER 10 UNITS: Performed by: INTERNAL MEDICINE

## 2024-10-04 PROCEDURE — 25010000002 DEXAMETHASONE SODIUM PHOSPHATE 100 MG/10ML SOLUTION: Performed by: NURSE PRACTITIONER

## 2024-10-04 PROCEDURE — 96361 HYDRATE IV INFUSION ADD-ON: CPT

## 2024-10-04 PROCEDURE — 96374 THER/PROPH/DIAG INJ IV PUSH: CPT

## 2024-10-04 PROCEDURE — 25810000003 SODIUM CHLORIDE 0.9 % SOLUTION: Performed by: NURSE PRACTITIONER

## 2024-10-04 RX ORDER — HEPARIN SODIUM (PORCINE) LOCK FLUSH IV SOLN 100 UNIT/ML 100 UNIT/ML
500 SOLUTION INTRAVENOUS AS NEEDED
Status: DISCONTINUED | OUTPATIENT
Start: 2024-10-04 | End: 2024-10-05 | Stop reason: HOSPADM

## 2024-10-04 RX ORDER — HEPARIN SODIUM (PORCINE) LOCK FLUSH IV SOLN 100 UNIT/ML 100 UNIT/ML
500 SOLUTION INTRAVENOUS AS NEEDED
OUTPATIENT
Start: 2024-10-04

## 2024-10-04 RX ADMIN — HEPARIN 500 UNITS: 100 SYRINGE at 10:04

## 2024-10-04 RX ADMIN — DEXAMETHASONE SODIUM PHOSPHATE 12 MG: 10 INJECTION, SOLUTION INTRAMUSCULAR; INTRAVENOUS at 09:00

## 2024-10-04 RX ADMIN — SODIUM CHLORIDE 1000 ML: 9 INJECTION, SOLUTION INTRAVENOUS at 09:01

## 2024-10-21 ENCOUNTER — HOSPITAL ENCOUNTER (OUTPATIENT)
Dept: PET IMAGING | Facility: HOSPITAL | Age: 59
Discharge: HOME | End: 2024-10-21
Payer: COMMERCIAL

## 2024-10-21 ENCOUNTER — LAB (OUTPATIENT)
Dept: LAB | Facility: HOSPITAL | Age: 59
End: 2024-10-21
Payer: COMMERCIAL

## 2024-10-21 DIAGNOSIS — C50.112 MALIGNANT NEOPLASM OF CENTRAL PORTION OF LEFT BREAST IN FEMALE, ESTROGEN RECEPTOR POSITIVE: ICD-10-CM

## 2024-10-21 DIAGNOSIS — C79.51 MALIGNANT NEOPLASM METASTATIC TO BONE: ICD-10-CM

## 2024-10-21 DIAGNOSIS — Z17.0 MALIGNANT NEOPLASM OF CENTRAL PORTION OF LEFT BREAST IN FEMALE, ESTROGEN RECEPTOR POSITIVE: ICD-10-CM

## 2024-10-21 DIAGNOSIS — C79.51 MALIGNANT NEOPLASM METASTATIC TO BONE: Primary | ICD-10-CM

## 2024-10-21 LAB
ALBUMIN SERPL-MCNC: 3.9 G/DL (ref 3.5–5.2)
ALBUMIN/GLOB SERPL: 1.6 G/DL
ALP SERPL-CCNC: 115 U/L (ref 39–117)
ALT SERPL W P-5'-P-CCNC: 22 U/L (ref 1–33)
ANION GAP SERPL CALCULATED.3IONS-SCNC: 10 MMOL/L (ref 5–15)
AST SERPL-CCNC: 25 U/L (ref 1–32)
BASOPHILS # BLD AUTO: 0.02 10*3/MM3 (ref 0–0.2)
BASOPHILS NFR BLD AUTO: 0.6 % (ref 0–1.5)
BILIRUB SERPL-MCNC: 0.3 MG/DL (ref 0–1.2)
BUN SERPL-MCNC: 14 MG/DL (ref 8–23)
BUN/CREAT SERPL: 17.5 (ref 7–25)
CALCIUM SPEC-SCNC: 8.9 MG/DL (ref 8.6–10.5)
CHLORIDE SERPL-SCNC: 106 MMOL/L (ref 98–107)
CO2 SERPL-SCNC: 25 MMOL/L (ref 22–29)
CREAT SERPL-MCNC: 0.8 MG/DL (ref 0.57–1)
DEPRECATED RDW RBC AUTO: 44.5 FL (ref 37–54)
EGFRCR SERPLBLD CKD-EPI 2021: 83.9 ML/MIN/1.73
EOSINOPHIL # BLD AUTO: 0.34 10*3/MM3 (ref 0–0.4)
EOSINOPHIL NFR BLD AUTO: 9.6 % (ref 0.3–6.2)
ERYTHROCYTE [DISTWIDTH] IN BLOOD BY AUTOMATED COUNT: 12.9 % (ref 12.3–15.4)
GLOBULIN UR ELPH-MCNC: 2.5 GM/DL
GLUCOSE BLDC GLUCOMTR-MCNC: 97 MG/DL (ref 70–130)
GLUCOSE SERPL-MCNC: 88 MG/DL (ref 65–99)
HCT VFR BLD AUTO: 35.9 % (ref 34–46.6)
HGB BLD-MCNC: 12.7 G/DL (ref 12–15.9)
IMM GRANULOCYTES # BLD AUTO: 0.01 10*3/MM3 (ref 0–0.05)
IMM GRANULOCYTES NFR BLD AUTO: 0.3 % (ref 0–0.5)
LYMPHOCYTES # BLD AUTO: 0.97 10*3/MM3 (ref 0.7–3.1)
LYMPHOCYTES NFR BLD AUTO: 27.2 % (ref 19.6–45.3)
MCH RBC QN AUTO: 33.2 PG (ref 26.6–33)
MCHC RBC AUTO-ENTMCNC: 35.4 G/DL (ref 31.5–35.7)
MCV RBC AUTO: 93.7 FL (ref 79–97)
MONOCYTES # BLD AUTO: 0.37 10*3/MM3 (ref 0.1–0.9)
MONOCYTES NFR BLD AUTO: 10.4 % (ref 5–12)
NEUTROPHILS NFR BLD AUTO: 1.85 10*3/MM3 (ref 1.7–7)
NEUTROPHILS NFR BLD AUTO: 51.9 % (ref 42.7–76)
PLATELET # BLD AUTO: 187 10*3/MM3 (ref 140–450)
PMV BLD AUTO: 8.5 FL (ref 6–12)
POTASSIUM SERPL-SCNC: 4.2 MMOL/L (ref 3.5–5.2)
PROT SERPL-MCNC: 6.4 G/DL (ref 6–8.5)
RBC # BLD AUTO: 3.83 10*6/MM3 (ref 3.77–5.28)
SODIUM SERPL-SCNC: 141 MMOL/L (ref 136–145)
WBC NRBC COR # BLD AUTO: 3.56 10*3/MM3 (ref 3.4–10.8)

## 2024-10-21 PROCEDURE — 36415 COLL VENOUS BLD VENIPUNCTURE: CPT

## 2024-10-21 PROCEDURE — 0 FLUDEOXYGLUCOSE F18 SOLUTION: Performed by: NURSE PRACTITIONER

## 2024-10-21 PROCEDURE — 85025 COMPLETE CBC W/AUTO DIFF WBC: CPT

## 2024-10-21 PROCEDURE — A9552 F18 FDG: HCPCS | Performed by: NURSE PRACTITIONER

## 2024-10-21 PROCEDURE — 82948 REAGENT STRIP/BLOOD GLUCOSE: CPT

## 2024-10-21 PROCEDURE — 80053 COMPREHEN METABOLIC PANEL: CPT

## 2024-10-21 PROCEDURE — 78815 PET IMAGE W/CT SKULL-THIGH: CPT

## 2024-10-21 RX ADMIN — FLUDEOXYGLUCOSE F 18 1 DOSE: 200 INJECTION, SOLUTION INTRAVENOUS at 08:50

## 2024-10-24 ENCOUNTER — HOSPITAL ENCOUNTER (OUTPATIENT)
Dept: ONCOLOGY | Facility: HOSPITAL | Age: 59
Discharge: HOME | End: 2024-10-24
Admitting: INTERNAL MEDICINE
Payer: COMMERCIAL

## 2024-10-24 ENCOUNTER — OFFICE VISIT (OUTPATIENT)
Dept: ONCOLOGY | Facility: CLINIC | Age: 59
End: 2024-10-24
Payer: COMMERCIAL

## 2024-10-24 VITALS
HEART RATE: 73 BPM | WEIGHT: 132 LBS | HEIGHT: 61 IN | RESPIRATION RATE: 14 BRPM | DIASTOLIC BLOOD PRESSURE: 80 MMHG | SYSTOLIC BLOOD PRESSURE: 129 MMHG | TEMPERATURE: 97.8 F | BODY MASS INDEX: 24.92 KG/M2 | OXYGEN SATURATION: 96 %

## 2024-10-24 DIAGNOSIS — C50.112 MALIGNANT NEOPLASM OF CENTRAL PORTION OF LEFT FEMALE BREAST, UNSPECIFIED ESTROGEN RECEPTOR STATUS: ICD-10-CM

## 2024-10-24 DIAGNOSIS — Z85.3 HISTORY OF LEFT BREAST CANCER: ICD-10-CM

## 2024-10-24 DIAGNOSIS — C79.51 MALIGNANT NEOPLASM METASTATIC TO BONE: Primary | ICD-10-CM

## 2024-10-24 DIAGNOSIS — Z45.2 ENCOUNTER FOR CARE RELATED TO VASCULAR ACCESS PORT: Primary | ICD-10-CM

## 2024-10-24 DIAGNOSIS — C79.51 MALIGNANT NEOPLASM METASTATIC TO BONE: ICD-10-CM

## 2024-10-24 PROCEDURE — 99214 OFFICE O/P EST MOD 30 MIN: CPT | Performed by: INTERNAL MEDICINE

## 2024-10-24 PROCEDURE — 25010000002 DEXAMETHASONE SODIUM PHOSPHATE 100 MG/10ML SOLUTION: Performed by: INTERNAL MEDICINE

## 2024-10-24 PROCEDURE — 25010000002 FOSAPREPITANT PER 1 MG: Performed by: INTERNAL MEDICINE

## 2024-10-24 PROCEDURE — 25010000002 HEPARIN LOCK FLUSH PER 10 UNITS: Performed by: INTERNAL MEDICINE

## 2024-10-24 PROCEDURE — 25010000002 PALONOSETRON PER 25 MCG: Performed by: INTERNAL MEDICINE

## 2024-10-24 PROCEDURE — 96375 TX/PRO/DX INJ NEW DRUG ADDON: CPT

## 2024-10-24 PROCEDURE — 96413 CHEMO IV INFUSION 1 HR: CPT

## 2024-10-24 PROCEDURE — 25010000002 FAM-TRASTUZUMAB DERUXTEC-NXKI 100 MG RECONSTITUTED SOLUTION 1 EACH VIAL: Performed by: INTERNAL MEDICINE

## 2024-10-24 PROCEDURE — 0 DEXTROSE 5 % SOLUTION: Performed by: INTERNAL MEDICINE

## 2024-10-24 PROCEDURE — 96367 TX/PROPH/DG ADDL SEQ IV INF: CPT

## 2024-10-24 RX ORDER — DEXTROSE MONOHYDRATE 50 MG/ML
20 INJECTION, SOLUTION INTRAVENOUS ONCE
OUTPATIENT
Start: 2024-11-14

## 2024-10-24 RX ORDER — DEXTROSE MONOHYDRATE 50 MG/ML
20 INJECTION, SOLUTION INTRAVENOUS ONCE
Status: CANCELLED | OUTPATIENT
Start: 2024-10-24

## 2024-10-24 RX ORDER — DEXTROSE MONOHYDRATE 50 MG/ML
20 INJECTION, SOLUTION INTRAVENOUS ONCE
Status: COMPLETED | OUTPATIENT
Start: 2024-10-24 | End: 2024-10-24

## 2024-10-24 RX ORDER — PALONOSETRON 0.05 MG/ML
0.25 INJECTION, SOLUTION INTRAVENOUS ONCE
Status: CANCELLED | OUTPATIENT
Start: 2024-10-24

## 2024-10-24 RX ORDER — PALONOSETRON 0.05 MG/ML
0.25 INJECTION, SOLUTION INTRAVENOUS ONCE
OUTPATIENT
Start: 2024-11-14

## 2024-10-24 RX ORDER — HEPARIN SODIUM (PORCINE) LOCK FLUSH IV SOLN 100 UNIT/ML 100 UNIT/ML
500 SOLUTION INTRAVENOUS AS NEEDED
Status: CANCELLED | OUTPATIENT
Start: 2024-10-24

## 2024-10-24 RX ORDER — HEPARIN SODIUM (PORCINE) LOCK FLUSH IV SOLN 100 UNIT/ML 100 UNIT/ML
500 SOLUTION INTRAVENOUS AS NEEDED
Status: DISCONTINUED | OUTPATIENT
Start: 2024-10-24 | End: 2024-10-25 | Stop reason: HOSPADM

## 2024-10-24 RX ORDER — PALONOSETRON 0.05 MG/ML
0.25 INJECTION, SOLUTION INTRAVENOUS ONCE
Status: COMPLETED | OUTPATIENT
Start: 2024-10-24 | End: 2024-10-24

## 2024-10-24 RX ADMIN — DEXAMETHASONE SODIUM PHOSPHATE 12 MG: 10 INJECTION, SOLUTION INTRAMUSCULAR; INTRAVENOUS at 09:36

## 2024-10-24 RX ADMIN — PALONOSETRON HYDROCHLORIDE 0.25 MG: 0.25 INJECTION INTRAVENOUS at 09:31

## 2024-10-24 RX ADMIN — DEXTROSE MONOHYDRATE 20 ML/HR: 50 INJECTION, SOLUTION INTRAVENOUS at 10:20

## 2024-10-24 RX ADMIN — HEPARIN 500 UNITS: 100 SYRINGE at 11:05

## 2024-10-24 RX ADMIN — FAM-TRASTUZUMAB DERUXTECAN-NXKI 200 MG: 100 INJECTION, POWDER, LYOPHILIZED, FOR SOLUTION INTRAVENOUS at 10:23

## 2024-10-24 RX ADMIN — FOSAPREPITANT DIMEGLUMINE 150 MG: 150 INJECTION, POWDER, LYOPHILIZED, FOR SOLUTION INTRAVENOUS at 09:36

## 2024-10-24 NOTE — PROGRESS NOTES
PROBLEM LIST:  1. Local recurrence of HR+ carcinoma of the left breast  A) history of left breast cancer in 2007.  Bilateral mastectomy 5/30/07.  pathology showed grade 2 IDC of the left breast measuring 1.8 cm.  ER+ SC+ Her2 negative.   0/2 SLN involved.  [id number].  B) adjuvant chemotherapy with TC x 4 cycles, completed September 2007 with Dr. De La Torre.  Tamoxifen October 2007 thru October 2012.  C) presented January 2022 with left chest wall mass.  CT chest 1/17/22 showed 4.6 cm lesion involving left manubrium with soft tissue extent extending posteriorly to the anterior aspect of mediastinum.  12 mm nodule right lung base.  D) ultrasound guided biopsy of chest wall mass on 2/10/22.  Pathology showed invasive ductal carcinoma of the breast.  ER positive (100%), SC positive (50%), HER-2 2+  E) letrozole started February 2022.  CyberKnife to the chest wall mass completed 4/15/2022.  Ibrance started 4/18/2022.  Ibrance decreased to 100 mg due to neutropenia 9/5/2022  F) PET/CT 7/5/23 showed a new 2.9 cm hypermetabolic liver lesion, nonenlarged but hypermetabolic bilateral hilar and mediastinal lymph nodes.  [id number] testing showed a PI3 kinase mutation, no ESR 1 mutation.  Treatment changed to fulvestrant and ribociclib.  Fulvestrant started 7/19/2023.  Ribociclib started 7/27/2023.  G) PET/CT on 12/1/2023 showed increasing size of solitary liver lesion.  No additional sites of disease.  Liver biopsy 12/11/2023 showed metastatic breast carcinoma, ER positive SC positive HER2 1+.  Treatment with Xeloda started 12/29/2023.  H) PET/CT 6/10/2024 showed enlargement in size and metabolic activity of the solitary liver metastasis.  Treatment changed to Enhertu, starting 6/19/2024.  2. Depression/anxiety  3. GERD    Subjective     CHIEF COMPLAINT: Breast cancer    HISTORY OF PRESENT ILLNESS:   Alcira Phelan returns for follow-up.  She has completed 6 cycles of Enhertu with dose reduction to 80% since cycle 4, and  "down to 60% dose with the last cycle.    She did tolerate this cycle better.  Her side effects were not as severe and they did not last as long.  She had about 3 or 4 days after the treatment where she still felt pretty poorly but she has felt okay the last 2 weeks.      Objective      /80   Pulse 73   Temp 97.8 °F (36.6 °C) (Infrared)   Resp 14   Ht 154.9 cm (61\")   Wt 59.9 kg (132 lb)   SpO2 96%   BMI 24.94 kg/m²    Vitals:    10/24/24 0831   PainSc: 0-No pain       ECOG score: 0         General: well appearing female in no acute distress  Neuro: alert and oriented  HEENT: sclera anicteric, oropharynx clear  Skin: No rashes  Psych: mood and affect appropriate    RECENT LABS:  Lab Results   Component Value Date    WBC 3.56 10/21/2024    HGB 12.7 10/21/2024    HCT 35.9 10/21/2024    MCV 93.7 10/21/2024     10/21/2024       Lab Results   Component Value Date    GLUCOSE 88 10/21/2024    BUN 14 10/21/2024    CREATININE 0.80 10/21/2024    EGFRIFNONA 77 02/16/2022    BCR 17.5 10/21/2024    K 4.2 10/21/2024    CO2 25.0 10/21/2024    CALCIUM 8.9 10/21/2024    ALBUMIN 3.9 10/21/2024    AST 25 10/21/2024    ALT 22 10/21/2024     NM PET/CT Skull Base to Mid Thigh  Narrative: FDG NM PET/CT SKULL BASE TO MID THIGH    Date of Exam: 10/21/2024 8:50 AM EDT    Indication: breast cancer with mets evaluation of treatment.    Comparison: 8/19/2024 whole-body PET scan    Technique: 13.2 mCi of F-18 FDG was administered intravenously. PET imaging was obtained from skull base to mid-thigh approximately 60 minutes after radiotracer injection. A low dose non contrast CT was obtained for attenuation correction and anatomic   localization. Fused PET-CT and 3D MIP reconstructions were utilized for image interpretation.  Fasting blood glucose level: 97 mg/dl.    Reference uptake values:  Mediastinum: 2.1 SUVmax  Liver: 3.1 SUVmax  Normalization method: Body Weight    Findings:  Previous exam report from 8/18/2024 described " interval response to therapy compared to an earlier study, with diminished left lobe hepatic mass, no longer hypermetabolic, and resolution of low-level activity in the mediastinum and pulmonary ryanne.    Today's study again appears negative for metastatic disease. A couple of foci of increased activity in the mid abdomen appear to correlate with normal appearing bowel. Some normal variant skeletal muscle uptake is seen.    Multiplanar images show no significant asymmetry of uptake in the brain. No hypermetabolic node or mass is seen in the neck.    In the chest, no hypermetabolic mediastinal, hilar, pulmonary parenchymal or axillary disease is seen. Trace low-level activity in the right axillary region is within normal-appearing cutaneous tissue, image 117 and apparently incidental. Maximal SUV   here is only 1.5.    Below the diaphragm, no hypermetabolic liver, adrenal or other solid organ lesions are seen. There is no evidence of hypermetabolic adenopathy, peritoneal disease or other mass. No abnormal marrow space uptake is seen.    CT scan images show stable or slightly further diminished low-attenuation changes in the left lower lobe, corresponding to the previous hypermetabolic lesion on 6/10/2024 exam.  Impression: Impression:  Negative PET scan. No evidence of metastatic disease.    Electronically Signed: Miguel Britt MD    10/22/2024 3:37 PM EDT    Workstation ID: [id number]    I personally reviewed the imaging studies        Assessment & Plan   Alcira Phelan is a 59 y.o. female with metastatic ER positive [place] positive HER-2 negative invasive ductal carcinoma, with involvement of lymph nodes and liver.    She has had 6 cycles of Enhertu.  She has now been reduced to a 60% dose and she tolerated this much better.  We will continue with the current dosing.  We reviewed her PET/CT images which showed ongoing disease control.      We will continue to bring her back the day after treatment for IV fluids to help  with hydration.    Chemotherapy induced nausea: continue Zofran and Compazine as needed.    Follow-up in 3 weeks with cycle 8.     Ruth Castro MD  [facility]    10/24/2024

## 2024-10-25 ENCOUNTER — DOCUMENTATION (OUTPATIENT)
Dept: NUTRITION | Facility: HOSPITAL | Age: 59
End: 2024-10-25
Payer: COMMERCIAL

## 2024-10-25 ENCOUNTER — HOSPITAL ENCOUNTER (OUTPATIENT)
Dept: ONCOLOGY | Facility: HOSPITAL | Age: 59
Discharge: HOME | End: 2024-10-25
Payer: COMMERCIAL

## 2024-10-25 VITALS
BODY MASS INDEX: 24.92 KG/M2 | HEIGHT: 61 IN | HEART RATE: 83 BPM | SYSTOLIC BLOOD PRESSURE: 135 MMHG | RESPIRATION RATE: 16 BRPM | TEMPERATURE: 96.8 F | WEIGHT: 132 LBS | DIASTOLIC BLOOD PRESSURE: 79 MMHG

## 2024-10-25 DIAGNOSIS — C79.51 MALIGNANT NEOPLASM METASTATIC TO BONE: Primary | ICD-10-CM

## 2024-10-25 DIAGNOSIS — Z45.2 ENCOUNTER FOR CARE RELATED TO VASCULAR ACCESS PORT: ICD-10-CM

## 2024-10-25 PROCEDURE — 25010000002 HEPARIN LOCK FLUSH PER 10 UNITS: Performed by: INTERNAL MEDICINE

## 2024-10-25 PROCEDURE — 96360 HYDRATION IV INFUSION INIT: CPT

## 2024-10-25 PROCEDURE — 96374 THER/PROPH/DIAG INJ IV PUSH: CPT

## 2024-10-25 PROCEDURE — 25010000002 DEXAMETHASONE SODIUM PHOSPHATE 100 MG/10ML SOLUTION: Performed by: INTERNAL MEDICINE

## 2024-10-25 PROCEDURE — 25810000003 SODIUM CHLORIDE 0.9 % SOLUTION: Performed by: INTERNAL MEDICINE

## 2024-10-25 RX ORDER — HEPARIN SODIUM (PORCINE) LOCK FLUSH IV SOLN 100 UNIT/ML 100 UNIT/ML
500 SOLUTION INTRAVENOUS AS NEEDED
OUTPATIENT
Start: 2024-10-25

## 2024-10-25 RX ORDER — HEPARIN SODIUM (PORCINE) LOCK FLUSH IV SOLN 100 UNIT/ML 100 UNIT/ML
500 SOLUTION INTRAVENOUS AS NEEDED
Status: DISCONTINUED | OUTPATIENT
Start: 2024-10-25 | End: 2024-10-26 | Stop reason: HOSPADM

## 2024-10-25 RX ADMIN — HEPARIN 500 UNITS: 100 SYRINGE at 10:34

## 2024-10-25 RX ADMIN — SODIUM CHLORIDE 1000 ML: 9 INJECTION, SOLUTION INTRAVENOUS at 09:05

## 2024-10-25 RX ADMIN — DEXAMETHASONE SODIUM PHOSPHATE 12 MG: 10 INJECTION, SOLUTION INTRAMUSCULAR; INTRAVENOUS at 09:30

## 2024-10-25 NOTE — PROGRESS NOTES
ONC Nutrition    Diagnosis:  Metastatic ER positive [place] positive HER-2 negative invasive ductal carcinoma, with involvement of lymph nodes and liver     Current Treatment:  She has had 6 cycles of Enhertu, reduced to a 60% dose which she is tolerating much better.    Patient here today for IV fluid hydration following treatment.    Per request of RN caring for patient today, discussed with patient importance of hydration, goal for 65+ ounces per day, sources of hydrating fluids and recipes for making her own hydration solutions.  Written patient education material provided, along with samples of Pedialyte.     Also discussed tips for management of taste changes, including oral care.

## 2024-11-12 ENCOUNTER — LAB (OUTPATIENT)
Dept: LAB | Facility: HOSPITAL | Age: 59
End: 2024-11-12
Payer: COMMERCIAL

## 2024-11-12 DIAGNOSIS — C50.112 MALIGNANT NEOPLASM OF CENTRAL PORTION OF LEFT FEMALE BREAST, UNSPECIFIED ESTROGEN RECEPTOR STATUS: ICD-10-CM

## 2024-11-12 DIAGNOSIS — Z85.3 HISTORY OF LEFT BREAST CANCER: ICD-10-CM

## 2024-11-12 DIAGNOSIS — C79.51 MALIGNANT NEOPLASM METASTATIC TO BONE: ICD-10-CM

## 2024-11-12 LAB
ALBUMIN SERPL-MCNC: 4 G/DL (ref 3.5–5.2)
ALBUMIN/GLOB SERPL: 1.5 G/DL
ALP SERPL-CCNC: 103 U/L (ref 39–117)
ALT SERPL W P-5'-P-CCNC: 27 U/L (ref 1–33)
ANION GAP SERPL CALCULATED.3IONS-SCNC: 11 MMOL/L (ref 5–15)
AST SERPL-CCNC: 35 U/L (ref 1–32)
BASOPHILS # BLD AUTO: 0.02 10*3/MM3 (ref 0–0.2)
BASOPHILS NFR BLD AUTO: 0.6 % (ref 0–1.5)
BILIRUB SERPL-MCNC: <0.2 MG/DL (ref 0–1.2)
BUN SERPL-MCNC: 13 MG/DL (ref 8–23)
BUN/CREAT SERPL: 13.5 (ref 7–25)
CALCIUM SPEC-SCNC: 9.4 MG/DL (ref 8.6–10.5)
CHLORIDE SERPL-SCNC: 104 MMOL/L (ref 98–107)
CO2 SERPL-SCNC: 26 MMOL/L (ref 22–29)
CREAT SERPL-MCNC: 0.96 MG/DL (ref 0.57–1)
DEPRECATED RDW RBC AUTO: 45 FL (ref 37–54)
EGFRCR SERPLBLD CKD-EPI 2021: 67.5 ML/MIN/1.73
EOSINOPHIL # BLD AUTO: 0.29 10*3/MM3 (ref 0–0.4)
EOSINOPHIL NFR BLD AUTO: 8.2 % (ref 0.3–6.2)
ERYTHROCYTE [DISTWIDTH] IN BLOOD BY AUTOMATED COUNT: 12.9 % (ref 12.3–15.4)
GLOBULIN UR ELPH-MCNC: 2.6 GM/DL
GLUCOSE SERPL-MCNC: 137 MG/DL (ref 65–99)
HCT VFR BLD AUTO: 38.4 % (ref 34–46.6)
HGB BLD-MCNC: 13.6 G/DL (ref 12–15.9)
IMM GRANULOCYTES # BLD AUTO: 0.01 10*3/MM3 (ref 0–0.05)
IMM GRANULOCYTES NFR BLD AUTO: 0.3 % (ref 0–0.5)
LYMPHOCYTES # BLD AUTO: 0.81 10*3/MM3 (ref 0.7–3.1)
LYMPHOCYTES NFR BLD AUTO: 22.8 % (ref 19.6–45.3)
MCH RBC QN AUTO: 33.3 PG (ref 26.6–33)
MCHC RBC AUTO-ENTMCNC: 35.4 G/DL (ref 31.5–35.7)
MCV RBC AUTO: 94.1 FL (ref 79–97)
MONOCYTES # BLD AUTO: 0.47 10*3/MM3 (ref 0.1–0.9)
MONOCYTES NFR BLD AUTO: 13.2 % (ref 5–12)
NEUTROPHILS NFR BLD AUTO: 1.95 10*3/MM3 (ref 1.7–7)
NEUTROPHILS NFR BLD AUTO: 54.9 % (ref 42.7–76)
PLATELET # BLD AUTO: 186 10*3/MM3 (ref 140–450)
PMV BLD AUTO: 9.1 FL (ref 6–12)
POTASSIUM SERPL-SCNC: 3.9 MMOL/L (ref 3.5–5.2)
PROT SERPL-MCNC: 6.6 G/DL (ref 6–8.5)
RBC # BLD AUTO: 4.08 10*6/MM3 (ref 3.77–5.28)
SODIUM SERPL-SCNC: 141 MMOL/L (ref 136–145)
WBC NRBC COR # BLD AUTO: 3.55 10*3/MM3 (ref 3.4–10.8)

## 2024-11-12 PROCEDURE — 80053 COMPREHEN METABOLIC PANEL: CPT

## 2024-11-12 PROCEDURE — 36415 COLL VENOUS BLD VENIPUNCTURE: CPT

## 2024-11-12 PROCEDURE — 85025 COMPLETE CBC W/AUTO DIFF WBC: CPT

## 2024-11-14 ENCOUNTER — OFFICE VISIT (OUTPATIENT)
Dept: ONCOLOGY | Facility: CLINIC | Age: 59
End: 2024-11-14
Payer: COMMERCIAL

## 2024-11-14 ENCOUNTER — HOSPITAL ENCOUNTER (OUTPATIENT)
Dept: ONCOLOGY | Facility: HOSPITAL | Age: 59
Discharge: HOME | End: 2024-11-14
Payer: COMMERCIAL

## 2024-11-14 VITALS
SYSTOLIC BLOOD PRESSURE: 146 MMHG | HEART RATE: 73 BPM | BODY MASS INDEX: 24.73 KG/M2 | RESPIRATION RATE: 18 BRPM | OXYGEN SATURATION: 97 % | TEMPERATURE: 98 F | HEIGHT: 61 IN | DIASTOLIC BLOOD PRESSURE: 85 MMHG | WEIGHT: 131 LBS

## 2024-11-14 DIAGNOSIS — Z85.3 HISTORY OF LEFT BREAST CANCER: Primary | ICD-10-CM

## 2024-11-14 DIAGNOSIS — C50.112 MALIGNANT NEOPLASM OF CENTRAL PORTION OF LEFT FEMALE BREAST, UNSPECIFIED ESTROGEN RECEPTOR STATUS: ICD-10-CM

## 2024-11-14 DIAGNOSIS — C78.7 CANCER, METASTATIC TO LIVER: ICD-10-CM

## 2024-11-14 DIAGNOSIS — Z45.2 ENCOUNTER FOR CARE RELATED TO VASCULAR ACCESS PORT: ICD-10-CM

## 2024-11-14 DIAGNOSIS — C79.51 MALIGNANT NEOPLASM METASTATIC TO BONE: ICD-10-CM

## 2024-11-14 DIAGNOSIS — C50.112 MALIGNANT NEOPLASM OF CENTRAL PORTION OF LEFT BREAST IN FEMALE, ESTROGEN RECEPTOR POSITIVE: Primary | ICD-10-CM

## 2024-11-14 DIAGNOSIS — Z17.0 MALIGNANT NEOPLASM OF CENTRAL PORTION OF LEFT BREAST IN FEMALE, ESTROGEN RECEPTOR POSITIVE: Primary | ICD-10-CM

## 2024-11-14 DIAGNOSIS — C79.51 MALIGNANT NEOPLASM METASTATIC TO BONE: Primary | ICD-10-CM

## 2024-11-14 PROCEDURE — 25010000002 FAM-TRASTUZUMAB DERUXTEC-NXKI 100 MG RECONSTITUTED SOLUTION 1 EACH VIAL: Performed by: INTERNAL MEDICINE

## 2024-11-14 PROCEDURE — 25010000002 DEXAMETHASONE SODIUM PHOSPHATE 100 MG/10ML SOLUTION: Performed by: INTERNAL MEDICINE

## 2024-11-14 PROCEDURE — 25010000002 PALONOSETRON PER 25 MCG: Performed by: INTERNAL MEDICINE

## 2024-11-14 PROCEDURE — 25010000002 FOSAPREPITANT PER 1 MG: Performed by: INTERNAL MEDICINE

## 2024-11-14 PROCEDURE — 99213 OFFICE O/P EST LOW 20 MIN: CPT | Performed by: NURSE PRACTITIONER

## 2024-11-14 PROCEDURE — 96413 CHEMO IV INFUSION 1 HR: CPT

## 2024-11-14 PROCEDURE — 25010000002 HEPARIN LOCK FLUSH PER 10 UNITS: Performed by: INTERNAL MEDICINE

## 2024-11-14 PROCEDURE — 96367 TX/PROPH/DG ADDL SEQ IV INF: CPT

## 2024-11-14 PROCEDURE — 96375 TX/PRO/DX INJ NEW DRUG ADDON: CPT

## 2024-11-14 RX ORDER — HEPARIN SODIUM (PORCINE) LOCK FLUSH IV SOLN 100 UNIT/ML 100 UNIT/ML
500 SOLUTION INTRAVENOUS AS NEEDED
Status: DISCONTINUED | OUTPATIENT
Start: 2024-11-14 | End: 2024-11-15 | Stop reason: HOSPADM

## 2024-11-14 RX ORDER — PALONOSETRON 0.05 MG/ML
0.25 INJECTION, SOLUTION INTRAVENOUS ONCE
Status: COMPLETED | OUTPATIENT
Start: 2024-11-14 | End: 2024-11-14

## 2024-11-14 RX ORDER — DEXTROSE MONOHYDRATE 50 MG/ML
20 INJECTION, SOLUTION INTRAVENOUS ONCE
Status: COMPLETED | OUTPATIENT
Start: 2024-11-14 | End: 2024-11-14

## 2024-11-14 RX ORDER — HEPARIN SODIUM (PORCINE) LOCK FLUSH IV SOLN 100 UNIT/ML 100 UNIT/ML
500 SOLUTION INTRAVENOUS AS NEEDED
Status: CANCELLED | OUTPATIENT
Start: 2024-11-14

## 2024-11-14 RX ADMIN — FOSAPREPITANT DIMEGLUMINE 150 MG: 150 INJECTION, POWDER, LYOPHILIZED, FOR SOLUTION INTRAVENOUS at 11:55

## 2024-11-14 RX ADMIN — FAM-TRASTUZUMAB DERUXTECAN-NXKI 200 MG: 100 INJECTION, POWDER, LYOPHILIZED, FOR SOLUTION INTRAVENOUS at 12:38

## 2024-11-14 RX ADMIN — DEXAMETHASONE SODIUM PHOSPHATE 12 MG: 100 INJECTION INTRAMUSCULAR; INTRAVENOUS at 11:55

## 2024-11-14 RX ADMIN — PALONOSETRON HYDROCHLORIDE 0.25 MG: 0.25 INJECTION INTRAVENOUS at 11:54

## 2024-11-14 RX ADMIN — DEXTROSE MONOHYDRATE 20 ML/HR: 50 INJECTION, SOLUTION INTRAVENOUS at 11:54

## 2024-11-14 RX ADMIN — HEPARIN 500 UNITS: 100 SYRINGE at 13:11

## 2024-11-14 NOTE — PROGRESS NOTES
PROBLEM LIST:  1. Local recurrence of HR+ carcinoma of the left breast  A) history of left breast cancer in 2007.  Bilateral mastectomy 5/30/07.  pathology showed grade 2 IDC of the left breast measuring 1.8 cm.  ER+ MI+ Her2 negative.   0/2 SLN involved.  [id number].  B) adjuvant chemotherapy with TC x 4 cycles, completed September 2007 with Dr. De La Torre.  Tamoxifen October 2007 thru October 2012.  C) presented January 2022 with left chest wall mass.  CT chest 1/17/22 showed 4.6 cm lesion involving left manubrium with soft tissue extent extending posteriorly to the anterior aspect of mediastinum.  12 mm nodule right lung base.  D) ultrasound guided biopsy of chest wall mass on 2/10/22.  Pathology showed invasive ductal carcinoma of the breast.  ER positive (100%), MI positive (50%), HER-2 2+  E) letrozole started February 2022.  CyberKnife to the chest wall mass completed 4/15/2022.  Ibrance started 4/18/2022.  Ibrance decreased to 100 mg due to neutropenia 9/5/2022  F) PET/CT 7/5/23 showed a new 2.9 cm hypermetabolic liver lesion, nonenlarged but hypermetabolic bilateral hilar and mediastinal lymph nodes.  [id number] testing showed a PI3 kinase mutation, no ESR 1 mutation.  Treatment changed to fulvestrant and ribociclib.  Fulvestrant started 7/19/2023.  Ribociclib started 7/27/2023.  G) PET/CT on 12/1/2023 showed increasing size of solitary liver lesion.  No additional sites of disease.  Liver biopsy 12/11/2023 showed metastatic breast carcinoma, ER positive MI positive HER2 1+.  Treatment with Xeloda started 12/29/2023.  H) PET/CT 6/10/2024 showed enlargement in size and metabolic activity of the solitary liver metastasis.  Treatment changed to Enhertu, starting 6/19/2024.  2. Depression/anxiety  3. GERD    Subjective     CHIEF COMPLAINT: Breast cancer    HISTORY OF PRESENT ILLNESS:   Alcira Phelan returns for follow-up.  She has completed 7 cycles of Enhertu with dose reduction to 80% since cycle 4, and  "down to 60% dose since cycle 6.    She tolerated her last cycle relatively well. She had moderate fatigue for approximately 1 week after treatment but then started feeling better. She had 3 days of nausea/vomiting and diarrhea this week that she thinks was related to food poisoning from a meal she ate Monday night. She started feeling better yesterday afternoon.              Objective      /85   Pulse 73   Temp 98 °F (36.7 °C)   Resp 18   Ht 154.9 cm (61\")   Wt 59.4 kg (131 lb)   SpO2 97%   BMI 24.75 kg/m²    Vitals:    11/14/24 1022   PainSc: 0-No pain       ECOG score: 0         General: well appearing female in no acute distress  Neuro: alert and oriented  HEENT: sclera anicteric, oropharynx clear  Skin: No rashes  Psych: mood and affect appropriate    RECENT LABS:  Lab Results   Component Value Date    WBC 3.55 11/12/2024    HGB 13.6 11/12/2024    HCT 38.4 11/12/2024    MCV 94.1 11/12/2024     11/12/2024       Lab Results   Component Value Date    GLUCOSE 137 (H) 11/12/2024    BUN 13 11/12/2024    CREATININE 0.96 11/12/2024    EGFRIFNONA 77 02/16/2022    BCR 13.5 11/12/2024    K 3.9 11/12/2024    CO2 26.0 11/12/2024    CALCIUM 9.4 11/12/2024    ALBUMIN 4.0 11/12/2024    AST 35 (H) 11/12/2024    ALT 27 11/12/2024     NM PET/CT Skull Base to Mid Thigh  Narrative: FDG NM PET/CT SKULL BASE TO MID THIGH    Date of Exam: 10/21/2024 8:50 AM EDT    Indication: breast cancer with mets evaluation of treatment.    Comparison: 8/19/2024 whole-body PET scan    Technique: 13.2 mCi of F-18 FDG was administered intravenously. PET imaging was obtained from skull base to mid-thigh approximately 60 minutes after radiotracer injection. A low dose non contrast CT was obtained for attenuation correction and anatomic   localization. Fused PET-CT and 3D MIP reconstructions were utilized for image interpretation.  Fasting blood glucose level: 97 mg/dl.    Reference uptake values:  Mediastinum: 2.1 SUVmax  Liver: 3.1 " SUVmax  Normalization method: Body Weight    Findings:  Previous exam report from 8/18/2024 described interval response to therapy compared to an earlier study, with diminished left lobe hepatic mass, no longer hypermetabolic, and resolution of low-level activity in the mediastinum and pulmonary ryanne.    Today's study again appears negative for metastatic disease. A couple of foci of increased activity in the mid abdomen appear to correlate with normal appearing bowel. Some normal variant skeletal muscle uptake is seen.    Multiplanar images show no significant asymmetry of uptake in the brain. No hypermetabolic node or mass is seen in the neck.    In the chest, no hypermetabolic mediastinal, hilar, pulmonary parenchymal or axillary disease is seen. Trace low-level activity in the right axillary region is within normal-appearing cutaneous tissue, image 117 and apparently incidental. Maximal SUV   here is only 1.5.    Below the diaphragm, no hypermetabolic liver, adrenal or other solid organ lesions are seen. There is no evidence of hypermetabolic adenopathy, peritoneal disease or other mass. No abnormal marrow space uptake is seen.    CT scan images show stable or slightly further diminished low-attenuation changes in the left lower lobe, corresponding to the previous hypermetabolic lesion on 6/10/2024 exam.  Impression: Impression:  Negative PET scan. No evidence of metastatic disease.    Electronically Signed: Miguel Britt MD    10/22/2024 3:37 PM EDT    Workstation ID: [id number]            Assessment & Plan   Alcira Phelan is a 59 y.o. female with metastatic ER positive [place] positive HER-2 negative invasive ductal carcinoma, with involvement of lymph nodes and liver.    She has had 7 cycles of Enhertu.  She has now been reduced to a 60% dose and she tolerated this much better.  We will continue with the current dosing.  Her last PET/CT images which showed ongoing disease control.  We will plan to repeat scans  after cycle 9. Order for PET placed today to schedule at the end of December 2024.     We will continue to bring her back the day after treatment for IV fluids to help with hydration.    Chemotherapy induced nausea: continue Zofran and Compazine as needed.    Follow-up in 3 weeks with cycle 9.       Rasheeda Ramirez, APRANTONY  [facility] Hematology and Oncology    11/14/2024

## 2024-11-15 ENCOUNTER — HOSPITAL ENCOUNTER (OUTPATIENT)
Dept: ONCOLOGY | Facility: HOSPITAL | Age: 59
Discharge: HOME | End: 2024-11-15
Payer: COMMERCIAL

## 2024-11-15 VITALS
BODY MASS INDEX: 24.73 KG/M2 | DIASTOLIC BLOOD PRESSURE: 73 MMHG | TEMPERATURE: 98.6 F | HEIGHT: 61 IN | SYSTOLIC BLOOD PRESSURE: 131 MMHG | RESPIRATION RATE: 16 BRPM | HEART RATE: 92 BPM | WEIGHT: 131 LBS

## 2024-11-15 DIAGNOSIS — Z45.2 ENCOUNTER FOR CARE RELATED TO VASCULAR ACCESS PORT: ICD-10-CM

## 2024-11-15 DIAGNOSIS — C79.51 MALIGNANT NEOPLASM METASTATIC TO BONE: Primary | ICD-10-CM

## 2024-11-15 PROCEDURE — 25810000003 SODIUM CHLORIDE 0.9 % SOLUTION: Performed by: INTERNAL MEDICINE

## 2024-11-15 PROCEDURE — 96361 HYDRATE IV INFUSION ADD-ON: CPT

## 2024-11-15 PROCEDURE — 96360 HYDRATION IV INFUSION INIT: CPT

## 2024-11-15 PROCEDURE — 25010000002 HEPARIN LOCK FLUSH PER 10 UNITS: Performed by: INTERNAL MEDICINE

## 2024-11-15 PROCEDURE — 25010000002 DEXAMETHASONE SODIUM PHOSPHATE 100 MG/10ML SOLUTION: Performed by: INTERNAL MEDICINE

## 2024-11-15 PROCEDURE — 96375 TX/PRO/DX INJ NEW DRUG ADDON: CPT

## 2024-11-15 RX ORDER — HEPARIN SODIUM (PORCINE) LOCK FLUSH IV SOLN 100 UNIT/ML 100 UNIT/ML
500 SOLUTION INTRAVENOUS AS NEEDED
Status: DISCONTINUED | OUTPATIENT
Start: 2024-11-15 | End: 2024-11-16 | Stop reason: HOSPADM

## 2024-11-15 RX ORDER — HEPARIN SODIUM (PORCINE) LOCK FLUSH IV SOLN 100 UNIT/ML 100 UNIT/ML
500 SOLUTION INTRAVENOUS AS NEEDED
OUTPATIENT
Start: 2024-11-15

## 2024-11-15 RX ADMIN — DEXAMETHASONE SODIUM PHOSPHATE 12 MG: 100 INJECTION INTRAMUSCULAR; INTRAVENOUS at 09:00

## 2024-11-15 RX ADMIN — HEPARIN 500 UNITS: 100 SYRINGE at 09:57

## 2024-11-15 RX ADMIN — SODIUM CHLORIDE 1000 ML: 9 INJECTION, SOLUTION INTRAVENOUS at 08:50

## 2024-12-02 ENCOUNTER — TELEPHONE (OUTPATIENT)
Dept: ONCOLOGY | Facility: CLINIC | Age: 59
End: 2024-12-02
Payer: COMMERCIAL

## 2024-12-02 NOTE — TELEPHONE ENCOUNTER
LMOM for PT to return my call. Called PT in regards to which OTC medications she has tried and dosing.   1425 [id number]  ~Shell

## 2024-12-04 ENCOUNTER — LAB (OUTPATIENT)
Dept: LAB | Facility: HOSPITAL | Age: 59
End: 2024-12-04
Payer: COMMERCIAL

## 2024-12-04 DIAGNOSIS — C79.51 MALIGNANT NEOPLASM METASTATIC TO BONE: ICD-10-CM

## 2024-12-04 DIAGNOSIS — Z85.3 HISTORY OF LEFT BREAST CANCER: ICD-10-CM

## 2024-12-04 DIAGNOSIS — C79.51 MALIGNANT NEOPLASM METASTATIC TO BONE: Primary | ICD-10-CM

## 2024-12-04 DIAGNOSIS — C50.112 MALIGNANT NEOPLASM OF CENTRAL PORTION OF LEFT FEMALE BREAST, UNSPECIFIED ESTROGEN RECEPTOR STATUS: ICD-10-CM

## 2024-12-04 LAB
ALBUMIN SERPL-MCNC: 3.9 G/DL (ref 3.5–5.2)
ALBUMIN/GLOB SERPL: 1.5 G/DL
ALP SERPL-CCNC: 124 U/L (ref 39–117)
ALT SERPL W P-5'-P-CCNC: 19 U/L (ref 1–33)
ANION GAP SERPL CALCULATED.3IONS-SCNC: 9 MMOL/L (ref 5–15)
AST SERPL-CCNC: 24 U/L (ref 1–32)
BASOPHILS # BLD AUTO: 0.02 10*3/MM3 (ref 0–0.2)
BASOPHILS NFR BLD AUTO: 0.6 % (ref 0–1.5)
BILIRUB SERPL-MCNC: 0.2 MG/DL (ref 0–1.2)
BUN SERPL-MCNC: 16 MG/DL (ref 8–23)
BUN/CREAT SERPL: 18.6 (ref 7–25)
CALCIUM SPEC-SCNC: 9.2 MG/DL (ref 8.6–10.5)
CHLORIDE SERPL-SCNC: 107 MMOL/L (ref 98–107)
CO2 SERPL-SCNC: 26 MMOL/L (ref 22–29)
CREAT SERPL-MCNC: 0.86 MG/DL (ref 0.57–1)
DEPRECATED RDW RBC AUTO: 44.4 FL (ref 37–54)
EGFRCR SERPLBLD CKD-EPI 2021: 77 ML/MIN/1.73
EOSINOPHIL # BLD AUTO: 0.23 10*3/MM3 (ref 0–0.4)
EOSINOPHIL NFR BLD AUTO: 7.1 % (ref 0.3–6.2)
ERYTHROCYTE [DISTWIDTH] IN BLOOD BY AUTOMATED COUNT: 12.9 % (ref 12.3–15.4)
GLOBULIN UR ELPH-MCNC: 2.6 GM/DL
GLUCOSE SERPL-MCNC: 92 MG/DL (ref 65–99)
HCT VFR BLD AUTO: 35.9 % (ref 34–46.6)
HGB BLD-MCNC: 12.6 G/DL (ref 12–15.9)
IMM GRANULOCYTES # BLD AUTO: 0 10*3/MM3 (ref 0–0.05)
IMM GRANULOCYTES NFR BLD AUTO: 0 % (ref 0–0.5)
LYMPHOCYTES # BLD AUTO: 0.99 10*3/MM3 (ref 0.7–3.1)
LYMPHOCYTES NFR BLD AUTO: 30.6 % (ref 19.6–45.3)
MCH RBC QN AUTO: 32.9 PG (ref 26.6–33)
MCHC RBC AUTO-ENTMCNC: 35.1 G/DL (ref 31.5–35.7)
MCV RBC AUTO: 93.7 FL (ref 79–97)
MONOCYTES # BLD AUTO: 0.35 10*3/MM3 (ref 0.1–0.9)
MONOCYTES NFR BLD AUTO: 10.8 % (ref 5–12)
NEUTROPHILS NFR BLD AUTO: 1.65 10*3/MM3 (ref 1.7–7)
NEUTROPHILS NFR BLD AUTO: 50.9 % (ref 42.7–76)
PLATELET # BLD AUTO: 194 10*3/MM3 (ref 140–450)
PMV BLD AUTO: 9 FL (ref 6–12)
POTASSIUM SERPL-SCNC: 4.7 MMOL/L (ref 3.5–5.2)
PROT SERPL-MCNC: 6.5 G/DL (ref 6–8.5)
RBC # BLD AUTO: 3.83 10*6/MM3 (ref 3.77–5.28)
SODIUM SERPL-SCNC: 142 MMOL/L (ref 136–145)
WBC NRBC COR # BLD AUTO: 3.24 10*3/MM3 (ref 3.4–10.8)

## 2024-12-04 PROCEDURE — 80053 COMPREHEN METABOLIC PANEL: CPT

## 2024-12-04 PROCEDURE — 36415 COLL VENOUS BLD VENIPUNCTURE: CPT

## 2024-12-04 PROCEDURE — 85025 COMPLETE CBC W/AUTO DIFF WBC: CPT

## 2024-12-05 ENCOUNTER — HOSPITAL ENCOUNTER (OUTPATIENT)
Dept: ONCOLOGY | Facility: HOSPITAL | Age: 59
Discharge: HOME | End: 2024-12-05
Payer: COMMERCIAL

## 2024-12-05 ENCOUNTER — OFFICE VISIT (OUTPATIENT)
Dept: ONCOLOGY | Facility: CLINIC | Age: 59
End: 2024-12-05
Payer: COMMERCIAL

## 2024-12-05 VITALS
HEIGHT: 61 IN | TEMPERATURE: 98.7 F | DIASTOLIC BLOOD PRESSURE: 87 MMHG | BODY MASS INDEX: 24.66 KG/M2 | RESPIRATION RATE: 16 BRPM | SYSTOLIC BLOOD PRESSURE: 126 MMHG | OXYGEN SATURATION: 95 % | WEIGHT: 130.6 LBS

## 2024-12-05 DIAGNOSIS — C79.51 MALIGNANT NEOPLASM METASTATIC TO BONE: ICD-10-CM

## 2024-12-05 DIAGNOSIS — C50.112 MALIGNANT NEOPLASM OF CENTRAL PORTION OF LEFT FEMALE BREAST, UNSPECIFIED ESTROGEN RECEPTOR STATUS: ICD-10-CM

## 2024-12-05 DIAGNOSIS — Z85.3 HISTORY OF LEFT BREAST CANCER: Primary | ICD-10-CM

## 2024-12-05 DIAGNOSIS — C78.7 CANCER, METASTATIC TO LIVER: ICD-10-CM

## 2024-12-05 DIAGNOSIS — Z45.2 ENCOUNTER FOR CARE RELATED TO VASCULAR ACCESS PORT: ICD-10-CM

## 2024-12-05 DIAGNOSIS — C50.112 MALIGNANT NEOPLASM OF CENTRAL PORTION OF LEFT BREAST IN FEMALE, ESTROGEN RECEPTOR POSITIVE: Primary | ICD-10-CM

## 2024-12-05 DIAGNOSIS — Z17.0 MALIGNANT NEOPLASM OF CENTRAL PORTION OF LEFT BREAST IN FEMALE, ESTROGEN RECEPTOR POSITIVE: Primary | ICD-10-CM

## 2024-12-05 PROCEDURE — 25010000002 PALONOSETRON PER 25 MCG: Performed by: NURSE PRACTITIONER

## 2024-12-05 PROCEDURE — 96413 CHEMO IV INFUSION 1 HR: CPT

## 2024-12-05 PROCEDURE — 25010000002 FOSAPREPITANT PER 1 MG: Performed by: NURSE PRACTITIONER

## 2024-12-05 PROCEDURE — 25010000002 DEXAMETHASONE SODIUM PHOSPHATE 100 MG/10ML SOLUTION: Performed by: NURSE PRACTITIONER

## 2024-12-05 PROCEDURE — 25010000002 FAM-TRASTUZUMAB DERUXTEC-NXKI 100 MG RECONSTITUTED SOLUTION 1 EACH VIAL: Performed by: NURSE PRACTITIONER

## 2024-12-05 PROCEDURE — 96375 TX/PRO/DX INJ NEW DRUG ADDON: CPT

## 2024-12-05 PROCEDURE — 96367 TX/PROPH/DG ADDL SEQ IV INF: CPT

## 2024-12-05 PROCEDURE — 25010000002 HEPARIN LOCK FLUSH PER 10 UNITS: Performed by: INTERNAL MEDICINE

## 2024-12-05 RX ORDER — PALONOSETRON 0.05 MG/ML
0.25 INJECTION, SOLUTION INTRAVENOUS ONCE
Status: COMPLETED | OUTPATIENT
Start: 2024-12-05 | End: 2024-12-05

## 2024-12-05 RX ORDER — PALONOSETRON 0.05 MG/ML
0.25 INJECTION, SOLUTION INTRAVENOUS ONCE
Status: CANCELLED | OUTPATIENT
Start: 2024-12-05

## 2024-12-05 RX ORDER — DEXTROSE MONOHYDRATE 50 MG/ML
20 INJECTION, SOLUTION INTRAVENOUS ONCE
Status: DISCONTINUED | OUTPATIENT
Start: 2024-12-05 | End: 2024-12-06 | Stop reason: HOSPADM

## 2024-12-05 RX ORDER — LACTULOSE 10 G/15ML
20 SOLUTION ORAL 2 TIMES DAILY PRN
Qty: 946 ML | Refills: 11 | Status: SHIPPED | OUTPATIENT
Start: 2024-12-05

## 2024-12-05 RX ORDER — DEXTROSE MONOHYDRATE 50 MG/ML
20 INJECTION, SOLUTION INTRAVENOUS ONCE
Status: CANCELLED | OUTPATIENT
Start: 2024-12-05

## 2024-12-05 RX ORDER — HEPARIN SODIUM (PORCINE) LOCK FLUSH IV SOLN 100 UNIT/ML 100 UNIT/ML
500 SOLUTION INTRAVENOUS AS NEEDED
Status: DISCONTINUED | OUTPATIENT
Start: 2024-12-05 | End: 2024-12-06 | Stop reason: HOSPADM

## 2024-12-05 RX ORDER — HEPARIN SODIUM (PORCINE) LOCK FLUSH IV SOLN 100 UNIT/ML 100 UNIT/ML
500 SOLUTION INTRAVENOUS AS NEEDED
OUTPATIENT
Start: 2024-12-05

## 2024-12-05 RX ADMIN — PALONOSETRON HYDROCHLORIDE 0.25 MG: 0.25 INJECTION INTRAVENOUS at 12:06

## 2024-12-05 RX ADMIN — DEXAMETHASONE SODIUM PHOSPHATE 12 MG: 10 INJECTION, SOLUTION INTRAMUSCULAR; INTRAVENOUS at 12:07

## 2024-12-05 RX ADMIN — FOSAPREPITANT 150 MG: 150 INJECTION, POWDER, LYOPHILIZED, FOR SOLUTION INTRAVENOUS at 12:07

## 2024-12-05 RX ADMIN — FAM-TRASTUZUMAB DERUXTECAN-NXKI 200 MG: 100 INJECTION, POWDER, LYOPHILIZED, FOR SOLUTION INTRAVENOUS at 12:54

## 2024-12-05 RX ADMIN — HEPARIN 500 UNITS: 100 SYRINGE at 13:38

## 2024-12-05 NOTE — PROGRESS NOTES
PROBLEM LIST:  1. Local recurrence of HR+ carcinoma of the left breast  A) history of left breast cancer in 2007.  Bilateral mastectomy 5/30/07.  pathology showed grade 2 IDC of the left breast measuring 1.8 cm.  ER+ [place]+ Her2 negative.   0/2 SLN involved.  [id number].  B) adjuvant chemotherapy with TC x 4 cycles, completed September 2007 with Dr. De La Torre.  Tamoxifen October 2007 thru October 2012.  C) presented January 2022 with left chest wall mass.  CT chest 1/17/22 showed 4.6 cm lesion involving left manubrium with soft tissue extent extending posteriorly to the anterior aspect of mediastinum.  12 mm nodule right lung base.  D) ultrasound guided biopsy of chest wall mass on 2/10/22.  Pathology showed invasive ductal carcinoma of the breast.  ER positive (100%), [place] positive (50%), HER-2 2+  E) letrozole started February 2022.  CyberKnife to the chest wall mass completed 4/15/2022.  Ibrance started 4/18/2022.  Ibrance decreased to 100 mg due to neutropenia 9/5/2022  F) PET/CT 7/5/23 showed a new 2.9 cm hypermetabolic liver lesion, nonenlarged but hypermetabolic bilateral hilar and mediastinal lymph nodes.  [id number] testing showed a PI3 kinase mutation, no ESR 1 mutation.  Treatment changed to fulvestrant and ribociclib.  Fulvestrant started 7/19/2023.  Ribociclib started 7/27/2023.  G) PET/CT on 12/1/2023 showed increasing size of solitary liver lesion.  No additional sites of disease.  Liver biopsy 12/11/2023 showed metastatic breast carcinoma, ER positive [place] positive HER2 1+.  Treatment with Xeloda started 12/29/2023.  H) PET/CT 6/10/2024 showed enlargement in size and metabolic activity of the solitary liver metastasis.  Treatment changed to Enhertu, starting 6/19/2024.  2. Depression/anxiety  3. GERD    Subjective     CHIEF COMPLAINT: Breast cancer    HISTORY OF PRESENT ILLNESS:   Alcira Phelan returns for follow-up.  She has completed 8 cycles of Enhertu. Dose reduction to 80% since cycle 4, and down  "to 60% dose since cycle 6.    She continues to tolerate the Enhertu well at the reduced dose of 60%.  She is having problems with constipation despite using Senokot 2 tablets twice a day as well as MiraLAX daily.  Usually the week after chemotherapy she will not have a bowel movement for approximately 7 days despite using the senna and MiraLAX.  Over the last 2 weeks she has been having a bowel movement every other day but the bowel movements are very small and hard.    The week after chemotherapy she does have approximately 5 days of fatigue but is still able to be active around the home.            Objective      /87   Temp 98.7 °F (37.1 °C)   Resp 16   Ht 154.9 cm (61\")   Wt 59.2 kg (130 lb 9.6 oz)   SpO2 95%   BMI 24.68 kg/m²    Vitals:    12/05/24 1005   PainSc: 0-No pain                 General: well appearing female in no acute distress  Neuro: alert and oriented  HEENT: sclera anicteric, oropharynx clear  Skin: No rashes  Psych: mood and affect appropriate    RECENT LABS:  Lab Results   Component Value Date    WBC 3.24 (L) 12/04/2024    HGB 12.6 12/04/2024    HCT 35.9 12/04/2024    MCV 93.7 12/04/2024     12/04/2024       Lab Results   Component Value Date    GLUCOSE 92 12/04/2024    BUN 16 12/04/2024    CREATININE 0.86 12/04/2024    EGFRIFNONA 77 02/16/2022    BCR 18.6 12/04/2024    K 4.7 12/04/2024    CO2 26.0 12/04/2024    CALCIUM 9.2 12/04/2024    ALBUMIN 3.9 12/04/2024    AST 24 12/04/2024    ALT 19 12/04/2024     NM PET/CT Skull Base to Mid Thigh  Narrative: FDG NM PET/CT SKULL BASE TO MID THIGH    Date of Exam: 10/21/2024 8:50 AM EDT    Indication: breast cancer with mets evaluation of treatment.    Comparison: 8/19/2024 whole-body PET scan    Technique: 13.2 mCi of F-18 FDG was administered intravenously. PET imaging was obtained from skull base to mid-thigh approximately 60 minutes after radiotracer injection. A low dose non contrast CT was obtained for attenuation correction and " anatomic   localization. Fused PET-CT and 3D MIP reconstructions were utilized for image interpretation.  Fasting blood glucose level: 97 mg/dl.    Reference uptake values:  Mediastinum: 2.1 SUVmax  Liver: 3.1 SUVmax  Normalization method: Body Weight    Findings:  Previous exam report from 8/18/2024 described interval response to therapy compared to an earlier study, with diminished left lobe hepatic mass, no longer hypermetabolic, and resolution of low-level activity in the mediastinum and pulmonary ryanne.    Today's study again appears negative for metastatic disease. A couple of foci of increased activity in the mid abdomen appear to correlate with normal appearing bowel. Some normal variant skeletal muscle uptake is seen.    Multiplanar images show no significant asymmetry of uptake in the brain. No hypermetabolic node or mass is seen in the neck.    In the chest, no hypermetabolic mediastinal, hilar, pulmonary parenchymal or axillary disease is seen. Trace low-level activity in the right axillary region is within normal-appearing cutaneous tissue, image 117 and apparently incidental. Maximal SUV   here is only 1.5.    Below the diaphragm, no hypermetabolic liver, adrenal or other solid organ lesions are seen. There is no evidence of hypermetabolic adenopathy, peritoneal disease or other mass. No abnormal marrow space uptake is seen.    CT scan images show stable or slightly further diminished low-attenuation changes in the left lower lobe, corresponding to the previous hypermetabolic lesion on 6/10/2024 exam.  Impression: Impression:  Negative PET scan. No evidence of metastatic disease.    Electronically Signed: Miguel Britt MD    10/22/2024 3:37 PM EDT    Workstation ID: [id number]            Assessment & Plan   Alcira Phelan is a 59 y.o. female with metastatic ER positive [place] positive HER-2 negative invasive ductal carcinoma, with involvement of lymph nodes and liver.    She has had 8 cycles of Enhertu.  She  has now been reduced to a 60% dose since cycle 6 and is tolerating this dose much better.  We will continue with the current dosing.  Patient has a PET scan scheduled for 12/23/2024.  CBC and CMP from 12/4/2024 were reviewed and are adequate for treatment.    We will continue to bring her back the day after treatment for IV fluids to help with hydration.    Chemotherapy induced nausea: continue Zofran and Compazine as needed.    Chemotherapy-induced constipation.  I recommend she continue senna 2 tablets twice a day.  I have asked her to increase her MiraLAX to 1 cap twice a day and add Colace 2 tablets twice a day.  If after 2 days she has no bowel movement I would like for her to take lactulose 30 mL twice a day as needed for constipation.  Prescription for lactulose sent to pharmacy today.  Patient has been instructed to hold all bowel medication if she develops diarrhea.    Follow-up in 3 weeks with cycle 10.       I spent 45 minutes caring for Alcira on this date of service. This time includes time spent by me in the following activities: preparing for the visit, reviewing tests, performing a medically appropriate examination and/or evaluation, counseling and educating the patient/family/caregiver, documenting information in the medical record, independently interpreting results and communicating that information with the patient/family/caregiver, ordering medications, ordering test(s), and obtaining a separately obtained history      Rasheeda Ramirez APRN  [facility]    12/5/2024

## 2024-12-16 NOTE — PROGRESS NOTES
PROBLEM LIST:  1. Local recurrence of HR+ carcinoma of the left breast  A) history of left breast cancer in 2007.  Bilateral mastectomy 5/30/07.  pathology showed grade 2 IDC of the left breast measuring 1.8 cm.  ER+ MA+ Her2 negative.   0/2 SLN involved.  [id number].  B) adjuvant chemotherapy with TC x 4 cycles, completed September 2007 with Dr. De La Torre.  Tamoxifen October 2007 thru October 2012.  C) presented January 2022 with left chest wall mass.  CT chest 1/17/22 showed 4.6 cm lesion involving left manubrium with soft tissue extent extending posteriorly to the anterior aspect of mediastinum.  12 mm nodule right lung base.  D) ultrasound guided biopsy of chest wall mass on 2/10/22.  Pathology showed invasive ductal carcinoma of the breast.  ER positive (100%), MA positive (50%), HER-2 2+  E) letrozole started February 2022.  CyberKnife to the chest wall mass completed 4/15/2022.  Ibrance started 4/18/2022.  Ibrance decreased to 100 mg due to neutropenia 9/5/2022  F) PET/CT 7/5/23 showed a new 2.9 cm hypermetabolic liver lesion, nonenlarged but hypermetabolic bilateral hilar and mediastinal lymph nodes.  [id number] testing showed a PI3 kinase mutation, no ESR 1 mutation.  Treatment changed to fulvestrant and ribociclib.  Fulvestrant started 7/19/2023.  Ribociclib started 7/27/2023.  G) PET/CT on 12/1/2023 showed increasing size of solitary liver lesion.  No additional sites of disease.  Liver biopsy 12/11/2023 showed metastatic breast carcinoma, ER positive MA positive HER2 1+.  Treatment with Xeloda started 12/29/2023.  2. Depression/anxiety  3. GERD    Subjective     CHIEF COMPLAINT: Breast cancer    HISTORY OF PRESENT ILLNESS:   Alcira Phelna returns for follow-up.  She started Xeloda 12/29/2023.      Her feet are feeling better on the reduced dose.  She still has some peeling and discoloration.  Otherwise she feels well.            Objective      /74   Pulse 80   Temp 97.5 °F (36.4 °C)    "Resp 18   Ht 154.9 cm (61\")   Wt 60.3 kg (133 lb)   SpO2 96%   BMI 25.13 kg/m²    Vitals:    03/13/24 1003   PainSc: 0-No pain                         ECOG score: 0           General: well appearing female in no acute distress  Neuro: alert and oriented  HEENT: sclera anicteric, oropharynx clear  Skin: Her feet have some erythema and dusky discoloration on the ball of the foot, peeling skin  Psych: mood and affect appropriate            RECENT LABS:  Lab Results   Component Value Date    WBC 3.56 03/11/2024    HGB 13.5 03/11/2024    HCT 39.6 03/11/2024    .3 (H) 03/11/2024     03/11/2024       Lab Results   Component Value Date    GLUCOSE 93 03/11/2024    BUN 13 03/11/2024    CREATININE 0.77 03/11/2024    EGFRIFNONA 77 02/16/2022    BCR 16.9 03/11/2024    K 4.5 03/11/2024    CO2 31.0 (H) 03/11/2024    CALCIUM 9.7 03/11/2024    ALBUMIN 4.6 03/11/2024    AST 33 (H) 03/11/2024    ALT 28 03/11/2024       NM PET/CT Skull Base to Mid Thigh  Narrative: NM PET/CT SKULL BASE TO MID THIGH    Date of Exam: 3/11/2024 8:55 AM EDT    Indication: breast cancer.    Comparison: PET/CT 12/1/2023    Technique: 13.6 mCi of F-18 FDG was administered intravenously. PET imaging was obtained from skull base to mid-thigh approximately 60 minutes after radiotracer injection. A low dose non contrast CT was obtained for attenuation correction and anatomic   localization. Fused PET-CT and 3D MIP reconstructions were utilized for image interpretation.  Fasting blood glucose level: 88 mg/dl.    Reference uptake values:  Mediastinum: 1.8 SUVmax  Liver: 3 SUVmax  Normalization method: Body Weight    Findings:  Head/neck: No suspicious FDG avid masses or cervical adenopathy.    Chest: No suspicious FDG avid mass or adenopathy. No infectious appearing airspace consolidation. Nonspecific bandlike opacities in the anterior left upper lobe stable from prior likely posttreatment related changes. Grossly intact bilateral breast "   implants.    ABDOMEN: Hypermetabolic left hepatic lobe mass has decreased in size now measuring 3.3 x 3.9 cm previously 3.7 x 4.6 cm. Max SUV now 6.2 previously 7.9. No new suspicious FDG avid masses or adenopathy in the abdomen or pelvis. No bowel obstruction. No   acute infectious/inflammatory process.    Bones: No suspicious FDG avid bone lesion. Ill-defined mixed sclerosis within the manubrium is not metabolically active suggesting treated lesion.  Impression: Impression:  1. Partial treatment response since 12/1/2023. Decreased size and metabolic activity within the isolated left hepatic lobe metastasis.  2. No signs of disease progression.  3. Treated manubrial lesion is not metabolically active.    Electronically Signed: Yogesh Terry MD    3/13/2024 8:01 AM EDT    Workstation ID: [id number]    I personally reviewed the imaging studies              Assessment & Plan   Alcira Phelan is a 58 y.o. female with metastatic ER positive DE positive HER-2 negative invasive ductal carcinoma, with involvement of lymph nodes and liver.    She continues on Xeloda.  We reviewed her PET scan results which show evidence of response, with decrease in size and metabolic activity of the liver metastasis.  We will continue Xeloda at a reduced dose of 2 pills twice daily.  Plan to repeat imaging in about 3 months.    Follow-up in 1 month with labs.        Total time of patient care on day of service including time prior to, face to face with patient, and following visit spent in reviewing records, lab results, imaging studies, discussion with patient, and documentation/charting was > 32 minutes.                      Ruth Castro MD  [facility] Hematology and On[facility]    3/13/2024          CC:    Yes

## 2024-12-23 ENCOUNTER — HOSPITAL ENCOUNTER (OUTPATIENT)
Dept: PET IMAGING | Facility: HOSPITAL | Age: 59
Discharge: HOME | End: 2024-12-23
Payer: COMMERCIAL

## 2024-12-23 DIAGNOSIS — C50.112 MALIGNANT NEOPLASM OF CENTRAL PORTION OF LEFT BREAST IN FEMALE, ESTROGEN RECEPTOR POSITIVE: ICD-10-CM

## 2024-12-23 DIAGNOSIS — C79.51 MALIGNANT NEOPLASM METASTATIC TO BONE: ICD-10-CM

## 2024-12-23 DIAGNOSIS — Z17.0 MALIGNANT NEOPLASM OF CENTRAL PORTION OF LEFT BREAST IN FEMALE, ESTROGEN RECEPTOR POSITIVE: ICD-10-CM

## 2024-12-23 DIAGNOSIS — C78.7 CANCER, METASTATIC TO LIVER: ICD-10-CM

## 2024-12-23 LAB — GLUCOSE BLDC GLUCOMTR-MCNC: 88 MG/DL (ref 70–130)

## 2024-12-23 PROCEDURE — 78815 PET IMAGE W/CT SKULL-THIGH: CPT

## 2024-12-23 PROCEDURE — A9552 F18 FDG: HCPCS | Performed by: NURSE PRACTITIONER

## 2024-12-23 PROCEDURE — 82948 REAGENT STRIP/BLOOD GLUCOSE: CPT

## 2024-12-23 PROCEDURE — 34310000005 FLUDEOXYGLUCOSE F18 SOLUTION: Performed by: NURSE PRACTITIONER

## 2024-12-23 RX ADMIN — FLUDEOXYGLUCOSE F 18 1 DOSE: 200 INJECTION, SOLUTION INTRAVENOUS at 08:22

## 2024-12-30 ENCOUNTER — LAB (OUTPATIENT)
Dept: LAB | Facility: HOSPITAL | Age: 59
End: 2024-12-30
Payer: COMMERCIAL

## 2024-12-30 DIAGNOSIS — C79.51 MALIGNANT NEOPLASM METASTATIC TO BONE: Primary | ICD-10-CM

## 2024-12-30 DIAGNOSIS — C79.51 MALIGNANT NEOPLASM METASTATIC TO BONE: ICD-10-CM

## 2024-12-30 LAB
ALBUMIN SERPL-MCNC: 4 G/DL (ref 3.5–5.2)
ALBUMIN/GLOB SERPL: 1.7 G/DL
ALP SERPL-CCNC: 148 U/L (ref 39–117)
ALT SERPL W P-5'-P-CCNC: 49 U/L (ref 1–33)
ANION GAP SERPL CALCULATED.3IONS-SCNC: 9 MMOL/L (ref 5–15)
AST SERPL-CCNC: 50 U/L (ref 1–32)
BASOPHILS # BLD AUTO: 0.03 10*3/MM3 (ref 0–0.2)
BASOPHILS NFR BLD AUTO: 0.7 % (ref 0–1.5)
BILIRUB SERPL-MCNC: <0.2 MG/DL (ref 0–1.2)
BUN SERPL-MCNC: 13 MG/DL (ref 8–23)
BUN/CREAT SERPL: 18.6 (ref 7–25)
CALCIUM SPEC-SCNC: 9.6 MG/DL (ref 8.6–10.5)
CANCER AG15-3 SERPL-ACNC: 36.7 U/ML
CHLORIDE SERPL-SCNC: 106 MMOL/L (ref 98–107)
CO2 SERPL-SCNC: 25 MMOL/L (ref 22–29)
CREAT SERPL-MCNC: 0.7 MG/DL (ref 0.57–1)
DEPRECATED RDW RBC AUTO: 45.2 FL (ref 37–54)
EGFRCR SERPLBLD CKD-EPI 2021: 98.5 ML/MIN/1.73
EOSINOPHIL # BLD AUTO: 0.38 10*3/MM3 (ref 0–0.4)
EOSINOPHIL NFR BLD AUTO: 9.2 % (ref 0.3–6.2)
ERYTHROCYTE [DISTWIDTH] IN BLOOD BY AUTOMATED COUNT: 12.9 % (ref 12.3–15.4)
GLOBULIN UR ELPH-MCNC: 2.4 GM/DL
GLUCOSE SERPL-MCNC: 93 MG/DL (ref 65–99)
HCT VFR BLD AUTO: 37.7 % (ref 34–46.6)
HGB BLD-MCNC: 13.2 G/DL (ref 12–15.9)
IMM GRANULOCYTES # BLD AUTO: 0.01 10*3/MM3 (ref 0–0.05)
IMM GRANULOCYTES NFR BLD AUTO: 0.2 % (ref 0–0.5)
LYMPHOCYTES # BLD AUTO: 1.1 10*3/MM3 (ref 0.7–3.1)
LYMPHOCYTES NFR BLD AUTO: 26.8 % (ref 19.6–45.3)
MCH RBC QN AUTO: 32.8 PG (ref 26.6–33)
MCHC RBC AUTO-ENTMCNC: 35 G/DL (ref 31.5–35.7)
MCV RBC AUTO: 93.8 FL (ref 79–97)
MONOCYTES # BLD AUTO: 0.44 10*3/MM3 (ref 0.1–0.9)
MONOCYTES NFR BLD AUTO: 10.7 % (ref 5–12)
NEUTROPHILS NFR BLD AUTO: 2.15 10*3/MM3 (ref 1.7–7)
NEUTROPHILS NFR BLD AUTO: 52.4 % (ref 42.7–76)
PLATELET # BLD AUTO: 187 10*3/MM3 (ref 140–450)
PMV BLD AUTO: 8.6 FL (ref 6–12)
POTASSIUM SERPL-SCNC: 5.1 MMOL/L (ref 3.5–5.2)
PROT SERPL-MCNC: 6.4 G/DL (ref 6–8.5)
RBC # BLD AUTO: 4.02 10*6/MM3 (ref 3.77–5.28)
SODIUM SERPL-SCNC: 140 MMOL/L (ref 136–145)
WBC NRBC COR # BLD AUTO: 4.11 10*3/MM3 (ref 3.4–10.8)

## 2024-12-30 PROCEDURE — 36415 COLL VENOUS BLD VENIPUNCTURE: CPT

## 2024-12-30 PROCEDURE — 86300 IMMUNOASSAY TUMOR CA 15-3: CPT

## 2024-12-30 PROCEDURE — 85025 COMPLETE CBC W/AUTO DIFF WBC: CPT

## 2024-12-30 PROCEDURE — 80053 COMPREHEN METABOLIC PANEL: CPT

## 2024-12-31 ENCOUNTER — TELEPHONE (OUTPATIENT)
Dept: RADIATION ONCOLOGY | Facility: HOSPITAL | Age: 59
End: 2024-12-31
Payer: COMMERCIAL

## 2024-12-31 LAB — CANCER AG27-29 SERPL-ACNC: 47.1 U/ML (ref 0–38.6)

## 2024-12-31 NOTE — PROGRESS NOTES
PROBLEM LIST:  1. Local recurrence of HR+ carcinoma of the left breast  A) history of left breast cancer in 2007.  Bilateral mastectomy 5/30/07.  pathology showed grade 2 IDC of the left breast measuring 1.8 cm.  ER+ CO+ Her2 negative.   0/2 SLN involved.  [id number].  B) adjuvant chemotherapy with TC x 4 cycles, completed September 2007 with Dr. De La Torre.  Tamoxifen October 2007 thru October 2012.  C) presented January 2022 with left chest wall mass.  CT chest 1/17/22 showed 4.6 cm lesion involving left manubrium with soft tissue extent extending posteriorly to the anterior aspect of mediastinum.  12 mm nodule right lung base.  D) ultrasound guided biopsy of chest wall mass on 2/10/22.  Pathology showed invasive ductal carcinoma of the breast.  ER positive (100%), CO positive (50%), HER-2 2+  E) letrozole started February 2022.  CyberKnife to the chest wall mass completed 4/15/2022.  Ibrance started 4/18/2022.  Ibrance decreased to 100 mg due to neutropenia 9/5/2022  F) PET/CT 7/5/23 showed a new 2.9 cm hypermetabolic liver lesion, nonenlarged but hypermetabolic bilateral hilar and mediastinal lymph nodes.  [id number] testing showed a PI3 kinase mutation, no ESR 1 mutation.  Treatment changed to fulvestrant and ribociclib.  Fulvestrant started 7/19/2023.  Ribociclib started 7/27/2023.  G) PET/CT on 12/1/2023 showed increasing size of solitary liver lesion.  No additional sites of disease.  Liver biopsy 12/11/2023 showed metastatic breast carcinoma, ER positive CO positive HER2 1+.  Treatment with Xeloda started 12/29/2023.  H) PET/CT 6/10/2024 showed enlargement in size and metabolic activity of the solitary liver metastasis.  Treatment changed to Enhertu, starting 6/19/2024.  2. Depression/anxiety  3. GERD    Subjective     CHIEF COMPLAINT: Breast cancer    HISTORY OF PRESENT ILLNESS:   Alcira Phelan returns for follow-up.  She has completed 9 cycles of Enhertu. Dose reduction to 80% since cycle 4, and down  to 60% dose since cycle 6.    She has been feeling okay.  They had a nice [place].                Objective      /86   Pulse 90   Temp 97.3 °F (36.3 °C) (Temporal)   Resp 18   Wt 60.4 kg (133 lb 1.6 oz)   SpO2 97%   BMI 25.15 kg/m²    There were no vitals filed for this visit.      ECOG score: 0         General: well appearing female in no acute distress  Neuro: alert and oriented  HEENT: sclera anicteric, oropharynx clear  Skin: No rashes  Psych: mood and affect appropriate    RECENT LABS:  Lab Results   Component Value Date    WBC 4.11 12/30/2024    HGB 13.2 12/30/2024    HCT 37.7 12/30/2024    MCV 93.8 12/30/2024     12/30/2024       Lab Results   Component Value Date    GLUCOSE 93 12/30/2024    BUN 13 12/30/2024    CREATININE 0.70 12/30/2024    EGFRIFNONA 77 02/16/2022    BCR 18.6 12/30/2024    K 5.1 12/30/2024    CO2 25.0 12/30/2024    CALCIUM 9.6 12/30/2024    ALBUMIN 4.0 12/30/2024    AST 50 (H) 12/30/2024    ALT 49 (H) 12/30/2024     NM PET/CT Skull Base to Mid Thigh  Narrative: FDG NM PET/CT SKULL BASE TO MID THIGH    Date of Exam: 12/23/2024 8:00 AM EST    Indication: Breast cancer with mets, evaluation of treatment.    Comparison: 10/21/2024 whole-body PET scan    Technique: 12.8 mCi of F-18 FDG was administered intravenously. PET imaging was obtained from skull base to mid-thigh approximately 60 minutes after radiotracer injection. A low dose non contrast CT was obtained for attenuation correction and anatomic   localization. Fused PET-CT and 3D MIP reconstructions were utilized for image interpretation.  Fasting blood glucose level: 88 mg/dl.    Reference uptake values:  Mediastinum: 1.4 SUVmax  Liver: 2.3 SUVmax  Normalization method: Body Weight    Findings:  Previous exam report from 10/21/2024 indicated negative study, with no evidence of metastatic disease. Patient has history of prior solitary hypermetabolic left lobe liver lesion, subsequently resolved on PET scan following  chemotherapy.    Today's 3D images appear to show mild but new uptake in the region of the patient's previous left lobe liver lesion, corresponding to the location of the lesion on the earlier 16 2024 scan, prior to most recent treatment. Small focus of activity in the   pelvis corresponds to normal muscle activity. No significant new abnormal radiotracer uptake is appreciated on the 3D series.    Multiplanar images show no significant asymmetry of uptake in the brain. No hypermetabolic node or mass is seen in the neck.    In the chest, no hypermetabolic mediastinal, hilar, axillary, or pulmonary parenchymal disease is seen.    In the abdomen and pelvis, axial images show subtle, but increased activity in the area of the patient's previous hepatic lesion, maximal SUV 3.4 on image 150 of the axial PET series. For comparison, liver background activity is 2.3.     No evidence of hepatic lesion is seen elsewhere. No evidence of other solid organ disease or hypermetabolic adenopathy is appreciated. There is typical GI and  tract activity. Some normal variant soft tissue activity is seen in the left hip at the   hamstring origin. No abnormal marrow space uptake is seen.  Impression: Impression:    1. Mildly increased uptake in the region of patient's previous left lobe liver lesion, concerning for early recurrence.    2. Negative PET scan elsewhere.    Electronically Signed: Miguel Britt MD    12/26/2024 11:09 AM EST    Workstation ID: [id number]    I personally reviewed the imaging studies.        Assessment & Plan   Alcira Phelan is a 59 y.o. female with metastatic ER positive MA positive HER-2 negative invasive ductal carcinoma, with involvement of lymph nodes and liver.    She has had 9 cycles of Enhertu, requiring dose reduction downt to 60% due to significant nausea and fatigue.  We reviewed her scans which show some mild progression in the liver lesion with no disease elsewhere.   It has now been about 18 months  since she has had disease only visible in this one liver lesion.  I have spoken with Dr. Banegas regarding the possibility of targeted radiation to this liver lesion.  If this can be completed, may consider maintenance treatment with an aromatase inhibitor for as long as there are no other sites of disease.    Chemotherapy induced nausea: continue Zofran and Compazine as needed.    Chemotherapy-induced constipation.  Continue current medications.    I will see her back in about a month.  Will not schedule any more infusions after today.  I will probably plan to start her on maintenance endocrine therapy after her CyberKnife treatment.      I spent 43 minutes caring for Alcira on this date of service. This time includes time spent by me in the following activities: preparing for the visit, reviewing tests, counseling and educating the patient/family/caregiver, referring and communicating with other health care professionals, documenting information in the medical record, independently interpreting results and communicating that information with the patient/family/caregiver, care coordination, ordering medications, ordering test(s), and obtaining a separately obtained history      Ruth Castro MD  [facility] Hematology and Oncology    1/2/2025

## 2024-12-31 NOTE — TELEPHONE ENCOUNTER
Called patient to schedule Re-Consult for Wednesday, 1/8/25 at 9:00 am at [facility].  Patient verbalized an understanding of date, time, and location of appointment.  Contact number provided should patient have questions or concerns.

## 2025-01-02 ENCOUNTER — OFFICE VISIT (OUTPATIENT)
Dept: ONCOLOGY | Facility: CLINIC | Age: 60
End: 2025-01-02
Payer: COMMERCIAL

## 2025-01-02 ENCOUNTER — HOSPITAL ENCOUNTER (OUTPATIENT)
Dept: ONCOLOGY | Facility: HOSPITAL | Age: 60
Discharge: HOME | End: 2025-01-02
Admitting: NURSE PRACTITIONER
Payer: COMMERCIAL

## 2025-01-02 VITALS
RESPIRATION RATE: 18 BRPM | OXYGEN SATURATION: 97 % | HEART RATE: 90 BPM | DIASTOLIC BLOOD PRESSURE: 86 MMHG | BODY MASS INDEX: 25.15 KG/M2 | TEMPERATURE: 97.3 F | SYSTOLIC BLOOD PRESSURE: 134 MMHG | WEIGHT: 133.1 LBS

## 2025-01-02 DIAGNOSIS — C79.51 MALIGNANT NEOPLASM METASTATIC TO BONE: Primary | ICD-10-CM

## 2025-01-02 DIAGNOSIS — C50.112 MALIGNANT NEOPLASM OF CENTRAL PORTION OF LEFT FEMALE BREAST, UNSPECIFIED ESTROGEN RECEPTOR STATUS: Primary | ICD-10-CM

## 2025-01-02 DIAGNOSIS — Z45.2 ENCOUNTER FOR CARE RELATED TO VASCULAR ACCESS PORT: ICD-10-CM

## 2025-01-02 DIAGNOSIS — Z85.3 HISTORY OF LEFT BREAST CANCER: Primary | ICD-10-CM

## 2025-01-02 DIAGNOSIS — C79.51 MALIGNANT NEOPLASM METASTATIC TO BONE: ICD-10-CM

## 2025-01-02 DIAGNOSIS — Z85.3 HISTORY OF LEFT BREAST CANCER: ICD-10-CM

## 2025-01-02 DIAGNOSIS — C50.112 MALIGNANT NEOPLASM OF CENTRAL PORTION OF LEFT FEMALE BREAST, UNSPECIFIED ESTROGEN RECEPTOR STATUS: ICD-10-CM

## 2025-01-02 PROCEDURE — 25010000002 FAM-TRASTUZUMAB DERUXTEC-NXKI 100 MG RECONSTITUTED SOLUTION 1 EACH VIAL: Performed by: NURSE PRACTITIONER

## 2025-01-02 PROCEDURE — 25010000002 DEXAMETHASONE SODIUM PHOSPHATE 100 MG/10ML SOLUTION: Performed by: NURSE PRACTITIONER

## 2025-01-02 PROCEDURE — 96365 THER/PROPH/DIAG IV INF INIT: CPT

## 2025-01-02 PROCEDURE — 96413 CHEMO IV INFUSION 1 HR: CPT

## 2025-01-02 PROCEDURE — 25010000002 PALONOSETRON PER 25 MCG: Performed by: NURSE PRACTITIONER

## 2025-01-02 PROCEDURE — 25010000002 HEPARIN LOCK FLUSH PER 10 UNITS: Performed by: INTERNAL MEDICINE

## 2025-01-02 PROCEDURE — 96375 TX/PRO/DX INJ NEW DRUG ADDON: CPT

## 2025-01-02 PROCEDURE — 25010000002 FOSAPREPITANT PER 1 MG: Performed by: NURSE PRACTITIONER

## 2025-01-02 RX ORDER — HEPARIN SODIUM (PORCINE) LOCK FLUSH IV SOLN 100 UNIT/ML 100 UNIT/ML
500 SOLUTION INTRAVENOUS AS NEEDED
OUTPATIENT
Start: 2025-01-02

## 2025-01-02 RX ORDER — PALONOSETRON 0.05 MG/ML
0.25 INJECTION, SOLUTION INTRAVENOUS ONCE
Status: CANCELLED | OUTPATIENT
Start: 2025-01-02

## 2025-01-02 RX ORDER — PALONOSETRON 0.05 MG/ML
0.25 INJECTION, SOLUTION INTRAVENOUS ONCE
Status: COMPLETED | OUTPATIENT
Start: 2025-01-02 | End: 2025-01-02

## 2025-01-02 RX ORDER — HEPARIN SODIUM (PORCINE) LOCK FLUSH IV SOLN 100 UNIT/ML 100 UNIT/ML
500 SOLUTION INTRAVENOUS AS NEEDED
Status: DISCONTINUED | OUTPATIENT
Start: 2025-01-02 | End: 2025-01-03 | Stop reason: HOSPADM

## 2025-01-02 RX ORDER — DEXTROSE MONOHYDRATE 50 MG/ML
20 INJECTION, SOLUTION INTRAVENOUS ONCE
Status: CANCELLED | OUTPATIENT
Start: 2025-01-02

## 2025-01-02 RX ADMIN — HEPARIN 500 UNITS: 100 SYRINGE at 11:46

## 2025-01-02 RX ADMIN — PALONOSETRON HYDROCHLORIDE 0.25 MG: 0.25 INJECTION INTRAVENOUS at 10:17

## 2025-01-02 RX ADMIN — FAM-TRASTUZUMAB DERUXTECAN-NXKI 200 MG: 100 INJECTION, POWDER, LYOPHILIZED, FOR SOLUTION INTRAVENOUS at 11:06

## 2025-01-02 RX ADMIN — FOSAPREPITANT 150 MG: 150 INJECTION, POWDER, LYOPHILIZED, FOR SOLUTION INTRAVENOUS at 10:14

## 2025-01-02 RX ADMIN — DEXAMETHASONE SODIUM PHOSPHATE 12 MG: 100 INJECTION INTRAMUSCULAR; INTRAVENOUS at 10:16

## 2025-01-08 ENCOUNTER — LAB (OUTPATIENT)
Dept: LAB | Facility: HOSPITAL | Age: 60
End: 2025-01-08
Payer: COMMERCIAL

## 2025-01-08 ENCOUNTER — TELEPHONE (OUTPATIENT)
Dept: RADIATION ONCOLOGY | Facility: HOSPITAL | Age: 60
End: 2025-01-08
Payer: COMMERCIAL

## 2025-01-08 ENCOUNTER — OFFICE VISIT (OUTPATIENT)
Dept: RADIATION ONCOLOGY | Facility: HOSPITAL | Age: 60
End: 2025-01-08
Payer: COMMERCIAL

## 2025-01-08 ENCOUNTER — HOSPITAL ENCOUNTER (OUTPATIENT)
Dept: RADIATION ONCOLOGY | Facility: HOSPITAL | Age: 60
Setting detail: RADIATION/ONCOLOGY SERIES
Discharge: HOME | End: 2025-01-08
Payer: COMMERCIAL

## 2025-01-08 VITALS
HEART RATE: 78 BPM | SYSTOLIC BLOOD PRESSURE: 116 MMHG | WEIGHT: 131.6 LBS | TEMPERATURE: 98.5 F | DIASTOLIC BLOOD PRESSURE: 73 MMHG | BODY MASS INDEX: 24.84 KG/M2 | RESPIRATION RATE: 16 BRPM | HEIGHT: 61 IN | OXYGEN SATURATION: 96 %

## 2025-01-08 DIAGNOSIS — C78.7 CANCER, METASTATIC TO LIVER: Primary | ICD-10-CM

## 2025-01-08 DIAGNOSIS — C78.7 CANCER, METASTATIC TO LIVER: ICD-10-CM

## 2025-01-08 LAB
ALBUMIN SERPL-MCNC: 3.8 G/DL (ref 3.5–5.2)
ALBUMIN/GLOB SERPL: 1.4 G/DL
ALP SERPL-CCNC: 125 U/L (ref 39–117)
ALT SERPL W P-5'-P-CCNC: 22 U/L (ref 1–33)
ANION GAP SERPL CALCULATED.3IONS-SCNC: 11 MMOL/L (ref 5–15)
AST SERPL-CCNC: 23 U/L (ref 1–32)
BASOPHILS # BLD AUTO: 0.02 10*3/MM3 (ref 0–0.2)
BASOPHILS NFR BLD AUTO: 0.3 % (ref 0–1.5)
BILIRUB SERPL-MCNC: 0.2 MG/DL (ref 0–1.2)
BUN SERPL-MCNC: 16 MG/DL (ref 8–23)
BUN/CREAT SERPL: 19.8 (ref 7–25)
CALCIUM SPEC-SCNC: 9.6 MG/DL (ref 8.6–10.5)
CHLORIDE SERPL-SCNC: 102 MMOL/L (ref 98–107)
CO2 SERPL-SCNC: 25 MMOL/L (ref 22–29)
CREAT SERPL-MCNC: 0.81 MG/DL (ref 0.57–1)
DEPRECATED RDW RBC AUTO: 42.1 FL (ref 37–54)
EGFRCR SERPLBLD CKD-EPI 2021: 82.7 ML/MIN/1.73
EOSINOPHIL # BLD AUTO: 0.31 10*3/MM3 (ref 0–0.4)
EOSINOPHIL NFR BLD AUTO: 5.3 % (ref 0.3–6.2)
ERYTHROCYTE [DISTWIDTH] IN BLOOD BY AUTOMATED COUNT: 12.2 % (ref 12.3–15.4)
GLOBULIN UR ELPH-MCNC: 2.8 GM/DL
GLUCOSE SERPL-MCNC: 96 MG/DL (ref 65–99)
HCT VFR BLD AUTO: 39.1 % (ref 34–46.6)
HGB BLD-MCNC: 13.8 G/DL (ref 12–15.9)
IMM GRANULOCYTES # BLD AUTO: 0.02 10*3/MM3 (ref 0–0.05)
IMM GRANULOCYTES NFR BLD AUTO: 0.3 % (ref 0–0.5)
INR PPP: 0.98 (ref 0.89–1.12)
LYMPHOCYTES # BLD AUTO: 1.42 10*3/MM3 (ref 0.7–3.1)
LYMPHOCYTES NFR BLD AUTO: 24.4 % (ref 19.6–45.3)
MCH RBC QN AUTO: 32.5 PG (ref 26.6–33)
MCHC RBC AUTO-ENTMCNC: 35.3 G/DL (ref 31.5–35.7)
MCV RBC AUTO: 92 FL (ref 79–97)
MONOCYTES # BLD AUTO: 0.39 10*3/MM3 (ref 0.1–0.9)
MONOCYTES NFR BLD AUTO: 6.7 % (ref 5–12)
NEUTROPHILS NFR BLD AUTO: 3.65 10*3/MM3 (ref 1.7–7)
NEUTROPHILS NFR BLD AUTO: 63 % (ref 42.7–76)
PLATELET # BLD AUTO: 197 10*3/MM3 (ref 140–450)
PMV BLD AUTO: 8.7 FL (ref 6–12)
POTASSIUM SERPL-SCNC: 4.3 MMOL/L (ref 3.5–5.2)
PROT SERPL-MCNC: 6.6 G/DL (ref 6–8.5)
PROTHROMBIN TIME: 13.1 SECONDS (ref 12.2–14.5)
RBC # BLD AUTO: 4.25 10*6/MM3 (ref 3.77–5.28)
SODIUM SERPL-SCNC: 138 MMOL/L (ref 136–145)
WBC NRBC COR # BLD AUTO: 5.81 10*3/MM3 (ref 3.4–10.8)

## 2025-01-08 PROCEDURE — 36415 COLL VENOUS BLD VENIPUNCTURE: CPT

## 2025-01-08 PROCEDURE — 85610 PROTHROMBIN TIME: CPT

## 2025-01-08 PROCEDURE — G0463 HOSPITAL OUTPT CLINIC VISIT: HCPCS

## 2025-01-08 PROCEDURE — 80053 COMPREHEN METABOLIC PANEL: CPT

## 2025-01-08 PROCEDURE — 85025 COMPLETE CBC W/AUTO DIFF WBC: CPT

## 2025-01-08 NOTE — PROGRESS NOTES
New Patient Office Visit      Encounter Date: 01/08/2025   Patient Name: Alcira Phelan  YOB: 1965   Medical Record Number: [id number]   Primary Diagnosis: Cancer, metastatic to liver [C78.7]   Cancer Staging: Cancer Staging   No matching staging information was found for the patient.      Chief Complaint:    Chief Complaint   Patient presents with    Liver Mets from Breast CA       History of Present Illness: Alcira Phelan is a 59 y.o. female who is here for consultation regarding SBRT to an oligometastatic site of disease in the liver from her known metastatic breast cancer.   Her original diagnosis was of an invasive ductal carcinoma of the left breast managed in 2007. Her radiation history is notable for cyberknife SBRT directed towards an oligometastatic site of recurrence involving the left chest wall/manubrium of the sternum in 4/2022 (50 Gy/5 fractions). In 2023, she developed metastatic disease involving the liver and mediastinum, with the liver lesion biopsy proven to contain +/+/+ metastatic breast carcinoma. Most recent PET/CT 12/2024 shows uptake in the known liver lesion (SUV 3.4) with no other evidence of active disease.  She was referred for consideration of ablative metastasis directed therapy.   She is very active at home and is independent with ADLs. She denies issues with fluctuating mental status or ascites. Weight stable. Denies jaundice, malaise, abd pain, or noticing palpable abdominal masses. She does notice constipation and bloating with her enhertu - Dr. Castro plans to dose reduce subsequent cycles.          Prior Radiation History: cyberknife SBRT directed towards an oligometastatic site of recurrence involving the left chest wall/manubrium of the sternum in 4/2022 (50 Gy/5 fractions).   Pacemaker or ICD: no  Pregnant or Nursing: N/A    Subjective        Review of Systems: Review of Systems   Constitutional:  Positive for fatigue.    Gastrointestinal:  Positive for constipation.        Pt has had issues w/ constipation r/t Chemo   Musculoskeletal:  Positive for arthralgias.   Neurological:  Positive for numbness.        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/t Chemo       Past Medical History:   Past Medical History:   Diagnosis Date    Anxiety and depression     Arthritis     Bone cancer     Breast cancer     Cancer     breast cancer    Cellulitis     GERD (gastroesophageal reflux disease)     Head injuries     hx of trauma to head with coke bottle    Headache     History of breast problem     malignant carcinoma    Joint pain, knee     pt denies this    Localized swelling, mass and lump, neck     pt denies this    Metastatic cancer     Bone Mets (2022) & Liver Mets (2024)    PONV (postoperative nausea and vomiting)     Urinary frequency     Wears glasses        Past Surgical History:   Past Surgical History:   Procedure Laterality Date    BREAST CAPSULOTOMY, IMPLANT REVISION Bilateral 07/09/2019    Procedure: BREAST CAPSULOTOMY, REMOVAL OLD IMPLANTS, REPLACEMENT OF NEW IMPLANTS FOR BREAST RECONSTRUCTION BILATERAL;  Surgeon: Melanie Sanford MD;  Location: [facility];  Service: Plastics    CARPAL TUNNEL RELEASE Right     COLONOSCOPY  06/06/2017    Dr Johnson Repeat in 2027    CYBERKNIFE  04/15/2022    sternal mass    MASTECTOMY Bilateral 05/30/2007    ROOT CANAL  01/2023       Family History:   Family History   Problem Relation Age of Onset    Breast cancer Maternal Aunt     Diabetes Other     Diabetes Mother     Heart attack Father 72    Hypertension Father     No Known Problems Sister     No Known Problems Brother     No Known Problems Maternal Grandmother     No Known Problems Maternal Grandfather     Emphysema Paternal Grandmother     No Known Problems Paternal Grandfather        Social History:   Social History     Socioeconomic History    Marital status:    Tobacco Use    Smoking status: Never    Smokeless tobacco: Never     Tobacco comments:     as a teenager   Vaping Use    Vaping status: Never Used   Substance and Sexual Activity    Alcohol use: No    Drug use: No    Sexual activity: Defer       Medications:     Current Outpatient Medications:     cetirizine (zyrTEC) 10 MG tablet, Take 1 tablet by mouth Daily., Disp: , Rfl:     Diclofenac Sodium (VOLTAREN) 1 % gel gel, Apply 4 g topically to the appropriate area as directed 2 (Two) Times a Day. Apply to the palmar and dorsal surface of each hand twice daily, Disp: 2 g, Rfl: 11    Famotidine (PEPCID PO), Take 1 tablet by mouth Daily., Disp: , Rfl:     ibuprofen (ADVIL,MOTRIN) 800 MG tablet, Take 1 tablet by mouth 2 (Two) Times a Day As Needed for Mild Pain., Disp: 180 tablet, Rfl: 0    lactulose (CHRONULAC) 10 GM/15ML solution, Take 30 mL by mouth 2 (Two) Times a Day As Needed (constipation)., Disp: 946 mL, Rfl: 11    lidocaine-prilocaine (EMLA) 2.5-2.5 % cream, Apply 1 Application topically to the appropriate area as directed As Needed (45-60 minutes prior to port access.  Cover with saran/plastic wrap.)., Disp: 30 g, Rfl: 3    Multiple Vitamins-Minerals (MULTI VITAMIN/MINERALS) tablet, Take 1 tablet by mouth Daily., Disp: , Rfl:     ondansetron (ZOFRAN) 8 MG tablet, Take 1 tablet by mouth 3 (Three) Times a Day As Needed for Nausea or Vomiting., Disp: 30 tablet, Rfl: 5    prochlorperazine (COMPAZINE) 5 MG tablet, Take 1 tablet by mouth Every 6 (Six) Hours As Needed for Nausea or Vomiting., Disp: 90 tablet, Rfl: 3    venlafaxine XR (EFFEXOR-XR) 150 MG 24 hr capsule, TAKE ONE CAPSULE BY MOUTH DAILY, Disp: 90 capsule, Rfl: 3    Allergies:   Allergies   Allergen Reactions    Penicillins Other (See Comments)     Headache       Measures:   Advanced Care Plan: N Advanced Care Planning was discussed. The patient does not have a living will documented in the medical record and declined to perform one today.  KPS/Quality of Life: 80 - Restricted Physical Activity  ECOG: (1) Restricted in  "physically strenuous activity, ambulatory and able to do work of light nature      Objective     Physical Exam:   Vital Signs:   Vitals:    01/08/25 0913   BP: 116/73   Pulse: 78   Resp: 16   Temp: 98.5 °F (36.9 °C)   TempSrc: Temporal   SpO2: 96%   Weight: 59.7 kg (131 lb 9.6 oz)   Height: 154.9 cm (61\")   PainSc: 0-No pain     Body mass index is 24.87 kg/m².     Constitutional: The patient is a well-developed, well-nourished female  in no acute distress.  Alert and oriented ×3.  General: NAD, sitting comfortably  Eye: EOMI, anicteric sclerae  HENT: NC/AT, MMM  Neck: No JVD or cervical lymphadenopathy  Respiratory: Symmetric expansion, nonlabored respiration  Cardiovascular: Regular rate and rhythm.  No murmurs, rubs, or gallops are appreciated.  Abdomen: nontender, nondistended.   Musculoskeletal: No obvious joint deformities, range of motion intact in all 4 extremities  Neuro: Alert oriented x3, cranial nerves III through XII are grossly intact, with no focal neurological deficits noted on exam.  Psych: Mood and affect appropriate    Results:   Date of Exam: 12/23/2024 8:00 AM EST     Indication: Breast cancer with mets, evaluation of treatment.     Comparison: 10/21/2024 whole-body PET scan     Technique: 12.8 mCi of F-18 FDG was administered intravenously. PET imaging was obtained from skull base to mid-thigh approximately 60 minutes after radiotracer injection. A low dose non contrast CT was obtained for attenuation correction and anatomic   localization. Fused PET-CT and 3D MIP reconstructions were utilized for image interpretation.  Fasting blood glucose level: 88 mg/dl.     Reference uptake values:  Mediastinum: 1.4 SUVmax  Liver: 2.3 SUVmax  Normalization method: Body Weight     Findings:  Previous exam report from 10/21/2024 indicated negative study, with no evidence of metastatic disease. Patient has history of prior solitary hypermetabolic left lobe liver lesion, subsequently resolved on PET scan " following chemotherapy.     Today's 3D images appear to show mild but new uptake in the region of the patient's previous left lobe liver lesion, corresponding to the location of the lesion on the earlier 16 2024 scan, prior to most recent treatment. Small focus of activity in the   pelvis corresponds to normal muscle activity. No significant new abnormal radiotracer uptake is appreciated on the 3D series.     Multiplanar images show no significant asymmetry of uptake in the brain. No hypermetabolic node or mass is seen in the neck.     In the chest, no hypermetabolic mediastinal, hilar, axillary, or pulmonary parenchymal disease is seen.     In the abdomen and pelvis, axial images show subtle, but increased activity in the area of the patient's previous hepatic lesion, maximal SUV 3.4 on image 150 of the axial PET series. For comparison, liver background activity is 2.3.      No evidence of hepatic lesion is seen elsewhere. No evidence of other solid organ disease or hypermetabolic adenopathy is appreciated. There is typical GI and  tract activity. Some normal variant soft tissue activity is seen in the left hip at the   hamstring origin. No abnormal marrow space uptake is seen.       Assessment / Plan      Assessment/Plan:   Alcira Phelan is a pleasant 59 y.o. female with metastatic breast cancer here for consideration of ablative metastasis directed radiation therapy for management of a single active site of disease in her liver. We discussed oncologic benefits of metastasis directed therapy for patients with an oligometastatic or oligoprogressive disease burden. She is interested in proceeding. We discussed risks associated with treatment including the acute and late toxicity profile of treatment. I would like to treat with SBRT on our Cyberknife and will coordinate with Dr. Valles in IR for fiducial placement. I will also order baseline lab work today including a CBC, CMP, INR, and bilirubin to ensure  liver function is appropriate in anticipation of upcoming treatment.       Diagnoses and all orders for this visit:    1. Cancer, metastatic to liver (Primary)  -     Ambulatory Referral to ONC Social Work  -     CT Guidance For Fiducial Placement; Future  -     Comprehensive Metabolic Panel; Future  -     Protime-INR; Future  -     Bilirubin, Total; Future  -     CBC & Differential; Future         Follow Up:  No follow-ups on file.      Time:   I spent 65 minutes on this encounter today, 01/08/25. Activities that took place during this time include: preparing to see the patient, obtaining history, reviewing separately obtained history, performing a medically appropriate examination and evaluation, counseling and educating the patient, ordering medications/tests/procedures, communicating with other healthcare providers, documenting clinical information in the health record, and coordinating care for this patient.     Sincerely,        Monica Banegas MD  Radiation Oncology   This document has been signed by Monica Banegas MD on January 8, 2025 11:29 EST     NOTICE TO PATIENTS:   At [facility], we believe that sharing information builds trust and better relationships. You are receiving this note because you recently visited [facility]. It is possible you will see health information before a provider has talked with you about it. This kind of information can be easy to misunderstand. To help you fully understand what it means for your health, we urge you to discuss this note with your provider.

## 2025-01-08 NOTE — TELEPHONE ENCOUNTER
Called patient and left a VM to schedule Fiducial Marker placement in Liver in [facility] (Dr. Paulino) on 1/14/25 at 11:30 am.  Patient is to be at Main Registration at [facility] at 11:00 am and be NPO after Midnight.  Patient must have a  for procedure, which generally takes 4 - 6 hours in total.  Patient is not Diabetic and is not on a blood thinner.  Also, scheduled CT Sim for 1/22/25 at 9:00 am at [facility].  Provided contact information and requested that patient return my call to confirm appointments.

## 2025-01-09 ENCOUNTER — DOCUMENTATION (OUTPATIENT)
Dept: OTHER | Facility: HOSPITAL | Age: 60
End: 2025-01-09
Payer: COMMERCIAL

## 2025-01-09 NOTE — PROGRESS NOTES
Distress Screening Follow-up    Name: Alcira Phelan    : 1965    Diagnosis: Cancer, metastatic to liver    Location of Distress Screening: Radiation Oncology    Distress Level: 5 (2025  9:27 AM)      Physical Concerns:  Fatigue: Y  Memory or concentration: Y  Changes in eating: Y  Loss or change of physical abilities: Y      Emotional Concerns:  Worry or anxiety: Y  Loneliness: Y      Spiritual Concerns:  Relationship with the sacred: Y      Comments:   MIYA contacted pt to follow up on Distress Screen from a recent visit. MIYA provided introductions and an overview of the role and resources. Pt communicated she has been doing okay. SW assessed for any psychosocial needs and pt denied having any resource needs at this time. MIYA provided contact information, encouraging pt to reach out should needs arise in the future, to which she agreed. Pt thanked MIYA for the call and MIYA will continue to available for ongoing support and assistance.

## 2025-01-10 ENCOUNTER — TELEPHONE (OUTPATIENT)
Dept: RADIATION ONCOLOGY | Facility: HOSPITAL | Age: 60
End: 2025-01-10
Payer: COMMERCIAL

## 2025-01-10 NOTE — TELEPHONE ENCOUNTER
Called patient to discuss upcoming procedure in [facility] on 1/14/25 to place Fiducial Markers in Liver by Dr. Paulino.  Patient is to arrive at Main Registration at 11:00 am at [facility] on 1/14/25, be NPO after Midnight and have a .  Patient verbalized an understanding of date, time, location, and prep for procedure.  In addition, patient is scheduled for a CT Sim on 1/22/25 at 9:00 am.  Patient verbalized an understanding of date, time, and location of appointment.  Provided contact information and patient will call with questions or concerns.

## 2025-01-13 ENCOUNTER — PREP FOR SURGERY (OUTPATIENT)
Dept: OTHER | Facility: HOSPITAL | Age: 60
End: 2025-01-13
Payer: COMMERCIAL

## 2025-01-13 RX ORDER — SODIUM CHLORIDE 0.9 % (FLUSH) 0.9 %
10 SYRINGE (ML) INJECTION AS NEEDED
Status: CANCELLED | OUTPATIENT
Start: 2025-01-13

## 2025-01-14 ENCOUNTER — HOSPITAL ENCOUNTER (OUTPATIENT)
Dept: GENERAL RADIOLOGY | Facility: HOSPITAL | Age: 60
Discharge: HOME | End: 2025-01-14
Payer: COMMERCIAL

## 2025-01-14 ENCOUNTER — HOSPITAL ENCOUNTER (OUTPATIENT)
Dept: ULTRASOUND IMAGING | Facility: HOSPITAL | Age: 60
Discharge: HOME | End: 2025-01-14
Payer: COMMERCIAL

## 2025-01-14 VITALS
SYSTOLIC BLOOD PRESSURE: 124 MMHG | DIASTOLIC BLOOD PRESSURE: 75 MMHG | BODY MASS INDEX: 25.52 KG/M2 | TEMPERATURE: 96.7 F | RESPIRATION RATE: 16 BRPM | WEIGHT: 130 LBS | HEIGHT: 60 IN | OXYGEN SATURATION: 95 % | HEART RATE: 76 BPM

## 2025-01-14 DIAGNOSIS — C78.7 CANCER, METASTATIC TO LIVER: ICD-10-CM

## 2025-01-14 PROCEDURE — 99153 MOD SED SAME PHYS/QHP EA: CPT

## 2025-01-14 PROCEDURE — 25010000002 FENTANYL CITRATE (PF) 50 MCG/ML SOLUTION: Performed by: RADIOLOGY

## 2025-01-14 PROCEDURE — 99152 MOD SED SAME PHYS/QHP 5/>YRS: CPT

## 2025-01-14 PROCEDURE — 49411 INS MARK ABD/PEL FOR RT PERQ: CPT

## 2025-01-14 PROCEDURE — 25010000002 MIDAZOLAM PER 1 MG: Performed by: RADIOLOGY

## 2025-01-14 PROCEDURE — A4648 IMPLANTABLE TISSUE MARKER: HCPCS

## 2025-01-14 PROCEDURE — 76942 ECHO GUIDE FOR BIOPSY: CPT

## 2025-01-14 PROCEDURE — 74018 RADEX ABDOMEN 1 VIEW: CPT

## 2025-01-14 PROCEDURE — 25010000002 HEPARIN LOCK FLUSH PER 10 UNITS: Performed by: RADIOLOGY

## 2025-01-14 PROCEDURE — 25010000002 LIDOCAINE PF 1% 1 % SOLUTION: Performed by: RADIOLOGY

## 2025-01-14 RX ORDER — LIDOCAINE HYDROCHLORIDE 10 MG/ML
5 INJECTION, SOLUTION EPIDURAL; INFILTRATION; INTRACAUDAL; PERINEURAL ONCE
Status: COMPLETED | OUTPATIENT
Start: 2025-01-14 | End: 2025-01-14

## 2025-01-14 RX ORDER — HEPARIN SODIUM (PORCINE) LOCK FLUSH IV SOLN 100 UNIT/ML 100 UNIT/ML
5 SOLUTION INTRAVENOUS AS NEEDED
Status: DISCONTINUED | OUTPATIENT
Start: 2025-01-14 | End: 2025-01-15 | Stop reason: HOSPADM

## 2025-01-14 RX ORDER — LIDOCAINE HYDROCHLORIDE 10 MG/ML
10 INJECTION, SOLUTION EPIDURAL; INFILTRATION; INTRACAUDAL; PERINEURAL ONCE
Status: COMPLETED | OUTPATIENT
Start: 2025-01-14 | End: 2025-01-14

## 2025-01-14 RX ORDER — MIDAZOLAM HYDROCHLORIDE 1 MG/ML
INJECTION, SOLUTION INTRAMUSCULAR; INTRAVENOUS AS NEEDED
Status: COMPLETED | OUTPATIENT
Start: 2025-01-14 | End: 2025-01-14

## 2025-01-14 RX ORDER — FENTANYL CITRATE 50 UG/ML
INJECTION, SOLUTION INTRAMUSCULAR; INTRAVENOUS AS NEEDED
Status: COMPLETED | OUTPATIENT
Start: 2025-01-14 | End: 2025-01-14

## 2025-01-14 RX ORDER — HYDROCODONE BITARTRATE AND ACETAMINOPHEN 5; 325 MG/1; MG/1
1 TABLET ORAL EVERY 4 HOURS PRN
Status: DISCONTINUED | OUTPATIENT
Start: 2025-01-14 | End: 2025-01-15 | Stop reason: HOSPADM

## 2025-01-14 RX ORDER — SODIUM CHLORIDE 0.9 % (FLUSH) 0.9 %
10 SYRINGE (ML) INJECTION AS NEEDED
Status: DISCONTINUED | OUTPATIENT
Start: 2025-01-14 | End: 2025-01-15 | Stop reason: HOSPADM

## 2025-01-14 RX ORDER — MIDAZOLAM HYDROCHLORIDE 1 MG/ML
INJECTION, SOLUTION INTRAMUSCULAR; INTRAVENOUS
Status: DISCONTINUED
Start: 2025-01-14 | End: 2025-01-15 | Stop reason: HOSPADM

## 2025-01-14 RX ORDER — FENTANYL CITRATE 50 UG/ML
INJECTION, SOLUTION INTRAMUSCULAR; INTRAVENOUS
Status: DISCONTINUED
Start: 2025-01-14 | End: 2025-01-15 | Stop reason: HOSPADM

## 2025-01-14 RX ADMIN — MIDAZOLAM HYDROCHLORIDE 1 MG: 1 INJECTION, SOLUTION INTRAMUSCULAR; INTRAVENOUS at 13:16

## 2025-01-14 RX ADMIN — FENTANYL CITRATE 50 MCG: 50 INJECTION, SOLUTION INTRAMUSCULAR; INTRAVENOUS at 13:27

## 2025-01-14 RX ADMIN — LIDOCAINE HYDROCHLORIDE 10 ML: 10 INJECTION, SOLUTION EPIDURAL; INFILTRATION; INTRACAUDAL; PERINEURAL at 13:10

## 2025-01-14 RX ADMIN — HEPARIN 500 UNITS: 100 SYRINGE at 16:50

## 2025-01-14 RX ADMIN — LIDOCAINE HYDROCHLORIDE 5 ML: 10 INJECTION, SOLUTION EPIDURAL; INFILTRATION; INTRACAUDAL; PERINEURAL at 13:10

## 2025-01-14 RX ADMIN — FENTANYL CITRATE 50 MCG: 50 INJECTION, SOLUTION INTRAMUSCULAR; INTRAVENOUS at 13:11

## 2025-01-14 RX ADMIN — MIDAZOLAM HYDROCHLORIDE 1 MG: 1 INJECTION, SOLUTION INTRAMUSCULAR; INTRAVENOUS at 13:11

## 2025-01-14 NOTE — NURSING NOTE
US guided gold seed fiducials times 3 placed by Dr Ochoa to liver. Pt received 100 mcg Fentanyl & 2 mg  Versed with a sedation time of 41 minutes.  KUB done post procedure. Report called to [facility], spoke with Mary.

## 2025-01-14 NOTE — PRE-PROCEDURE NOTE
[facility]   Vascular Interventional Radiology  History & Physicial        Patient Name:Alcira Phelan    : 1965    MRN: [id number]    Primary Care Physician: Batool Blair MD    Referring Physician: Monica Banegas MD     Date of admission: 2025    Subjective     Reason for Consult: Fiducial pl liver    History of Present Illness     Alcira Phelan is a 59 y.o. female referred to IR as noted above.      Active Hospital Problems:  There are no active hospital problems to display for this patient.      Personal History     Past Medical History:   Diagnosis Date    Anxiety and depression     Arthritis     Bone cancer     Breast cancer     Cancer     breast cancer    Cellulitis     GERD (gastroesophageal reflux disease)     Head injuries     hx of trauma to head with coke bottle    Headache     History of breast problem     malignant carcinoma    Joint pain, knee     pt denies this    Localized swelling, mass and lump, neck     pt denies this    Metastatic cancer     Bone Mets () & Liver Mets ()    PONV (postoperative nausea and vomiting)     Urinary frequency     Wears glasses        Past Surgical History:   Procedure Laterality Date    BREAST CAPSULOTOMY, IMPLANT REVISION Bilateral 2019    Procedure: BREAST CAPSULOTOMY, REMOVAL OLD IMPLANTS, REPLACEMENT OF NEW IMPLANTS FOR BREAST RECONSTRUCTION BILATERAL;  Surgeon: Melanie Sanford MD;  Location: [facility];  Service: Plastics    CARPAL TUNNEL RELEASE Right     COLONOSCOPY  2017    Dr Johnson Repeat in     CYBERKNIFE  04/15/2022    sternal mass    MASTECTOMY Bilateral 2007    ROOT CANAL  2023       Family History: Her family history includes Breast cancer in her maternal aunt; Diabetes in her mother and another family member; Emphysema in her paternal grandmother; Heart attack (age of onset: 72) in her father; Hypertension in her father; No Known Problems in her brother, maternal grandfather,  "maternal grandmother, paternal grandfather, and sister.     Social History: She  reports that she has never smoked. She has never used smokeless tobacco. She reports that she does not drink alcohol and does not use drugs.    Home Medications:  Diclofenac Sodium, Famotidine, cetirizine, ibuprofen, lactulose, lidocaine-prilocaine, multivitamin with minerals, ondansetron, prochlorperazine, and venlafaxine XR    Current Medications:    heparin    midazolam    sodium chloride    sodium chloride     Allergies:  Allergies   Allergen Reactions    Penicillins Other (See Comments)     Headache       Review of Systems    IR Procedure pertinent significant findings are mentioned in the PMH and PSH above.    Objective     Visit Vitals  /66 (BP Location: Right arm, Patient Position: Lying)   Pulse 80   Temp 96.7 °F (35.9 °C)   Resp 16   Ht 152.4 cm (60\")   Wt 59 kg (130 lb)   SpO2 98%   BMI 25.39 kg/m²        Physical Exam    A&Ox3.   Able to communicate  No Apparent Distress  Average physique   CVS: VS as noted. Chart reviewed. Stable for the procedure.  Respiratory: Non labored breathing. No signs of respiratory compromise.    Result Review      I have personally reviewed the results from the time of this admission to 1/14/2025 12:42 EST and agree with these findings.  [x]  Laboratory  []  Microbiology  [x]  Radiology  []  EKG/Telemetry   []  Cardiology/Vascular   []  Pathology  []  Old records  []  Other:    Most notable findings include: As noted:    Results from last 7 days   Lab Units 01/08/25  1022   INR  0.98   WBC 10*3/mm3 5.81   HEMOGLOBIN g/dL 13.8   HEMATOCRIT % 39.1   PLATELETS 10*3/mm3 197       Results from last 7 days   Lab Units 01/08/25  1022   SODIUM mmol/L 138   POTASSIUM mmol/L 4.3   CHLORIDE mmol/L 102   CO2 mmol/L 25.0   BUN mg/dL 16   CREATININE mg/dL 0.81   EGFR mL/min/1.73 82.7   GLUCOSE mg/dL 96           Lab 01/08/25  1022   TOTAL PROTEIN 6.6   ALBUMIN 3.8   GLOBULIN 2.8   ALT (SGPT) 22   AST " "(SGOT) 23   BILIRUBIN 0.2   ALK PHOS 125*       Estimated Creatinine Clearance: 58.6 mL/min (by C-G formula based on SCr of 0.81 mg/dL). No results found for: \"CREATININE\"    COVID19   Date Value Ref Range Status   06/23/2023 Not Detected Not Detected - Ref. Range Final        No results found for: \"PREGTESTUR\", \"PREGSERUM\", \"HCG\", \"HCGQUANT\"     ASA SCALE ASSESSMENT (applicable ONLY if sedation planned):   3     MALLAMPATI CLASSIFICATION (applicable ONLY if sedation planned):   1    Assessment / Plan     Alcira Phelan is a 59 y.o. female referred to the IR service with above problem.    Plan:   As above.    Notice: The note was created before the performance of the procedure. It might have been left in the pending status and signed off after the procedure. The time stamp on the note may be misleading.    Dominguez Ochoa MD   Vascular Interventional Radiology  01/14/25   12:42 PM EST     "

## 2025-01-14 NOTE — DISCHARGE INSTRUCTIONS
Avoid any strenuous activities, pulling, tugging, straining or lifting anything over 10 pounds for the next 48 hours.    You may have soreness at the procedure site or shoulder pain after your procedure. You may take Tylenol or Ibuprofen to help relieve pain/soreness if not contraindicated.    You may remove the bandage tomorrow and shower tomorrow; but you will need to avoid tub baths or any situation where your are submersed in water for the next 4 or 5 days to avoid the risk of infection.     DO NOT DRIVE ON THE DAY OF YOUR PROCEDURE.    If you have any problems or concern please contact your physician's office.      SEEK IMMEDIATE MEDICAL ATTENTION FOR ANY UNCONTROLLED PAIN IN SHOULDER/ABDOMEN ON PROCEDURAL SIDE, DIFFICULTY BREATHING, CHEST PAIN, DIZZINESS ON STANDING, UNUSUAL BRUISING AT OR NEAR SITE, PERSISTENT NAUSEA AND VOMITING, OR SIGNS OF INFECTION SUCH AS CHILLS OR FEVER GREATER THAN 101.

## 2025-01-14 NOTE — POST-PROCEDURE NOTE
The following procedure was performed: USG L liver tumor fiducial pl    Please see corresponding Radiology report for in detail procedural information. The Radiology report will be dictated shortly, if not done so already. Please see the IR RN note for the information regarding medicines administered if any, segundo-procedural vitals and I/O information.

## 2025-01-15 ENCOUNTER — TELEPHONE (OUTPATIENT)
Dept: INFUSION THERAPY | Facility: HOSPITAL | Age: 60
End: 2025-01-15
Payer: COMMERCIAL

## 2025-01-20 DIAGNOSIS — C78.7 CANCER, METASTATIC TO LIVER: Primary | ICD-10-CM

## 2025-01-21 ENCOUNTER — PATIENT MESSAGE (OUTPATIENT)
Dept: ONCOLOGY | Facility: CLINIC | Age: 60
End: 2025-01-21
Payer: COMMERCIAL

## 2025-01-21 ENCOUNTER — TELEPHONE (OUTPATIENT)
Dept: RADIATION ONCOLOGY | Facility: HOSPITAL | Age: 60
End: 2025-01-21
Payer: COMMERCIAL

## 2025-01-21 NOTE — TELEPHONE ENCOUNTER
Called patient and left a VM to schedule MRI of Pelvis w/ Liver Protocol for 1rst available on Monday, 2/3/25 at 5:30 pm.  Patient is to arrive at Main Registration, [address] at 5:00  pm and be NPO 4 hours before scan.  Provided contact number should patient have questions or concerns.  Patient is scheduled for CT Sim tomorrow, 1/22/25 at 9:00 am.  I will touch base with patient then and provide an appointment card for MRI.

## 2025-01-21 NOTE — TELEPHONE ENCOUNTER
Patient returned my call.  MRI has been moved up to tomorrow, 1/22/25 at 11:00 am at [address] [place] [address].  Patient is to arrive at 10:30 am and be NPO 4 hours before scan.  This will be after CT Sim at 9:00 am at [facility].  Patient verbalized an understanding of date, times, and locations of appointments.  Patient is requesting that Dr. Banegas speak with Dr. Castro regarding whether or not patient would benefit from a Chemo treatment during radiation planning process.  Dr. Banegas has agreed to contact Dr. Castro to discuss and either Dr. Castro, her APRN, or her RN will call patient to further discuss.  Patient also has questions regarding imaging and planning process.  Dr. Banegas will see patient during CT Sim to answer questions.  Provided my contact number should patient have questions or concerns.

## 2025-01-22 ENCOUNTER — HOSPITAL ENCOUNTER (OUTPATIENT)
Dept: MRI IMAGING | Facility: HOSPITAL | Age: 60
Discharge: HOME | End: 2025-01-22
Admitting: RADIOLOGY
Payer: COMMERCIAL

## 2025-01-22 ENCOUNTER — HOSPITAL ENCOUNTER (OUTPATIENT)
Dept: RADIATION ONCOLOGY | Facility: HOSPITAL | Age: 60
Discharge: HOME | End: 2025-01-22

## 2025-01-22 DIAGNOSIS — C78.7 CANCER, METASTATIC TO LIVER: ICD-10-CM

## 2025-01-22 PROCEDURE — 25510000002 GADOBENATE DIMEGLUMINE 529 MG/ML SOLUTION: Performed by: RADIOLOGY

## 2025-01-22 PROCEDURE — 74183 MRI ABD W/O CNTR FLWD CNTR: CPT

## 2025-01-22 PROCEDURE — A9577 INJ MULTIHANCE: HCPCS | Performed by: RADIOLOGY

## 2025-01-22 RX ADMIN — GADOBENATE DIMEGLUMINE 12 ML: 529 INJECTION, SOLUTION INTRAVENOUS at 11:51

## 2025-01-23 ENCOUNTER — HOSPITAL ENCOUNTER (OUTPATIENT)
Dept: RADIATION ONCOLOGY | Facility: HOSPITAL | Age: 60
Discharge: HOME | End: 2025-01-23

## 2025-01-23 PROCEDURE — 77332 RADIATION TREATMENT AID(S): CPT | Performed by: RADIOLOGY

## 2025-01-27 ENCOUNTER — CLINICAL SUPPORT (OUTPATIENT)
Dept: INTERNAL MEDICINE | Facility: CLINIC | Age: 60
End: 2025-01-27
Payer: COMMERCIAL

## 2025-01-27 ENCOUNTER — TELEPHONE (OUTPATIENT)
Dept: ONCOLOGY | Facility: CLINIC | Age: 60
End: 2025-01-27
Payer: COMMERCIAL

## 2025-01-27 DIAGNOSIS — Z23 NEED FOR INFLUENZA VACCINATION: Primary | ICD-10-CM

## 2025-01-27 PROCEDURE — 90471 IMMUNIZATION ADMIN: CPT | Performed by: INTERNAL MEDICINE

## 2025-01-27 PROCEDURE — 90656 IIV3 VACC NO PRSV 0.5 ML IM: CPT | Performed by: INTERNAL MEDICINE

## 2025-01-27 NOTE — TELEPHONE ENCOUNTER
"Returned call to patient to answer her concerns regarding recent scan results.  Patient wanted clarification on the MRI of abdomen on 1/22/2025.  The report for the MRI on [address] reads:    \"Indication: History of breast cancer, hepatic metastasis, recent PET/CT with concern for increased uptake in the left lung concerning for recurrent hepatic metastasis.\"     Explained to patient the indication had what is most likely a miss print.  The PET/CT from 12/23/2024 shows:\"Mildly increased uptake in the region of patient's previous left lobe liver lesion, concerning for early recurrence\"    Explained to patient there was no new concerns in the left lung.  That statement should have read left lobe liver lesion.      "

## 2025-01-28 ENCOUNTER — OFFICE VISIT (OUTPATIENT)
Dept: INTERNAL MEDICINE | Facility: CLINIC | Age: 60
End: 2025-01-28
Payer: COMMERCIAL

## 2025-01-28 VITALS
HEIGHT: 60 IN | WEIGHT: 135 LBS | SYSTOLIC BLOOD PRESSURE: 122 MMHG | DIASTOLIC BLOOD PRESSURE: 72 MMHG | OXYGEN SATURATION: 99 % | HEART RATE: 74 BPM | BODY MASS INDEX: 26.5 KG/M2

## 2025-01-28 DIAGNOSIS — F41.9 ANXIETY: ICD-10-CM

## 2025-01-28 DIAGNOSIS — Z00.00 ANNUAL PHYSICAL EXAM: Primary | ICD-10-CM

## 2025-01-28 DIAGNOSIS — H61.23 BILATERAL IMPACTED CERUMEN: ICD-10-CM

## 2025-01-28 DIAGNOSIS — Z17.0 MALIGNANT NEOPLASM OF CENTRAL PORTION OF LEFT BREAST IN FEMALE, ESTROGEN RECEPTOR POSITIVE: ICD-10-CM

## 2025-01-28 DIAGNOSIS — C79.51 MALIGNANT NEOPLASM METASTATIC TO BONE: ICD-10-CM

## 2025-01-28 DIAGNOSIS — M19.032 ARTHRITIS OF LEFT WRIST: ICD-10-CM

## 2025-01-28 DIAGNOSIS — E78.2 MIXED HYPERLIPIDEMIA: ICD-10-CM

## 2025-01-28 DIAGNOSIS — F33.42 RECURRENT MAJOR DEPRESSIVE DISORDER, IN FULL REMISSION: ICD-10-CM

## 2025-01-28 DIAGNOSIS — C78.7 CANCER, METASTATIC TO LIVER: ICD-10-CM

## 2025-01-28 DIAGNOSIS — K21.9 GASTROESOPHAGEAL REFLUX DISEASE WITHOUT ESOPHAGITIS: ICD-10-CM

## 2025-01-28 DIAGNOSIS — C50.112 MALIGNANT NEOPLASM OF CENTRAL PORTION OF LEFT BREAST IN FEMALE, ESTROGEN RECEPTOR POSITIVE: ICD-10-CM

## 2025-01-28 PROCEDURE — 90471 IMMUNIZATION ADMIN: CPT | Performed by: INTERNAL MEDICINE

## 2025-01-28 PROCEDURE — 99396 PREV VISIT EST AGE 40-64: CPT | Performed by: INTERNAL MEDICINE

## 2025-01-28 PROCEDURE — 90750 HZV VACC RECOMBINANT IM: CPT | Performed by: INTERNAL MEDICINE

## 2025-01-28 PROCEDURE — 69210 REMOVE IMPACTED EAR WAX UNI: CPT | Performed by: INTERNAL MEDICINE

## 2025-01-28 RX ORDER — VENLAFAXINE HYDROCHLORIDE 150 MG/1
150 CAPSULE, EXTENDED RELEASE ORAL DAILY
Qty: 90 CAPSULE | Refills: 3 | Status: SHIPPED | OUTPATIENT
Start: 2025-01-28

## 2025-01-28 RX ORDER — IBUPROFEN 800 MG/1
800 TABLET, FILM COATED ORAL 2 TIMES DAILY PRN
Qty: 180 TABLET | Refills: 0 | Status: SHIPPED | OUTPATIENT
Start: 2025-01-28

## 2025-01-28 RX ORDER — IBUPROFEN 800 MG/1
800 TABLET, FILM COATED ORAL 2 TIMES DAILY PRN
Qty: 180 TABLET | Refills: 0 | Status: CANCELLED | OUTPATIENT
Start: 2025-01-28

## 2025-01-28 NOTE — PROGRESS NOTES
Internal Medicine Annual Exam  Alcira Phelan is a 59 y.o. female who presents today for an annual exam and with concerns as outlined below.    Chief Complaint  Chief Complaint   Patient presents with    Annual Exam    Hyperlipidemia        HPI  Ms. Snowden comes in today for a physical. She continues to follow with oncology for her metastatic breast cancer. She has been referred to radiation oncology due to liver metastases and plans to start cyberknife this coming month. She has had this previously to sternal metastasis and it was effective. She reports mood remains well controlled with effexor 150mg daily. She is up to date with her pap smear which was normal in 2023. She is also up to date dental and vision exams. Colonoscopy is up to date. Denies use of tobacco, ecigarettes, illicit drugs, and alcohol. She received her first shingrix shot today and plans to return for COVID vaccine. She has been vaccinated for the flu.      Hyperlipidemia         Review of Systems  Review of Systems   HENT:  Positive for ear pain (feels that there is water in her ears).    All other systems reviewed and are negative.       Past Medical History  Past Medical History:   Diagnosis Date    Anxiety and depression     Arthritis     Bone cancer     Breast cancer     Cancer     breast cancer    Cellulitis     GERD (gastroesophageal reflux disease)     Head injuries     hx of trauma to head with coke bottle    Headache     History of breast problem     malignant carcinoma    Joint pain, knee     pt denies this    Localized swelling, mass and lump, neck     pt denies this    Metastatic cancer     Bone Mets (2022) & Liver Mets (2024)    PONV (postoperative nausea and vomiting)     Urinary frequency     Wears glasses         Surgical History  Past Surgical History:   Procedure Laterality Date    BREAST CAPSULOTOMY, IMPLANT REVISION Bilateral 07/09/2019    Procedure: BREAST CAPSULOTOMY, REMOVAL OLD IMPLANTS, REPLACEMENT OF NEW IMPLANTS  FOR BREAST RECONSTRUCTION BILATERAL;  Surgeon: Melanie Sanford MD;  Location: [facility];  Service: Plastics    CARPAL TUNNEL RELEASE Right     COLONOSCOPY  06/06/2017    Dr Johnson Repeat in 2027    CYBERKNIFE  04/15/2022    sternal mass    MASTECTOMY Bilateral 05/30/2007    ROOT CANAL  01/2023        Family History  Family History   Problem Relation Age of Onset    Breast cancer Maternal Aunt     Diabetes Other     Diabetes Mother     Heart attack Father 72    Hypertension Father     No Known Problems Sister     No Known Problems Brother     No Known Problems Maternal Grandmother     No Known Problems Maternal Grandfather     Emphysema Paternal Grandmother     No Known Problems Paternal Grandfather         Social History  Social History     Socioeconomic History    Marital status:    Tobacco Use    Smoking status: Never    Smokeless tobacco: Never    Tobacco comments:     as a teenager   Vaping Use    Vaping status: Never Used   Substance and Sexual Activity    Alcohol use: No    Drug use: No    Sexual activity: Defer        Current Medications  Current Outpatient Medications on File Prior to Visit   Medication Sig Dispense Refill    cetirizine (zyrTEC) 10 MG tablet Take 1 tablet by mouth Daily.      Famotidine (PEPCID PO) Take 1 tablet by mouth Daily.      lidocaine-prilocaine (EMLA) 2.5-2.5 % cream Apply 1 Application topically to the appropriate area as directed As Needed (45-60 minutes prior to port access.  Cover with saran/plastic wrap.). 30 g 3    Multiple Vitamins-Minerals (MULTI VITAMIN/MINERALS) tablet Take 1 tablet by mouth Daily.      ondansetron (ZOFRAN) 8 MG tablet Take 1 tablet by mouth 3 (Three) Times a Day As Needed for Nausea or Vomiting. 30 tablet 5    prochlorperazine (COMPAZINE) 5 MG tablet Take 1 tablet by mouth Every 6 (Six) Hours As Needed for Nausea or Vomiting. 90 tablet 3    [DISCONTINUED] Diclofenac Sodium (VOLTAREN) 1 % gel gel Apply 4 g topically to the appropriate area  "as directed 2 (Two) Times a Day. Apply to the palmar and dorsal surface of each hand twice daily 2 g 11    [DISCONTINUED] ibuprofen (ADVIL,MOTRIN) 800 MG tablet Take 1 tablet by mouth 2 (Two) Times a Day As Needed for Mild Pain. 180 tablet 0    [DISCONTINUED] lactulose (CHRONULAC) 10 GM/15ML solution Take 30 mL by mouth 2 (Two) Times a Day As Needed (constipation). 946 mL 11    [DISCONTINUED] venlafaxine XR (EFFEXOR-XR) 150 MG 24 hr capsule TAKE ONE CAPSULE BY MOUTH DAILY 90 capsule 3     No current facility-administered medications on file prior to visit.       Allergies  Allergies   Allergen Reactions    Penicillins Other (See Comments)     Headache        Objective  Visit Vitals  /72   Pulse 74   Ht 152.4 cm (60\")   Wt 61.2 kg (135 lb)   SpO2 99%   BMI 26.37 kg/m²        Physical Exam  Physical Exam  Vitals and nursing note reviewed.   Constitutional:       General: She is not in acute distress.     Appearance: She is well-developed. She is not ill-appearing, toxic-appearing or diaphoretic.   HENT:      Head: Normocephalic and atraumatic.      Right Ear: External ear normal. There is impacted cerumen.      Left Ear: External ear normal. There is impacted cerumen.      Nose: Nose normal.   Eyes:      General: No scleral icterus.     Conjunctiva/sclera: Conjunctivae normal.   Neck:      Thyroid: No thyromegaly.   Cardiovascular:      Rate and Rhythm: Normal rate and regular rhythm.      Heart sounds: Normal heart sounds. No murmur heard.  Pulmonary:      Effort: Pulmonary effort is normal. No respiratory distress.      Breath sounds: Normal breath sounds.   Abdominal:      General: There is no distension.      Palpations: Abdomen is soft. There is no mass.      Tenderness: There is no abdominal tenderness.   Musculoskeletal:         General: No deformity.      Cervical back: Neck supple.      Right lower leg: No edema.      Left lower leg: No edema.   Lymphadenopathy:      Cervical: No cervical adenopathy. "   Skin:     General: Skin is warm and dry.      Findings: No rash.   Neurological:      Mental Status: She is alert and oriented to person, place, and time. Mental status is at baseline.      Gait: Gait normal.   Psychiatric:         Mood and Affect: Mood normal.         Behavior: Behavior normal.         Thought Content: Thought content normal.         Judgment: Judgment normal.          Results  Results for orders placed or performed in visit on 01/08/25   Comprehensive Metabolic Panel    Collection Time: 01/08/25 10:22 AM    Specimen: Blood   Result Value Ref Range    Glucose 96 65 - 99 mg/dL    BUN 16 8 - 23 mg/dL    Creatinine 0.81 0.57 - 1.00 mg/dL    Sodium 138 136 - 145 mmol/L    Potassium 4.3 3.5 - 5.2 mmol/L    Chloride 102 98 - 107 mmol/L    CO2 25.0 22.0 - 29.0 mmol/L    Calcium 9.6 8.6 - 10.5 mg/dL    Total Protein 6.6 6.0 - 8.5 g/dL    Albumin 3.8 3.5 - 5.2 g/dL    ALT (SGPT) 22 1 - 33 U/L    AST (SGOT) 23 1 - 32 U/L    Alkaline Phosphatase 125 (H) 39 - 117 U/L    Total Bilirubin 0.2 0.0 - 1.2 mg/dL    Globulin 2.8 gm/dL    A/G Ratio 1.4 g/dL    BUN/Creatinine Ratio 19.8 7.0 - 25.0    Anion Gap 11.0 5.0 - 15.0 mmol/L    eGFR 82.7 >60.0 mL/min/1.73   Protime-INR    Collection Time: 01/08/25 10:22 AM    Specimen: Blood   Result Value Ref Range    Protime 13.1 12.2 - 14.5 Seconds    INR 0.98 0.89 - 1.12   CBC Auto Differential    Collection Time: 01/08/25 10:22 AM    Specimen: Blood   Result Value Ref Range    WBC 5.81 3.40 - 10.80 10*3/mm3    RBC 4.25 3.77 - 5.28 10*6/mm3    Hemoglobin 13.8 12.0 - 15.9 g/dL    Hematocrit 39.1 34.0 - 46.6 %    MCV 92.0 79.0 - 97.0 fL    MCH 32.5 26.6 - 33.0 pg    MCHC 35.3 31.5 - 35.7 g/dL    RDW 12.2 (L) 12.3 - 15.4 %    RDW-SD 42.1 37.0 - 54.0 fl    MPV 8.7 6.0 - 12.0 fL    Platelets 197 140 - 450 10*3/mm3    Neutrophil % 63.0 42.7 - 76.0 %    Lymphocyte % 24.4 19.6 - 45.3 %    Monocyte % 6.7 5.0 - 12.0 %    Eosinophil % 5.3 0.3 - 6.2 %    Basophil % 0.3 0.0 - 1.5 %     Immature Grans % 0.3 0.0 - 0.5 %    Neutrophils, Absolute 3.65 1.70 - 7.00 10*3/mm3    Lymphocytes, Absolute 1.42 0.70 - 3.10 10*3/mm3    Monocytes, Absolute 0.39 0.10 - 0.90 10*3/mm3    Eosinophils, Absolute 0.31 0.00 - 0.40 10*3/mm3    Basophils, Absolute 0.02 0.00 - 0.20 10*3/mm3    Immature Grans, Absolute 0.02 0.00 - 0.05 10*3/mm3     *Note: Due to a large number of results and/or encounters for the requested time period, some results have not been displayed. A complete set of results can be found in Results Review.      Ear Cerumen Removal    Date/Time: 1/28/2025 10:53 AM    Performed by: Tricia Lu MA  Authorized by: Batool Blair MD  Consent: Verbal consent obtained. Written consent not obtained.  Risks and benefits: risks, benefits and alternatives were discussed  Consent given by: patient  Patient understanding: patient states understanding of the procedure being performed  Patient identity confirmed: verbally with patient    Anesthesia:  Local Anesthetic: none  Location details: left ear and right ear  Patient tolerance: patient tolerated the procedure well with no immediate complications  Procedure type: instrumentation, irrigation   Sedation:  Patient sedated: no            Assessment and Plan  Diagnoses and all orders for this visit:    Annual physical exam  - Counseling was given to patient for the following topics:  importance of immunizations, including risks and benefits. Also discussed the importance of regular dental and vision care.  -     TSH Rfx On Abnormal To Free T4; Future  -     Hemoglobin A1c; Future    Arthritis of left wrist  - xray in 2020 with minimal radiocarpal arthritis   - pain is controlled with ibuprofen 800mg PRN, will continue     Mixed hyperlipidemia  - update lipid panel     Recurrent major depressive disorder, in full remission  Anxiety  - well controlled, continue effexor 150mg daily     Malignant neoplasm of central portion of left breast in female, estrogen  receptor positive  Malignant neoplasm metastatic to bone  Cancer, metastatic to liver  - following with Dr. Castro  - s/p mastectomy, cyberknife to sternal metastasis. Liver metastases first noted in 2023 that did not respond to fulvestrant, ribociclib, or xeloda. Completed 9 cycles of dose reduced Enhertu with scans showing mild progression in liver lesion so now planned for cyberknife.    Gastroesophageal reflux disease without esophagitis  - stable on OTC pepcid    Bilateral impacted cerumen  - removed in office today    Other orders  -     Shingrix Vaccine      Health Maintenance   Topic Date Due    LIPID PANEL  02/07/2024    BMI FOLLOWUP  01/08/2025    COVID-19 Vaccine (7 - 2024-25 season) 04/30/2025 (Originally 9/1/2024)    Pneumococcal Vaccine 0-64 (1 of 2 - PCV) 01/28/2026 (Originally 3/23/1971)    ZOSTER VACCINE (2 of 2) 03/25/2025    ANNUAL PHYSICAL  01/28/2026    PAP SMEAR  03/14/2026    COLORECTAL CANCER SCREENING  04/01/2028    TDAP/TD VACCINES (3 - Td or Tdap) 07/03/2029    HEPATITIS C SCREENING  Completed    INFLUENZA VACCINE  Completed    MAMMOGRAM  Discontinued     Health Maintenance  - Pap smear: 3/2023 negative cytology and cotest. Plan to repeat next year.  - Colonoscopy: 5129-8692, repeat 10y  - HCV: negative  - Immunizations: Shingrix #1 today. COVID deferred. Flu UTD. Tdap 7/2019.  - Depression screening: negative 1/2025       Return in about 6 months (around 7/28/2025) for Follow up, 1 year for annual with pap 45 minutes, Labs today.

## 2025-01-29 PROCEDURE — 77338 DESIGN MLC DEVICE FOR IMRT: CPT | Performed by: RADIOLOGY

## 2025-01-29 PROCEDURE — 77300 RADIATION THERAPY DOSE PLAN: CPT | Performed by: RADIOLOGY

## 2025-01-29 PROCEDURE — 77301 RADIOTHERAPY DOSE PLAN IMRT: CPT | Performed by: RADIOLOGY

## 2025-01-30 ENCOUNTER — DOCUMENTATION (OUTPATIENT)
Dept: ONCOLOGY | Facility: CLINIC | Age: 60
End: 2025-01-30
Payer: COMMERCIAL

## 2025-01-30 ENCOUNTER — OFFICE VISIT (OUTPATIENT)
Dept: ONCOLOGY | Facility: CLINIC | Age: 60
End: 2025-01-30
Payer: COMMERCIAL

## 2025-01-30 ENCOUNTER — HOSPITAL ENCOUNTER (OUTPATIENT)
Dept: ONCOLOGY | Facility: HOSPITAL | Age: 60
Discharge: HOME | End: 2025-01-30
Admitting: INTERNAL MEDICINE
Payer: COMMERCIAL

## 2025-01-30 ENCOUNTER — TELEPHONE (OUTPATIENT)
Dept: ONCOLOGY | Facility: CLINIC | Age: 60
End: 2025-01-30

## 2025-01-30 VITALS
HEART RATE: 84 BPM | SYSTOLIC BLOOD PRESSURE: 120 MMHG | RESPIRATION RATE: 18 BRPM | OXYGEN SATURATION: 98 % | WEIGHT: 136.7 LBS | HEIGHT: 60 IN | DIASTOLIC BLOOD PRESSURE: 79 MMHG | BODY MASS INDEX: 26.84 KG/M2 | TEMPERATURE: 97.3 F

## 2025-01-30 DIAGNOSIS — C50.112 MALIGNANT NEOPLASM OF CENTRAL PORTION OF LEFT FEMALE BREAST, UNSPECIFIED ESTROGEN RECEPTOR STATUS: Primary | ICD-10-CM

## 2025-01-30 DIAGNOSIS — Z45.2 ENCOUNTER FOR CARE RELATED TO VASCULAR ACCESS PORT: Primary | ICD-10-CM

## 2025-01-30 PROCEDURE — 25010000002 HEPARIN LOCK FLUSH PER 10 UNITS: Performed by: INTERNAL MEDICINE

## 2025-01-30 PROCEDURE — 99214 OFFICE O/P EST MOD 30 MIN: CPT | Performed by: INTERNAL MEDICINE

## 2025-01-30 PROCEDURE — 96523 IRRIG DRUG DELIVERY DEVICE: CPT

## 2025-01-30 RX ORDER — HEPARIN SODIUM (PORCINE) LOCK FLUSH IV SOLN 100 UNIT/ML 100 UNIT/ML
500 SOLUTION INTRAVENOUS AS NEEDED
OUTPATIENT
Start: 2025-01-30

## 2025-01-30 RX ORDER — HEPARIN SODIUM (PORCINE) LOCK FLUSH IV SOLN 100 UNIT/ML 100 UNIT/ML
500 SOLUTION INTRAVENOUS AS NEEDED
Status: DISCONTINUED | OUTPATIENT
Start: 2025-01-30 | End: 2025-02-01 | Stop reason: HOSPADM

## 2025-01-30 RX ADMIN — HEPARIN 500 UNITS: 100 SYRINGE at 10:49

## 2025-01-30 NOTE — PROGRESS NOTES
PROBLEM LIST:  1. Local recurrence of HR+ carcinoma of the left breast  A) history of left breast cancer in 2007.  Bilateral mastectomy 5/30/07.  pathology showed grade 2 IDC of the left breast measuring 1.8 cm.  ER+ OH+ Her2 negative.   0/2 SLN involved.  [id number].  B) adjuvant chemotherapy with TC x 4 cycles, completed September 2007 with Dr. De La Torre.  Tamoxifen October 2007 thru October 2012.  C) presented January 2022 with left chest wall mass.  CT chest 1/17/22 showed 4.6 cm lesion involving left manubrium with soft tissue extent extending posteriorly to the anterior aspect of mediastinum.  12 mm nodule right lung base.  D) ultrasound guided biopsy of chest wall mass on 2/10/22.  Pathology showed invasive ductal carcinoma of the breast.  ER positive (100%), OH positive (50%), HER-2 2+  E) letrozole started February 2022.  CyberKnife to the chest wall mass completed 4/15/2022.  Ibrance started 4/18/2022.  Ibrance decreased to 100 mg due to neutropenia 9/5/2022  F) PET/CT 7/5/23 showed a new 2.9 cm hypermetabolic liver lesion, nonenlarged but hypermetabolic bilateral hilar and mediastinal lymph nodes.  [id number] testing showed a PI3 kinase mutation, no ESR 1 mutation.  Treatment changed to fulvestrant and ribociclib.  Fulvestrant started 7/19/2023.  Ribociclib started 7/27/2023.  G) PET/CT on 12/1/2023 showed increasing size of solitary liver lesion.  No additional sites of disease.  Liver biopsy 12/11/2023 showed metastatic breast carcinoma, ER positive OH positive HER2 1+.  Treatment with Xeloda started 12/29/2023.  H) PET/CT 6/10/2024 showed enlargement in size and metabolic activity of the solitary liver metastasis.  Treatment changed to Enhertu, starting 6/19/2024.  2. Depression/anxiety  3. GERD    Subjective     CHIEF COMPLAINT: Breast cancer    HISTORY OF PRESENT ILLNESS:   Alcira Phelan returns for follow-up.  She is feeling okay.  She had an MRI of her liver done recently which showed the same  "lesion but no other areas of disease.  She thinks her radiation may start next week.                Objective      /79   Pulse 84   Temp 97.3 °F (36.3 °C) (Temporal)   Resp 18   Ht 152.4 cm (60\")   Wt 62 kg (136 lb 11.2 oz)   SpO2 98%   BMI 26.70 kg/m²    Vitals:    01/30/25 0930   PainSc: 0-No pain         ECOG score: 0         General: well appearing female in no acute distress  Neuro: alert and oriented  HEENT: sclera anicteric, oropharynx clear  Skin: No rashes  Psych: mood and affect appropriate    RECENT LABS:  Lab Results   Component Value Date    WBC 5.81 01/08/2025    HGB 13.8 01/08/2025    HCT 39.1 01/08/2025    MCV 92.0 01/08/2025     01/08/2025       Lab Results   Component Value Date    GLUCOSE 96 01/08/2025    BUN 16 01/08/2025    CREATININE 0.81 01/08/2025    EGFRIFNONA 77 02/16/2022    BCR 19.8 01/08/2025    K 4.3 01/08/2025    CO2 25.0 01/08/2025    CALCIUM 9.6 01/08/2025    ALBUMIN 3.8 01/08/2025    AST 23 01/08/2025    ALT 22 01/08/2025     MRI Abdomen With & Without Contrast  Narrative: MRI ABDOMEN W WO CONTRAST    Date of Exam: 1/22/2025 10:56 AM EST    Indication: History of breast cancer, hepatic metastasis, recent PET/CT with concern for increased uptake in the left lung concerning for recurrent hepatic metastasis.     Comparison: PET/CT 12/23/2024    Technique:  Routine multiplanar/multisequence images of the abdomen were obtained before and after the uneventful administration of 12 mL Multihance.    Findings:  Lower chest demonstrates no basilar consolidation. Negative for pericardial effusion or pleural effusion. Minimal right medial basilar atelectasis related to exophytic lower thoracic osteophytes.    The liver is without significant hepatic steatosis. Within the left hepatic lobe at the junction of segments 4A and 4B, there is a T2 hyperintense lesion measuring 3.1 x 2.4 cm consistent with known hepatic metastatic disease (7/11, 8/24). There is   dilatation of the " peripheral bile ducts at the segment related to this mass within the periphery of segment 4A and 4B. No other enhancing liver lesions or suspicious findings to indicate hepatic metastatic disease.    The spleen is normal in size. Both adrenal glands are normal.     The pancreas is normal in T1 signal intensity. No findings of pancreatitis. The pancreatic main duct dilatation.    The gallbladder is present. Negative for pericholecystic inflammation or gallbladder wall thickening. Normal caliber common bile duct. No choledocholithiasis.    The kidneys are without hydronephrosis. No suspicious renal mass. Normal renal enhancement.    No dilated bowel loops in the upper abdomen. No upper abdominal ascites. No adenopathy in the upper abdomen. The portal vein, splenic vein, and superior mesenteric vein are patent. Abdominal aorta and branch vasculature patent. Negative for aortic   aneurysm. Postcontrast images partially limited due to motion.  Impression: Impression:  1. Liver lesion at the junction of segments 4A and 4B measuring 3 cm consistent with known hepatic metastasis. No additional suspicious liver lesion.  2. Lesion results in obstruction of peripheral bile ducts within segment 4, similar to prior studies.  3. Additional incidental findings above.    Electronically Signed: Toño Nelson MD    1/24/2025 12:10 PM EST    Workstation ID: [id number]    I personally reviewed the imaging studies.        Assessment & Plan   Alcira Phelan is a 59 y.o. female with metastatic ER positive CO positive HER-2 negative invasive ductal carcinoma, with involvement of lymph nodes and liver.    She is waiting to begin targeted radiation therapy to the liver lesion.  Once this is complete I will likely put her on endocrine therapy for maintenance.  We will continue to monitor closely with scans and may need to resume chemotherapy at some point if there is evidence of progression.    She will have her port flushed today.    I will  see her back again in about a month with port flush and to start endocrine therapy.      I spent 32 minutes caring for Alcira on this date of service. This time includes time spent by me in the following activities: preparing for the visit, reviewing tests, counseling and educating the patient/family/caregiver, referring and communicating with other health care professionals, documenting information in the medical record, care coordination, ordering medications, ordering test(s), and obtaining a separately obtained history      Ruth Castro MD  [facility]ematology [facility] Oncology    1/30/2025

## 2025-01-30 NOTE — TELEPHONE ENCOUNTER
Patient brought in Life insurance paperwork from [facility] [employer] [facility] [facility] on 01/30/25 to be faxed.        Put Life insurance paperwork from [facility] [employer] [facility] on 01/30/25 to be faxed in MA's box.

## 2025-01-31 ENCOUNTER — TELEPHONE (OUTPATIENT)
Dept: RADIATION ONCOLOGY | Facility: HOSPITAL | Age: 60
End: 2025-01-31
Payer: COMMERCIAL

## 2025-01-31 NOTE — TELEPHONE ENCOUNTER
Spoke with  [place] this morning. Ms. Phelan's radiation plan is ready to start, just waiting on insurance approval.

## 2025-02-03 ENCOUNTER — TELEPHONE (OUTPATIENT)
Dept: INTERNAL MEDICINE | Facility: CLINIC | Age: 60
End: 2025-02-03

## 2025-02-03 ENCOUNTER — HOSPITAL ENCOUNTER (OUTPATIENT)
Dept: RADIATION ONCOLOGY | Facility: HOSPITAL | Age: 60
Setting detail: RADIATION/ONCOLOGY SERIES
End: 2025-02-03
Payer: COMMERCIAL

## 2025-02-03 NOTE — TELEPHONE ENCOUNTER
Caller: Alcira Phelan    Relationship: Self    Best call back number: [phone]    What medication are you requesting: TRAZODONE 50 MG     What are your current symptoms: DIFFICULTY SLEEPING     How long have you been experiencing symptoms: N/A    Have you had these symptoms before:    [x] Yes  [] No    Have you been treated for these symptoms before:   [x] Yes  [] No    If a prescription is needed, what is your preferred pharmacy and phone number: [facility] [id number] - [place] [address] AT [facility] [address] - [phone]  - [phone] FX     Additional notes: PATIENT STATES THAT SHE USED TO BE ON THIS MEDICATION

## 2025-02-04 ENCOUNTER — TELEPHONE (OUTPATIENT)
Dept: INTERNAL MEDICINE | Facility: CLINIC | Age: 60
End: 2025-02-04
Payer: COMMERCIAL

## 2025-02-04 ENCOUNTER — TELEPHONE (OUTPATIENT)
Age: 60
End: 2025-02-04
Payer: COMMERCIAL

## 2025-02-04 ENCOUNTER — HOSPITAL ENCOUNTER (OUTPATIENT)
Dept: RADIATION ONCOLOGY | Facility: HOSPITAL | Age: 60
Discharge: HOME | End: 2025-02-04

## 2025-02-04 DIAGNOSIS — G47.00 INSOMNIA, UNSPECIFIED TYPE: Primary | ICD-10-CM

## 2025-02-04 PROCEDURE — 77373 STRTCTC BDY RAD THER TX DLVR: CPT | Performed by: RADIOLOGY

## 2025-02-04 RX ORDER — TRAZODONE HYDROCHLORIDE 50 MG/1
50 TABLET, FILM COATED ORAL NIGHTLY PRN
Qty: 90 TABLET | Refills: 1 | Status: SHIPPED | OUTPATIENT
Start: 2025-02-04

## 2025-02-04 NOTE — TELEPHONE ENCOUNTER
Medication not currently on list. Patient states it was last filled by previous pcp. Patient last seen 1/28/2025

## 2025-02-04 NOTE — TELEPHONE ENCOUNTER
Called in Zofran, 8 mg ODT tabs to patient's preferred pharmacy.  Patient will take 30 mins before each radiation treatment.  Provided contact number should questions arise.  Called patient to discuss Zofran instructions for use.  Patient verbalized an understanding of medication used as a prophylaxis for radiation treatment.  Also, discussed patient's treatment plan for SBRT on CK vs Radixact.  After Dr. Lebron discussed with patient, she understands that either machine can be safely used for SBRT, but after 2nd CT Sim, the most productive plan with best outcomes was completed on Radixact.  Patient feels comfortable with this plan, just did not initially understand that either machine could be used to safely complete treatment.  Provided contact number should patient have questions or concerns.

## 2025-02-04 NOTE — TELEPHONE ENCOUNTER
Caller: Alcira Phelan    Relationship: Self    Best call back number: [phone]    Requested Prescriptions:   TRAZODONE 50 MG     Pharmacy where request should be sent: [facility] [id number] [address]  AT [facility] [address] - [phone] PH - [phone] FX     Last office visit with prescribing clinician: 1/28/2025   Last telemedicine visit with prescribing clinician: Visit date not found   Next office visit with prescribing clinician: 7/29/2025     Additional details provided by patient: THE PATIENT IS OUT OF MEDICATION SHE STATES THAT THE MEDICATION WAS LAST FILLED BY HER PREVIOUS PRIMARY CARE DOCTOR PLEASE CALL PATIENT TO LET HER KNOW IF THAT CAN BE REFILLED     Does the patient have less than a 3 day supply:  [x] Yes  [] No    Would you like a call back once the refill request has been completed: [x] Yes [] No    If the office needs to give you a call back, can they leave a voicemail: [x] Yes [] No    Cande Galeas Rep   02/04/25 16:03 EST

## 2025-02-05 NOTE — TELEPHONE ENCOUNTER
Hub to relay    Lvm for patient to return call. Notified patient that medication has been sent to pharmacy but please clarify if she has been taking this medication or is she planning to resume? Office number given.

## 2025-02-05 NOTE — TELEPHONE ENCOUNTER
Name: Alcira Phelan    Relationship: Self        HUB PROVIDED THE RELAY MESSAGE FROM THE OFFICE   PATIENT VOICED UNDERSTANDING AND HAS NO FURTHER QUESTIONS AT THIS TIME    ADDITIONAL INFORMATION: THE PATIENT STATES THAT SHE STOPPED THE MEDICATION BUT WANTS TO START IT AGAIN THE PATIENT STATES THAT SHE HAS NOT TAKEN THE MEDICATION FOR FOUR MONTHS SHE WOULD LIKE TO KNOW IF SHE SHOULD START WITH A HALF TABLET INSTEAD OF A WHOLE

## 2025-02-06 ENCOUNTER — HOSPITAL ENCOUNTER (OUTPATIENT)
Dept: RADIATION ONCOLOGY | Facility: HOSPITAL | Age: 60
Discharge: HOME | End: 2025-02-06

## 2025-02-06 PROCEDURE — 77336 RADIATION PHYSICS CONSULT: CPT | Performed by: RADIOLOGY

## 2025-02-06 PROCEDURE — 77373 STRTCTC BDY RAD THER TX DLVR: CPT | Performed by: RADIOLOGY

## 2025-02-06 NOTE — TELEPHONE ENCOUNTER
HUB to read: LM to return call, please give message below.  She can start with half tablet and if not effective increase to whole tablet.

## 2025-02-10 ENCOUNTER — HOSPITAL ENCOUNTER (OUTPATIENT)
Dept: RADIATION ONCOLOGY | Facility: HOSPITAL | Age: 60
Discharge: HOME | End: 2025-02-10
Payer: COMMERCIAL

## 2025-02-10 PROCEDURE — 77373 STRTCTC BDY RAD THER TX DLVR: CPT | Performed by: RADIOLOGY

## 2025-02-12 ENCOUNTER — HOSPITAL ENCOUNTER (OUTPATIENT)
Dept: RADIATION ONCOLOGY | Facility: HOSPITAL | Age: 60
Discharge: HOME | End: 2025-02-12

## 2025-02-12 ENCOUNTER — PATIENT MESSAGE (OUTPATIENT)
Dept: ONCOLOGY | Facility: CLINIC | Age: 60
End: 2025-02-12
Payer: COMMERCIAL

## 2025-02-12 ENCOUNTER — TELEPHONE (OUTPATIENT)
Dept: INTERNAL MEDICINE | Facility: CLINIC | Age: 60
End: 2025-02-12
Payer: COMMERCIAL

## 2025-02-12 PROCEDURE — 77373 STRTCTC BDY RAD THER TX DLVR: CPT | Performed by: RADIOLOGY

## 2025-02-12 NOTE — TELEPHONE ENCOUNTER
Caller: Alcira Phelan    Relationship: Self    Best call back number:   Telephone Information:   Mobile [phone]        What medication are you requesting: XANAX 0.5MG    What are your current symptoms: ANXIOUS, NOT SLEEPING    How long have you been experiencing symptoms: A FEW YEARS    Have you had these symptoms before:    [x] Yes  [] No    Have you been treated for these symptoms before:   [x] Yes  [] No    If a prescription is needed, what is your preferred pharmacy and phone number: [facility] [id number] - [address]  [facility] [address] - [phone]  - [phone] FX     Additional notes: PATIENT STATED SHE WAS GIVING ANOTHER MEDICATION TO HELP HER SLEEP BUT IT IS NOT WORKING

## 2025-02-13 NOTE — TELEPHONE ENCOUNTER
I do not recommend starting a chronic benzodiazepine. I can offer a referral to psychiatry for management of her ongoing anxiety. If she would like this referral please let me know.

## 2025-02-13 NOTE — TELEPHONE ENCOUNTER
Spoke with patient regarding the message  Patient declined the offer due to already has someone she talks to. No further questions at this time

## 2025-02-14 ENCOUNTER — HOSPITAL ENCOUNTER (OUTPATIENT)
Dept: RADIATION ONCOLOGY | Facility: HOSPITAL | Age: 60
Discharge: HOME | End: 2025-02-14

## 2025-02-14 PROCEDURE — 77373 STRTCTC BDY RAD THER TX DLVR: CPT | Performed by: RADIOLOGY

## 2025-02-17 ENCOUNTER — PATIENT MESSAGE (OUTPATIENT)
Dept: ONCOLOGY | Facility: CLINIC | Age: 60
End: 2025-02-17
Payer: COMMERCIAL

## 2025-02-17 ENCOUNTER — TELEPHONE (OUTPATIENT)
Dept: RADIATION ONCOLOGY | Facility: HOSPITAL | Age: 60
End: 2025-02-17
Payer: COMMERCIAL

## 2025-02-17 NOTE — TELEPHONE ENCOUNTER
Patient completed radiation treatments on Friday, 2/14/25.  Called patient and left a VM to follow-up on symptom management regarding N/V.  Contact number provided and requested that patient return my call to let me know if she is experiencing and N/V at this time and if she still has an active prescription for Zofran with pills still available.

## 2025-02-18 ENCOUNTER — TELEPHONE (OUTPATIENT)
Age: 60
End: 2025-02-18
Payer: COMMERCIAL

## 2025-02-18 ENCOUNTER — TELEPHONE (OUTPATIENT)
Dept: INTERNAL MEDICINE | Facility: CLINIC | Age: 60
End: 2025-02-18

## 2025-02-18 NOTE — TELEPHONE ENCOUNTER
Called and spoke to pt, per pt the 50mg dose is not putting her to sleep. Pt was wanting PCP's opinion as to going up to 1.5 pills a night to see if that helps. Permission to increase?   Please advise.

## 2025-02-18 NOTE — TELEPHONE ENCOUNTER
Caller: Alcira Phelan    Relationship: Self    Best call back number: [phone]     What is the best time to reach you: ANY    Who are you requesting to speak with (clinical staff, provider,  specific staff member): CLINICAL    Do you know the name of the person who called: SELF    What was the call regarding: PATIENT WANTED TO KNOW IF SHE COULD INCREASE HER DOSE OF TRAZODONE. PLEASE CALL TO DISCUSS.    Is it okay if the provider responds through MyChart: NO

## 2025-02-18 NOTE — TELEPHONE ENCOUNTER
Patient returned my call regarding symptoms.  Patient reports feeling well overall and does not currently have nausea.  Patient asking when she will know the results of radiation treatments or if the treatments were successful.  Patient is scheduled for post-treatment follow-up with Dr. Banegas on 3/14/25 at 10:30 am at [facility] and she will discuss follow-up plan then.  Patient has my contact information and will call with questions or concerns should they arise.

## 2025-02-26 DIAGNOSIS — Z45.2 ENCOUNTER FOR CARE RELATED TO VASCULAR ACCESS PORT: Primary | ICD-10-CM

## 2025-02-26 DIAGNOSIS — C50.112 MALIGNANT NEOPLASM OF CENTRAL PORTION OF LEFT FEMALE BREAST, UNSPECIFIED ESTROGEN RECEPTOR STATUS: ICD-10-CM

## 2025-02-28 ENCOUNTER — LAB (OUTPATIENT)
Dept: LAB | Facility: HOSPITAL | Age: 60
End: 2025-02-28
Payer: COMMERCIAL

## 2025-02-28 DIAGNOSIS — Z45.2 ENCOUNTER FOR CARE RELATED TO VASCULAR ACCESS PORT: ICD-10-CM

## 2025-02-28 DIAGNOSIS — C50.112 MALIGNANT NEOPLASM OF CENTRAL PORTION OF LEFT FEMALE BREAST, UNSPECIFIED ESTROGEN RECEPTOR STATUS: ICD-10-CM

## 2025-02-28 LAB
ALBUMIN SERPL-MCNC: 4.1 G/DL (ref 3.5–5.2)
ALBUMIN/GLOB SERPL: 1.6 G/DL
ALP SERPL-CCNC: 120 U/L (ref 39–117)
ALT SERPL W P-5'-P-CCNC: 28 U/L (ref 1–33)
ANION GAP SERPL CALCULATED.3IONS-SCNC: 12 MMOL/L (ref 5–15)
AST SERPL-CCNC: 34 U/L (ref 1–32)
BASOPHILS # BLD AUTO: 0.03 10*3/MM3 (ref 0–0.2)
BASOPHILS NFR BLD AUTO: 0.8 % (ref 0–1.5)
BILIRUB SERPL-MCNC: 0.3 MG/DL (ref 0–1.2)
BUN SERPL-MCNC: 14 MG/DL (ref 8–23)
BUN/CREAT SERPL: 19.4 (ref 7–25)
CALCIUM SPEC-SCNC: 9.3 MG/DL (ref 8.6–10.5)
CANCER AG15-3 SERPL-ACNC: 34.9 U/ML
CHLORIDE SERPL-SCNC: 106 MMOL/L (ref 98–107)
CO2 SERPL-SCNC: 25 MMOL/L (ref 22–29)
CREAT SERPL-MCNC: 0.72 MG/DL (ref 0.57–1)
DEPRECATED RDW RBC AUTO: 39 FL (ref 37–54)
EGFRCR SERPLBLD CKD-EPI 2021: 95.3 ML/MIN/1.73
EOSINOPHIL # BLD AUTO: 0.37 10*3/MM3 (ref 0–0.4)
EOSINOPHIL NFR BLD AUTO: 9.6 % (ref 0.3–6.2)
ERYTHROCYTE [DISTWIDTH] IN BLOOD BY AUTOMATED COUNT: 11.9 % (ref 12.3–15.4)
GLOBULIN UR ELPH-MCNC: 2.6 GM/DL
GLUCOSE SERPL-MCNC: 116 MG/DL (ref 65–99)
HCT VFR BLD AUTO: 38.6 % (ref 34–46.6)
HGB BLD-MCNC: 14 G/DL (ref 12–15.9)
IMM GRANULOCYTES # BLD AUTO: 0.01 10*3/MM3 (ref 0–0.05)
IMM GRANULOCYTES NFR BLD AUTO: 0.3 % (ref 0–0.5)
LYMPHOCYTES # BLD AUTO: 0.86 10*3/MM3 (ref 0.7–3.1)
LYMPHOCYTES NFR BLD AUTO: 22.2 % (ref 19.6–45.3)
MCH RBC QN AUTO: 32.4 PG (ref 26.6–33)
MCHC RBC AUTO-ENTMCNC: 36.3 G/DL (ref 31.5–35.7)
MCV RBC AUTO: 89.4 FL (ref 79–97)
MONOCYTES # BLD AUTO: 0.46 10*3/MM3 (ref 0.1–0.9)
MONOCYTES NFR BLD AUTO: 11.9 % (ref 5–12)
NEUTROPHILS NFR BLD AUTO: 2.14 10*3/MM3 (ref 1.7–7)
NEUTROPHILS NFR BLD AUTO: 55.2 % (ref 42.7–76)
PLATELET # BLD AUTO: 131 10*3/MM3 (ref 140–450)
PMV BLD AUTO: 9.3 FL (ref 6–12)
POTASSIUM SERPL-SCNC: 4.1 MMOL/L (ref 3.5–5.2)
PROT SERPL-MCNC: 6.7 G/DL (ref 6–8.5)
RBC # BLD AUTO: 4.32 10*6/MM3 (ref 3.77–5.28)
SODIUM SERPL-SCNC: 143 MMOL/L (ref 136–145)
WBC NRBC COR # BLD AUTO: 3.87 10*3/MM3 (ref 3.4–10.8)

## 2025-02-28 PROCEDURE — 85025 COMPLETE CBC W/AUTO DIFF WBC: CPT

## 2025-02-28 PROCEDURE — 86300 IMMUNOASSAY TUMOR CA 15-3: CPT

## 2025-02-28 PROCEDURE — 80053 COMPREHEN METABOLIC PANEL: CPT

## 2025-02-28 PROCEDURE — 36415 COLL VENOUS BLD VENIPUNCTURE: CPT

## 2025-03-01 LAB — CANCER AG27-29 SERPL-ACNC: 36.5 U/ML (ref 0–38.6)

## 2025-03-04 ENCOUNTER — TELEPHONE (OUTPATIENT)
Age: 60
End: 2025-03-04
Payer: COMMERCIAL

## 2025-03-04 ENCOUNTER — TELEPHONE (OUTPATIENT)
Dept: RADIATION ONCOLOGY | Facility: HOSPITAL | Age: 60
End: 2025-03-04
Payer: COMMERCIAL

## 2025-03-04 DIAGNOSIS — C78.7 CANCER, METASTATIC TO LIVER: Primary | ICD-10-CM

## 2025-03-04 RX ORDER — ALPRAZOLAM 0.5 MG
0.5 TABLET ORAL EVERY 12 HOURS PRN
Qty: 28 TABLET | Refills: 0 | Status: SHIPPED | OUTPATIENT
Start: 2025-03-04 | End: 2025-03-18

## 2025-03-04 NOTE — TELEPHONE ENCOUNTER
Patient called asking when she would have the results of her radiation treatments as she is extremely nervous that it was not successful.  I explained that, as radiation is cumulative, it will continue to work for 14 days after the completion ( patient completed on 2/20/25).  I also explained that a 1 month post-treatment follow-up is standard.  However patient feels this is tool long to wait for a follow-up.  Patient wanted to know if results of radiation from machine images would be in My Chart.  I explained that the images that are taken during each treatment are not the same as an MRI or CT Scan and do not result as such.  Dr. Banegas will discuss plans for follow-up appointments and surveillance scans and either she or Dr. Castro will order and monitor these scans at intervals per NCCN recommendations.  Patient has a follow-up appointment with Mervat in Med/Onc on Thursday, 3/6/25 and will discuss her prognosis and follow-up plan during this appointment.  Patient is requesting to speak with Dr. Banegas if possible to ease her mind.  I will notify Dr. Banegas.  Patient has my contact number and will call with additional questions or concerns.

## 2025-03-04 NOTE — TELEPHONE ENCOUNTER
I returned Mrs. Phelan's call today.  She completed SBRT for management of disease in her liver on 2/14/25. She had questions about whether radiation has worked yet and our plans for monitoring response.   We discussed that it takes ~6 weeks for the full effect of treatment to become apparent. We plan to see her in office when she is ~1 mo post treatment and repeat liver imaging ~6-8 weeks post treatment.   She reported GI discomfort following treatment which has resolved. She denies new or concerning symptoms.  She is understandably anxious about seeing if treatment has helped and what next steps will be. I offered to call in medication for anxiety to be used as needed. She requested xanax, which she has taken before without incident. She will keep her follow up on 3/14.

## 2025-03-06 ENCOUNTER — TELEPHONE (OUTPATIENT)
Dept: ONCOLOGY | Facility: CLINIC | Age: 60
End: 2025-03-06

## 2025-03-06 ENCOUNTER — HOSPITAL ENCOUNTER (OUTPATIENT)
Dept: CARDIOLOGY | Facility: HOSPITAL | Age: 60
Discharge: HOME | End: 2025-03-06
Payer: COMMERCIAL

## 2025-03-06 ENCOUNTER — HOSPITAL ENCOUNTER (OUTPATIENT)
Dept: ONCOLOGY | Facility: HOSPITAL | Age: 60
Discharge: HOME | End: 2025-03-06
Payer: COMMERCIAL

## 2025-03-06 ENCOUNTER — OFFICE VISIT (OUTPATIENT)
Dept: ONCOLOGY | Facility: CLINIC | Age: 60
End: 2025-03-06
Payer: COMMERCIAL

## 2025-03-06 VITALS
HEIGHT: 60 IN | DIASTOLIC BLOOD PRESSURE: 77 MMHG | BODY MASS INDEX: 26.5 KG/M2 | SYSTOLIC BLOOD PRESSURE: 145 MMHG | HEART RATE: 68 BPM | OXYGEN SATURATION: 97 % | TEMPERATURE: 97.1 F | WEIGHT: 135 LBS

## 2025-03-06 DIAGNOSIS — M79.621 PAIN IN RIGHT UPPER ARM: ICD-10-CM

## 2025-03-06 DIAGNOSIS — Z17.0 MALIGNANT NEOPLASM OF CENTRAL PORTION OF LEFT BREAST IN FEMALE, ESTROGEN RECEPTOR POSITIVE: ICD-10-CM

## 2025-03-06 DIAGNOSIS — C79.51 MALIGNANT NEOPLASM METASTATIC TO BONE: ICD-10-CM

## 2025-03-06 DIAGNOSIS — Z17.0 MALIGNANT NEOPLASM OF CENTRAL PORTION OF LEFT BREAST IN FEMALE, ESTROGEN RECEPTOR POSITIVE: Primary | ICD-10-CM

## 2025-03-06 DIAGNOSIS — C78.7 CANCER, METASTATIC TO LIVER: ICD-10-CM

## 2025-03-06 DIAGNOSIS — C50.112 MALIGNANT NEOPLASM OF CENTRAL PORTION OF LEFT BREAST IN FEMALE, ESTROGEN RECEPTOR POSITIVE: ICD-10-CM

## 2025-03-06 DIAGNOSIS — C50.112 MALIGNANT NEOPLASM OF CENTRAL PORTION OF LEFT BREAST IN FEMALE, ESTROGEN RECEPTOR POSITIVE: Primary | ICD-10-CM

## 2025-03-06 DIAGNOSIS — R60.0 EDEMA OF RIGHT UPPER ARM: ICD-10-CM

## 2025-03-06 DIAGNOSIS — Z45.2 ENCOUNTER FOR CARE RELATED TO VASCULAR ACCESS PORT: Primary | ICD-10-CM

## 2025-03-06 LAB
BH CV UPPER VENOUS LEFT SUBCLAVIAN AUGMENT: NORMAL
BH CV UPPER VENOUS LEFT SUBCLAVIAN COMPRESS: NORMAL
BH CV UPPER VENOUS LEFT SUBCLAVIAN PHASIC: NORMAL
BH CV UPPER VENOUS LEFT SUBCLAVIAN SPONT: NORMAL
BH CV UPPER VENOUS RIGHT AXILLARY AUGMENT: NORMAL
BH CV UPPER VENOUS RIGHT AXILLARY COMPRESS: NORMAL
BH CV UPPER VENOUS RIGHT AXILLARY PHASIC: NORMAL
BH CV UPPER VENOUS RIGHT AXILLARY SPONT: NORMAL
BH CV UPPER VENOUS RIGHT BASILIC FOREARM COMPRESS: NORMAL
BH CV UPPER VENOUS RIGHT BASILIC UPPER COMPRESS: NORMAL
BH CV UPPER VENOUS RIGHT BRACHIAL AUGMENT: NORMAL
BH CV UPPER VENOUS RIGHT BRACHIAL COMPRESS: NORMAL
BH CV UPPER VENOUS RIGHT BRACHIAL PHASIC: NORMAL
BH CV UPPER VENOUS RIGHT BRACHIAL SPONT: NORMAL
BH CV UPPER VENOUS RIGHT CEPHALIC FOREARM COMPRESS: NORMAL
BH CV UPPER VENOUS RIGHT CEPHALIC UPPER COMPRESS: NORMAL
BH CV UPPER VENOUS RIGHT INTERNAL JUGULAR AUGMENT: NORMAL
BH CV UPPER VENOUS RIGHT INTERNAL JUGULAR COMPRESS: NORMAL
BH CV UPPER VENOUS RIGHT INTERNAL JUGULAR PHASIC: NORMAL
BH CV UPPER VENOUS RIGHT INTERNAL JUGULAR SPONT: NORMAL
BH CV UPPER VENOUS RIGHT RADIAL COMPRESS: NORMAL
BH CV UPPER VENOUS RIGHT SUBCLAVIAN AUGMENT: NORMAL
BH CV UPPER VENOUS RIGHT SUBCLAVIAN COMPRESS: NORMAL
BH CV UPPER VENOUS RIGHT SUBCLAVIAN PHASIC: NORMAL
BH CV UPPER VENOUS RIGHT SUBCLAVIAN SPONT: NORMAL
BH CV UPPER VENOUS RIGHT ULNAR COMPRESS: NORMAL

## 2025-03-06 PROCEDURE — 93971 EXTREMITY STUDY: CPT

## 2025-03-06 PROCEDURE — 25010000002 HEPARIN LOCK FLUSH PER 10 UNITS: Performed by: INTERNAL MEDICINE

## 2025-03-06 PROCEDURE — 36591 DRAW BLOOD OFF VENOUS DEVICE: CPT

## 2025-03-06 RX ORDER — LETROZOLE 2.5 MG/1
2.5 TABLET, FILM COATED ORAL DAILY
Qty: 30 TABLET | Refills: 11 | Status: SHIPPED | OUTPATIENT
Start: 2025-03-06

## 2025-03-06 RX ORDER — HEPARIN SODIUM (PORCINE) LOCK FLUSH IV SOLN 100 UNIT/ML 100 UNIT/ML
500 SOLUTION INTRAVENOUS AS NEEDED
Status: DISCONTINUED | OUTPATIENT
Start: 2025-03-06 | End: 2025-03-07 | Stop reason: HOSPADM

## 2025-03-06 RX ORDER — HEPARIN SODIUM (PORCINE) LOCK FLUSH IV SOLN 100 UNIT/ML 100 UNIT/ML
500 SOLUTION INTRAVENOUS AS NEEDED
OUTPATIENT
Start: 2025-03-06

## 2025-03-06 RX ADMIN — HEPARIN 500 UNITS: 100 SYRINGE at 11:13

## 2025-03-06 NOTE — TELEPHONE ENCOUNTER
Call placed to Alcira to review venous duplex results show no evidence of DVT.  If pain continues I have asked her to contact the office and I can place an order for an x-ray.

## 2025-03-06 NOTE — PROGRESS NOTES
PROBLEM LIST:  1. Local recurrence of HR+ carcinoma of the left breast  A) history of left breast cancer in 2007.  Bilateral mastectomy 5/30/07.  pathology showed grade 2 IDC of the left breast measuring 1.8 cm.  ER+ FL+ Her2 negative.   0/2 SLN involved.  [id number].  B) adjuvant chemotherapy with TC x 4 cycles, completed September 2007 with Dr. De La Torre.  Tamoxifen October 2007 thru October 2012.  C) presented January 2022 with left chest wall mass.  CT chest 1/17/22 showed 4.6 cm lesion involving left manubrium with soft tissue extent extending posteriorly to the anterior aspect of mediastinum.  12 mm nodule right lung base.  D) ultrasound guided biopsy of chest wall mass on 2/10/22.  Pathology showed invasive ductal carcinoma of the breast.  ER positive (100%), FL positive (50%), HER-2 2+  E) letrozole started February 2022.  CyberKnife to the chest wall mass completed 4/15/2022.  Ibrance started 4/18/2022.  Ibrance decreased to 100 mg due to neutropenia 9/5/2022  F) PET/CT 7/5/23 showed a new 2.9 cm hypermetabolic liver lesion, nonenlarged but hypermetabolic bilateral hilar and mediastinal lymph nodes.  [id number] testing showed a PI3 kinase mutation, no ESR 1 mutation.  Treatment changed to fulvestrant and ribociclib.  Fulvestrant started 7/19/2023.  Ribociclib started 7/27/2023.  G) PET/CT on 12/1/2023 showed increasing size of solitary liver lesion.  No additional sites of disease.  Liver biopsy 12/11/2023 showed metastatic breast carcinoma, ER positive FL positive HER2 1+.  Treatment with Xeloda started 12/29/2023.  H) PET/CT 6/10/2024 showed enlargement in size and metabolic activity of the solitary liver metastasis.  Treatment changed to Enhertu, starting 6/19/2024. Enhertu on hold since 1/30/2025.   I)  Completed SBRT for management of disease in her liver on 2/14/25.   J) Enhertu remains on hold. Started Letrozole 3/6/2025.   2. Depression/anxiety  3. GERD    Subjective     CHIEF COMPLAINT: Breast  "cancer    HISTORY OF PRESENT ILLNESS:   Alcira Phelan returns for follow-up.  Alcira completed SBRT for management of disease in her liver on 2/14/2025.  She has had some increased fatigue since completion of radiation.    She has noticed some pain and swelling in her right arm over the last few weeks. She denies any recent falls or injury.  She did have a shingles vaccine 1/28/2025.        Objective      /77   Pulse 68   Temp 97.1 °F (36.2 °C) (Infrared)   Ht 152.4 cm (60\")   Wt 61.2 kg (135 lb)   SpO2 97%   BMI 26.37 kg/m²    Vitals:    03/06/25 0942   PainSc: 0-No pain         ECOG score: 0         General: well appearing female in no acute distress  Neuro: alert and oriented  HEENT: sclera anicteric, oropharynx clear  Extremities: Mild right upper extremity edema.  Approximately 3 cm larger than left upper extremity.  No erythema noted on right upper extremity.  She denies any recent falls or injury  Skin: No rashes  Psych: mood and affect appropriate    RECENT LABS:  Lab Results   Component Value Date    WBC 3.87 02/28/2025    HGB 14.0 02/28/2025    HCT 38.6 02/28/2025    MCV 89.4 02/28/2025     (L) 02/28/2025       Lab Results   Component Value Date    GLUCOSE 116 (H) 02/28/2025    BUN 14 02/28/2025    CREATININE 0.72 02/28/2025    EGFRIFNONA 77 02/16/2022    BCR 19.4 02/28/2025    K 4.1 02/28/2025    CO2 25.0 02/28/2025    CALCIUM 9.3 02/28/2025    ALBUMIN 4.1 02/28/2025    AST 34 (H) 02/28/2025    ALT 28 02/28/2025     MRI Abdomen With & Without Contrast  Narrative: MRI ABDOMEN W WO CONTRAST    Date of Exam: 1/22/2025 10:56 AM EST    Indication: History of breast cancer, hepatic metastasis, recent PET/CT with concern for increased uptake in the left lung concerning for recurrent hepatic metastasis.     Comparison: PET/CT 12/23/2024    Technique:  Routine multiplanar/multisequence images of the abdomen were obtained before and after the uneventful administration of 12 mL " Multihance.    Findings:  Lower chest demonstrates no basilar consolidation. Negative for pericardial effusion or pleural effusion. Minimal right medial basilar atelectasis related to exophytic lower thoracic osteophytes.    The liver is without significant hepatic steatosis. Within the left hepatic lobe at the junction of segments 4A and 4B, there is a T2 hyperintense lesion measuring 3.1 x 2.4 cm consistent with known hepatic metastatic disease (7/11, 8/24). There is   dilatation of the peripheral bile ducts at the segment related to this mass within the periphery of segment 4A and 4B. No other enhancing liver lesions or suspicious findings to indicate hepatic metastatic disease.    The spleen is normal in size. Both adrenal glands are normal.     The pancreas is normal in T1 signal intensity. No findings of pancreatitis. The pancreatic main duct dilatation.    The gallbladder is present. Negative for pericholecystic inflammation or gallbladder wall thickening. Normal caliber common bile duct. No choledocholithiasis.    The kidneys are without hydronephrosis. No suspicious renal mass. Normal renal enhancement.    No dilated bowel loops in the upper abdomen. No upper abdominal ascites. No adenopathy in the upper abdomen. The portal vein, splenic vein, and superior mesenteric vein are patent. Abdominal aorta and branch vasculature patent. Negative for aortic   aneurysm. Postcontrast images partially limited due to motion.  Impression: Impression:  1. Liver lesion at the junction of segments 4A and 4B measuring 3 cm consistent with known hepatic metastasis. No additional suspicious liver lesion.  2. Lesion results in obstruction of peripheral bile ducts within segment 4, similar to prior studies.  3. Additional incidental findings above.    Electronically Signed: Toño Nelson MD    1/24/2025 12:10 PM EST    Workstation ID: [id number]    I personally reviewed the imaging studies.        Assessment & Plan   Alcira Ugalde  Tapan is a 59 y.o. female with metastatic ER positive CT positive HER-2 negative invasive ductal carcinoma, with involvement of lymph nodes and liver.    Last dose of Enhertu given 1/2/2025.  She has completed SBRT to disease and liver 2/14/2025.  We will continue to hold the Enhertu and we will start her on letrozole maintenance.  Prescription for letrozole sent to pharmacy today.  Reviewed common side effects of letrozole including hot flashes/night sweats, joint pain, mood and sleep changes.  We will plan on repeat PET/CT scan prior to return in 1 month to evaluate response to treatment.  We may need to resume chemotherapy at some point if there is evidence of progression.  After consultation with Dr. Ruth Castro, we will obtain [id number] today to see if she has an ESR1 mutation that could be treated with Elacestrant.     She will have her port flushed today. Labs from 2/28/2025 were reviewed and are stable.     Right arm swelling and pain: She does have a right chest wall port a cath in place. Will obtain stat venous duplex.     Follow up in 1 month with PET/CT and labs prior to return.       I spent 60 minutes caring for Alcira on this date of service. This time includes time spent by me in the following activities: preparing for the visit, reviewing tests, performing a medically appropriate examination and/or evaluation, counseling and educating the patient/family/caregiver, referring and communicating with other health care professionals, documenting information in the medical record, independently interpreting results and communicating that information with the patient/family/caregiver, ordering medications, ordering test(s), and obtaining a separately obtained history        Rasheeda Ramirez APRN  [facility]    3/6/2025

## 2025-03-10 ENCOUNTER — TELEPHONE (OUTPATIENT)
Dept: RADIATION ONCOLOGY | Facility: HOSPITAL | Age: 60
End: 2025-03-10
Payer: COMMERCIAL

## 2025-03-10 NOTE — TELEPHONE ENCOUNTER
Called patient and left a VM to schedule MRI of Abdomen (Liver Protocol) on 4/7/25 at 10:30 am.  Patient is to arrive at the  Imaging Center, [address] [place] [address] at 10:00 am and be NPO 2 hours before the scan.  The patient will then follow-up with Dr. Banegas on 4/11/25 at 9:30 am at [facility].  Provided contact number should patient have questions or concerns.

## 2025-03-11 ENCOUNTER — TELEPHONE (OUTPATIENT)
Dept: RADIATION ONCOLOGY | Facility: HOSPITAL | Age: 60
End: 2025-03-11
Payer: COMMERCIAL

## 2025-03-11 NOTE — TELEPHONE ENCOUNTER
Patient called to verify and confirm Post-treatment F/U with Dr. Banegas on Friday, 3/14/25 at 10:00 am at [facility], MRI of Abdomen on 4/7/25 at 10:30 am at [facility], [facility] at [facility], and F/U with Dr. Banegas on 4/11/25 at 9:30 am at [facility].  Patient has my contact information and will call with questions or concerns should they arise.

## 2025-03-12 ENCOUNTER — HOSPITAL ENCOUNTER (EMERGENCY)
Facility: HOSPITAL | Age: 60
Discharge: HOME | End: 2025-03-13
Attending: EMERGENCY MEDICINE
Payer: COMMERCIAL

## 2025-03-12 VITALS
HEIGHT: 60 IN | OXYGEN SATURATION: 95 % | SYSTOLIC BLOOD PRESSURE: 127 MMHG | TEMPERATURE: 98.3 F | WEIGHT: 134.92 LBS | HEART RATE: 65 BPM | BODY MASS INDEX: 26.49 KG/M2 | RESPIRATION RATE: 18 BRPM | DIASTOLIC BLOOD PRESSURE: 86 MMHG
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
## 2025-03-12 DIAGNOSIS — S61.212A LACERATION OF RIGHT MIDDLE FINGER WITHOUT FOREIGN BODY WITHOUT DAMAGE TO NAIL, INITIAL ENCOUNTER: Primary | ICD-10-CM

## 2025-03-12 LAB — LAB GUARDANT ONC MSI-HIGH: NOT DETECTED

## 2025-03-12 PROCEDURE — 99283 EMERGENCY DEPT VISIT LOW MDM: CPT

## 2025-03-13 PROCEDURE — 63710000001 ONDANSETRON ODT 4 MG TABLET DISPERSIBLE: Performed by: EMERGENCY MEDICINE

## 2025-03-13 RX ORDER — ACETAMINOPHEN 500 MG
1000 TABLET ORAL ONCE
Status: COMPLETED | OUTPATIENT
Start: 2025-03-13 | End: 2025-03-13

## 2025-03-13 RX ORDER — DOXYCYCLINE 100 MG/1
100 CAPSULE ORAL ONCE
Status: COMPLETED | OUTPATIENT
Start: 2025-03-13 | End: 2025-03-13

## 2025-03-13 RX ORDER — ONDANSETRON 4 MG/1
4 TABLET, ORALLY DISINTEGRATING ORAL ONCE
Status: COMPLETED | OUTPATIENT
Start: 2025-03-13 | End: 2025-03-13

## 2025-03-13 RX ORDER — DOXYCYCLINE 100 MG/1
100 CAPSULE ORAL 2 TIMES DAILY
Qty: 14 CAPSULE | Refills: 0 | Status: SHIPPED | OUTPATIENT
Start: 2025-03-13 | End: 2025-03-20

## 2025-03-13 RX ADMIN — ONDANSETRON 4 MG: 4 TABLET, ORALLY DISINTEGRATING ORAL at 00:25

## 2025-03-13 RX ADMIN — ACETAMINOPHEN 1000 MG: 500 TABLET ORAL at 00:25

## 2025-03-13 RX ADMIN — DOXYCYCLINE 100 MG: 100 CAPSULE ORAL at 00:25
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
## 2025-03-14 ENCOUNTER — OFFICE VISIT (OUTPATIENT)
Dept: RADIATION ONCOLOGY | Facility: HOSPITAL | Age: 60
End: 2025-03-14
Payer: COMMERCIAL

## 2025-03-14 ENCOUNTER — HOSPITAL ENCOUNTER (OUTPATIENT)
Dept: RADIATION ONCOLOGY | Facility: HOSPITAL | Age: 60
Setting detail: RADIATION/ONCOLOGY SERIES
Discharge: HOME | End: 2025-03-14
Payer: COMMERCIAL

## 2025-03-14 VITALS
OXYGEN SATURATION: 96 % | BODY MASS INDEX: 26.45 KG/M2 | RESPIRATION RATE: 16 BRPM | SYSTOLIC BLOOD PRESSURE: 132 MMHG | TEMPERATURE: 97.7 F | WEIGHT: 134.7 LBS | HEIGHT: 60 IN | DIASTOLIC BLOOD PRESSURE: 76 MMHG | HEART RATE: 68 BPM

## 2025-03-14 DIAGNOSIS — C78.7 CANCER, METASTATIC TO LIVER: Primary | ICD-10-CM

## 2025-03-14 PROCEDURE — G0463 HOSPITAL OUTPT CLINIC VISIT: HCPCS

## 2025-03-14 RX ORDER — ALPRAZOLAM 0.5 MG
0.5 TABLET ORAL 2 TIMES DAILY PRN
Qty: 60 TABLET | Refills: 0 | Status: SHIPPED | OUTPATIENT
Start: 2025-03-14 | End: 2025-04-13

## 2025-03-14 NOTE — PROGRESS NOTES
Follow Up Office Visit      Encounter Date: 03/14/2025   Patient Name: Alcira Phelan  YOB: 1965   Medical Record Number: [id number]   Primary Diagnosis: Cancer, metastatic to liver [C78.7]   Cancer Staging: Cancer Staging   No matching staging information was found for the patient.                Cancer Staging   No matching staging information was found for the patient.          Chief Complaint:    Chief Complaint   Patient presents with    Mets to Liver       Oncologic History: Alcira Phelan is a 59 y.o. female  who is here for follow up after recieving SBRT to an oligometastatic site of disease in the liver from her known metastatic breast cancer.   Her original diagnosis was of an invasive ductal carcinoma of the left breast managed in 2007. Her radiation history is notable for cyberknife SBRT directed towards an oligometastatic site of recurrence involving the left chest wall/manubrium of the sternum in 4/2022 (50 Gy/5 fractions). In 2023, she developed metastatic disease involving the liver and mediastinum, with the liver lesion biopsy proven to contain +/+/+ metastatic breast carcinoma. Most recent PET/CT 12/2024 shows uptake in the known liver lesion (SUV 3.4) with no other evidence of active disease.    She completed SBRT to the liver lesion 27.5 Gy/5 fractions on 2/14/25 and is here for 1 mo f/u.     Interval History:   She is very active at home and is independent with ADLs. She denies issues with fluctuating mental status or ascites. Weight stable. Denies jaundice, malaise, abd pain, or noticing palpable abdominal masses. Intermittent nausea, managed with medication.  Has difficulty with sleep, previously on trazodone but does not feel like it helps.  Reported anxiety, understandably relating to her diagnosis, which I called in valium for. She had previously taken valium and felt it was effective for her. It is helping her now with both  "anxiety and sleep.         Subjective      Review of Systems: Review of Systems   Gastrointestinal:  Positive for nausea.        Pt reports some very mild, int nausea; manages w/ Zofran & Compazine.   Psychiatric/Behavioral:  Positive for sleep disturbance. The patient is nervous/anxious.         Pt reports not sleeping well; has stopped taking the Trazadone; Valium does help tremendously.       The following portions of the patient's history were reviewed and updated as appropriate: allergies, current medications, past family history, past medical history, past social history, past surgical history and problem list.    Measures:   KPS/Quality of Life: 80 - Restricted Physical Activity      Objective     Physical Exam:   Vital Signs:   Vitals:    03/14/25 1005   BP: 132/76   Pulse: 68   Resp: 16   Temp: 97.7 °F (36.5 °C)   TempSrc: Temporal   SpO2: 96%   Weight: 61.1 kg (134 lb 11.2 oz)   Height: 152.4 cm (60\")   PainSc: 0-No pain     Body mass index is 26.31 kg/m².     Constitutional: No acute distress, sitting comfortably  Eye: EOMI, anicteric sclerae  HENT: NC/AT, MMM   Respiratory: Symmetric expansion, nonlabored respiration  MSK: ROM intact in all four extremities, no obvious deformities  Neuro: Alert, oriented x3, CN3-12 grossly intact.   Psych: Appropriate mood and affect.            Assessment / Plan        Assessment/Plan:     Diagnoses and all orders for this visit:    1. Cancer, metastatic to liver (Primary)  -     Ambulatory Referral to Behavioral Health        Alcira Phelan is a pleasant 59 y.o. female with metastatic breast cancer here for follow up after receiving radiation therapy for management of a single active site of disease in her liver (SBRT 27.5 Gy/5 fractions, completed 2/2024). She is 1 mo out from completion of treatment and has recovered from acute treatment related toxicity. We discussed the plan for MRI imaging to assess response when she is ~6 wks post treatment. She already has " the date for her scan and subsequent follow up.   She is experiencing anxiety relating to her diagnosis, for which I prescribed valium, which had been helpful for her in the past. She feels that the medication is helping and would like to continue. She would like to establish care with JOVAN Messina for ongoing management of anxiety relating to her diagnosis and medication management. I have refilled her prescription so that she can continue medication while she awaits her appointment.       Follow Up:   No follow-ups on file.        Time:   I spent 35 minutes on this encounter today, 03/14/25. Activities that took place during this time include:   - preparing to see the patient  - obtaining and reviewing separately obtained history  - performing a medically appropriate examination and evaluation  - counseling and educating the patient  - ordering medications/tests/procedures  - communicating with other healthcare providers  - documenting clinical information in the health record  - coordinating care for this patient.     Sincerely,        Monica Banegas MD  Radiation Oncology  This document has been signed by Monica Banegas MD on March 14, 2025 13:18 EDT           NOTICE TO PATIENTS  At [facility], we believe that sharing information builds trust and better relationships. You are receiving this note because you recently visited [facility]. It is possible you will see health information before a provider has talked with you about it. This kind of information can be easy to misunderstand. To help you fully understand what it means for your health, we urge you to discuss this note with your provider.

## 2025-03-26 ENCOUNTER — HOSPITAL ENCOUNTER (OUTPATIENT)
Dept: PET IMAGING | Facility: HOSPITAL | Age: 60
Discharge: HOME | End: 2025-03-26
Payer: COMMERCIAL

## 2025-03-26 DIAGNOSIS — C50.112 MALIGNANT NEOPLASM OF CENTRAL PORTION OF LEFT BREAST IN FEMALE, ESTROGEN RECEPTOR POSITIVE: ICD-10-CM

## 2025-03-26 DIAGNOSIS — Z17.0 MALIGNANT NEOPLASM OF CENTRAL PORTION OF LEFT BREAST IN FEMALE, ESTROGEN RECEPTOR POSITIVE: ICD-10-CM

## 2025-03-26 DIAGNOSIS — C79.51 MALIGNANT NEOPLASM METASTATIC TO BONE: ICD-10-CM

## 2025-03-26 DIAGNOSIS — C78.7 CANCER, METASTATIC TO LIVER: ICD-10-CM

## 2025-03-26 LAB — GLUCOSE BLDC GLUCOMTR-MCNC: 92 MG/DL (ref 70–130)

## 2025-03-26 PROCEDURE — A9552 F18 FDG: HCPCS | Performed by: NURSE PRACTITIONER

## 2025-03-26 PROCEDURE — 78815 PET IMAGE W/CT SKULL-THIGH: CPT

## 2025-03-26 PROCEDURE — 34310000005 FLUDEOXYGLUCOSE F18 SOLUTION: Performed by: NURSE PRACTITIONER

## 2025-03-26 PROCEDURE — 82948 REAGENT STRIP/BLOOD GLUCOSE: CPT

## 2025-03-26 RX ADMIN — FLUDEOXYGLUCOSE F 18 1 DOSE: 200 INJECTION, SOLUTION INTRAVENOUS at 08:16

## 2025-04-02 ENCOUNTER — LAB (OUTPATIENT)
Dept: LAB | Facility: HOSPITAL | Age: 60
End: 2025-04-02
Payer: COMMERCIAL

## 2025-04-02 DIAGNOSIS — C50.112 MALIGNANT NEOPLASM OF CENTRAL PORTION OF LEFT BREAST IN FEMALE, ESTROGEN RECEPTOR POSITIVE: ICD-10-CM

## 2025-04-02 DIAGNOSIS — Z17.0 MALIGNANT NEOPLASM OF CENTRAL PORTION OF LEFT BREAST IN FEMALE, ESTROGEN RECEPTOR POSITIVE: ICD-10-CM

## 2025-04-02 DIAGNOSIS — C79.51 MALIGNANT NEOPLASM METASTATIC TO BONE: ICD-10-CM

## 2025-04-02 DIAGNOSIS — C78.7 CANCER, METASTATIC TO LIVER: ICD-10-CM

## 2025-04-02 LAB
ALBUMIN SERPL-MCNC: 4 G/DL (ref 3.5–5.2)
ALBUMIN/GLOB SERPL: 1.5 G/DL
ALP SERPL-CCNC: 122 U/L (ref 39–117)
ALT SERPL W P-5'-P-CCNC: 20 U/L (ref 1–33)
ANION GAP SERPL CALCULATED.3IONS-SCNC: 12 MMOL/L (ref 5–15)
AST SERPL-CCNC: 31 U/L (ref 1–32)
BASOPHILS # BLD AUTO: 0.02 10*3/MM3 (ref 0–0.2)
BASOPHILS NFR BLD AUTO: 0.5 % (ref 0–1.5)
BILIRUB SERPL-MCNC: 0.2 MG/DL (ref 0–1.2)
BUN SERPL-MCNC: 9 MG/DL (ref 8–23)
BUN/CREAT SERPL: 12.5 (ref 7–25)
CALCIUM SPEC-SCNC: 9.6 MG/DL (ref 8.6–10.5)
CANCER AG15-3 SERPL-ACNC: 19.2 U/ML
CHLORIDE SERPL-SCNC: 107 MMOL/L (ref 98–107)
CO2 SERPL-SCNC: 24 MMOL/L (ref 22–29)
CREAT SERPL-MCNC: 0.72 MG/DL (ref 0.57–1)
DEPRECATED RDW RBC AUTO: 39.7 FL (ref 37–54)
EGFRCR SERPLBLD CKD-EPI 2021: 94.7 ML/MIN/1.73
EOSINOPHIL # BLD AUTO: 0.31 10*3/MM3 (ref 0–0.4)
EOSINOPHIL NFR BLD AUTO: 8.3 % (ref 0.3–6.2)
ERYTHROCYTE [DISTWIDTH] IN BLOOD BY AUTOMATED COUNT: 12.1 % (ref 12.3–15.4)
GLOBULIN UR ELPH-MCNC: 2.6 GM/DL
GLUCOSE SERPL-MCNC: 100 MG/DL (ref 65–99)
HCT VFR BLD AUTO: 40.2 % (ref 34–46.6)
HGB BLD-MCNC: 14.4 G/DL (ref 12–15.9)
IMM GRANULOCYTES # BLD AUTO: 0 10*3/MM3 (ref 0–0.05)
IMM GRANULOCYTES NFR BLD AUTO: 0 % (ref 0–0.5)
LYMPHOCYTES # BLD AUTO: 0.86 10*3/MM3 (ref 0.7–3.1)
LYMPHOCYTES NFR BLD AUTO: 23 % (ref 19.6–45.3)
MCH RBC QN AUTO: 32.1 PG (ref 26.6–33)
MCHC RBC AUTO-ENTMCNC: 35.8 G/DL (ref 31.5–35.7)
MCV RBC AUTO: 89.5 FL (ref 79–97)
MONOCYTES # BLD AUTO: 0.35 10*3/MM3 (ref 0.1–0.9)
MONOCYTES NFR BLD AUTO: 9.4 % (ref 5–12)
NEUTROPHILS NFR BLD AUTO: 2.2 10*3/MM3 (ref 1.7–7)
NEUTROPHILS NFR BLD AUTO: 58.8 % (ref 42.7–76)
PLATELET # BLD AUTO: 167 10*3/MM3 (ref 140–450)
PMV BLD AUTO: 9.4 FL (ref 6–12)
POTASSIUM SERPL-SCNC: 4.4 MMOL/L (ref 3.5–5.2)
PROT SERPL-MCNC: 6.6 G/DL (ref 6–8.5)
RBC # BLD AUTO: 4.49 10*6/MM3 (ref 3.77–5.28)
SODIUM SERPL-SCNC: 143 MMOL/L (ref 136–145)
WBC NRBC COR # BLD AUTO: 3.74 10*3/MM3 (ref 3.4–10.8)

## 2025-04-02 PROCEDURE — 36415 COLL VENOUS BLD VENIPUNCTURE: CPT

## 2025-04-02 PROCEDURE — 80053 COMPREHEN METABOLIC PANEL: CPT

## 2025-04-02 PROCEDURE — 85025 COMPLETE CBC W/AUTO DIFF WBC: CPT

## 2025-04-02 PROCEDURE — 86300 IMMUNOASSAY TUMOR CA 15-3: CPT

## 2025-04-02 NOTE — PROGRESS NOTES
PROBLEM LIST:  1. Local recurrence of HR+ carcinoma of the left breast  A) history of left breast cancer in 2007.  Bilateral mastectomy 5/30/07.  pathology showed grade 2 IDC of the left breast measuring 1.8 cm.  ER+ NE+ Her2 negative.   0/2 SLN involved.  [id number].  B) adjuvant chemotherapy with TC x 4 cycles, completed September 2007 with Dr. De La Torre.  Tamoxifen October 2007 thru October 2012.  C) presented January 2022 with left chest wall mass.  CT chest 1/17/22 showed 4.6 cm lesion involving left manubrium with soft tissue extent extending posteriorly to the anterior aspect of mediastinum.  12 mm nodule right lung base.  D) ultrasound guided biopsy of chest wall mass on 2/10/22.  Pathology showed invasive ductal carcinoma of the breast.  ER positive (100%), NE positive (50%), HER-2 2+  E) letrozole started February 2022.  CyberKnife to the chest wall mass completed 4/15/2022.  Ibrance started 4/18/2022.  Ibrance decreased to 100 mg due to neutropenia 9/5/2022  F) PET/CT 7/5/23 showed a new 2.9 cm hypermetabolic liver lesion, nonenlarged but hypermetabolic bilateral hilar and mediastinal lymph nodes.  [id number] testing showed a PI3 kinase mutation, no ESR 1 mutation.  Treatment changed to fulvestrant and ribociclib.  Fulvestrant started 7/19/2023.  Ribociclib started 7/27/2023.  G) PET/CT on 12/1/2023 showed increasing size of solitary liver lesion.  No additional sites of disease.  Liver biopsy 12/11/2023 showed metastatic breast carcinoma, ER positive NE positive HER2 1+.  Treatment with Xeloda started 12/29/2023.  H) PET/CT 6/10/2024 showed enlargement in size and metabolic activity of the solitary liver metastasis.  Treatment changed to Enhertu, starting 6/19/2024. Enhertu on hold since 1/30/2025.   I)  Completed SBRT for management of disease in her liver on 2/14/25.   J) Enhertu remains on hold. Started Letrozole 3/6/2025.   2. Depression/anxiety  3. GERD    Subjective     CHIEF COMPLAINT: Breast  cancer    HISTORY OF PRESENT ILLNESS:   Alcira Phelan returns for follow-up.  Alcira completed SBRT for management of disease in her liver on 2/14/2025.  She had a PET/CT done last week.    She says she is feeling pretty well.  She has had some redness and peeling on her feet.  She also notices that her taste has not been very good.        Objective      There were no vitals taken for this visit.   There were no vitals filed for this visit.                  General: well appearing female in no acute distress  Neuro: alert and oriented  HEENT: sclera anicteric, oropharynx clear  Skin: No rashes  Psych: mood and affect appropriate    RECENT LABS:  Lab Results   Component Value Date    WBC 3.74 04/02/2025    HGB 14.4 04/02/2025    HCT 40.2 04/02/2025    MCV 89.5 04/02/2025     04/02/2025       Lab Results   Component Value Date    GLUCOSE 100 (H) 04/02/2025    BUN 9 04/02/2025    CREATININE 0.72 04/02/2025    EGFRIFNONA 77 02/16/2022    BCR 12.5 04/02/2025    K 4.4 04/02/2025    CO2 24.0 04/02/2025    CALCIUM 9.6 04/02/2025    ALBUMIN 4.0 04/02/2025    AST 31 04/02/2025    ALT 20 04/02/2025     NM PET/CT Skull Base to Mid Thigh  Narrative: FDG NM PET/CT SKULL BASE TO MID THIGH    Date of Exam: 3/26/2025 8:02 AM EDT    Indication: Breast cancer with mets, evaluation of treatment.    Comparison: MR abdomen 1/22/2025    Technique: 12.1 mCi of F-18 FDG was administered intravenously. PET imaging was obtained from skull base to mid-thigh approximately 60 minutes after radiotracer injection. A low dose non contrast CT was obtained for attenuation correction and anatomic   localization. Fused PET-CT and 3D MIP reconstructions were utilized for image interpretation.  Fasting blood glucose level: 92 mg/dl.    Reference uptake values:  Mediastinum: 1.9 SUVmax  Liver: 3.1 SUVmax  Normalization method: Body Weight    Findings:  Head/neck: No suspicious FDG avid mass or adenopathy.    CHEST: No suspicious FDG avid mass or  adenopathy. Right chest port catheter tip terminates within the high right atrium. Grossly intact breast implants. Linear bandlike areas of atelectasis/scar. No signs of active infection or suspicious nodule. Mild   aortic atherosclerotic disease.    Abdomen/pelvis: Multiple fiduciary markers in the region of previously noted left hepatic lobe metastases. Vague hypodensity correlating with prior MRI measuring 1.6 cm showing minimal FDG activity below liver blood pool max SUV 2.2. No suspicious liver   lesion that is above background liver blood pool. No other suspicious FDG avid mass or adenopathy. No hydronephrosis. No bowel obstruction. No free air or drainable fluid collection.    MUSCULOSKELETAL: No suspicious FDG avid mass or destructive bone lesion. Tracer activity in the right upper extremity at antecubital fossa, artifactual related to injection site. Degenerative change throughout the spine. No visualized displaced fracture.  Impression: Impression:  1. Hypodense left hepatic lobe lesion shows FDG activity below liver blood pool favoring posttreatment related change. Recommend attention on follow-up restaging CT or MRI with IV contrast.  2. No signs of disease progression or distant metastases.  3. Ancillary findings as above.    Electronically Signed: Yogesh Terry MD    3/30/2025 5:03 PM EDT    Workstation ID: [id number]    I personally reviewed the imaging studies.        Assessment & Plan   Alcira Phelan is a 60 y.o. female with metastatic ER positive MD positive HER-2 negative invasive ductal carcinoma, with involvement of lymph nodes and liver.    Last dose of Enhertu given 1/2/2025.  She has completed SBRT to disease and liver 2/14/2025.  PET/CT shows a good response in the liver and no other evidence of active disease at this time.  Continue letrozole maintenance.  Guardant 360 testing done 3/6/25 showed ASCENCION mutation, no ESR1 mutation.    Port flush with visits or every 6-8 weeks.    F/u 6 weeks  with labs.  Repeat imaging with PET/CT in 3 months.      I spent 31 minutes caring for Alcira on this date of service. This time includes time spent by me in the following activities: preparing for the visit, reviewing tests, performing a medically appropriate examination and/or evaluation, counseling and educating the patient/family/caregiver, documenting information in the medical record, independently interpreting results and communicating that information with the patient/family/caregiver, ordering medications, ordering test(s), and obtaining a separately obtained history        Ruth Castro MD  [facility]    4/2/2025

## 2025-04-03 ENCOUNTER — HOSPITAL ENCOUNTER (OUTPATIENT)
Dept: ONCOLOGY | Facility: HOSPITAL | Age: 60
Discharge: HOME | End: 2025-04-03
Admitting: INTERNAL MEDICINE
Payer: COMMERCIAL

## 2025-04-03 ENCOUNTER — OFFICE VISIT (OUTPATIENT)
Dept: ONCOLOGY | Facility: CLINIC | Age: 60
End: 2025-04-03
Payer: COMMERCIAL

## 2025-04-03 ENCOUNTER — HOSPITAL ENCOUNTER (OUTPATIENT)
Facility: HOSPITAL | Age: 60
Setting detail: RADIATION/ONCOLOGY SERIES
End: 2025-04-03
Payer: COMMERCIAL

## 2025-04-03 VITALS
TEMPERATURE: 97.7 F | DIASTOLIC BLOOD PRESSURE: 79 MMHG | OXYGEN SATURATION: 98 % | RESPIRATION RATE: 16 BRPM | BODY MASS INDEX: 26.07 KG/M2 | WEIGHT: 132.8 LBS | HEART RATE: 69 BPM | HEIGHT: 60 IN | SYSTOLIC BLOOD PRESSURE: 128 MMHG

## 2025-04-03 DIAGNOSIS — Z45.2 ENCOUNTER FOR CARE RELATED TO VASCULAR ACCESS PORT: Primary | ICD-10-CM

## 2025-04-03 DIAGNOSIS — Z17.0 MALIGNANT NEOPLASM OF CENTRAL PORTION OF LEFT BREAST IN FEMALE, ESTROGEN RECEPTOR POSITIVE: Primary | ICD-10-CM

## 2025-04-03 DIAGNOSIS — C50.112 MALIGNANT NEOPLASM OF CENTRAL PORTION OF LEFT BREAST IN FEMALE, ESTROGEN RECEPTOR POSITIVE: Primary | ICD-10-CM

## 2025-04-03 LAB — CANCER AG27-29 SERPL-ACNC: 26.7 U/ML (ref 0–38.6)

## 2025-04-03 PROCEDURE — 99214 OFFICE O/P EST MOD 30 MIN: CPT | Performed by: INTERNAL MEDICINE

## 2025-04-03 PROCEDURE — 96523 IRRIG DRUG DELIVERY DEVICE: CPT

## 2025-04-03 PROCEDURE — 25010000002 HEPARIN LOCK FLUSH PER 10 UNITS: Performed by: INTERNAL MEDICINE

## 2025-04-03 RX ORDER — HEPARIN SODIUM (PORCINE) LOCK FLUSH IV SOLN 100 UNIT/ML 100 UNIT/ML
500 SOLUTION INTRAVENOUS AS NEEDED
Status: DISCONTINUED | OUTPATIENT
Start: 2025-04-03 | End: 2025-04-04 | Stop reason: HOSPADM

## 2025-04-03 RX ORDER — HEPARIN SODIUM (PORCINE) LOCK FLUSH IV SOLN 100 UNIT/ML 100 UNIT/ML
500 SOLUTION INTRAVENOUS AS NEEDED
OUTPATIENT
Start: 2025-04-03

## 2025-04-03 RX ADMIN — HEPARIN 500 UNITS: 100 SYRINGE at 11:23

## 2025-04-07 ENCOUNTER — HOSPITAL ENCOUNTER (OUTPATIENT)
Dept: MRI IMAGING | Facility: HOSPITAL | Age: 60
Discharge: HOME | End: 2025-04-07
Admitting: RADIOLOGY
Payer: COMMERCIAL

## 2025-04-07 ENCOUNTER — TELEPHONE (OUTPATIENT)
Dept: INTERNAL MEDICINE | Facility: CLINIC | Age: 60
End: 2025-04-07
Payer: COMMERCIAL

## 2025-04-07 DIAGNOSIS — Z17.0 MALIGNANT NEOPLASM OF CENTRAL PORTION OF LEFT BREAST IN FEMALE, ESTROGEN RECEPTOR POSITIVE: ICD-10-CM

## 2025-04-07 DIAGNOSIS — M79.621 PAIN IN RIGHT UPPER ARM: Primary | ICD-10-CM

## 2025-04-07 DIAGNOSIS — C50.112 MALIGNANT NEOPLASM OF CENTRAL PORTION OF LEFT FEMALE BREAST, UNSPECIFIED ESTROGEN RECEPTOR STATUS: ICD-10-CM

## 2025-04-07 DIAGNOSIS — C78.7 CANCER, METASTATIC TO LIVER: ICD-10-CM

## 2025-04-07 DIAGNOSIS — C50.112 MALIGNANT NEOPLASM OF CENTRAL PORTION OF LEFT BREAST IN FEMALE, ESTROGEN RECEPTOR POSITIVE: ICD-10-CM

## 2025-04-07 DIAGNOSIS — C79.51 MALIGNANT NEOPLASM METASTATIC TO BONE: ICD-10-CM

## 2025-04-07 DIAGNOSIS — Z85.3 HISTORY OF LEFT BREAST CANCER: ICD-10-CM

## 2025-04-07 PROCEDURE — 74183 MRI ABD W/O CNTR FLWD CNTR: CPT

## 2025-04-07 PROCEDURE — 25510000002 GADOBENATE DIMEGLUMINE 529 MG/ML SOLUTION: Performed by: RADIOLOGY

## 2025-04-07 PROCEDURE — A9577 INJ MULTIHANCE: HCPCS | Performed by: RADIOLOGY

## 2025-04-07 RX ADMIN — GADOBENATE DIMEGLUMINE 12 ML: 529 INJECTION, SOLUTION INTRAVENOUS at 12:12

## 2025-04-07 NOTE — TELEPHONE ENCOUNTER
Returned call to patient and left message offering to schedule patient to be seen in office  Office number left for patient return call  HUB CAN SCHEDULE PATIENT

## 2025-04-07 NOTE — TELEPHONE ENCOUNTER
Caller: Alcira Phelan     Relationship: SELF    Best call back number: [phone]     What is your medical concern? PATIENT STATES SINCE SHE HAD HER FIRST SHINGLES SHOT HER ARM HAS BEEN IN PAIN AND SHE IS WONDERING IF THAT IS NORMAL? PATIENT IS WONDERING WHEN SHE IS SUPPOSED TO COME BACK IN FOR HER 2ND DOSAGE OF THE SHINGLES SHOT. PATIENT IS REQUESTING A CALL BACK WITH ADVICE.

## 2025-04-07 NOTE — TELEPHONE ENCOUNTER
Returned call to patient and advised per the medication the two doses of Shingrix are to be  by 2-6 months.  Inquired about the arm pain. It showed up a week or two after patient received the shot Pain down right arm showed up a week or two after the shingles shot on 01/28/25 and has been gradually getting worse. Patient denies any burning or itching just a radiating pain. Patient denies pain in shoulder neck or hand, states only in the arm.   Patients oncologist did ultrasound on arm and couldn't find anything

## 2025-04-08 NOTE — TELEPHONE ENCOUNTER
2nd message left offering patient and appointment to discuss pain in person. Left message with office number for patient return call  HUB CAN SCHEDULE APPOINTMENT

## 2025-04-09 ENCOUNTER — OFFICE VISIT (OUTPATIENT)
Age: 60
End: 2025-04-09
Payer: COMMERCIAL

## 2025-04-09 VITALS
DIASTOLIC BLOOD PRESSURE: 78 MMHG | WEIGHT: 131.6 LBS | OXYGEN SATURATION: 95 % | BODY MASS INDEX: 25.84 KG/M2 | RESPIRATION RATE: 16 BRPM | HEART RATE: 75 BPM | SYSTOLIC BLOOD PRESSURE: 126 MMHG | TEMPERATURE: 97.9 F | HEIGHT: 60 IN

## 2025-04-09 DIAGNOSIS — C78.7 CANCER, METASTATIC TO LIVER: Primary | ICD-10-CM

## 2025-04-09 PROCEDURE — G0463 HOSPITAL OUTPT CLINIC VISIT: HCPCS

## 2025-04-09 NOTE — PROGRESS NOTES
Follow Up Office Visit      Encounter Date: 04/09/2025   Patient Name: Alcira Phelan  YOB: 1965   Medical Record Number: [id number]   Primary Diagnosis: Cancer, metastatic to liver [C78.7]   Cancer Staging: Cancer Staging   No matching staging information was found for the patient.                Cancer Staging   No matching staging information was found for the patient.          Chief Complaint:  No chief complaint on file.      Oncologic History: Alcira Phelan is a 60 y.o. female  who is here for follow up after recieving SBRT to an oligometastatic site of disease in the liver from her known metastatic breast cancer.   Her original diagnosis was of an invasive ductal carcinoma of the left breast managed in 2007. Her radiation history is notable for cyberknife SBRT directed towards an oligometastatic site of recurrence involving the left chest wall/manubrium of the sternum in 4/2022 (50 Gy/5 fractions). In 2023, she developed metastatic disease involving the liver and mediastinum, with the liver lesion biopsy proven to contain +/+/+ metastatic breast carcinoma. Most recent PET/CT 12/2024 shows uptake in the known liver lesion (SUV 3.4) with no other evidence of active disease.     She completed SBRT to the liver lesion 27.5 Gy/5 fractions on 2/14/25    Interval History: She is very active at home and is independent with ADLs. She denies issues with fluctuating mental status or ascites. Weight stable. Denies jaundice, malaise, abd pain, or noticing palpable abdominal masses. Intermittent nausea, managed with medication.   New since last visit is pain in her arm following a vaccine.    Prior Radiation: Yes  Completion Date: 2/14/2025        Subjective      Review of Systems: Review of Systems   Constitutional:  Negative for activity change, appetite change, chills, fatigue, fever and unexpected weight change.   HENT: Negative.     Eyes: Negative.   "  Respiratory:  Negative for cough, chest tightness and shortness of breath.    Cardiovascular:  Negative for chest pain and leg swelling.   Gastrointestinal:  Negative for abdominal distention, abdominal pain, diarrhea, nausea and vomiting.   Endocrine:        Hot flashes     Musculoskeletal:  Positive for arthralgias and myalgias. Negative for back pain and neck pain.   Skin: Negative.    Allergic/Immunologic: Negative.    Neurological:  Negative for dizziness, light-headedness and headaches.   Hematological:  Bruises/bleeds easily.   Psychiatric/Behavioral:  Negative for dysphoric mood and sleep disturbance. The patient is nervous/anxious.        The following portions of the patient's history were reviewed and updated as appropriate: allergies, current medications, past family history, past medical history, past social history, past surgical history and problem list.    Measures:   KPS/Quality of Life: 80 - Restricted Physical Activity      Objective     Physical Exam:   Vital Signs:   Vitals:    04/09/25 1352   BP: 126/78   Pulse: 75   Resp: 16   Temp: 97.9 °F (36.6 °C)   TempSrc: Oral   SpO2: 95%   Weight: 59.7 kg (131 lb 9.6 oz)   Height: 152.4 cm (60\")   PainSc: 7    PainLoc: Arm  Comment: rIGHT ARM     Body mass index is 25.7 kg/m².     Constitutional: No acute distress, sitting comfortably  Eye: EOMI, anicteric sclerae  HENT: NC/AT, MMM   Respiratory: Symmetric expansion, nonlabored respiration  MSK: ROM intact in all four extremities, no obvious deformities  Neuro: Alert, oriented x3, CN3-12 grossly intact.   Psych: Appropriate mood and affect.    Results:   Imaging: Images were reviewed personally and pertinent findings are detailed below.      MRI 4/7/25-   Impression:     1. The left hepatic lobe mass has diminished in size, and demonstrates only vague peripheral enhancement on delayed phase imaging, favored to represent treated disease with adjacent posttreatment fibrosis.  2. No new liver lesions are " appreciated.  3. No convincing evidence of progressive malignancy.     PET/CT 3/26/25-   Impression:  1. Hypodense left hepatic lobe lesion shows FDG activity below liver blood pool favoring posttreatment related change. Recommend attention on follow-up restaging CT or MRI with IV contrast.  2. No signs of disease progression or distant metastases.  3. Ancillary findings as above.         Assessment / Plan        Assessment/Plan:     Diagnoses and all orders for this visit:    1. Cancer, metastatic to liver (Primary)        Alcira Phelan is a pleasant 60 y.o. female with metastatic breast cancer here for follow up after receiving radiation therapy for management of a single active site of disease in her liver (SBRT 27.5 Gy/5 fractions, completed 2/2024). She is doing well. Imaging demonstrates a radiographic response to treatment with no evidence of progression or further metastatic spread. She continues surveillance imaging with Dr. Castro and will return to clinic on an as needed basis.      Follow Up:   No follow-ups on file.        Time:   I spent 35 minutes on this encounter today, 04/09/25. Activities that took place during this time include:   - preparing to see the patient  - obtaining and reviewing separately obtained history  - performing a medically appropriate examination and evaluation  - counseling and educating the patient  - ordering medications/tests/procedures  - communicating with other healthcare providers  - documenting clinical information in the health record  - coordinating care for this patient.     Sincerely,        Monica Banegas MD  Radiation Oncology  This document has been signed by Monica Banegas MD on April 9, 2025 14:23 EDT           NOTICE TO PATIENTS  At [facility], we believe that sharing information builds trust and better relationships. You are receiving this note because you recently visited [facility]. It is possible you will see health information before a  provider has talked with you about it. This kind of information can be easy to misunderstand. To help you fully understand what it means for your health, we urge you to discuss this note with your provider.

## 2025-04-17 ENCOUNTER — TELEPHONE (OUTPATIENT)
Dept: ONCOLOGY | Facility: CLINIC | Age: 60
End: 2025-04-17
Payer: COMMERCIAL

## 2025-04-17 DIAGNOSIS — C79.51 MALIGNANT NEOPLASM METASTATIC TO BONE: Primary | ICD-10-CM

## 2025-04-17 DIAGNOSIS — G62.9 NEUROPATHY: ICD-10-CM

## 2025-04-17 RX ORDER — GABAPENTIN 300 MG/1
300 CAPSULE ORAL NIGHTLY
Qty: 30 CAPSULE | Refills: 3 | Status: SHIPPED | OUTPATIENT
Start: 2025-04-17

## 2025-04-17 NOTE — TELEPHONE ENCOUNTER
Caller: KWASI    Relationship: ES NURSE     Best call back number: [phone]     Who are you requesting to speak with (clinical staff, provider,  specific staff member): NON-CLINICAL    What was the call regarding: WANTED TO GIVE CALL BACK NUMBER FOR IF WE NEED ANY ADDITIONAL RESOURCES FOR THE PT.

## 2025-04-28 ENCOUNTER — HOSPITAL ENCOUNTER (OUTPATIENT)
Dept: MRI IMAGING | Facility: HOSPITAL | Age: 60
Discharge: HOME | End: 2025-04-28
Admitting: NURSE PRACTITIONER
Payer: COMMERCIAL

## 2025-04-28 DIAGNOSIS — M79.621 PAIN IN RIGHT UPPER ARM: ICD-10-CM

## 2025-04-28 DIAGNOSIS — C79.51 MALIGNANT NEOPLASM METASTATIC TO BONE: ICD-10-CM

## 2025-04-28 DIAGNOSIS — C78.7 CANCER, METASTATIC TO LIVER: ICD-10-CM

## 2025-04-28 DIAGNOSIS — C50.112 MALIGNANT NEOPLASM OF CENTRAL PORTION OF LEFT BREAST IN FEMALE, ESTROGEN RECEPTOR POSITIVE: ICD-10-CM

## 2025-04-28 DIAGNOSIS — Z17.0 MALIGNANT NEOPLASM OF CENTRAL PORTION OF LEFT BREAST IN FEMALE, ESTROGEN RECEPTOR POSITIVE: ICD-10-CM

## 2025-04-28 PROCEDURE — 25510000002 GADOBENATE DIMEGLUMINE 529 MG/ML SOLUTION: Performed by: NURSE PRACTITIONER

## 2025-04-28 PROCEDURE — 73220 MRI UPPR EXTREMITY W/O&W/DYE: CPT

## 2025-04-28 PROCEDURE — A9577 INJ MULTIHANCE: HCPCS | Performed by: NURSE PRACTITIONER

## 2025-04-28 RX ADMIN — GADOBENATE DIMEGLUMINE 12 ML: 529 INJECTION, SOLUTION INTRAVENOUS at 08:41

## 2025-05-06 ENCOUNTER — TELEPHONE (OUTPATIENT)
Dept: ONCOLOGY | Facility: CLINIC | Age: 60
End: 2025-05-06
Payer: COMMERCIAL

## 2025-05-06 DIAGNOSIS — M79.621 PAIN IN RIGHT UPPER ARM: Primary | ICD-10-CM

## 2025-05-06 NOTE — TELEPHONE ENCOUNTER
Called patient returning message. Informed her that Dr. Castro and Rasheeda Ramirez were out yesterday but that Dr. Castro will review her MRI today and we will call her with any next steps. In the meantime, encouraged patient to take prescribed ibuprofen that she has to help with the pain as well as limit activity of arm. Also informed patient that if pain becomes unbearable and ibuprofen isn't working that she should go to ER. Patient stated understanding and had no further questions.

## 2025-05-06 NOTE — TELEPHONE ENCOUNTER
Called patient re: brachial plexus MRI.  No worrisome findings from a malignancy standpoint. She is having pain that goes all the from her upper arm down to the wrist.  Will order mri c spine for evaluation.

## 2025-05-07 ENCOUNTER — TELEPHONE (OUTPATIENT)
Dept: ONCOLOGY | Facility: CLINIC | Age: 60
End: 2025-05-07

## 2025-05-07 NOTE — TELEPHONE ENCOUNTER
Caller: Alcira Phelan    Relationship: Self    Best call back number: [phone]      What was the call regarding: PATIENT WANTED TO LET ELIU KNOW THAT SHE IS KEEPING HER APPOINTMENT FOR HER MRI FOR TOMORROW

## 2025-05-08 ENCOUNTER — HOSPITAL ENCOUNTER (OUTPATIENT)
Dept: MRI IMAGING | Facility: HOSPITAL | Age: 60
Discharge: HOME | End: 2025-05-08
Admitting: INTERNAL MEDICINE
Payer: COMMERCIAL

## 2025-05-08 DIAGNOSIS — M79.621 PAIN IN RIGHT UPPER ARM: ICD-10-CM

## 2025-05-08 PROCEDURE — A9577 INJ MULTIHANCE: HCPCS | Performed by: INTERNAL MEDICINE

## 2025-05-08 PROCEDURE — 25510000002 GADOBENATE DIMEGLUMINE 529 MG/ML SOLUTION: Performed by: INTERNAL MEDICINE

## 2025-05-08 PROCEDURE — 72156 MRI NECK SPINE W/O & W/DYE: CPT

## 2025-05-08 RX ADMIN — GADOBENATE DIMEGLUMINE 10 ML: 529 INJECTION, SOLUTION INTRAVENOUS at 15:12

## 2025-05-09 ENCOUNTER — TELEPHONE (OUTPATIENT)
Dept: NEUROSURGERY | Facility: CLINIC | Age: 60
End: 2025-05-09
Payer: COMMERCIAL

## 2025-05-09 ENCOUNTER — TELEPHONE (OUTPATIENT)
Dept: ONCOLOGY | Facility: CLINIC | Age: 60
End: 2025-05-09
Payer: COMMERCIAL

## 2025-05-09 DIAGNOSIS — M54.2 NECK PAIN: Primary | ICD-10-CM

## 2025-05-09 DIAGNOSIS — M54.12 CERVICAL RADICULOPATHY: Primary | ICD-10-CM

## 2025-05-09 NOTE — TELEPHONE ENCOUNTER
----- Message from Jurgen Montanez sent at 5/9/2025 11:35 AM EDT -----   Needs to see pain management but can see me in the next couple of weeks can increase gabapentin to 3 times daily I spoke with Ann-Marie

## 2025-05-09 NOTE — TELEPHONE ENCOUNTER
Called patient re: mri results.  She has degenerative change in the spine.  Discussed with Dr. Montanez who offered to see her in clinic.  Alcira reports that she did not tolerate gabapentin.  She has been trying some exercises at home which seems to help a bit.      Will refer to Dr. Montanez.  She would like to hold off on pain specialist referral or PT referral at this time.

## 2025-05-09 NOTE — TELEPHONE ENCOUNTER
I have called patient to schedule.  [facility]- If patient returns call before I can contact her again transfer to me  [phone].

## 2025-05-15 ENCOUNTER — HOSPITAL ENCOUNTER (OUTPATIENT)
Dept: ONCOLOGY | Facility: HOSPITAL | Age: 60
Discharge: HOME | End: 2025-05-15
Admitting: INTERNAL MEDICINE
Payer: COMMERCIAL

## 2025-05-15 ENCOUNTER — OFFICE VISIT (OUTPATIENT)
Dept: NEUROSURGERY | Facility: CLINIC | Age: 60
End: 2025-05-15
Payer: COMMERCIAL

## 2025-05-15 ENCOUNTER — OFFICE VISIT (OUTPATIENT)
Dept: ONCOLOGY | Facility: CLINIC | Age: 60
End: 2025-05-15
Payer: COMMERCIAL

## 2025-05-15 VITALS
TEMPERATURE: 97.4 F | SYSTOLIC BLOOD PRESSURE: 126 MMHG | DIASTOLIC BLOOD PRESSURE: 70 MMHG | WEIGHT: 133 LBS | OXYGEN SATURATION: 99 % | HEIGHT: 60 IN | RESPIRATION RATE: 16 BRPM | BODY MASS INDEX: 26.11 KG/M2 | HEART RATE: 67 BPM

## 2025-05-15 VITALS — TEMPERATURE: 97.5 F | WEIGHT: 132.3 LBS | BODY MASS INDEX: 25.97 KG/M2 | HEIGHT: 60 IN

## 2025-05-15 DIAGNOSIS — Z17.0 MALIGNANT NEOPLASM OF CENTRAL PORTION OF LEFT BREAST IN FEMALE, ESTROGEN RECEPTOR POSITIVE: Primary | ICD-10-CM

## 2025-05-15 DIAGNOSIS — Z45.2 ENCOUNTER FOR CARE RELATED TO VASCULAR ACCESS PORT: Primary | ICD-10-CM

## 2025-05-15 DIAGNOSIS — Z17.0 MALIGNANT NEOPLASM OF CENTRAL PORTION OF LEFT BREAST IN FEMALE, ESTROGEN RECEPTOR POSITIVE: ICD-10-CM

## 2025-05-15 DIAGNOSIS — C50.112 MALIGNANT NEOPLASM OF CENTRAL PORTION OF LEFT BREAST IN FEMALE, ESTROGEN RECEPTOR POSITIVE: ICD-10-CM

## 2025-05-15 DIAGNOSIS — C78.7 CANCER, METASTATIC TO LIVER: ICD-10-CM

## 2025-05-15 DIAGNOSIS — M54.2 NECK PAIN: ICD-10-CM

## 2025-05-15 DIAGNOSIS — M25.511 ACUTE PAIN OF RIGHT SHOULDER: ICD-10-CM

## 2025-05-15 DIAGNOSIS — M79.601 RIGHT ARM PAIN: ICD-10-CM

## 2025-05-15 DIAGNOSIS — M54.12 CERVICAL RADICULOPATHY: Primary | ICD-10-CM

## 2025-05-15 DIAGNOSIS — C50.112 MALIGNANT NEOPLASM OF CENTRAL PORTION OF LEFT BREAST IN FEMALE, ESTROGEN RECEPTOR POSITIVE: Primary | ICD-10-CM

## 2025-05-15 LAB
ALBUMIN SERPL-MCNC: 4 G/DL (ref 3.5–5.2)
ALBUMIN/GLOB SERPL: 1.6 G/DL
ALP SERPL-CCNC: 137 U/L (ref 39–117)
ALT SERPL W P-5'-P-CCNC: 23 U/L (ref 1–33)
ANION GAP SERPL CALCULATED.3IONS-SCNC: 9 MMOL/L (ref 5–15)
AST SERPL-CCNC: 30 U/L (ref 1–32)
BASOPHILS # BLD AUTO: 0.02 10*3/MM3 (ref 0–0.2)
BASOPHILS NFR BLD AUTO: 0.5 % (ref 0–1.5)
BILIRUB SERPL-MCNC: 0.2 MG/DL (ref 0–1.2)
BUN SERPL-MCNC: 14 MG/DL (ref 8–23)
BUN/CREAT SERPL: 20.3 (ref 7–25)
CALCIUM SPEC-SCNC: 9.3 MG/DL (ref 8.6–10.5)
CANCER AG15-3 SERPL-ACNC: 20.1 U/ML
CHLORIDE SERPL-SCNC: 107 MMOL/L (ref 98–107)
CO2 SERPL-SCNC: 27 MMOL/L (ref 22–29)
CREAT SERPL-MCNC: 0.69 MG/DL (ref 0.57–1)
DEPRECATED RDW RBC AUTO: 40.1 FL (ref 37–54)
EGFRCR SERPLBLD CKD-EPI 2021: 98.3 ML/MIN/1.73
EOSINOPHIL # BLD AUTO: 0.29 10*3/MM3 (ref 0–0.4)
EOSINOPHIL NFR BLD AUTO: 7.1 % (ref 0.3–6.2)
ERYTHROCYTE [DISTWIDTH] IN BLOOD BY AUTOMATED COUNT: 12.1 % (ref 12.3–15.4)
GLOBULIN UR ELPH-MCNC: 2.5 GM/DL
GLUCOSE SERPL-MCNC: 89 MG/DL (ref 65–99)
HCT VFR BLD AUTO: 36.1 % (ref 34–46.6)
HGB BLD-MCNC: 12.7 G/DL (ref 12–15.9)
IMM GRANULOCYTES # BLD AUTO: 0 10*3/MM3 (ref 0–0.05)
IMM GRANULOCYTES NFR BLD AUTO: 0 % (ref 0–0.5)
LYMPHOCYTES # BLD AUTO: 0.84 10*3/MM3 (ref 0.7–3.1)
LYMPHOCYTES NFR BLD AUTO: 20.6 % (ref 19.6–45.3)
MCH RBC QN AUTO: 31.7 PG (ref 26.6–33)
MCHC RBC AUTO-ENTMCNC: 35.2 G/DL (ref 31.5–35.7)
MCV RBC AUTO: 90 FL (ref 79–97)
MONOCYTES # BLD AUTO: 0.43 10*3/MM3 (ref 0.1–0.9)
MONOCYTES NFR BLD AUTO: 10.6 % (ref 5–12)
NEUTROPHILS NFR BLD AUTO: 2.49 10*3/MM3 (ref 1.7–7)
NEUTROPHILS NFR BLD AUTO: 61.2 % (ref 42.7–76)
PLATELET # BLD AUTO: 167 10*3/MM3 (ref 140–450)
PMV BLD AUTO: 9.3 FL (ref 6–12)
POTASSIUM SERPL-SCNC: 4.2 MMOL/L (ref 3.5–5.2)
PROT SERPL-MCNC: 6.5 G/DL (ref 6–8.5)
RBC # BLD AUTO: 4.01 10*6/MM3 (ref 3.77–5.28)
SODIUM SERPL-SCNC: 143 MMOL/L (ref 136–145)
WBC NRBC COR # BLD AUTO: 4.07 10*3/MM3 (ref 3.4–10.8)

## 2025-05-15 PROCEDURE — 36591 DRAW BLOOD OFF VENOUS DEVICE: CPT

## 2025-05-15 PROCEDURE — 80053 COMPREHEN METABOLIC PANEL: CPT | Performed by: INTERNAL MEDICINE

## 2025-05-15 PROCEDURE — 99214 OFFICE O/P EST MOD 30 MIN: CPT | Performed by: NURSE PRACTITIONER

## 2025-05-15 PROCEDURE — 85025 COMPLETE CBC W/AUTO DIFF WBC: CPT | Performed by: INTERNAL MEDICINE

## 2025-05-15 PROCEDURE — 99204 OFFICE O/P NEW MOD 45 MIN: CPT | Performed by: NEUROLOGICAL SURGERY

## 2025-05-15 PROCEDURE — 25010000002 HEPARIN LOCK FLUSH PER 10 UNITS: Performed by: INTERNAL MEDICINE

## 2025-05-15 PROCEDURE — 86300 IMMUNOASSAY TUMOR CA 15-3: CPT | Performed by: INTERNAL MEDICINE

## 2025-05-15 RX ORDER — HEPARIN SODIUM (PORCINE) LOCK FLUSH IV SOLN 100 UNIT/ML 100 UNIT/ML
500 SOLUTION INTRAVENOUS AS NEEDED
OUTPATIENT
Start: 2025-05-15

## 2025-05-15 RX ORDER — HEPARIN SODIUM (PORCINE) LOCK FLUSH IV SOLN 100 UNIT/ML 100 UNIT/ML
500 SOLUTION INTRAVENOUS AS NEEDED
Status: DISCONTINUED | OUTPATIENT
Start: 2025-05-15 | End: 2025-05-16 | Stop reason: HOSPADM

## 2025-05-15 RX ADMIN — HEPARIN 500 UNITS: 100 SYRINGE at 11:08

## 2025-05-15 NOTE — PROGRESS NOTES
NAME: OSMAN THOMAS   DOS: 5/15/2025  : 1965  PCP: Batool Blair MD    Chief Complaint:    Chief Complaint   Patient presents with    Neck Pain    Arm Pain     Right Arm Pain         History of Present Illness:  60 y.o. female   I saw a 60-year-old female in neurosurgical consultation.  She was referred by Ruth Castro for a history of breast cancer and right upper extremity pain.  She reports about a month and a half ago or so that she had a history of vaccination for shingles this was followed in the right arm by heaviness and overall global sense of arm pain it is intermittent in nature its associate with shoulder pain she does have neck discomfort and arthritic symptomatology denies myelopathic findings and is here for evaluation    PMHX  Allergies:  Allergies   Allergen Reactions    Penicillin G Headache    Promethazine Headache    Penicillins Other (See Comments)     Headache     Medications    Current Outpatient Medications:     cetirizine (zyrTEC) 10 MG tablet, Take 1 tablet by mouth Daily., Disp: , Rfl:     Diclofenac Sodium (VOLTAREN) 1 % gel gel, APPLY 4 GRAMS TO THE PALMAR AND DORSAL SURFACE OF EACH HAND TWO TIMES A DAY, Disp: 200 g, Rfl: 0    Famotidine (PEPCID PO), Take 1 tablet by mouth Daily., Disp: , Rfl:     gabapentin (NEURONTIN) 300 MG capsule, Take 1 capsule by mouth Every Night., Disp: 30 capsule, Rfl: 3    ibuprofen (ADVIL,MOTRIN) 800 MG tablet, Take 1 tablet by mouth 2 (Two) Times a Day As Needed for Mild Pain., Disp: 180 tablet, Rfl: 0    letrozole (FEMARA) 2.5 MG tablet, Take 1 tablet by mouth Daily., Disp: 30 tablet, Rfl: 11    lidocaine-prilocaine (EMLA) 2.5-2.5 % cream, Apply 1 Application topically to the appropriate area as directed As Needed (45-60 minutes prior to port access.  Cover with saran/plastic wrap.)., Disp: 30 g, Rfl: 3    Multiple Vitamins-Minerals (MULTI VITAMIN/MINERALS) tablet, Take 1 tablet by mouth Daily., Disp: , Rfl:     ondansetron (ZOFRAN) 8 MG  tablet, Take 1 tablet by mouth 3 (Three) Times a Day As Needed for Nausea or Vomiting., Disp: 30 tablet, Rfl: 5    prochlorperazine (COMPAZINE) 5 MG tablet, Take 1 tablet by mouth Every 6 (Six) Hours As Needed for Nausea or Vomiting., Disp: 90 tablet, Rfl: 3    traZODone (DESYREL) 50 MG tablet, Take 1 tablet by mouth At Night As Needed for Sleep., Disp: 90 tablet, Rfl: 1    venlafaxine XR (EFFEXOR-XR) 150 MG 24 hr capsule, Take 1 capsule by mouth Daily., Disp: 90 capsule, Rfl: 3  No current facility-administered medications for this visit.    Facility-Administered Medications Ordered in Other Visits:     heparin injection 500 Units, 500 Units, Intravenous, PRN, Ruth Castro MD, 500 Units at 05/15/25 1108  Past Medical History:  Past Medical History:   Diagnosis Date    Anxiety and depression     Arthritis     Bone cancer     Breast cancer     Cancer     breast cancer    Cellulitis     Cervical disc disorder     GERD (gastroesophageal reflux disease)     Head injuries     hx of trauma to head with coke bottle    Headache     History of breast problem     malignant carcinoma    Joint pain, knee     pt denies this    Localized swelling, mass and lump, neck     pt denies this    Metastatic cancer     Bone Mets (2022) & Liver Mets (2024)    PONV (postoperative nausea and vomiting)     Urinary frequency     Wears glasses      Past Surgical History:  Past Surgical History:   Procedure Laterality Date    BREAST CAPSULOTOMY, IMPLANT REVISION Bilateral 07/09/2019    Procedure: BREAST CAPSULOTOMY, REMOVAL OLD IMPLANTS, REPLACEMENT OF NEW IMPLANTS FOR BREAST RECONSTRUCTION BILATERAL;  Surgeon: Melanie Sanford MD;  Location: [facility];  Service: Plastics    CARPAL TUNNEL RELEASE Right     COLONOSCOPY  06/06/2017    Dr Johnson Repeat in 2027    CYBERKNIFE  04/15/2022    sternal mass    MASTECTOMY Bilateral 05/30/2007    ROOT CANAL  01/2023     Social Hx:  Social History     Tobacco Use    Smoking status: Never     Smokeless tobacco: Never    Tobacco comments:     as a teenager   Vaping Use    Vaping status: Never Used   Substance Use Topics    Alcohol use: No    Drug use: No     Family Hx:  Family History   Problem Relation Age of Onset    Breast cancer Maternal Aunt     Diabetes Other     Diabetes Mother     Heart attack Father 72    Hypertension Father     No Known Problems Sister     No Known Problems Brother     No Known Problems Maternal Grandmother     No Known Problems Maternal Grandfather     Emphysema Paternal Grandmother     No Known Problems Paternal Grandfather      Review of Systems:        Review of Systems   Constitutional:  Negative for activity change, appetite change, chills, diaphoresis, fatigue, fever and unexpected weight change.   HENT:  Negative for congestion, dental problem, drooling, ear discharge, ear pain, facial swelling, hearing loss, mouth sores, nosebleeds, postnasal drip, rhinorrhea, sinus pressure, sinus pain, sneezing, sore throat, tinnitus, trouble swallowing and voice change.    Eyes:  Negative for photophobia, pain, discharge, redness, itching and visual disturbance.   Respiratory:  Negative for apnea, cough, choking, chest tightness, shortness of breath, wheezing and stridor.    Cardiovascular:  Negative for chest pain, palpitations and leg swelling.   Gastrointestinal:  Negative for abdominal distention, abdominal pain, anal bleeding, blood in stool, constipation, diarrhea, nausea, rectal pain and vomiting.   Endocrine: Negative for cold intolerance, heat intolerance, polydipsia, polyphagia and polyuria.   Genitourinary:  Negative for decreased urine volume, difficulty urinating, dyspareunia, dysuria, enuresis, flank pain, frequency, genital sores, hematuria, menstrual problem, pelvic pain, urgency, vaginal bleeding, vaginal discharge and vaginal pain.   Musculoskeletal:  Positive for neck pain and neck stiffness. Negative for arthralgias, back pain, gait problem, joint swelling and  myalgias.   Skin:  Negative for color change, pallor, rash and wound.   Allergic/Immunologic: Negative for environmental allergies, food allergies and immunocompromised state.   Neurological:  Positive for weakness and numbness. Negative for dizziness, tremors, seizures, syncope, facial asymmetry, speech difficulty, light-headedness and headaches.   Hematological:  Negative for adenopathy. Does not bruise/bleed easily.   Psychiatric/Behavioral:  Negative for agitation, behavioral problems, confusion, decreased concentration, dysphoric mood, hallucinations, self-injury, sleep disturbance and suicidal ideas. The patient is not nervous/anxious and is not hyperactive.       I have reviewed this note template and all pertinent parts of the review of systems social, family history, surgical history and medication list    Physical Examination:  Vitals:    05/15/25 1450   Temp: 97.5 °F (36.4 °C)      General Appearance:   Well developed, well nourished, well groomed, alert, and cooperative.  Neurological examination:  Neurological Exam   Vital signs were reviewed and documented in the chart  Patient appeared in good neurologic function with normal comprehension fluent speech  Mood and affect are normal  Sense of smell deferred    Cranial nerves intact    Muscle bulk and tone normal she has no winging of the right scapula    Pain with right sided passive range of motion that reproduces a lot of shoulder discomfort    Tenderness supraspinatus on the right          5 out of 5 strength no motor drift  Gait normal intact  Negative Romberg  No clonus long tract signs or myelopathy    Reflexes symmetrically brisk without evidence of Dee's arthritic stigmata the hands  No edema noted and extremities skin appears normal    Plexuses with no masses arm pulses are good      Review of Imaging/DATA:  I personally reviewed MRI of the cervical spine with and without contrast as well as her right brachial plexus MRI on personal review  as well as her PET scans I see no evidence of brachial plexus pathology she is got multilevel cervical degenerative changes of moderate intensity there is contouring of the cord in the mid cervical area C4 556 and 6 7 she does have some high-grade foraminal stenosis      Diagnoses/Plan:    Ms. Phelan is a 60 y.o. female   1.  Right upper extremity pain secondary to    2.  Right supraspinatus tendinopathy    3.  Rotator cuff pathology    4.  Scapular dyskinesia    5.  [place] radicular symptoms intermittent for multilevel degenerative disc I explained that her MRI is bad enough for surgery but it would be associated with unpredictable effects as far as the pain that she has in the right shoulder and arm    6.  History of breast cancer    Insert risk I talked her about the role of anterior cervical fusions it would be a last option without a clear diagnosis from a neurosurgical standpoint a reasonable course of action will be    1.  Referral for physical therapy explained the importance of right shoulder physical therapy    2.  Can have an interventional pain management appointment    3.  I would make an appointment with the orthopedic surgeon I will send her to [facility] again for evaluation I explained that this is most likely nonsurgical but that perhaps an injection in the shoulder may help    4.  Can follow-up with flexion-extension films after SHAWNA potentially cervically right shoulder injection physical therapy to monitor her progress as long as her scans are quiescent and show no evidence of progressive cancer symptomatology neck steps as far as try to treat her if she still miserable would be a cervical myelogram follow-up with EMG nerve conduction studies    Has been a pleasure to provide neurosurgical care

## 2025-05-15 NOTE — PROGRESS NOTES
PROBLEM LIST:  1. Local recurrence of HR+ carcinoma of the left breast  A) history of left breast cancer in 2007.  Bilateral mastectomy 5/30/07.  pathology showed grade 2 IDC of the left breast measuring 1.8 cm.  ER+ IA+ Her2 negative.   0/2 SLN involved.  [id number].  B) adjuvant chemotherapy with TC x 4 cycles, completed September 2007 with Dr. De La Torre.  Tamoxifen October 2007 thru October 2012.  C) presented January 2022 with left chest wall mass.  CT chest 1/17/22 showed 4.6 cm lesion involving left manubrium with soft tissue extent extending posteriorly to the anterior aspect of mediastinum.  12 mm nodule right lung base.  D) ultrasound guided biopsy of chest wall mass on 2/10/22.  Pathology showed invasive ductal carcinoma of the breast.  ER positive (100%), IA positive (50%), HER-2 2+  E) letrozole started February 2022.  CyberKnife to the chest wall mass completed 4/15/2022.  Ibrance started 4/18/2022.  Ibrance decreased to 100 mg due to neutropenia 9/5/2022  F) PET/CT 7/5/23 showed a new 2.9 cm hypermetabolic liver lesion, nonenlarged but hypermetabolic bilateral hilar and mediastinal lymph nodes.  [id number] testing showed a PI3 kinase mutation, no ESR 1 mutation.  Treatment changed to fulvestrant and ribociclib.  Fulvestrant started 7/19/2023.  Ribociclib started 7/27/2023.  G) PET/CT on 12/1/2023 showed increasing size of solitary liver lesion.  No additional sites of disease.  Liver biopsy 12/11/2023 showed metastatic breast carcinoma, ER positive IA positive HER2 1+.  Treatment with Xeloda started 12/29/2023.  H) PET/CT 6/10/2024 showed enlargement in size and metabolic activity of the solitary liver metastasis.  Treatment changed to Enhertu, starting 6/19/2024. Enhertu on hold since 1/30/2025.   I)  Completed SBRT for management of disease in her liver on 2/14/25.   J) Enhertu remains on hold. Started Letrozole 3/6/2025.   2. Depression/anxiety  3. GERD    Subjective     CHIEF COMPLAINT: Breast  "cancer    HISTORY OF PRESENT ILLNESS:   Alcira Phelan returns for follow-up.  Alcira continues to have constant neuropathy in her feet. She has burning in her feet usually in the evenings and at bedtime. She tried Gabapentin 300 mg about 2 hours from bedtime but she states it made her feel like she \"was drunk\".  She tried the capsaicin cream on her feet, but it made the burning much worse.  She denies any peeling or blistering on her feet but states they are red and warm at bedtime.     She is still having right arm pain and is scheduled to see Dr. Montanez today.       Objective      /70 Comment: RUE  Pulse 67   Temp 97.4 °F (36.3 °C) (Temporal)   Resp 16   Ht 152.4 cm (60\")   Wt 60.3 kg (133 lb)   SpO2 99% Comment: RA  BMI 25.97 kg/m²    Vitals:    05/15/25 0920   PainSc: 0-No pain           ECOG score: 0         General: well appearing female in no acute distress  Neuro: alert and oriented  HEENT: sclera anicteric, oropharynx clear  Skin: No rashes  Psych: mood and affect appropriate    RECENT LABS:  Lab Results   Component Value Date    WBC 3.74 04/02/2025    HGB 14.4 04/02/2025    HCT 40.2 04/02/2025    MCV 89.5 04/02/2025     04/02/2025       Lab Results   Component Value Date    GLUCOSE 100 (H) 04/02/2025    BUN 9 04/02/2025    CREATININE 0.72 04/02/2025    EGFRIFNONA 77 02/16/2022    BCR 12.5 04/02/2025    K 4.4 04/02/2025    CO2 24.0 04/02/2025    CALCIUM 9.6 04/02/2025    ALBUMIN 4.0 04/02/2025    AST 31 04/02/2025    ALT 20 04/02/2025     MRI Cervical Spine With & Without Contrast  Narrative: MRI CERVICAL SPINE W WO CONTRAST    Date of Exam: 5/8/2025 2:13 PM EDT    Indication: right arm pain.     Comparison: None available.    Technique:  Routine multiplanar/multisequence sequence images of the cervical spine were obtained before and after the uneventful administration of 10 mL Multihance.      FINDINGS:  Cervical spine alignment appears unremarkable. The craniocervical and " atlantoaxial relationships are normal. Visualized marrow signal is unremarkable. Vertebral body heights are normal. Cervical discs are desiccated, and there is multilevel disc height   loss. Questionable mild T2 signal hyperintensity within the cervical cord at the C5-C6 level (series 4, image 7, series 6, image 22). Mild myelomalacia at this site cannot be excluded. The paraspinal soft tissues appear unremarkable. Limited   visualization of the intracranial structures is unremarkable. No suspicious contrast enhancement is identified.    C2-C3: No significant spinal canal or neural foraminal stenosis.    C3-C4: Broad-based disc bulge, bilateral uncovertebral and facet arthropathy, and ligamentum flavum buckling contributes to mild to moderate spinal canal stenosis with mild to moderate left neural foraminal stenosis. Right neural foramen does not appear   significantly narrowed.    C4-C5: Broad-based disc bulge with bilateral uncovertebral and facet arthropathy and ligamentum flavum buckling. There is mild to moderate spinal canal stenosis with moderate bilateral neural foraminal stenosis.    C5-C6: Broad-based disc bulge with bilateral uncovertebral and facet arthropathy and ligamentum flavum buckling. There is mild to moderate spinal canal stenosis with moderate bilateral neural foraminal stenosis.    C6-C7: Mild posterior disc bulging. No significant spinal canal or neural foraminal stenosis.    C7-T1: No significant spinal canal or neural foraminal stenosis.  Impression: 1.Cervical spondylosis with spinal canal stenosis and bilateral neural foraminal stenosis at C3-C4, C4-C5, and C5-C6. Please see above for level by level description.  2.Questionable mild T2 signal hyperintensity within the cervical cord at the C5-C6 level (series 4, image 7, series 6, image 22). Mild myelomalacia at this site cannot be excluded.  3.No suspicious contrast enhancement is identified.    Electronically Signed: Ba Sheriff MD     5/9/2025 9:02 AM EDT    Workstation ID: [id number]            Assessment & Plan   Alcira Phelan is a 60 y.o. female with metastatic ER positive CA positive HER-2 negative invasive ductal carcinoma, with involvement of lymph nodes and liver.    Last dose of Enhertu given 1/2/2025.  She has completed SBRT to disease and liver 2/14/2025. Her last PET/CT 3/26/2025 shows a good response in the liver and no other evidence of active disease at this time.  Continue letrozole maintenance.  Guardant 360 testing done 3/6/25 showed ASCENCION mutation, no ESR1 mutation. We will plan on repeat PET in 6 weeks to evaluate response to treatment.     Port flush with visits or every 6-8 weeks.  CBC from today was reviewed and is stable. Remaining labs are pending.     Right arm pain: Consultation with Dr. Montanez today.     Neuropathy pain: we discussed decreasing the Gabapentin to 100 mg at bedtime. She would like to retry the Gabapentin 300 mg closer to bedtime to see if she tolerates the dose better. She will contact us if she is not tolerating the gabapentin and I can send in Gabapentin 100 mg at bedtime.      Follow up in  6 weeks with labs and PET/CT prior to return.               Rasheeda Ramirez APRN  [facility]    5/15/2025

## 2025-05-15 NOTE — PATIENT INSTRUCTIONS
Recommendations from Dr. Montanez:    Ref to:  - Orthopedic Surgery (Right low, Dr. Ronald Corley)  - Pain Management  - Physical Therapy  - EMG / Nerve Study  - XR    FUP w/ Dr. Montanez in 90 days once all above completed.     Lucia [phone]

## 2025-05-16 LAB — CANCER AG27-29 SERPL-ACNC: 20.8 U/ML (ref 0–38.6)

## 2025-05-28 ENCOUNTER — OFFICE VISIT (OUTPATIENT)
Dept: PAIN MEDICINE | Facility: CLINIC | Age: 60
End: 2025-05-28
Payer: COMMERCIAL

## 2025-05-28 VITALS — BODY MASS INDEX: 26.31 KG/M2 | HEIGHT: 60 IN | WEIGHT: 134 LBS

## 2025-05-28 DIAGNOSIS — G89.29 CHRONIC RIGHT SHOULDER PAIN: ICD-10-CM

## 2025-05-28 DIAGNOSIS — M54.12 CERVICAL RADICULOPATHY: Primary | ICD-10-CM

## 2025-05-28 DIAGNOSIS — M25.511 CHRONIC RIGHT SHOULDER PAIN: ICD-10-CM

## 2025-05-28 DIAGNOSIS — G89.4 CHRONIC PAIN SYNDROME: ICD-10-CM

## 2025-05-28 NOTE — PROGRESS NOTES
Referring Physician: Jurgen Montanez MD  [address]  [place] [address]    Primary Physician: Batool Blair MD    CHIEF COMPLAINT or REASON FOR VISIT: Neck Pain (New patient)      Initial history of present illness on 05/28/2025:  Ms. Alcira Phelan is 60 y.o. female who presents as a new patient referral for evaluation and treatment of right-sided neck pain with radiation to the right upper extremity.  Pain for started in January/February 2025 without any specific inciting event or trauma.  She does recall getting a COVID shot around that time.  She denies any history of spinal surgery or injection.  She states that it does not particularly bother her as she has a high pain tolerance.  She was evaluated by Dr. Montanez with neurosurgery who had some suspicion for intrinsic shoulder pathology and referred to orthopedics.  She describes numbness and tingling radiating into her right hand.  She has tried NSAIDs and has recently begun physical therapy.    Interval history:    Interventions:      Objective Pain Scoring:   BRIEF PAIN INVENTORY:  Total score:   Pain Score    05/28/25 0848   PainSc: 6    PainLoc: Arm      PHQ-2: 2  PHQ-9: 6  Opioid Risk Tool:         Review of Systems:   ROS negative except as otherwise noted     Past Medical History:   Past Medical History:   Diagnosis Date    Anxiety and depression     Arthritis     Bone cancer     Breast cancer     Cancer     breast cancer    Cellulitis     Cervical disc disorder     GERD (gastroesophageal reflux disease)     Head injuries     hx of trauma to head with coke bottle    Headache     History of breast problem     malignant carcinoma    Joint pain, knee     pt denies this    Localized swelling, mass and lump, neck     pt denies this    Metastatic cancer     Bone Mets (2022) & Liver Mets (2024)    PONV (postoperative nausea and vomiting)     Urinary frequency     Wears glasses          Past Surgical History:   Past Surgical  History:   Procedure Laterality Date    BREAST CAPSULOTOMY, IMPLANT REVISION Bilateral 07/09/2019    Procedure: BREAST CAPSULOTOMY, REMOVAL OLD IMPLANTS, REPLACEMENT OF NEW IMPLANTS FOR BREAST RECONSTRUCTION BILATERAL;  Surgeon: Melanie Sanford MD;  Location: [facility];  Service: Plastics    CARPAL TUNNEL RELEASE Right     COLONOSCOPY  06/06/2017    Dr Johnson Repeat in 2027    CYBERKNIFE  04/15/2022    sternal mass    MASTECTOMY Bilateral 05/30/2007    ROOT CANAL  01/2023         Family History   Family History   Problem Relation Age of Onset    Breast cancer Maternal Aunt     Diabetes Other     Diabetes Mother     Heart attack Father 72    Hypertension Father     No Known Problems Sister     No Known Problems Brother     No Known Problems Maternal Grandmother     No Known Problems Maternal Grandfather     Emphysema Paternal Grandmother     No Known Problems Paternal Grandfather          Social History   Social History     Socioeconomic History    Marital status:    Tobacco Use    Smoking status: Never    Smokeless tobacco: Never    Tobacco comments:     as a teenager   Vaping Use    Vaping status: Never Used   Substance and Sexual Activity    Alcohol use: No    Drug use: No    Sexual activity: Defer        Medications:     Current Outpatient Medications:     cetirizine (zyrTEC) 10 MG tablet, Take 1 tablet by mouth Daily., Disp: , Rfl:     Diclofenac Sodium (VOLTAREN) 1 % gel gel, APPLY 4 GRAMS TO THE PALMAR AND DORSAL SURFACE OF EACH HAND TWO TIMES A DAY, Disp: 200 g, Rfl: 0    Famotidine (PEPCID PO), Take 1 tablet by mouth Daily., Disp: , Rfl:     gabapentin (NEURONTIN) 300 MG capsule, Take 1 capsule by mouth Every Night., Disp: 30 capsule, Rfl: 3    ibuprofen (ADVIL,MOTRIN) 800 MG tablet, Take 1 tablet by mouth 2 (Two) Times a Day As Needed for Mild Pain., Disp: 180 tablet, Rfl: 0    letrozole (FEMARA) 2.5 MG tablet, Take 1 tablet by mouth Daily., Disp: 30 tablet, Rfl: 11    lidocaine-prilocaine  "(EMLA) 2.5-2.5 % cream, Apply 1 Application topically to the appropriate area as directed As Needed (45-60 minutes prior to port access.  Cover with saran/plastic wrap.)., Disp: 30 g, Rfl: 3    Multiple Vitamins-Minerals (MULTI VITAMIN/MINERALS) tablet, Take 1 tablet by mouth Daily., Disp: , Rfl:     ondansetron (ZOFRAN) 8 MG tablet, Take 1 tablet by mouth 3 (Three) Times a Day As Needed for Nausea or Vomiting., Disp: 30 tablet, Rfl: 5    prochlorperazine (COMPAZINE) 5 MG tablet, Take 1 tablet by mouth Every 6 (Six) Hours As Needed for Nausea or Vomiting., Disp: 90 tablet, Rfl: 3    venlafaxine XR (EFFEXOR-XR) 150 MG 24 hr capsule, Take 1 capsule by mouth Daily., Disp: 90 capsule, Rfl: 3    traZODone (DESYREL) 50 MG tablet, Take 1 tablet by mouth At Night As Needed for Sleep., Disp: 90 tablet, Rfl: 1        Physical Exam:     Vitals:    05/28/25 0848   Weight: 60.8 kg (134 lb)   Height: 152.4 cm (60\")   PainSc: 6    PainLoc: Arm        General: Alert and oriented, No acute distress.   HEENT: Normocephalic, atraumatic.   Cardiovascular: No gross edema  Respiratory: Respirations are non-labored    Cervical Spine:   No masses or atrophy  Range of motion - Flexion normal. Extension normal. Lateral rotation normal.   Palpation - nontender   Spurling's -positive right    Thoracic Spine:   Inspection: no gross abnormality  Paraspinal muscle palpation: nontender  Spinous process palpation: nontender       Motor Exam:    Strength: Rate on 1-5 scale Right Left    C5-Elbow flexion, Deltoid 5/5  5/5    C6-Wrist extension 5/5  5/5    C7- Elbow / finger extension 5/5  5/5    C8- Finger flexion 5/5  5/5    T1- Intrinsics hand 5/5  5/5       Sensory Exam: Full and equal sensation to light touch throughout.       Neurologic: Cranial Nerves II-XII are grossly intact.   Ayala’s -negative bilaterally      Psychiatric: Cooperative.   Gait: Normal   Assistive Devices: None    Imaging Studies:   No results found for this or any " previous visit.        Independent review of radiographic imaging: Available for my interpretation is a cervical MRI dated in May 2025 demonstrating bilateral neuroforaminal stenosis at C4/C5 and C5/C6 without high-grade canal stenosis.    Impression & Plan:       05/28/2025: Alcira Phelan is a 60 y.o. female with past medical history significant for malignant neoplasm of left breast, depression, overactive bladder, anxiety, malignant neoplasm metastatic to bone, HLD, GERD who presents to the pain clinic for evaluation and treatment of chronic right-sided neck pain with radiation of the right upper extremity.  MRI interpretation as above.  Evaluation is most consistent with intrinsic right shoulder pathology.  She does have upcoming appoint with orthopedics.  We briefly discussed diagnostic and therapeutic cervical epidural steroid injection however patient does not wish to have any steroid injection at this time.  She will continue physical therapy and reach out if pain is intolerable.    1. Cervical radiculopathy    2. Chronic right shoulder pain    3. Chronic pain syndrome        PLAN:  1. Medications: Recommend Voltaren topical, NSAIDs, Tylenol.  Can trial turmeric 500 mg twice daily if NSAID contraindicated.    2. Physical Therapy: Continue PT    3. Psychological: defer    4. Complementary and alternative (CAM) Therapies:     5. Labs/Diagnostic studies: None indicated     6. Imaging: MRI independently interpreted and reviewed with patient    7. Interventions: Discussed cervical epidural steroid injection, patient declines at this time.    8. Referrals: Pending referral to orthopedics    9. Records: Reviewed neurosurgery notes    10. Lifestyle goals:    Follow-up as needed      [facility]  Francisco Watts MD          Quality Metrics:                Please note that portions of this note were completed with a voice recognition program.      Any copied data in any portion of my  note from previous notes included in the HPI, PE, MDM and/or assessment and plan has been reviewed by myself and accurate as of this date.      The 21st Century Cures Act makes medical notes like this available to patients in the interest of transparency. This is a medical document intended as peer to peer communication. It is written in medical language and may contain abbreviations or verbiage that are unfamiliar. It may appear blunt or direct. Medical documents are intended to carry relevant information, facts as evident, and the clinical opinion of the practitioner.

## 2025-06-17 ENCOUNTER — LAB (OUTPATIENT)
Dept: LAB | Facility: HOSPITAL | Age: 60
End: 2025-06-17
Payer: COMMERCIAL

## 2025-06-17 ENCOUNTER — CLINICAL SUPPORT (OUTPATIENT)
Dept: INTERNAL MEDICINE | Facility: CLINIC | Age: 60
End: 2025-06-17
Payer: COMMERCIAL

## 2025-06-17 DIAGNOSIS — C78.7 CANCER, METASTATIC TO LIVER: ICD-10-CM

## 2025-06-17 DIAGNOSIS — Z17.0 MALIGNANT NEOPLASM OF CENTRAL PORTION OF LEFT BREAST IN FEMALE, ESTROGEN RECEPTOR POSITIVE: ICD-10-CM

## 2025-06-17 DIAGNOSIS — C50.112 MALIGNANT NEOPLASM OF CENTRAL PORTION OF LEFT BREAST IN FEMALE, ESTROGEN RECEPTOR POSITIVE: ICD-10-CM

## 2025-06-17 DIAGNOSIS — Z23 NEED FOR SHINGLES VACCINE: Primary | ICD-10-CM

## 2025-06-17 LAB
ALBUMIN SERPL-MCNC: 4.3 G/DL (ref 3.5–5.2)
ALBUMIN/GLOB SERPL: 1.5 G/DL
ALP SERPL-CCNC: 150 U/L (ref 39–117)
ALT SERPL W P-5'-P-CCNC: 29 U/L (ref 1–33)
ANION GAP SERPL CALCULATED.3IONS-SCNC: 12 MMOL/L (ref 5–15)
AST SERPL-CCNC: 35 U/L (ref 1–32)
BASOPHILS # BLD AUTO: 0.03 10*3/MM3 (ref 0–0.2)
BASOPHILS NFR BLD AUTO: 0.5 % (ref 0–1.5)
BILIRUB SERPL-MCNC: 0.2 MG/DL (ref 0–1.2)
BUN SERPL-MCNC: 12.3 MG/DL (ref 8–23)
BUN/CREAT SERPL: 17.8 (ref 7–25)
CALCIUM SPEC-SCNC: 10 MG/DL (ref 8.6–10.5)
CANCER AG15-3 SERPL-ACNC: 36.3 U/ML
CHLORIDE SERPL-SCNC: 105 MMOL/L (ref 98–107)
CO2 SERPL-SCNC: 25 MMOL/L (ref 22–29)
CREAT SERPL-MCNC: 0.69 MG/DL (ref 0.57–1)
DEPRECATED RDW RBC AUTO: 40.6 FL (ref 37–54)
EGFRCR SERPLBLD CKD-EPI 2021: 98.3 ML/MIN/1.73
EOSINOPHIL # BLD AUTO: 0.28 10*3/MM3 (ref 0–0.4)
EOSINOPHIL NFR BLD AUTO: 4.9 % (ref 0.3–6.2)
ERYTHROCYTE [DISTWIDTH] IN BLOOD BY AUTOMATED COUNT: 12.4 % (ref 12.3–15.4)
GLOBULIN UR ELPH-MCNC: 2.9 GM/DL
GLUCOSE SERPL-MCNC: 127 MG/DL (ref 65–99)
HCT VFR BLD AUTO: 40.7 % (ref 34–46.6)
HGB BLD-MCNC: 14.4 G/DL (ref 12–15.9)
IMM GRANULOCYTES # BLD AUTO: 0.02 10*3/MM3 (ref 0–0.05)
IMM GRANULOCYTES NFR BLD AUTO: 0.3 % (ref 0–0.5)
LYMPHOCYTES # BLD AUTO: 0.92 10*3/MM3 (ref 0.7–3.1)
LYMPHOCYTES NFR BLD AUTO: 16 % (ref 19.6–45.3)
MCH RBC QN AUTO: 31.6 PG (ref 26.6–33)
MCHC RBC AUTO-ENTMCNC: 35.4 G/DL (ref 31.5–35.7)
MCV RBC AUTO: 89.3 FL (ref 79–97)
MONOCYTES # BLD AUTO: 0.48 10*3/MM3 (ref 0.1–0.9)
MONOCYTES NFR BLD AUTO: 8.3 % (ref 5–12)
NEUTROPHILS NFR BLD AUTO: 4.03 10*3/MM3 (ref 1.7–7)
NEUTROPHILS NFR BLD AUTO: 70 % (ref 42.7–76)
NRBC BLD AUTO-RTO: 0 /100 WBC (ref 0–0.2)
PLATELET # BLD AUTO: 211 10*3/MM3 (ref 140–450)
PMV BLD AUTO: 9.8 FL (ref 6–12)
POTASSIUM SERPL-SCNC: 3.8 MMOL/L (ref 3.5–5.2)
PROT SERPL-MCNC: 7.2 G/DL (ref 6–8.5)
RBC # BLD AUTO: 4.56 10*6/MM3 (ref 3.77–5.28)
SODIUM SERPL-SCNC: 142 MMOL/L (ref 136–145)
WBC NRBC COR # BLD AUTO: 5.76 10*3/MM3 (ref 3.4–10.8)

## 2025-06-17 PROCEDURE — 90750 HZV VACC RECOMBINANT IM: CPT | Performed by: INTERNAL MEDICINE

## 2025-06-17 PROCEDURE — 86300 IMMUNOASSAY TUMOR CA 15-3: CPT

## 2025-06-17 PROCEDURE — 36415 COLL VENOUS BLD VENIPUNCTURE: CPT

## 2025-06-17 PROCEDURE — 80053 COMPREHEN METABOLIC PANEL: CPT

## 2025-06-17 PROCEDURE — 85025 COMPLETE CBC W/AUTO DIFF WBC: CPT

## 2025-06-19 LAB — CANCER AG27-29 SERPL-ACNC: 50.7 U/ML (ref 0–38.6)

## 2025-06-20 ENCOUNTER — HOSPITAL ENCOUNTER (OUTPATIENT)
Dept: PET IMAGING | Facility: HOSPITAL | Age: 60
Discharge: HOME | End: 2025-06-20
Payer: COMMERCIAL

## 2025-06-20 DIAGNOSIS — Z17.0 MALIGNANT NEOPLASM OF CENTRAL PORTION OF LEFT BREAST IN FEMALE, ESTROGEN RECEPTOR POSITIVE: ICD-10-CM

## 2025-06-20 DIAGNOSIS — C78.7 CANCER, METASTATIC TO LIVER: ICD-10-CM

## 2025-06-20 DIAGNOSIS — C50.112 MALIGNANT NEOPLASM OF CENTRAL PORTION OF LEFT BREAST IN FEMALE, ESTROGEN RECEPTOR POSITIVE: ICD-10-CM

## 2025-06-20 LAB — GLUCOSE BLDC GLUCOMTR-MCNC: 104 MG/DL (ref 70–130)

## 2025-06-20 PROCEDURE — 78815 PET IMAGE W/CT SKULL-THIGH: CPT

## 2025-06-20 PROCEDURE — 34310000005 FLUDEOXYGLUCOSE F18 SOLUTION: Performed by: NURSE PRACTITIONER

## 2025-06-20 PROCEDURE — 82948 REAGENT STRIP/BLOOD GLUCOSE: CPT

## 2025-06-20 PROCEDURE — A9552 F18 FDG: HCPCS | Performed by: NURSE PRACTITIONER

## 2025-06-20 RX ADMIN — FLUDEOXYGLUCOSE F 18 1 DOSE: 200 INJECTION, SOLUTION INTRAVENOUS at 08:19

## 2025-06-26 ENCOUNTER — OFFICE VISIT (OUTPATIENT)
Dept: ONCOLOGY | Facility: CLINIC | Age: 60
End: 2025-06-26
Payer: COMMERCIAL

## 2025-06-26 ENCOUNTER — HOSPITAL ENCOUNTER (OUTPATIENT)
Dept: ONCOLOGY | Facility: HOSPITAL | Age: 60
Discharge: HOME | End: 2025-06-26
Admitting: INTERNAL MEDICINE
Payer: COMMERCIAL

## 2025-06-26 VITALS
HEIGHT: 60 IN | RESPIRATION RATE: 16 BRPM | HEART RATE: 72 BPM | SYSTOLIC BLOOD PRESSURE: 137 MMHG | BODY MASS INDEX: 26.39 KG/M2 | OXYGEN SATURATION: 97 % | DIASTOLIC BLOOD PRESSURE: 85 MMHG | WEIGHT: 134.4 LBS | TEMPERATURE: 97.1 F

## 2025-06-26 DIAGNOSIS — C79.51 MALIGNANT NEOPLASM METASTATIC TO BONE: Primary | ICD-10-CM

## 2025-06-26 DIAGNOSIS — Z17.0 MALIGNANT NEOPLASM OF CENTRAL PORTION OF LEFT BREAST IN FEMALE, ESTROGEN RECEPTOR POSITIVE: Primary | ICD-10-CM

## 2025-06-26 DIAGNOSIS — C50.112 MALIGNANT NEOPLASM OF CENTRAL PORTION OF LEFT BREAST IN FEMALE, ESTROGEN RECEPTOR POSITIVE: Primary | ICD-10-CM

## 2025-06-26 DIAGNOSIS — Z45.2 ENCOUNTER FOR CARE RELATED TO VASCULAR ACCESS PORT: Primary | ICD-10-CM

## 2025-06-26 DIAGNOSIS — C50.112 MALIGNANT NEOPLASM OF CENTRAL PORTION OF LEFT FEMALE BREAST, UNSPECIFIED ESTROGEN RECEPTOR STATUS: ICD-10-CM

## 2025-06-26 DIAGNOSIS — Z85.3 HISTORY OF LEFT BREAST CANCER: ICD-10-CM

## 2025-06-26 PROCEDURE — 25010000002 HEPARIN LOCK FLUSH PER 10 UNITS: Performed by: INTERNAL MEDICINE

## 2025-06-26 PROCEDURE — 96523 IRRIG DRUG DELIVERY DEVICE: CPT

## 2025-06-26 RX ORDER — SODIUM CHLORIDE 9 MG/ML
20 INJECTION, SOLUTION INTRAVENOUS ONCE
OUTPATIENT
Start: 2025-07-10

## 2025-06-26 RX ORDER — FAMOTIDINE 10 MG/ML
20 INJECTION, SOLUTION INTRAVENOUS AS NEEDED
OUTPATIENT
Start: 2025-07-10

## 2025-06-26 RX ORDER — DIPHENHYDRAMINE HYDROCHLORIDE 50 MG/ML
50 INJECTION, SOLUTION INTRAMUSCULAR; INTRAVENOUS AS NEEDED
OUTPATIENT
Start: 2025-07-10

## 2025-06-26 RX ORDER — HEPARIN SODIUM (PORCINE) LOCK FLUSH IV SOLN 100 UNIT/ML 100 UNIT/ML
500 SOLUTION INTRAVENOUS AS NEEDED
Status: DISCONTINUED | OUTPATIENT
Start: 2025-06-26 | End: 2025-06-28 | Stop reason: HOSPADM

## 2025-06-26 RX ORDER — DIPHENHYDRAMINE HYDROCHLORIDE 50 MG/ML
50 INJECTION, SOLUTION INTRAMUSCULAR; INTRAVENOUS AS NEEDED
OUTPATIENT
Start: 2025-07-03

## 2025-06-26 RX ORDER — SODIUM CHLORIDE 9 MG/ML
20 INJECTION, SOLUTION INTRAVENOUS ONCE
OUTPATIENT
Start: 2025-07-03

## 2025-06-26 RX ORDER — HEPARIN SODIUM (PORCINE) LOCK FLUSH IV SOLN 100 UNIT/ML 100 UNIT/ML
500 SOLUTION INTRAVENOUS AS NEEDED
OUTPATIENT
Start: 2025-06-26

## 2025-06-26 RX ORDER — FAMOTIDINE 10 MG/ML
20 INJECTION, SOLUTION INTRAVENOUS ONCE
OUTPATIENT
Start: 2025-07-03

## 2025-06-26 RX ORDER — ACETAMINOPHEN 325 MG/1
650 TABLET ORAL ONCE
OUTPATIENT
Start: 2025-07-10

## 2025-06-26 RX ORDER — HYDROCORTISONE SODIUM SUCCINATE 100 MG/2ML
100 INJECTION INTRAMUSCULAR; INTRAVENOUS AS NEEDED
OUTPATIENT
Start: 2025-07-03

## 2025-06-26 RX ORDER — FAMOTIDINE 10 MG/ML
20 INJECTION, SOLUTION INTRAVENOUS ONCE
OUTPATIENT
Start: 2025-07-10

## 2025-06-26 RX ORDER — ATROPINE SULFATE 1 MG/ML
0.25 INJECTION, SOLUTION INTRAMUSCULAR; INTRAVENOUS; SUBCUTANEOUS
OUTPATIENT
Start: 2025-07-10

## 2025-06-26 RX ORDER — HYDROCORTISONE SODIUM SUCCINATE 100 MG/2ML
100 INJECTION INTRAMUSCULAR; INTRAVENOUS AS NEEDED
OUTPATIENT
Start: 2025-07-10

## 2025-06-26 RX ORDER — PALONOSETRON 0.05 MG/ML
0.25 INJECTION, SOLUTION INTRAVENOUS ONCE
OUTPATIENT
Start: 2025-07-03

## 2025-06-26 RX ORDER — PALONOSETRON 0.05 MG/ML
0.25 INJECTION, SOLUTION INTRAVENOUS ONCE
OUTPATIENT
Start: 2025-07-10

## 2025-06-26 RX ORDER — FAMOTIDINE 10 MG/ML
20 INJECTION, SOLUTION INTRAVENOUS AS NEEDED
OUTPATIENT
Start: 2025-07-03

## 2025-06-26 RX ORDER — ACETAMINOPHEN 325 MG/1
650 TABLET ORAL ONCE
OUTPATIENT
Start: 2025-07-03

## 2025-06-26 RX ORDER — ATROPINE SULFATE 1 MG/ML
0.25 INJECTION, SOLUTION INTRAMUSCULAR; INTRAVENOUS; SUBCUTANEOUS
OUTPATIENT
Start: 2025-07-03

## 2025-06-26 RX ADMIN — HEPARIN 500 UNITS: 100 SYRINGE at 08:35

## 2025-06-26 NOTE — PROGRESS NOTES
PROBLEM LIST:  1. Local recurrence of HR+ carcinoma of the left breast  A) history of left breast cancer in 2007.  Bilateral mastectomy 5/30/07.  pathology showed grade 2 IDC of the left breast measuring 1.8 cm.  ER+ OR+ Her2 negative.   0/2 SLN involved.  [id number].  B) adjuvant chemotherapy with TC x 4 cycles, completed September 2007 with Dr. De La Torre.  Tamoxifen October 2007 thru October 2012.  C) presented January 2022 with left chest wall mass.  CT chest 1/17/22 showed 4.6 cm lesion involving left manubrium with soft tissue extent extending posteriorly to the anterior aspect of mediastinum.  12 mm nodule right lung base.  D) ultrasound guided biopsy of chest wall mass on 2/10/22.  Pathology showed invasive ductal carcinoma of the breast.  ER positive (100%), OR positive (50%), HER-2 2+  E) letrozole started February 2022.  CyberKnife to the chest wall mass completed 4/15/2022.  Ibrance started 4/18/2022.  Ibrance decreased to 100 mg due to neutropenia 9/5/2022  F) PET/CT 7/5/23 showed a new 2.9 cm hypermetabolic liver lesion, nonenlarged but hypermetabolic bilateral hilar and mediastinal lymph nodes.  [id number] testing showed a PI3 kinase mutation, no ESR 1 mutation.  Treatment changed to fulvestrant and ribociclib.  Fulvestrant started 7/19/2023.  Ribociclib started 7/27/2023.  G) PET/CT on 12/1/2023 showed increasing size of solitary liver lesion.  No additional sites of disease.  Liver biopsy 12/11/2023 showed metastatic breast carcinoma, ER positive OR positive HER2 1+.  Treatment with Xeloda started 12/29/2023.  H) PET/CT 6/10/2024 showed enlargement in size and metabolic activity of the solitary liver metastasis.  Treatment changed to Enhertu, starting 6/19/2024. Enhertu on hold since 1/30/2025.   I)  Completed SBRT for management of disease in her liver on 2/14/25.   J) Enhertu remains on hold. Started Letrozole 3/6/2025.   K) PET/CT 6/20/2025 showed a new liver lesion.  Treatment with Trodelvy  "started 7/3/2025.  2. Depression/anxiety  3. GERD    Subjective     CHIEF COMPLAINT: Breast cancer    HISTORY OF PRESENT ILLNESS:   Alcira Phelan returns for follow-up.  She had a recent pet/ct and presents to review the results.  She says she has been feeling a little more tired.  She did have a little abdominal pain this morning.      Objective      /85   Pulse 72   Temp 97.1 °F (36.2 °C) (Temporal)   Resp 16   Ht 152.4 cm (60\")   Wt 61 kg (134 lb 6.4 oz)   SpO2 97%   BMI 26.25 kg/m²    Vitals:    06/26/25 0845   PainSc: 6    PainLoc: Comment: right arm           ECOG score: 0         General: well appearing female in no acute distress  Neuro: alert and oriented  HEENT: sclera anicteric, oropharynx clear  Skin: No rashes  Psych: mood and affect appropriate    RECENT LABS:  Lab Results   Component Value Date    WBC 5.76 06/17/2025    HGB 14.4 06/17/2025    HCT 40.7 06/17/2025    MCV 89.3 06/17/2025     06/17/2025       Lab Results   Component Value Date    GLUCOSE 127 (H) 06/17/2025    BUN 12.3 06/17/2025    CREATININE 0.69 06/17/2025    EGFRIFNONA 77 02/16/2022    BCR 17.8 06/17/2025    K 3.8 06/17/2025    CO2 25.0 06/17/2025    CALCIUM 10.0 06/17/2025    ALBUMIN 4.3 06/17/2025    AST 35 (H) 06/17/2025    ALT 29 06/17/2025       I personally reviewed the pet/ct images.        Assessment & Plan   Alcira Phelan is a 60 y.o. female with metastatic ER positive HI positive HER-2 negative invasive ductal carcinoma, with involvement of lymph nodes and liver.     She has completed SBRT to disease and liver 2/14/2025. I reviewed the pet/ct and discussed with radiology.  She has a new left lobe liver lesion.  We discussed starting treatment with Trodelvy.  We reviewed potential side effects including nausea, cytopenias, skin rash, fatigue, hair loss, and diarrhea.      Shoulder surgery: She was planning on delaying this anyway because of vacation plans.  We discussed that now that she is starting " chemotherapy we will have to consider the timing of this.  I think if she has evidence of response to treatment it would be reasonable to take a short break from chemo in order for her to have the surgery done..     Neuropathy pain: Continue gabapentin.    Follow up in 4 weeks with cycle 2.    Total time of patient care on day of service including time prior to, face to face with patient, and following visit spent in reviewing records, lab results, imaging studies, discussion with patient, and documentation/charting was > 40 minutes.                Ruth Castro MD  [facility] Hematology and On[facility]    6/26/2025

## 2025-06-30 DIAGNOSIS — C50.112 MALIGNANT NEOPLASM OF CENTRAL PORTION OF LEFT FEMALE BREAST, UNSPECIFIED ESTROGEN RECEPTOR STATUS: ICD-10-CM

## 2025-06-30 DIAGNOSIS — Z85.3 HISTORY OF LEFT BREAST CANCER: Primary | ICD-10-CM

## 2025-06-30 DIAGNOSIS — C79.51 MALIGNANT NEOPLASM METASTATIC TO BONE: ICD-10-CM

## 2025-06-30 RX ORDER — OLANZAPINE 5 MG/1
5 TABLET, FILM COATED ORAL NIGHTLY
Qty: 8 TABLET | Refills: 5 | Status: SHIPPED | OUTPATIENT
Start: 2025-06-30

## 2025-06-30 RX ORDER — ONDANSETRON 8 MG/1
8 TABLET, FILM COATED ORAL 3 TIMES DAILY PRN
Qty: 30 TABLET | Refills: 5 | Status: SHIPPED | OUTPATIENT
Start: 2025-06-30

## 2025-07-02 ENCOUNTER — SPECIALTY PHARMACY (OUTPATIENT)
Dept: ONCOLOGY | Facility: HOSPITAL | Age: 60
End: 2025-07-02
Payer: COMMERCIAL

## 2025-07-02 ENCOUNTER — HOSPITAL ENCOUNTER (OUTPATIENT)
Dept: ONCOLOGY | Facility: HOSPITAL | Age: 60
Discharge: HOME | End: 2025-07-02
Payer: COMMERCIAL

## 2025-07-02 VITALS
RESPIRATION RATE: 16 BRPM | DIASTOLIC BLOOD PRESSURE: 74 MMHG | WEIGHT: 132.4 LBS | HEIGHT: 60 IN | SYSTOLIC BLOOD PRESSURE: 138 MMHG | HEART RATE: 89 BPM | BODY MASS INDEX: 26 KG/M2 | TEMPERATURE: 97.4 F

## 2025-07-02 DIAGNOSIS — C50.112 MALIGNANT NEOPLASM OF CENTRAL PORTION OF LEFT FEMALE BREAST, UNSPECIFIED ESTROGEN RECEPTOR STATUS: ICD-10-CM

## 2025-07-02 DIAGNOSIS — C79.51 MALIGNANT NEOPLASM METASTATIC TO BONE: ICD-10-CM

## 2025-07-02 DIAGNOSIS — Z45.2 ENCOUNTER FOR CARE RELATED TO VASCULAR ACCESS PORT: Primary | ICD-10-CM

## 2025-07-02 DIAGNOSIS — Z85.3 HISTORY OF LEFT BREAST CANCER: ICD-10-CM

## 2025-07-02 LAB
ALBUMIN SERPL-MCNC: 4.2 G/DL (ref 3.5–5.2)
ALBUMIN/GLOB SERPL: 1.4 G/DL
ALP SERPL-CCNC: 151 U/L (ref 39–117)
ALT SERPL W P-5'-P-CCNC: 26 U/L (ref 1–33)
ANION GAP SERPL CALCULATED.3IONS-SCNC: 10.7 MMOL/L (ref 5–15)
AST SERPL-CCNC: 34 U/L (ref 1–32)
BASOPHILS # BLD AUTO: 0.03 10*3/MM3 (ref 0–0.2)
BASOPHILS NFR BLD AUTO: 0.7 % (ref 0–1.5)
BILIRUB SERPL-MCNC: 0.2 MG/DL (ref 0–1.2)
BUN SERPL-MCNC: 17.2 MG/DL (ref 8–23)
BUN/CREAT SERPL: 21.8 (ref 7–25)
CALCIUM SPEC-SCNC: 9.6 MG/DL (ref 8.6–10.5)
CHLORIDE SERPL-SCNC: 104 MMOL/L (ref 98–107)
CO2 SERPL-SCNC: 27.3 MMOL/L (ref 22–29)
CREAT SERPL-MCNC: 0.79 MG/DL (ref 0.57–1)
DEPRECATED RDW RBC AUTO: 41.9 FL (ref 37–54)
EGFRCR SERPLBLD CKD-EPI 2021: 84.7 ML/MIN/1.73
EOSINOPHIL # BLD AUTO: 0.36 10*3/MM3 (ref 0–0.4)
EOSINOPHIL NFR BLD AUTO: 8 % (ref 0.3–6.2)
ERYTHROCYTE [DISTWIDTH] IN BLOOD BY AUTOMATED COUNT: 12.1 % (ref 12.3–15.4)
GLOBULIN UR ELPH-MCNC: 3 GM/DL
GLUCOSE SERPL-MCNC: 90 MG/DL (ref 65–99)
HCT VFR BLD AUTO: 38.6 % (ref 34–46.6)
HGB BLD-MCNC: 13.3 G/DL (ref 12–15.9)
IMM GRANULOCYTES # BLD AUTO: 0.01 10*3/MM3 (ref 0–0.05)
IMM GRANULOCYTES NFR BLD AUTO: 0.2 % (ref 0–0.5)
LYMPHOCYTES # BLD AUTO: 1.09 10*3/MM3 (ref 0.7–3.1)
LYMPHOCYTES NFR BLD AUTO: 24.3 % (ref 19.6–45.3)
MCH RBC QN AUTO: 31.7 PG (ref 26.6–33)
MCHC RBC AUTO-ENTMCNC: 34.5 G/DL (ref 31.5–35.7)
MCV RBC AUTO: 91.9 FL (ref 79–97)
MONOCYTES # BLD AUTO: 0.58 10*3/MM3 (ref 0.1–0.9)
MONOCYTES NFR BLD AUTO: 12.9 % (ref 5–12)
NEUTROPHILS NFR BLD AUTO: 2.42 10*3/MM3 (ref 1.7–7)
NEUTROPHILS NFR BLD AUTO: 53.9 % (ref 42.7–76)
PLATELET # BLD AUTO: 199 10*3/MM3 (ref 140–450)
PMV BLD AUTO: 9.3 FL (ref 6–12)
POTASSIUM SERPL-SCNC: 4.3 MMOL/L (ref 3.5–5.2)
PROT SERPL-MCNC: 7.2 G/DL (ref 6–8.5)
RBC # BLD AUTO: 4.2 10*6/MM3 (ref 3.77–5.28)
SODIUM SERPL-SCNC: 142 MMOL/L (ref 136–145)
WBC NRBC COR # BLD AUTO: 4.49 10*3/MM3 (ref 3.4–10.8)

## 2025-07-02 PROCEDURE — 80053 COMPREHEN METABOLIC PANEL: CPT | Performed by: INTERNAL MEDICINE

## 2025-07-02 PROCEDURE — 25010000002 HEPARIN LOCK FLUSH PER 10 UNITS: Performed by: INTERNAL MEDICINE

## 2025-07-02 PROCEDURE — 85025 COMPLETE CBC W/AUTO DIFF WBC: CPT | Performed by: INTERNAL MEDICINE

## 2025-07-02 PROCEDURE — 36591 DRAW BLOOD OFF VENOUS DEVICE: CPT

## 2025-07-02 RX ORDER — HEPARIN SODIUM (PORCINE) LOCK FLUSH IV SOLN 100 UNIT/ML 100 UNIT/ML
500 SOLUTION INTRAVENOUS AS NEEDED
Status: CANCELLED | OUTPATIENT
Start: 2025-07-02

## 2025-07-02 RX ORDER — HEPARIN SODIUM (PORCINE) LOCK FLUSH IV SOLN 100 UNIT/ML 100 UNIT/ML
500 SOLUTION INTRAVENOUS AS NEEDED
Status: DISCONTINUED | OUTPATIENT
Start: 2025-07-02 | End: 2025-07-03 | Stop reason: HOSPADM

## 2025-07-02 RX ADMIN — HEPARIN 500 UNITS: 100 SYRINGE at 09:59

## 2025-07-02 NOTE — PROGRESS NOTES
Outpatient Infusion  [address] [place] [address]  [phone]      CHEMOTHERAPY EDUCATION    NAME:  Alcira Phelan      : 1965           DATE: 25    Medication Education Sheets: (select all that apply)  Sacituzumab govitecan    Other Education Sheets: (select all that apply)  CINV, Constipation, Diarrhea, and Symptom Tracker Sheet    Chemotherapy Regimen:   OP BREAST Sacituzumab govitecan every 21 days  Sacituzumab govitecan-hziy (Trodelvy) 10 mg/kg IV on days 1 and 8 of a 21 day cycle until disease progression or unacceptable toxicity    TOPICS COMMENTS   ANEMIA:  role of RBC, cause, s/s, ways to manage, role of transfusion Reviewed the role of RBC and the use of transfusions if hemoglobin decreases too much.  Patient to notify us if she experiences shortness of breath, dizziness, or palpitations.  Also let patient know she could feel more tired than usual and to try to stay active, but rest if she needs to.    THROMBOCYTOPENIA:  role of platelet, cause, s/s, ways to prevent bleeding, things to avoid, when to seek help Reviewed the role of platelets in blood clotting and when to call clinic (bloody nose that bleeds for 5 minutes despite pressure, a cut that won't stop bleeding despite pressure, gums that bleed excessively with brushing or flossing). Recommend using an electric razor, soft bristle toothbrush, and blowing the nose gently.    NEUTROPENIA:  role of WBC, cause, infection precautions, s/s of infection, when to call MD Reviewed the role of WBC, good infection prevention practices, and when to call the clinic (temperature 100.4F, sore throat, burning urination, etc).   NUTRITION & APPETITE CHANGES:  importance of maintaining healthy diet & weight, ways to manage to improve intake, dietary consult, exercise regimen, electrolyte and/or blood glucose abnormalities Increased Blood Sugar: This patient's oncology therapy can increase blood sugar.  Reviewed the  symptoms of high blood sugar which include increased thirst, increased urination, and confusion. Electrolyte Abnormalities:  Explained that the oncology therapy may lead to abnormalities in electrolytes, specifically: low albumin, low calcium, low sodium, low potassium, low magnesium, low phosphate.   DIARRHEA:  causes, s/s of dehydration, ways to manage, dietary changes, when to call MD Discussed the risk of diarrhea. Instructed patient on use OTC loperamide with diarrhea onset, but emphasized the importance of calling the clinic if 4-6 episodes in 24 hours not relieved by OTC loperamide.   CONSTIPATION:  causes, ways to manage, dietary changes, when to call MD Provided supplementary handout with instructions for use of docusate and other OTC therapies to manage constipation.  Instructed patient to call the clinic if medications aren't working.    NAUSEA & VOMITING:  cause, use of antiemetics, dietary changes, when to call MD Emetic risk: High  Premeds: Dexamethasone, Fosaprepitant, and Palonosetron  Scheduled home meds: Olanzapine 5 mg by mouth at night on days 1-4 and 8-11 after chemotherapy to prevent delayed nausea  PRN home meds: Ondansetron 8mg PO every 8 hours PRN nausea / vomiting  Pharmacy home meds sent to: [facility] on [address].    Instructed the patient to take the scheduled anti-nausea medications even if she does not feel nauseous.  I explained this is to prevent delayed nausea.    Instructed the patient to take a dose of the PRN medication at the first onset of nausea and if it's not working to call us for additional medications.  Also provided non-drug measures to mitigate nausea.   ALOPECIA:  cause, ways to manage, resources Discussed the possibility of hair loss with the patient. Informed patient that she could request a prescription for a wig if desired and most of the cost is usually covered by insurance. Recommended covering the head with a hat and/or protecting the skin on the head with SPF 30  or higher.    PAIN:  causes, ways to manage Recommended reporting any serious abdominal pain to your care team right away.   SKIN & NAIL CHANGES:  cause, s/s, ways to manage Generalized Rash: Discussed the potential for a rash or itchy skin, offered nonpharmacologic prevention strategies, and instructed the patient to call if a rash develops and worsens.   ORGAN TOXICITIES:  cause, s/s, need for diagnostic tests, labs, when to notify MD Discussed potential effects on organ systems, monitoring, diagnostic tests, labs, and when to notify the clinic. Discussed the signs/symptoms of the following: nephrotoxicity and skin changes.   INFUSION RELATED REACTIONS or INJECTION-SITE REACTION:  Cause, s/s, anaphylaxis, monitoring, etc. Premeds: Acetaminophen, Dexamethasone, Diphenhydramine, and Famotidine    Discussed the risk of an infusion reaction and symptoms such as: fever, chills, dizziness, itchiness or rash, flushing, trouble breathing, wheezing, sudden back pain, or feeling faint.  Instructed the patient to notify her nurse if she starts feeling weird at any point during her infusion.    Discussed the rate of infusion will be slower with the initial dose and can be increased for subsequent doses if he/she tolerates the first dose without a reaction., 1st dose will be given over 3 hours, and Future doses will be given over 1 hour if the 1st dose was well tolerated    Monitoring will occur for 30 minutes after each dose    Reviewed how infusion reactions are managed.   MISCELLANEOUS:  drug interactions, administration, labs, etc. Discussed chemotherapy schedule, lab draws, infusion times, and total expected visit time.   DDIs: No significant DDIs We did discuss the risk of increased drowsiness with olanzapine, cetirizine, prochlorperazine, and gabapentin.  Lab draws: On or before days 1 and 8 of each cycle, no sooner than 3 days early.   INFERTILITY & SEXUALITY:    causes, fertility preservation options, sexuality  changes, ways to manage, importance of birth control IV Oncology Therapy: Reviewed safe sex practices and the importance of minimizing exposure to body fluids for 7 days after each dose of IV oncology therapy. The patient is not of childbearing potential.   HOME CARE:  storing of oral chemo, how to manage bodily fluids IV - Counseled on management of soiled linens and proper flush technique.  Discussed how to manage all the side effects at home and advised when to contact the MD office.     Medications:  Prior to Admission medications    Medication Sig Start Date End Date Taking? Authorizing Provider   cetirizine (zyrTEC) 10 MG tablet Take 1 tablet by mouth Daily.    ProviderRubens MD   Diclofenac Sodium (VOLTAREN) 1 % gel gel APPLY 4 GRAMS TO THE PALMAR AND DORSAL SURFACE OF EACH HAND TWO TIMES A DAY 4/7/25   Rasheeda Ramirez APRN   Famotidine (PEPCID PO) Take 1 tablet by mouth Daily.    ProviderRubens MD   gabapentin (NEURONTIN) 300 MG capsule Take 1 capsule by mouth Every Night. 4/17/25   Rasheeda Ramirez APRN   ibuprofen (ADVIL,MOTRIN) 800 MG tablet Take 1 tablet by mouth 2 (Two) Times a Day As Needed for Mild Pain. 1/28/25   Batool Blair MD   letrozole (FEMARA) 2.5 MG tablet Take 1 tablet by mouth Daily. 3/6/25   Rasheeda Ramirez APRN   Multiple Vitamins-Minerals (MULTI VITAMIN/MINERALS) tablet Take 1 tablet by mouth Daily. 2/20/13   Rubens Lind MD   OLANZapine (zyPREXA) 5 MG tablet Take 1 tablet by mouth Every Night. Take on days 1, 2, 3, and 4 and Days 8, 9, 10, 11 after chemotherapy. 6/30/25   Ruth Castro MD   ondansetron (ZOFRAN) 8 MG tablet Take 1 tablet by mouth 3 (Three) Times a Day As Needed for Nausea or Vomiting. 6/30/25   Ruth Castro MD   prochlorperazine (COMPAZINE) 5 MG tablet Take 1 tablet by mouth Every 6 (Six) Hours As Needed for Nausea or Vomiting. 7/31/24   Rasheeda Ramirez APRN   venlafaxine XR (EFFEXOR-XR) 150 MG 24 hr capsule Take 1 capsule by  mouth Daily. 1/28/25   Batool Blair MD       Notes: All questions and concerns were addressed. Provided a personalized treatment calendar to patient (includes treatment and lab schedule). Provided patient with contact information for the pharmacist and clinic while instructing her to call if any questions or concerns arise. Informed consent for treatment was obtained. Patient was receptive to information and expressed understanding.     Regine Remy, [place], [facility]  Clinic Specialty Oncology Pharmacist  Phone [phone]      7/2/2025  10:52 EDT

## 2025-07-03 ENCOUNTER — DOCUMENTATION (OUTPATIENT)
Dept: NUTRITION | Facility: HOSPITAL | Age: 60
End: 2025-07-03
Payer: COMMERCIAL

## 2025-07-03 ENCOUNTER — HOSPITAL ENCOUNTER (OUTPATIENT)
Dept: ONCOLOGY | Facility: HOSPITAL | Age: 60
Discharge: HOME | End: 2025-07-03
Admitting: INTERNAL MEDICINE
Payer: COMMERCIAL

## 2025-07-03 VITALS
BODY MASS INDEX: 26.25 KG/M2 | HEIGHT: 60 IN | DIASTOLIC BLOOD PRESSURE: 69 MMHG | TEMPERATURE: 98 F | WEIGHT: 133.7 LBS | SYSTOLIC BLOOD PRESSURE: 139 MMHG | HEART RATE: 73 BPM

## 2025-07-03 DIAGNOSIS — Z45.2 ENCOUNTER FOR CARE RELATED TO VASCULAR ACCESS PORT: ICD-10-CM

## 2025-07-03 DIAGNOSIS — C50.112 MALIGNANT NEOPLASM OF CENTRAL PORTION OF LEFT BREAST IN FEMALE, ESTROGEN RECEPTOR POSITIVE: ICD-10-CM

## 2025-07-03 DIAGNOSIS — C79.51 MALIGNANT NEOPLASM METASTATIC TO BONE: ICD-10-CM

## 2025-07-03 DIAGNOSIS — Z17.0 MALIGNANT NEOPLASM OF CENTRAL PORTION OF LEFT BREAST IN FEMALE, ESTROGEN RECEPTOR POSITIVE: ICD-10-CM

## 2025-07-03 DIAGNOSIS — Z85.3 HISTORY OF LEFT BREAST CANCER: Primary | ICD-10-CM

## 2025-07-03 DIAGNOSIS — C50.112 MALIGNANT NEOPLASM OF CENTRAL PORTION OF LEFT FEMALE BREAST, UNSPECIFIED ESTROGEN RECEPTOR STATUS: ICD-10-CM

## 2025-07-03 PROCEDURE — 25010000002 DEXAMETHASONE SODIUM PHOSPHATE 100 MG/10ML SOLUTION: Performed by: INTERNAL MEDICINE

## 2025-07-03 PROCEDURE — 96415 CHEMO IV INFUSION ADDL HR: CPT

## 2025-07-03 PROCEDURE — 25810000003 SODIUM CHLORIDE 0.9 % SOLUTION 250 ML FLEX CONT: Performed by: INTERNAL MEDICINE

## 2025-07-03 PROCEDURE — 25010000002 HEPARIN LOCK FLUSH PER 10 UNITS: Performed by: INTERNAL MEDICINE

## 2025-07-03 PROCEDURE — 25010000002 FAMOTIDINE 10 MG/ML SOLUTION: Performed by: INTERNAL MEDICINE

## 2025-07-03 PROCEDURE — 96367 TX/PROPH/DG ADDL SEQ IV INF: CPT

## 2025-07-03 PROCEDURE — 25010000002 PALONOSETRON PER 25 MCG: Performed by: INTERNAL MEDICINE

## 2025-07-03 PROCEDURE — 25010000002 FOSAPREPITANT PER 1 MG: Performed by: INTERNAL MEDICINE

## 2025-07-03 PROCEDURE — 96413 CHEMO IV INFUSION 1 HR: CPT

## 2025-07-03 PROCEDURE — 96375 TX/PRO/DX INJ NEW DRUG ADDON: CPT

## 2025-07-03 PROCEDURE — 25010000002 SACITUZUMAB GOVITECAN-HZIY 180 MG RECONSTITUTED SOLUTION 1 EACH VIAL: Performed by: INTERNAL MEDICINE

## 2025-07-03 PROCEDURE — 25010000002 DIPHENHYDRAMINE PER 50 MG: Performed by: INTERNAL MEDICINE

## 2025-07-03 PROCEDURE — 25810000003 SODIUM CHLORIDE 0.9 % SOLUTION: Performed by: INTERNAL MEDICINE

## 2025-07-03 RX ORDER — HEPARIN SODIUM (PORCINE) LOCK FLUSH IV SOLN 100 UNIT/ML 100 UNIT/ML
500 SOLUTION INTRAVENOUS AS NEEDED
OUTPATIENT
Start: 2025-07-03

## 2025-07-03 RX ORDER — ACETAMINOPHEN 325 MG/1
650 TABLET ORAL ONCE
Status: COMPLETED | OUTPATIENT
Start: 2025-07-03 | End: 2025-07-03

## 2025-07-03 RX ORDER — SODIUM CHLORIDE 9 MG/ML
20 INJECTION, SOLUTION INTRAVENOUS ONCE
Status: COMPLETED | OUTPATIENT
Start: 2025-07-03 | End: 2025-07-03

## 2025-07-03 RX ORDER — HYDROCORTISONE SODIUM SUCCINATE 100 MG/2ML
100 INJECTION INTRAMUSCULAR; INTRAVENOUS AS NEEDED
Status: DISCONTINUED | OUTPATIENT
Start: 2025-07-03 | End: 2025-07-04 | Stop reason: HOSPADM

## 2025-07-03 RX ORDER — ATROPINE SULFATE 1 MG/ML
0.25 INJECTION, SOLUTION INTRAMUSCULAR; INTRAVENOUS; SUBCUTANEOUS
Status: DISCONTINUED | OUTPATIENT
Start: 2025-07-03 | End: 2025-07-04 | Stop reason: HOSPADM

## 2025-07-03 RX ORDER — HEPARIN SODIUM (PORCINE) LOCK FLUSH IV SOLN 100 UNIT/ML 100 UNIT/ML
500 SOLUTION INTRAVENOUS AS NEEDED
Status: DISCONTINUED | OUTPATIENT
Start: 2025-07-03 | End: 2025-07-04 | Stop reason: HOSPADM

## 2025-07-03 RX ORDER — FAMOTIDINE 10 MG/ML
20 INJECTION, SOLUTION INTRAVENOUS AS NEEDED
Status: DISCONTINUED | OUTPATIENT
Start: 2025-07-03 | End: 2025-07-04 | Stop reason: HOSPADM

## 2025-07-03 RX ORDER — DIPHENHYDRAMINE HYDROCHLORIDE 50 MG/ML
50 INJECTION, SOLUTION INTRAMUSCULAR; INTRAVENOUS AS NEEDED
Status: DISCONTINUED | OUTPATIENT
Start: 2025-07-03 | End: 2025-07-04 | Stop reason: HOSPADM

## 2025-07-03 RX ORDER — PALONOSETRON 0.05 MG/ML
0.25 INJECTION, SOLUTION INTRAVENOUS ONCE
Status: COMPLETED | OUTPATIENT
Start: 2025-07-03 | End: 2025-07-03

## 2025-07-03 RX ORDER — FAMOTIDINE 10 MG/ML
20 INJECTION, SOLUTION INTRAVENOUS ONCE
Status: COMPLETED | OUTPATIENT
Start: 2025-07-03 | End: 2025-07-03

## 2025-07-03 RX ADMIN — ACETAMINOPHEN 650 MG: 325 TABLET ORAL at 09:00

## 2025-07-03 RX ADMIN — DIPHENHYDRAMINE HYDROCHLORIDE 50 MG: 50 INJECTION INTRAMUSCULAR; INTRAVENOUS at 08:57

## 2025-07-03 RX ADMIN — FAMOTIDINE 20 MG: 10 INJECTION, SOLUTION INTRAVENOUS at 08:56

## 2025-07-03 RX ADMIN — PALONOSETRON HYDROCHLORIDE 0.25 MG: 0.25 INJECTION INTRAVENOUS at 08:56

## 2025-07-03 RX ADMIN — SODIUM CHLORIDE 20 ML/HR: 9 INJECTION, SOLUTION INTRAVENOUS at 08:16

## 2025-07-03 RX ADMIN — DEXAMETHASONE SODIUM PHOSPHATE 12 MG: 10 INJECTION, SOLUTION INTRAMUSCULAR; INTRAVENOUS at 08:17

## 2025-07-03 RX ADMIN — FOSAPREPITANT 150 MG: 150 INJECTION, POWDER, LYOPHILIZED, FOR SOLUTION INTRAVENOUS at 08:17

## 2025-07-03 RX ADMIN — SODIUM CHLORIDE 610 MG: 9 INJECTION, SOLUTION INTRAVENOUS at 09:23

## 2025-07-03 RX ADMIN — HEPARIN 500 UNITS: 100 SYRINGE at 13:15

## 2025-07-03 NOTE — PROGRESS NOTES
ONC Nutrition    Diagnosis:   ER positive NM positive HER-2 negative invasive ductal carcinoma left breast / original diagnosis 2007   Recurrence diagnosis 1/2022   PET/CT 7/5/23 showed a new 2.9 cm hypermetabolic liver lesion, nonenlarged but hypermetabolic bilateral hilar and mediastinal lymph nodes   PET/CT on 12/1/2023 showed increasing size of solitary liver lesion   PET/CT 6/10/2024 showed enlargement in size and metabolic activity of the solitary liver metastasis   PET/CT 6/20/2025 showed a new liver lesion     Surgery: Bilateral mastectomy 5/30/07      Radiation:   SBRT on the CyberKnife - 50 Gy in 5 fractions delivered to the recurrent sternal mass  (completed 4/15/2022)     2.   Completed SBRT for management of disease in her liver on  2/14/25     Adjuvant Treatment:    TC x 4 cycles (completed 9/2007) followed  by Tamoxifen 10/2007 - 10/2012  2.   Letrozole / Ibrance (2/2022 - 7/2023 - d/c'd due to progression)  3.   OP BREAST Fulvestrant / Ribociclib (7/2023 - 12/2023)  4.   Xeloda (12/2023 - 6/2024)  5.   Enhertu 6/24-1/30/25  6.   Letrozole 3/6/2025   7.   Trodelvy started 7/3/2025     Weight - 133.7# / stable range past ~6 months / patient desires to keep weight in 135 lbs range    Followed up with patient as she begins new treatment plan - Trodelvy.  She is doing well; weight has remained stable; no nutritional issues noted.      Reviewed nutritional intake which indicates that patient eats lightly for breakfast, snack pack with cheese, nuts and fruit for lunch and her larger meal at dinner. Reviewed the importance of nutrition with diagnosis and treatment plan, focusing on adequate calorie, protein, nutrient and hydration. Encouraged focus on healthy food choices that would support immune system and nutritional status.   Discussed indication for higher protein intake and reviewed high protein foods with encouragement to include at each meal / snack. Provided suggestions for foods to keep on hand for  snacking. Protein goal 65-70+ grams.  Reviewed importance of maintaining adequate hydration. Addressed all of patient's questions.    Provided written information for high protein food list and macronutrients.    Will follow for tolerance of new treatment plan.  Patient has contact information for any additional questions.

## 2025-07-07 ENCOUNTER — DOCUMENTATION (OUTPATIENT)
Dept: OTHER | Facility: HOSPITAL | Age: 60
End: 2025-07-07
Payer: COMMERCIAL

## 2025-07-07 NOTE — PROGRESS NOTES
Distress Screening Follow-up    Name: Alcira Phelan    : 1965    Diagnosis: Malignant neoplasm of left female breast     Location of Distress Screening: Infusion    Distress Level: 4 (7/3/2025  8:21 AM)      Physical Concerns:  Fatigue: Y  Memory or concentration: Y  Changes in eating: Y  Loss or change of physical abilities: Y      Emotional Concerns:  Worry or anxiety: Y  Sadness or depression: Y  Grief or loss: Y  Fear: Y  Loneliness: Y      Social Concerns:  Relationship with friends or coworkers: Y      Practical Concerns:       Spiritual Concerns:  Relationship with the sacred: Y       Interventions:        Comments:  SW contacted pt to follow up on Distress Screen from recent visit. SW provided introductions and explained nature of the call. Pt communicated she is doing well, and feeling good after treatment. SW inquired about psychosocial needs, to which pt denied any needs at this time. SW provided contact information should needs arise. Pt thanked MIYA for the call and was agreeable to do so. SW will be available ongoing.

## 2025-07-08 DIAGNOSIS — C78.7 CANCER, METASTATIC TO LIVER: Primary | ICD-10-CM

## 2025-07-08 DIAGNOSIS — Z95.828 PORT-A-CATH IN PLACE: ICD-10-CM

## 2025-07-08 RX ORDER — LIDOCAINE AND PRILOCAINE 25; 25 MG/G; MG/G
1 CREAM TOPICAL AS NEEDED
Qty: 30 G | Refills: 3 | Status: SHIPPED | OUTPATIENT
Start: 2025-07-08

## 2025-07-10 ENCOUNTER — HOSPITAL ENCOUNTER (OUTPATIENT)
Dept: ONCOLOGY | Facility: HOSPITAL | Age: 60
Discharge: HOME | End: 2025-07-10
Admitting: INTERNAL MEDICINE
Payer: COMMERCIAL

## 2025-07-10 ENCOUNTER — APPOINTMENT (OUTPATIENT)
Dept: ONCOLOGY | Facility: HOSPITAL | Age: 60
End: 2025-07-10
Payer: COMMERCIAL

## 2025-07-10 VITALS
BODY MASS INDEX: 26.11 KG/M2 | HEART RATE: 71 BPM | TEMPERATURE: 98 F | WEIGHT: 133 LBS | HEIGHT: 60 IN | RESPIRATION RATE: 18 BRPM | SYSTOLIC BLOOD PRESSURE: 139 MMHG | DIASTOLIC BLOOD PRESSURE: 86 MMHG

## 2025-07-10 DIAGNOSIS — G47.00 INSOMNIA, UNSPECIFIED TYPE: Primary | ICD-10-CM

## 2025-07-10 DIAGNOSIS — C50.112 MALIGNANT NEOPLASM OF CENTRAL PORTION OF LEFT FEMALE BREAST, UNSPECIFIED ESTROGEN RECEPTOR STATUS: Primary | ICD-10-CM

## 2025-07-10 DIAGNOSIS — C79.51 MALIGNANT NEOPLASM METASTATIC TO BONE: ICD-10-CM

## 2025-07-10 DIAGNOSIS — Z45.2 ENCOUNTER FOR CARE RELATED TO VASCULAR ACCESS PORT: ICD-10-CM

## 2025-07-10 DIAGNOSIS — Z85.3 HISTORY OF LEFT BREAST CANCER: ICD-10-CM

## 2025-07-10 LAB
ALBUMIN SERPL-MCNC: 4.2 G/DL (ref 3.5–5.2)
ALBUMIN/GLOB SERPL: 1.6 G/DL
ALP SERPL-CCNC: 129 U/L (ref 39–117)
ALT SERPL W P-5'-P-CCNC: 34 U/L (ref 1–33)
ANION GAP SERPL CALCULATED.3IONS-SCNC: 10.8 MMOL/L (ref 5–15)
AST SERPL-CCNC: 36 U/L (ref 1–32)
BASOPHILS # BLD AUTO: 0.01 10*3/MM3 (ref 0–0.2)
BASOPHILS NFR BLD AUTO: 0.3 % (ref 0–1.5)
BILIRUB SERPL-MCNC: 0.2 MG/DL (ref 0–1.2)
BUN SERPL-MCNC: 15.2 MG/DL (ref 8–23)
BUN/CREAT SERPL: 22.7 (ref 7–25)
CALCIUM SPEC-SCNC: 9.3 MG/DL (ref 8.6–10.5)
CHLORIDE SERPL-SCNC: 104 MMOL/L (ref 98–107)
CO2 SERPL-SCNC: 24.2 MMOL/L (ref 22–29)
CREAT SERPL-MCNC: 0.67 MG/DL (ref 0.57–1)
DEPRECATED RDW RBC AUTO: 39.7 FL (ref 37–54)
EGFRCR SERPLBLD CKD-EPI 2021: 99 ML/MIN/1.73
EOSINOPHIL # BLD AUTO: 0.23 10*3/MM3 (ref 0–0.4)
EOSINOPHIL NFR BLD AUTO: 6.8 % (ref 0.3–6.2)
ERYTHROCYTE [DISTWIDTH] IN BLOOD BY AUTOMATED COUNT: 11.8 % (ref 12.3–15.4)
GLOBULIN UR ELPH-MCNC: 2.6 GM/DL
GLUCOSE SERPL-MCNC: 92 MG/DL (ref 65–99)
HCT VFR BLD AUTO: 34.8 % (ref 34–46.6)
HGB BLD-MCNC: 12.1 G/DL (ref 12–15.9)
IMM GRANULOCYTES # BLD AUTO: 0.02 10*3/MM3 (ref 0–0.05)
IMM GRANULOCYTES NFR BLD AUTO: 0.6 % (ref 0–0.5)
LYMPHOCYTES # BLD AUTO: 1.17 10*3/MM3 (ref 0.7–3.1)
LYMPHOCYTES NFR BLD AUTO: 34.4 % (ref 19.6–45.3)
MCH RBC QN AUTO: 31.8 PG (ref 26.6–33)
MCHC RBC AUTO-ENTMCNC: 34.8 G/DL (ref 31.5–35.7)
MCV RBC AUTO: 91.3 FL (ref 79–97)
MONOCYTES # BLD AUTO: 0.36 10*3/MM3 (ref 0.1–0.9)
MONOCYTES NFR BLD AUTO: 10.6 % (ref 5–12)
NEUTROPHILS NFR BLD AUTO: 1.61 10*3/MM3 (ref 1.7–7)
NEUTROPHILS NFR BLD AUTO: 47.3 % (ref 42.7–76)
PLATELET # BLD AUTO: 180 10*3/MM3 (ref 140–450)
PMV BLD AUTO: 9.5 FL (ref 6–12)
POTASSIUM SERPL-SCNC: 4.3 MMOL/L (ref 3.5–5.2)
PROT SERPL-MCNC: 6.8 G/DL (ref 6–8.5)
RBC # BLD AUTO: 3.81 10*6/MM3 (ref 3.77–5.28)
SODIUM SERPL-SCNC: 139 MMOL/L (ref 136–145)
WBC NRBC COR # BLD AUTO: 3.4 10*3/MM3 (ref 3.4–10.8)

## 2025-07-10 PROCEDURE — 25010000002 DEXAMETHASONE SODIUM PHOSPHATE 100 MG/10ML SOLUTION: Performed by: INTERNAL MEDICINE

## 2025-07-10 PROCEDURE — 25810000003 SODIUM CHLORIDE 0.9 % SOLUTION 250 ML FLEX CONT: Performed by: INTERNAL MEDICINE

## 2025-07-10 PROCEDURE — 80053 COMPREHEN METABOLIC PANEL: CPT | Performed by: INTERNAL MEDICINE

## 2025-07-10 PROCEDURE — 96374 THER/PROPH/DIAG INJ IV PUSH: CPT

## 2025-07-10 PROCEDURE — 96413 CHEMO IV INFUSION 1 HR: CPT

## 2025-07-10 PROCEDURE — 96375 TX/PRO/DX INJ NEW DRUG ADDON: CPT

## 2025-07-10 PROCEDURE — 25010000002 DIPHENHYDRAMINE PER 50 MG: Performed by: INTERNAL MEDICINE

## 2025-07-10 PROCEDURE — 85025 COMPLETE CBC W/AUTO DIFF WBC: CPT | Performed by: INTERNAL MEDICINE

## 2025-07-10 PROCEDURE — 25010000002 HEPARIN LOCK FLUSH PER 10 UNITS: Performed by: INTERNAL MEDICINE

## 2025-07-10 PROCEDURE — 96365 THER/PROPH/DIAG IV INF INIT: CPT

## 2025-07-10 PROCEDURE — 25010000002 FOSAPREPITANT PER 1 MG: Performed by: INTERNAL MEDICINE

## 2025-07-10 PROCEDURE — 25010000002 SACITUZUMAB GOVITECAN-HZIY 180 MG RECONSTITUTED SOLUTION 1 EACH VIAL: Performed by: INTERNAL MEDICINE

## 2025-07-10 PROCEDURE — 25810000003 SODIUM CHLORIDE 0.9 % SOLUTION: Performed by: INTERNAL MEDICINE

## 2025-07-10 PROCEDURE — 25010000002 PALONOSETRON PER 25 MCG: Performed by: INTERNAL MEDICINE

## 2025-07-10 PROCEDURE — 25010000002 FAMOTIDINE 10 MG/ML SOLUTION: Performed by: INTERNAL MEDICINE

## 2025-07-10 RX ORDER — TEMAZEPAM 7.5 MG/1
7.5 CAPSULE ORAL NIGHTLY PRN
Qty: 30 CAPSULE | Refills: 5 | Status: SHIPPED | OUTPATIENT
Start: 2025-07-10

## 2025-07-10 RX ORDER — FAMOTIDINE 10 MG/ML
20 INJECTION, SOLUTION INTRAVENOUS ONCE
Status: COMPLETED | OUTPATIENT
Start: 2025-07-10 | End: 2025-07-10

## 2025-07-10 RX ORDER — PALONOSETRON 0.05 MG/ML
0.25 INJECTION, SOLUTION INTRAVENOUS ONCE
Status: COMPLETED | OUTPATIENT
Start: 2025-07-10 | End: 2025-07-10

## 2025-07-10 RX ORDER — HEPARIN SODIUM (PORCINE) LOCK FLUSH IV SOLN 100 UNIT/ML 100 UNIT/ML
500 SOLUTION INTRAVENOUS AS NEEDED
Status: DISCONTINUED | OUTPATIENT
Start: 2025-07-10 | End: 2025-07-11 | Stop reason: HOSPADM

## 2025-07-10 RX ORDER — ACETAMINOPHEN 325 MG/1
650 TABLET ORAL ONCE
Status: COMPLETED | OUTPATIENT
Start: 2025-07-10 | End: 2025-07-10

## 2025-07-10 RX ORDER — HEPARIN SODIUM (PORCINE) LOCK FLUSH IV SOLN 100 UNIT/ML 100 UNIT/ML
500 SOLUTION INTRAVENOUS AS NEEDED
OUTPATIENT
Start: 2025-07-10

## 2025-07-10 RX ORDER — ATROPINE SULFATE 1 MG/ML
0.25 INJECTION, SOLUTION INTRAMUSCULAR; INTRAVENOUS; SUBCUTANEOUS
Status: DISCONTINUED | OUTPATIENT
Start: 2025-07-10 | End: 2025-07-11 | Stop reason: HOSPADM

## 2025-07-10 RX ORDER — SODIUM CHLORIDE 9 MG/ML
20 INJECTION, SOLUTION INTRAVENOUS ONCE
Status: COMPLETED | OUTPATIENT
Start: 2025-07-10 | End: 2025-07-10

## 2025-07-10 RX ADMIN — DIPHENHYDRAMINE HYDROCHLORIDE 25 MG: 50 INJECTION, SOLUTION INTRAMUSCULAR; INTRAVENOUS at 12:09

## 2025-07-10 RX ADMIN — SODIUM CHLORIDE 20 ML/HR: 9 INJECTION, SOLUTION INTRAVENOUS at 11:30

## 2025-07-10 RX ADMIN — FAMOTIDINE 20 MG: 10 INJECTION, SOLUTION INTRAVENOUS at 12:00

## 2025-07-10 RX ADMIN — DEXAMETHASONE SODIUM PHOSPHATE 12 MG: 10 INJECTION, SOLUTION INTRAMUSCULAR; INTRAVENOUS at 12:06

## 2025-07-10 RX ADMIN — HEPARIN 500 UNITS: 100 SYRINGE at 14:56

## 2025-07-10 RX ADMIN — FOSAPREPITANT 150 MG: 150 INJECTION, POWDER, LYOPHILIZED, FOR SOLUTION INTRAVENOUS at 12:00

## 2025-07-10 RX ADMIN — SODIUM CHLORIDE 610 MG: 9 INJECTION, SOLUTION INTRAVENOUS at 13:10

## 2025-07-10 RX ADMIN — PALONOSETRON HYDROCHLORIDE 0.25 MG: 0.25 INJECTION, SOLUTION INTRAVENOUS at 12:03

## 2025-07-10 RX ADMIN — ACETAMINOPHEN 650 MG: 325 TABLET ORAL at 11:58

## 2025-07-16 DIAGNOSIS — C79.51 MALIGNANT NEOPLASM METASTATIC TO BONE: Primary | ICD-10-CM

## 2025-07-16 DIAGNOSIS — Z85.3 HISTORY OF LEFT BREAST CANCER: ICD-10-CM

## 2025-07-16 DIAGNOSIS — C50.112 MALIGNANT NEOPLASM OF CENTRAL PORTION OF LEFT FEMALE BREAST, UNSPECIFIED ESTROGEN RECEPTOR STATUS: ICD-10-CM

## 2025-07-23 DIAGNOSIS — G47.00 INSOMNIA, UNSPECIFIED TYPE: Primary | ICD-10-CM

## 2025-07-23 DIAGNOSIS — F41.9 ANXIETY: ICD-10-CM

## 2025-07-23 RX ORDER — ALPRAZOLAM 0.5 MG
0.5 TABLET ORAL NIGHTLY PRN
Qty: 30 TABLET | Refills: 0 | Status: SHIPPED | OUTPATIENT
Start: 2025-07-23

## 2025-07-24 ENCOUNTER — OFFICE VISIT (OUTPATIENT)
Dept: ONCOLOGY | Facility: CLINIC | Age: 60
End: 2025-07-24
Payer: COMMERCIAL

## 2025-07-24 ENCOUNTER — APPOINTMENT (OUTPATIENT)
Dept: ONCOLOGY | Facility: HOSPITAL | Age: 60
End: 2025-07-24
Payer: COMMERCIAL

## 2025-07-24 ENCOUNTER — HOSPITAL ENCOUNTER (OUTPATIENT)
Dept: ONCOLOGY | Facility: HOSPITAL | Age: 60
Discharge: HOME | End: 2025-07-24
Admitting: INTERNAL MEDICINE
Payer: COMMERCIAL

## 2025-07-24 VITALS
BODY MASS INDEX: 26.82 KG/M2 | SYSTOLIC BLOOD PRESSURE: 131 MMHG | TEMPERATURE: 97.5 F | HEART RATE: 71 BPM | DIASTOLIC BLOOD PRESSURE: 77 MMHG | WEIGHT: 136.6 LBS | RESPIRATION RATE: 16 BRPM | HEIGHT: 60 IN | OXYGEN SATURATION: 99 %

## 2025-07-24 DIAGNOSIS — C79.51 MALIGNANT NEOPLASM METASTATIC TO BONE: ICD-10-CM

## 2025-07-24 DIAGNOSIS — C50.112 MALIGNANT NEOPLASM OF CENTRAL PORTION OF LEFT FEMALE BREAST, UNSPECIFIED ESTROGEN RECEPTOR STATUS: Primary | ICD-10-CM

## 2025-07-24 DIAGNOSIS — Z85.3 HISTORY OF LEFT BREAST CANCER: ICD-10-CM

## 2025-07-24 DIAGNOSIS — C50.112 MALIGNANT NEOPLASM OF CENTRAL PORTION OF LEFT FEMALE BREAST, UNSPECIFIED ESTROGEN RECEPTOR STATUS: ICD-10-CM

## 2025-07-24 DIAGNOSIS — Z45.2 ENCOUNTER FOR CARE RELATED TO VASCULAR ACCESS PORT: ICD-10-CM

## 2025-07-24 DIAGNOSIS — C79.51 MALIGNANT NEOPLASM METASTATIC TO BONE: Primary | ICD-10-CM

## 2025-07-24 LAB
ALBUMIN SERPL-MCNC: 3.9 G/DL (ref 3.5–5.2)
ALBUMIN/GLOB SERPL: 1.7 G/DL
ALP SERPL-CCNC: 132 U/L (ref 39–117)
ALT SERPL W P-5'-P-CCNC: 26 U/L (ref 1–33)
ANION GAP SERPL CALCULATED.3IONS-SCNC: 7.4 MMOL/L (ref 5–15)
AST SERPL-CCNC: 29 U/L (ref 1–32)
BASOPHILS # BLD AUTO: 0.03 10*3/MM3 (ref 0–0.2)
BASOPHILS NFR BLD AUTO: 0.9 % (ref 0–1.5)
BILIRUB SERPL-MCNC: <0.2 MG/DL (ref 0–1.2)
BUN SERPL-MCNC: 11.9 MG/DL (ref 8–23)
BUN/CREAT SERPL: 17 (ref 7–25)
CALCIUM SPEC-SCNC: 9.2 MG/DL (ref 8.6–10.5)
CHLORIDE SERPL-SCNC: 108 MMOL/L (ref 98–107)
CO2 SERPL-SCNC: 26.6 MMOL/L (ref 22–29)
CREAT SERPL-MCNC: 0.7 MG/DL (ref 0.57–1)
DEPRECATED RDW RBC AUTO: 44.1 FL (ref 37–54)
EGFRCR SERPLBLD CKD-EPI 2021: 97.9 ML/MIN/1.73
EOSINOPHIL # BLD AUTO: 0.19 10*3/MM3 (ref 0–0.4)
EOSINOPHIL NFR BLD AUTO: 5.8 % (ref 0.3–6.2)
ERYTHROCYTE [DISTWIDTH] IN BLOOD BY AUTOMATED COUNT: 13 % (ref 12.3–15.4)
GLOBULIN UR ELPH-MCNC: 2.3 GM/DL
GLUCOSE SERPL-MCNC: 101 MG/DL (ref 65–99)
HCT VFR BLD AUTO: 33.9 % (ref 34–46.6)
HGB BLD-MCNC: 11.7 G/DL (ref 12–15.9)
IMM GRANULOCYTES # BLD AUTO: 0.01 10*3/MM3 (ref 0–0.05)
IMM GRANULOCYTES NFR BLD AUTO: 0.3 % (ref 0–0.5)
LYMPHOCYTES # BLD AUTO: 0.88 10*3/MM3 (ref 0.7–3.1)
LYMPHOCYTES NFR BLD AUTO: 27 % (ref 19.6–45.3)
MCH RBC QN AUTO: 32.1 PG (ref 26.6–33)
MCHC RBC AUTO-ENTMCNC: 34.5 G/DL (ref 31.5–35.7)
MCV RBC AUTO: 92.9 FL (ref 79–97)
MONOCYTES # BLD AUTO: 0.38 10*3/MM3 (ref 0.1–0.9)
MONOCYTES NFR BLD AUTO: 11.7 % (ref 5–12)
NEUTROPHILS NFR BLD AUTO: 1.77 10*3/MM3 (ref 1.7–7)
NEUTROPHILS NFR BLD AUTO: 54.3 % (ref 42.7–76)
PLATELET # BLD AUTO: 168 10*3/MM3 (ref 140–450)
PMV BLD AUTO: 9.1 FL (ref 6–12)
POTASSIUM SERPL-SCNC: 4.2 MMOL/L (ref 3.5–5.2)
PROT SERPL-MCNC: 6.2 G/DL (ref 6–8.5)
RBC # BLD AUTO: 3.65 10*6/MM3 (ref 3.77–5.28)
SODIUM SERPL-SCNC: 142 MMOL/L (ref 136–145)
WBC NRBC COR # BLD AUTO: 3.26 10*3/MM3 (ref 3.4–10.8)

## 2025-07-24 PROCEDURE — 96375 TX/PRO/DX INJ NEW DRUG ADDON: CPT

## 2025-07-24 PROCEDURE — 80053 COMPREHEN METABOLIC PANEL: CPT | Performed by: INTERNAL MEDICINE

## 2025-07-24 PROCEDURE — 25810000003 SODIUM CHLORIDE 0.9 % SOLUTION 250 ML FLEX CONT: Performed by: INTERNAL MEDICINE

## 2025-07-24 PROCEDURE — 25010000002 DEXAMETHASONE SODIUM PHOSPHATE 100 MG/10ML SOLUTION: Performed by: INTERNAL MEDICINE

## 2025-07-24 PROCEDURE — 96413 CHEMO IV INFUSION 1 HR: CPT

## 2025-07-24 PROCEDURE — 85025 COMPLETE CBC W/AUTO DIFF WBC: CPT | Performed by: INTERNAL MEDICINE

## 2025-07-24 PROCEDURE — 25010000002 FAMOTIDINE 10 MG/ML SOLUTION: Performed by: INTERNAL MEDICINE

## 2025-07-24 PROCEDURE — 25010000002 FOSAPREPITANT PER 1 MG: Performed by: INTERNAL MEDICINE

## 2025-07-24 PROCEDURE — 99214 OFFICE O/P EST MOD 30 MIN: CPT | Performed by: INTERNAL MEDICINE

## 2025-07-24 PROCEDURE — 25010000002 PALONOSETRON PER 25 MCG: Performed by: INTERNAL MEDICINE

## 2025-07-24 PROCEDURE — 25010000002 HEPARIN LOCK FLUSH PER 10 UNITS: Performed by: INTERNAL MEDICINE

## 2025-07-24 PROCEDURE — 96367 TX/PROPH/DG ADDL SEQ IV INF: CPT

## 2025-07-24 PROCEDURE — 25010000002 DIPHENHYDRAMINE PER 50 MG: Performed by: INTERNAL MEDICINE

## 2025-07-24 PROCEDURE — 25810000003 SODIUM CHLORIDE 0.9 % SOLUTION: Performed by: INTERNAL MEDICINE

## 2025-07-24 PROCEDURE — 25010000002 SACITUZUMAB GOVITECAN-HZIY 180 MG RECONSTITUTED SOLUTION 1 EACH VIAL: Performed by: INTERNAL MEDICINE

## 2025-07-24 RX ORDER — ACETAMINOPHEN 325 MG/1
650 TABLET ORAL ONCE
OUTPATIENT
Start: 2025-08-21

## 2025-07-24 RX ORDER — ATROPINE SULFATE 1 MG/ML
0.25 INJECTION, SOLUTION INTRAMUSCULAR; INTRAVENOUS; SUBCUTANEOUS
OUTPATIENT
Start: 2025-07-31

## 2025-07-24 RX ORDER — DIPHENHYDRAMINE HYDROCHLORIDE 50 MG/ML
50 INJECTION, SOLUTION INTRAMUSCULAR; INTRAVENOUS AS NEEDED
OUTPATIENT
Start: 2025-08-14

## 2025-07-24 RX ORDER — FAMOTIDINE 10 MG/ML
20 INJECTION, SOLUTION INTRAVENOUS ONCE
Status: CANCELLED | OUTPATIENT
Start: 2025-07-24

## 2025-07-24 RX ORDER — ACETAMINOPHEN 325 MG/1
650 TABLET ORAL ONCE
OUTPATIENT
Start: 2025-07-31

## 2025-07-24 RX ORDER — HEPARIN SODIUM (PORCINE) LOCK FLUSH IV SOLN 100 UNIT/ML 100 UNIT/ML
500 SOLUTION INTRAVENOUS AS NEEDED
OUTPATIENT
Start: 2025-07-24

## 2025-07-24 RX ORDER — DIPHENHYDRAMINE HYDROCHLORIDE 50 MG/ML
50 INJECTION, SOLUTION INTRAMUSCULAR; INTRAVENOUS AS NEEDED
Status: CANCELLED | OUTPATIENT
Start: 2025-07-24

## 2025-07-24 RX ORDER — SODIUM CHLORIDE 9 MG/ML
20 INJECTION, SOLUTION INTRAVENOUS ONCE
OUTPATIENT
Start: 2025-07-31

## 2025-07-24 RX ORDER — SODIUM CHLORIDE 9 MG/ML
20 INJECTION, SOLUTION INTRAVENOUS ONCE
OUTPATIENT
Start: 2025-08-21

## 2025-07-24 RX ORDER — ACETAMINOPHEN 325 MG/1
650 TABLET ORAL ONCE
Status: COMPLETED | OUTPATIENT
Start: 2025-07-24 | End: 2025-07-24

## 2025-07-24 RX ORDER — HEPARIN SODIUM (PORCINE) LOCK FLUSH IV SOLN 100 UNIT/ML 100 UNIT/ML
500 SOLUTION INTRAVENOUS AS NEEDED
Status: DISCONTINUED | OUTPATIENT
Start: 2025-07-24 | End: 2025-07-25 | Stop reason: HOSPADM

## 2025-07-24 RX ORDER — PALONOSETRON 0.05 MG/ML
0.25 INJECTION, SOLUTION INTRAVENOUS ONCE
OUTPATIENT
Start: 2025-08-14

## 2025-07-24 RX ORDER — FAMOTIDINE 10 MG/ML
20 INJECTION, SOLUTION INTRAVENOUS AS NEEDED
OUTPATIENT
Start: 2025-08-14

## 2025-07-24 RX ORDER — SODIUM CHLORIDE 9 MG/ML
20 INJECTION, SOLUTION INTRAVENOUS ONCE
OUTPATIENT
Start: 2025-08-14

## 2025-07-24 RX ORDER — ACETAMINOPHEN 325 MG/1
650 TABLET ORAL ONCE
Status: CANCELLED | OUTPATIENT
Start: 2025-07-24

## 2025-07-24 RX ORDER — PALONOSETRON 0.05 MG/ML
0.25 INJECTION, SOLUTION INTRAVENOUS ONCE
OUTPATIENT
Start: 2025-08-21

## 2025-07-24 RX ORDER — DIPHENHYDRAMINE HYDROCHLORIDE 50 MG/ML
50 INJECTION, SOLUTION INTRAMUSCULAR; INTRAVENOUS AS NEEDED
OUTPATIENT
Start: 2025-07-31

## 2025-07-24 RX ORDER — ATROPINE SULFATE 1 MG/ML
0.25 INJECTION, SOLUTION INTRAMUSCULAR; INTRAVENOUS; SUBCUTANEOUS
OUTPATIENT
Start: 2025-08-14

## 2025-07-24 RX ORDER — ACETAMINOPHEN 325 MG/1
650 TABLET ORAL ONCE
OUTPATIENT
Start: 2025-08-14

## 2025-07-24 RX ORDER — FAMOTIDINE 10 MG/ML
20 INJECTION, SOLUTION INTRAVENOUS ONCE
OUTPATIENT
Start: 2025-07-31

## 2025-07-24 RX ORDER — HYDROCORTISONE SODIUM SUCCINATE 100 MG/2ML
100 INJECTION INTRAMUSCULAR; INTRAVENOUS AS NEEDED
Status: CANCELLED | OUTPATIENT
Start: 2025-07-24

## 2025-07-24 RX ORDER — PALONOSETRON 0.05 MG/ML
0.25 INJECTION, SOLUTION INTRAVENOUS ONCE
Status: COMPLETED | OUTPATIENT
Start: 2025-07-24 | End: 2025-07-24

## 2025-07-24 RX ORDER — PALONOSETRON 0.05 MG/ML
0.25 INJECTION, SOLUTION INTRAVENOUS ONCE
OUTPATIENT
Start: 2025-07-31

## 2025-07-24 RX ORDER — FAMOTIDINE 10 MG/ML
20 INJECTION, SOLUTION INTRAVENOUS ONCE
OUTPATIENT
Start: 2025-08-14

## 2025-07-24 RX ORDER — PALONOSETRON 0.05 MG/ML
0.25 INJECTION, SOLUTION INTRAVENOUS ONCE
Status: CANCELLED | OUTPATIENT
Start: 2025-07-24

## 2025-07-24 RX ORDER — ATROPINE SULFATE 1 MG/ML
0.25 INJECTION, SOLUTION INTRAMUSCULAR; INTRAVENOUS; SUBCUTANEOUS
Status: CANCELLED | OUTPATIENT
Start: 2025-07-24

## 2025-07-24 RX ORDER — DIPHENHYDRAMINE HYDROCHLORIDE 50 MG/ML
50 INJECTION, SOLUTION INTRAMUSCULAR; INTRAVENOUS AS NEEDED
OUTPATIENT
Start: 2025-08-21

## 2025-07-24 RX ORDER — FAMOTIDINE 10 MG/ML
20 INJECTION, SOLUTION INTRAVENOUS AS NEEDED
OUTPATIENT
Start: 2025-08-21

## 2025-07-24 RX ORDER — FAMOTIDINE 10 MG/ML
20 INJECTION, SOLUTION INTRAVENOUS AS NEEDED
Status: CANCELLED | OUTPATIENT
Start: 2025-07-24

## 2025-07-24 RX ORDER — FAMOTIDINE 10 MG/ML
20 INJECTION, SOLUTION INTRAVENOUS ONCE
Status: COMPLETED | OUTPATIENT
Start: 2025-07-24 | End: 2025-07-24

## 2025-07-24 RX ORDER — ATROPINE SULFATE 1 MG/ML
0.25 INJECTION, SOLUTION INTRAMUSCULAR; INTRAVENOUS; SUBCUTANEOUS
Status: DISCONTINUED | OUTPATIENT
Start: 2025-07-24 | End: 2025-07-25 | Stop reason: HOSPADM

## 2025-07-24 RX ORDER — HYDROCORTISONE SODIUM SUCCINATE 100 MG/2ML
100 INJECTION INTRAMUSCULAR; INTRAVENOUS AS NEEDED
OUTPATIENT
Start: 2025-08-21

## 2025-07-24 RX ORDER — HYDROCORTISONE SODIUM SUCCINATE 100 MG/2ML
100 INJECTION INTRAMUSCULAR; INTRAVENOUS AS NEEDED
OUTPATIENT
Start: 2025-08-14

## 2025-07-24 RX ORDER — SODIUM CHLORIDE 9 MG/ML
20 INJECTION, SOLUTION INTRAVENOUS ONCE
Status: CANCELLED | OUTPATIENT
Start: 2025-07-24

## 2025-07-24 RX ORDER — FAMOTIDINE 10 MG/ML
20 INJECTION, SOLUTION INTRAVENOUS AS NEEDED
OUTPATIENT
Start: 2025-07-31

## 2025-07-24 RX ORDER — FAMOTIDINE 10 MG/ML
20 INJECTION, SOLUTION INTRAVENOUS ONCE
OUTPATIENT
Start: 2025-08-21

## 2025-07-24 RX ORDER — HYDROCORTISONE SODIUM SUCCINATE 100 MG/2ML
100 INJECTION INTRAMUSCULAR; INTRAVENOUS AS NEEDED
OUTPATIENT
Start: 2025-07-31

## 2025-07-24 RX ORDER — SODIUM CHLORIDE 9 MG/ML
20 INJECTION, SOLUTION INTRAVENOUS ONCE
Status: COMPLETED | OUTPATIENT
Start: 2025-07-24 | End: 2025-07-24

## 2025-07-24 RX ORDER — ATROPINE SULFATE 1 MG/ML
0.25 INJECTION, SOLUTION INTRAMUSCULAR; INTRAVENOUS; SUBCUTANEOUS
OUTPATIENT
Start: 2025-08-21

## 2025-07-24 RX ADMIN — SODIUM CHLORIDE 25 MG: 9 INJECTION, SOLUTION INTRAVENOUS at 11:00

## 2025-07-24 RX ADMIN — DEXAMETHASONE SODIUM PHOSPHATE 12 MG: 10 INJECTION, SOLUTION INTRAMUSCULAR; INTRAVENOUS at 11:25

## 2025-07-24 RX ADMIN — ACETAMINOPHEN 650 MG: 325 TABLET ORAL at 10:54

## 2025-07-24 RX ADMIN — FAMOTIDINE 20 MG: 10 INJECTION INTRAVENOUS at 10:56

## 2025-07-24 RX ADMIN — HEPARIN 500 UNITS: 100 SYRINGE at 13:58

## 2025-07-24 RX ADMIN — PALONOSETRON HYDROCHLORIDE 0.25 MG: 0.25 INJECTION, SOLUTION INTRAVENOUS at 10:59

## 2025-07-24 RX ADMIN — SODIUM CHLORIDE 610 MG: 9 INJECTION, SOLUTION INTRAVENOUS at 12:18

## 2025-07-24 RX ADMIN — FOSAPREPITANT DIMEGLUMINE 150 MG: 150 INJECTION, POWDER, LYOPHILIZED, FOR SOLUTION INTRAVENOUS at 11:25

## 2025-07-24 RX ADMIN — SODIUM CHLORIDE 20 ML/HR: 9 INJECTION, SOLUTION INTRAVENOUS at 11:00

## 2025-07-24 NOTE — PROGRESS NOTES
PROBLEM LIST:  1. Local recurrence of HR+ carcinoma of the left breast  A) history of left breast cancer in 2007.  Bilateral mastectomy 5/30/07.  pathology showed grade 2 IDC of the left breast measuring 1.8 cm.  ER+ IN+ Her2 negative.   0/2 SLN involved.  [id number].  B) adjuvant chemotherapy with TC x 4 cycles, completed September 2007 with Dr. De La Torre.  Tamoxifen October 2007 thru October 2012.  C) presented January 2022 with left chest wall mass.  CT chest 1/17/22 showed 4.6 cm lesion involving left manubrium with soft tissue extent extending posteriorly to the anterior aspect of mediastinum.  12 mm nodule right lung base.  D) ultrasound guided biopsy of chest wall mass on 2/10/22.  Pathology showed invasive ductal carcinoma of the breast.  ER positive (100%), IN positive (50%), HER-2 2+  E) letrozole started February 2022.  CyberKnife to the chest wall mass completed 4/15/2022.  Ibrance started 4/18/2022.  Ibrance decreased to 100 mg due to neutropenia 9/5/2022  F) PET/CT 7/5/23 showed a new 2.9 cm hypermetabolic liver lesion, nonenlarged but hypermetabolic bilateral hilar and mediastinal lymph nodes.  [id number] testing showed a PI3 kinase mutation, no ESR 1 mutation.  Treatment changed to fulvestrant and ribociclib.  Fulvestrant started 7/19/2023.  Ribociclib started 7/27/2023.  G) PET/CT on 12/1/2023 showed increasing size of solitary liver lesion.  No additional sites of disease.  Liver biopsy 12/11/2023 showed metastatic breast carcinoma, ER positive IN positive HER2 1+.  Treatment with Xeloda started 12/29/2023.  H) PET/CT 6/10/2024 showed enlargement in size and metabolic activity of the solitary liver metastasis.  Treatment changed to Enhertu, starting 6/19/2024. Enhertu on hold since 1/30/2025.   I)  Completed SBRT for management of disease in her liver on 2/14/25.   J) Enhertu remains on hold. Started Letrozole 3/6/2025.   K) PET/CT 6/20/2025 showed a new liver lesion.  Treatment with Trodelvy  "started 7/3/2025.  2. Depression/anxiety  3. GERD    Subjective     CHIEF COMPLAINT: Breast cancer    HISTORY OF PRESENT ILLNESS:   Alcira Phelan returns for follow-up.  She has started Trodelvy.  She says it went pretty well.  She did notice some fatigue.  She had to take breaks when doing things around the house.  A little bit of constipation.  Her hair all fell out shortly after starting treatment.    Objective      /77   Pulse 71   Temp 97.5 °F (36.4 °C) (Temporal)   Resp 16   Ht 152.4 cm (60\")   Wt 62 kg (136 lb 9.6 oz)   SpO2 99%   BMI 26.68 kg/m²    There were no vitals filed for this visit.          ECOG score: 0         General: well appearing female in no acute distress  Neuro: alert and oriented  Vascular: Regular rate and rhythm no murmurs  Lungs: Clear to auscultation bilaterally  HEENT: sclera anicteric, oropharynx clear  Skin: No rashes  Psych: mood and affect appropriate    RECENT LABS:  Lab Results   Component Value Date    WBC 3.26 (L) 07/24/2025    HGB 11.7 (L) 07/24/2025    HCT 33.9 (L) 07/24/2025    MCV 92.9 07/24/2025     07/24/2025       Lab Results   Component Value Date    GLUCOSE 92 07/10/2025    BUN 15.2 07/10/2025    CREATININE 0.67 07/10/2025    EGFRIFNONA 77 02/16/2022    BCR 22.7 07/10/2025    K 4.3 07/10/2025    CO2 24.2 07/10/2025    CALCIUM 9.3 07/10/2025    ALBUMIN 4.2 07/10/2025    AST 36 (H) 07/10/2025    ALT 34 (H) 07/10/2025               Assessment & Plan   Alcira Phelan is a 60 y.o. female with metastatic ER positive FL positive HER-2 negative invasive ductal carcinoma, with involvement of lymph nodes and liver.     She has completed SBRT to disease and liver 2/14/2025.  She has now started Trodelvy due to progression in the liver.  She has tolerated it fairly well.  Will proceed with cycle 2.  Plan to repeat PET/CT imaging after 3 cycles.    Shoulder surgery: Currently on hold.  I think if she has evidence of response to treatment it would be " reasonable to take a short break from chemo in order for her to have the surgery done.    Insomnia: Xanax 0.5 mg nightly as needed.    Neuropathy pain: Continue gabapentin.    Follow up in 3 weeks with cycle 3.    Total time of patient care on day of service including time prior to, face to face with patient, and following visit spent in reviewing records, lab results,  discussion with patient, and documentation/charting was > 34 minutes.                Ruth Castro MD  [facility]    7/24/2025

## 2025-07-31 ENCOUNTER — HOSPITAL ENCOUNTER (OUTPATIENT)
Dept: ONCOLOGY | Facility: HOSPITAL | Age: 60
Discharge: HOME | End: 2025-07-31
Payer: COMMERCIAL

## 2025-07-31 ENCOUNTER — DOCUMENTATION (OUTPATIENT)
Dept: OTHER | Facility: HOSPITAL | Age: 60
End: 2025-07-31
Payer: COMMERCIAL

## 2025-07-31 VITALS
SYSTOLIC BLOOD PRESSURE: 134 MMHG | BODY MASS INDEX: 31.24 KG/M2 | DIASTOLIC BLOOD PRESSURE: 78 MMHG | HEIGHT: 55 IN | RESPIRATION RATE: 16 BRPM | HEART RATE: 70 BPM | WEIGHT: 135 LBS | TEMPERATURE: 97.1 F

## 2025-07-31 DIAGNOSIS — Z85.3 HISTORY OF LEFT BREAST CANCER: ICD-10-CM

## 2025-07-31 DIAGNOSIS — C79.51 MALIGNANT NEOPLASM METASTATIC TO BONE: ICD-10-CM

## 2025-07-31 DIAGNOSIS — C50.112 MALIGNANT NEOPLASM OF CENTRAL PORTION OF LEFT FEMALE BREAST, UNSPECIFIED ESTROGEN RECEPTOR STATUS: Primary | ICD-10-CM

## 2025-07-31 DIAGNOSIS — Z45.2 ENCOUNTER FOR CARE RELATED TO VASCULAR ACCESS PORT: ICD-10-CM

## 2025-07-31 LAB
ALBUMIN SERPL-MCNC: 3.9 G/DL (ref 3.5–5.2)
ALBUMIN/GLOB SERPL: 1.6 G/DL
ALP SERPL-CCNC: 140 U/L (ref 39–117)
ALT SERPL W P-5'-P-CCNC: 63 U/L (ref 1–33)
ANION GAP SERPL CALCULATED.3IONS-SCNC: 8.1 MMOL/L (ref 5–15)
AST SERPL-CCNC: 39 U/L (ref 1–32)
BASOPHILS # BLD AUTO: 0.01 10*3/MM3 (ref 0–0.2)
BASOPHILS NFR BLD AUTO: 0.4 % (ref 0–1.5)
BILIRUB SERPL-MCNC: <0.2 MG/DL (ref 0–1.2)
BUN SERPL-MCNC: 11.7 MG/DL (ref 8–23)
BUN/CREAT SERPL: 16.5 (ref 7–25)
CALCIUM SPEC-SCNC: 9.1 MG/DL (ref 8.6–10.5)
CHLORIDE SERPL-SCNC: 105 MMOL/L (ref 98–107)
CO2 SERPL-SCNC: 26.9 MMOL/L (ref 22–29)
CREAT SERPL-MCNC: 0.71 MG/DL (ref 0.57–1)
DEPRECATED RDW RBC AUTO: 43.4 FL (ref 37–54)
EGFRCR SERPLBLD CKD-EPI 2021: 96.3 ML/MIN/1.73
EOSINOPHIL # BLD AUTO: 0.16 10*3/MM3 (ref 0–0.4)
EOSINOPHIL NFR BLD AUTO: 5.8 % (ref 0.3–6.2)
ERYTHROCYTE [DISTWIDTH] IN BLOOD BY AUTOMATED COUNT: 13 % (ref 12.3–15.4)
GLOBULIN UR ELPH-MCNC: 2.5 GM/DL
GLUCOSE SERPL-MCNC: 87 MG/DL (ref 65–99)
HCT VFR BLD AUTO: 33.3 % (ref 34–46.6)
HGB BLD-MCNC: 11.5 G/DL (ref 12–15.9)
IMM GRANULOCYTES # BLD AUTO: 0.01 10*3/MM3 (ref 0–0.05)
IMM GRANULOCYTES NFR BLD AUTO: 0.4 % (ref 0–0.5)
LYMPHOCYTES # BLD AUTO: 1.06 10*3/MM3 (ref 0.7–3.1)
LYMPHOCYTES NFR BLD AUTO: 38.5 % (ref 19.6–45.3)
MCH RBC QN AUTO: 31.9 PG (ref 26.6–33)
MCHC RBC AUTO-ENTMCNC: 34.5 G/DL (ref 31.5–35.7)
MCV RBC AUTO: 92.5 FL (ref 79–97)
MONOCYTES # BLD AUTO: 0.38 10*3/MM3 (ref 0.1–0.9)
MONOCYTES NFR BLD AUTO: 13.8 % (ref 5–12)
NEUTROPHILS NFR BLD AUTO: 1.13 10*3/MM3 (ref 1.7–7)
NEUTROPHILS NFR BLD AUTO: 41.1 % (ref 42.7–76)
PLATELET # BLD AUTO: 196 10*3/MM3 (ref 140–450)
PMV BLD AUTO: 8.7 FL (ref 6–12)
POTASSIUM SERPL-SCNC: 4.1 MMOL/L (ref 3.5–5.2)
PROT SERPL-MCNC: 6.4 G/DL (ref 6–8.5)
RBC # BLD AUTO: 3.6 10*6/MM3 (ref 3.77–5.28)
SODIUM SERPL-SCNC: 140 MMOL/L (ref 136–145)
WBC NRBC COR # BLD AUTO: 2.75 10*3/MM3 (ref 3.4–10.8)

## 2025-07-31 PROCEDURE — 85025 COMPLETE CBC W/AUTO DIFF WBC: CPT | Performed by: INTERNAL MEDICINE

## 2025-07-31 PROCEDURE — 96367 TX/PROPH/DG ADDL SEQ IV INF: CPT

## 2025-07-31 PROCEDURE — 25810000003 SODIUM CHLORIDE 0.9 % SOLUTION: Performed by: INTERNAL MEDICINE

## 2025-07-31 PROCEDURE — 25010000002 FOSAPREPITANT PER 1 MG: Performed by: INTERNAL MEDICINE

## 2025-07-31 PROCEDURE — 96413 CHEMO IV INFUSION 1 HR: CPT

## 2025-07-31 PROCEDURE — 25010000002 DEXAMETHASONE SODIUM PHOSPHATE 100 MG/10ML SOLUTION: Performed by: INTERNAL MEDICINE

## 2025-07-31 PROCEDURE — 25010000002 HEPARIN LOCK FLUSH PER 10 UNITS: Performed by: INTERNAL MEDICINE

## 2025-07-31 PROCEDURE — 25810000003 SODIUM CHLORIDE 0.9 % SOLUTION 250 ML FLEX CONT: Performed by: INTERNAL MEDICINE

## 2025-07-31 PROCEDURE — 25010000002 FAMOTIDINE 10 MG/ML SOLUTION: Performed by: INTERNAL MEDICINE

## 2025-07-31 PROCEDURE — 25010000002 PALONOSETRON PER 25 MCG: Performed by: INTERNAL MEDICINE

## 2025-07-31 PROCEDURE — 25010000002 DIPHENHYDRAMINE PER 50 MG: Performed by: INTERNAL MEDICINE

## 2025-07-31 PROCEDURE — 25010000002 SACITUZUMAB GOVITECAN-HZIY 180 MG RECONSTITUTED SOLUTION 1 EACH VIAL: Performed by: INTERNAL MEDICINE

## 2025-07-31 PROCEDURE — 96375 TX/PRO/DX INJ NEW DRUG ADDON: CPT

## 2025-07-31 PROCEDURE — 80053 COMPREHEN METABOLIC PANEL: CPT | Performed by: INTERNAL MEDICINE

## 2025-07-31 RX ORDER — FAMOTIDINE 10 MG/ML
20 INJECTION, SOLUTION INTRAVENOUS AS NEEDED
Status: DISCONTINUED | OUTPATIENT
Start: 2025-07-31 | End: 2025-08-01 | Stop reason: HOSPADM

## 2025-07-31 RX ORDER — SODIUM CHLORIDE 9 MG/ML
20 INJECTION, SOLUTION INTRAVENOUS ONCE
Status: COMPLETED | OUTPATIENT
Start: 2025-07-31 | End: 2025-07-31

## 2025-07-31 RX ORDER — PALONOSETRON 0.05 MG/ML
0.25 INJECTION, SOLUTION INTRAVENOUS ONCE
Status: COMPLETED | OUTPATIENT
Start: 2025-07-31 | End: 2025-07-31

## 2025-07-31 RX ORDER — HYDROCORTISONE SODIUM SUCCINATE 100 MG/2ML
100 INJECTION INTRAMUSCULAR; INTRAVENOUS AS NEEDED
Status: DISCONTINUED | OUTPATIENT
Start: 2025-07-31 | End: 2025-08-01 | Stop reason: HOSPADM

## 2025-07-31 RX ORDER — ATROPINE SULFATE 1 MG/ML
0.25 INJECTION, SOLUTION INTRAMUSCULAR; INTRAVENOUS; SUBCUTANEOUS
Status: DISCONTINUED | OUTPATIENT
Start: 2025-07-31 | End: 2025-08-01 | Stop reason: HOSPADM

## 2025-07-31 RX ORDER — DIPHENHYDRAMINE HYDROCHLORIDE 50 MG/ML
50 INJECTION, SOLUTION INTRAMUSCULAR; INTRAVENOUS AS NEEDED
Status: DISCONTINUED | OUTPATIENT
Start: 2025-07-31 | End: 2025-08-01 | Stop reason: HOSPADM

## 2025-07-31 RX ORDER — HEPARIN SODIUM (PORCINE) LOCK FLUSH IV SOLN 100 UNIT/ML 100 UNIT/ML
500 SOLUTION INTRAVENOUS AS NEEDED
Status: DISCONTINUED | OUTPATIENT
Start: 2025-07-31 | End: 2025-08-01 | Stop reason: HOSPADM

## 2025-07-31 RX ORDER — ACETAMINOPHEN 325 MG/1
650 TABLET ORAL ONCE
Status: COMPLETED | OUTPATIENT
Start: 2025-07-31 | End: 2025-07-31

## 2025-07-31 RX ORDER — FAMOTIDINE 10 MG/ML
20 INJECTION, SOLUTION INTRAVENOUS ONCE
Status: COMPLETED | OUTPATIENT
Start: 2025-07-31 | End: 2025-07-31

## 2025-07-31 RX ORDER — HEPARIN SODIUM (PORCINE) LOCK FLUSH IV SOLN 100 UNIT/ML 100 UNIT/ML
500 SOLUTION INTRAVENOUS AS NEEDED
OUTPATIENT
Start: 2025-07-31

## 2025-07-31 RX ADMIN — SODIUM CHLORIDE 20 ML/HR: 9 INJECTION, SOLUTION INTRAVENOUS at 13:02

## 2025-07-31 RX ADMIN — HEPARIN 500 UNITS: 100 SYRINGE at 16:13

## 2025-07-31 RX ADMIN — ACETAMINOPHEN 650 MG: 325 TABLET ORAL at 13:03

## 2025-07-31 RX ADMIN — DEXAMETHASONE SODIUM PHOSPHATE 12 MG: 10 INJECTION, SOLUTION INTRAMUSCULAR; INTRAVENOUS at 13:09

## 2025-07-31 RX ADMIN — FOSAPREPITANT 150 MG: 150 INJECTION, POWDER, LYOPHILIZED, FOR SOLUTION INTRAVENOUS at 13:26

## 2025-07-31 RX ADMIN — PALONOSETRON HYDROCHLORIDE 0.25 MG: 0.25 INJECTION, SOLUTION INTRAVENOUS at 13:04

## 2025-07-31 RX ADMIN — SODIUM CHLORIDE 610 MG: 9 INJECTION, SOLUTION INTRAVENOUS at 15:00

## 2025-07-31 RX ADMIN — SODIUM CHLORIDE 25 MG: 9 INJECTION, SOLUTION INTRAVENOUS at 13:09

## 2025-07-31 RX ADMIN — FAMOTIDINE 20 MG: 10 INJECTION INTRAVENOUS at 13:09

## 2025-07-31 NOTE — PROGRESS NOTES
SW met with pt in infusion to assist with psychosocial needs. SW provided pt with [employer] gas card to assist with transportation costs. Pt thanked SW and reported overall she is doing well. SW encouraged pt to reach out for ongoing support and assistance should other needs arise.

## 2025-08-06 DIAGNOSIS — T45.1X5A ALOPECIA DUE TO CYTOTOXIC DRUG: ICD-10-CM

## 2025-08-06 DIAGNOSIS — Z17.0 MALIGNANT NEOPLASM OF CENTRAL PORTION OF LEFT BREAST IN FEMALE, ESTROGEN RECEPTOR POSITIVE: Primary | ICD-10-CM

## 2025-08-06 DIAGNOSIS — C50.112 MALIGNANT NEOPLASM OF CENTRAL PORTION OF LEFT BREAST IN FEMALE, ESTROGEN RECEPTOR POSITIVE: Primary | ICD-10-CM

## 2025-08-06 DIAGNOSIS — L65.8 ALOPECIA DUE TO CYTOTOXIC DRUG: ICD-10-CM

## 2025-08-14 ENCOUNTER — HOSPITAL ENCOUNTER (OUTPATIENT)
Dept: ONCOLOGY | Facility: HOSPITAL | Age: 60
Discharge: HOME | End: 2025-08-14
Admitting: INTERNAL MEDICINE
Payer: COMMERCIAL

## 2025-08-14 ENCOUNTER — APPOINTMENT (OUTPATIENT)
Dept: ONCOLOGY | Facility: HOSPITAL | Age: 60
End: 2025-08-14
Payer: COMMERCIAL

## 2025-08-14 ENCOUNTER — OFFICE VISIT (OUTPATIENT)
Dept: ONCOLOGY | Facility: CLINIC | Age: 60
End: 2025-08-14
Payer: COMMERCIAL

## 2025-08-14 VITALS
TEMPERATURE: 97.6 F | HEART RATE: 72 BPM | DIASTOLIC BLOOD PRESSURE: 75 MMHG | OXYGEN SATURATION: 97 % | RESPIRATION RATE: 18 BRPM | HEIGHT: 60 IN | WEIGHT: 138 LBS | SYSTOLIC BLOOD PRESSURE: 127 MMHG | BODY MASS INDEX: 27.09 KG/M2

## 2025-08-14 DIAGNOSIS — C79.51 MALIGNANT NEOPLASM METASTATIC TO BONE: ICD-10-CM

## 2025-08-14 DIAGNOSIS — Z85.3 HISTORY OF LEFT BREAST CANCER: ICD-10-CM

## 2025-08-14 DIAGNOSIS — Z45.2 ENCOUNTER FOR CARE RELATED TO VASCULAR ACCESS PORT: ICD-10-CM

## 2025-08-14 DIAGNOSIS — C50.112 MALIGNANT NEOPLASM OF CENTRAL PORTION OF LEFT FEMALE BREAST, UNSPECIFIED ESTROGEN RECEPTOR STATUS: Primary | ICD-10-CM

## 2025-08-14 DIAGNOSIS — C78.7 CANCER, METASTATIC TO LIVER: ICD-10-CM

## 2025-08-14 DIAGNOSIS — Z17.0 MALIGNANT NEOPLASM OF CENTRAL PORTION OF LEFT BREAST IN FEMALE, ESTROGEN RECEPTOR POSITIVE: Primary | ICD-10-CM

## 2025-08-14 DIAGNOSIS — C50.112 MALIGNANT NEOPLASM OF CENTRAL PORTION OF LEFT BREAST IN FEMALE, ESTROGEN RECEPTOR POSITIVE: Primary | ICD-10-CM

## 2025-08-14 LAB
ALBUMIN SERPL-MCNC: 3.9 G/DL (ref 3.5–5.2)
ALBUMIN/GLOB SERPL: 1.7 G/DL
ALP SERPL-CCNC: 135 U/L (ref 39–117)
ALT SERPL W P-5'-P-CCNC: 17 U/L (ref 1–33)
ANION GAP SERPL CALCULATED.3IONS-SCNC: 8 MMOL/L (ref 5–15)
AST SERPL-CCNC: 21 U/L (ref 1–32)
BASOPHILS # BLD AUTO: 0.04 10*3/MM3 (ref 0–0.2)
BASOPHILS NFR BLD AUTO: 1.3 % (ref 0–1.5)
BILIRUB SERPL-MCNC: <0.2 MG/DL (ref 0–1.2)
BUN SERPL-MCNC: 12.7 MG/DL (ref 8–23)
BUN/CREAT SERPL: 16.9 (ref 7–25)
CALCIUM SPEC-SCNC: 9 MG/DL (ref 8.6–10.5)
CHLORIDE SERPL-SCNC: 108 MMOL/L (ref 98–107)
CO2 SERPL-SCNC: 26 MMOL/L (ref 22–29)
CREAT SERPL-MCNC: 0.75 MG/DL (ref 0.57–1)
DEPRECATED RDW RBC AUTO: 47.4 FL (ref 37–54)
EGFRCR SERPLBLD CKD-EPI 2021: 90.1 ML/MIN/1.73
EOSINOPHIL # BLD AUTO: 0.17 10*3/MM3 (ref 0–0.4)
EOSINOPHIL NFR BLD AUTO: 5.4 % (ref 0.3–6.2)
ERYTHROCYTE [DISTWIDTH] IN BLOOD BY AUTOMATED COUNT: 13.5 % (ref 12.3–15.4)
GLOBULIN UR ELPH-MCNC: 2.3 GM/DL
GLUCOSE SERPL-MCNC: 119 MG/DL (ref 65–99)
HCT VFR BLD AUTO: 34.7 % (ref 34–46.6)
HGB BLD-MCNC: 11.7 G/DL (ref 12–15.9)
IMM GRANULOCYTES # BLD AUTO: 0.03 10*3/MM3 (ref 0–0.05)
IMM GRANULOCYTES NFR BLD AUTO: 1 % (ref 0–0.5)
LYMPHOCYTES # BLD AUTO: 0.78 10*3/MM3 (ref 0.7–3.1)
LYMPHOCYTES NFR BLD AUTO: 24.8 % (ref 19.6–45.3)
MCH RBC QN AUTO: 32.1 PG (ref 26.6–33)
MCHC RBC AUTO-ENTMCNC: 33.7 G/DL (ref 31.5–35.7)
MCV RBC AUTO: 95.1 FL (ref 79–97)
MONOCYTES # BLD AUTO: 0.41 10*3/MM3 (ref 0.1–0.9)
MONOCYTES NFR BLD AUTO: 13.1 % (ref 5–12)
NEUTROPHILS NFR BLD AUTO: 1.71 10*3/MM3 (ref 1.7–7)
NEUTROPHILS NFR BLD AUTO: 54.4 % (ref 42.7–76)
PLATELET # BLD AUTO: 197 10*3/MM3 (ref 140–450)
PMV BLD AUTO: 9.3 FL (ref 6–12)
POTASSIUM SERPL-SCNC: 4.2 MMOL/L (ref 3.5–5.2)
PROT SERPL-MCNC: 6.2 G/DL (ref 6–8.5)
RBC # BLD AUTO: 3.65 10*6/MM3 (ref 3.77–5.28)
SODIUM SERPL-SCNC: 142 MMOL/L (ref 136–145)
WBC NRBC COR # BLD AUTO: 3.14 10*3/MM3 (ref 3.4–10.8)

## 2025-08-14 PROCEDURE — 25010000002 HEPARIN LOCK FLUSH PER 10 UNITS: Performed by: INTERNAL MEDICINE

## 2025-08-14 PROCEDURE — 96367 TX/PROPH/DG ADDL SEQ IV INF: CPT

## 2025-08-14 PROCEDURE — 25010000002 FAMOTIDINE 10 MG/ML SOLUTION: Performed by: INTERNAL MEDICINE

## 2025-08-14 PROCEDURE — 85025 COMPLETE CBC W/AUTO DIFF WBC: CPT | Performed by: INTERNAL MEDICINE

## 2025-08-14 PROCEDURE — 80053 COMPREHEN METABOLIC PANEL: CPT | Performed by: INTERNAL MEDICINE

## 2025-08-14 PROCEDURE — 25010000002 FOSAPREPITANT PER 1 MG: Performed by: INTERNAL MEDICINE

## 2025-08-14 PROCEDURE — 96413 CHEMO IV INFUSION 1 HR: CPT

## 2025-08-14 PROCEDURE — 96366 THER/PROPH/DIAG IV INF ADDON: CPT

## 2025-08-14 PROCEDURE — 25010000002 PALONOSETRON PER 25 MCG: Performed by: INTERNAL MEDICINE

## 2025-08-14 PROCEDURE — 25010000002 SACITUZUMAB GOVITECAN-HZIY 180 MG RECONSTITUTED SOLUTION 1 EACH VIAL: Performed by: INTERNAL MEDICINE

## 2025-08-14 PROCEDURE — 96375 TX/PRO/DX INJ NEW DRUG ADDON: CPT

## 2025-08-14 PROCEDURE — 25010000002 DIPHENHYDRAMINE PER 50 MG: Performed by: INTERNAL MEDICINE

## 2025-08-14 PROCEDURE — 25010000002 DEXAMETHASONE SODIUM PHOSPHATE 100 MG/10ML SOLUTION: Performed by: INTERNAL MEDICINE

## 2025-08-14 PROCEDURE — 25810000003 SODIUM CHLORIDE 0.9 % SOLUTION 250 ML FLEX CONT: Performed by: INTERNAL MEDICINE

## 2025-08-14 PROCEDURE — 99214 OFFICE O/P EST MOD 30 MIN: CPT | Performed by: NURSE PRACTITIONER

## 2025-08-14 PROCEDURE — 25810000003 SODIUM CHLORIDE 0.9 % SOLUTION: Performed by: INTERNAL MEDICINE

## 2025-08-14 RX ORDER — PALONOSETRON 0.05 MG/ML
0.25 INJECTION, SOLUTION INTRAVENOUS ONCE
Status: COMPLETED | OUTPATIENT
Start: 2025-08-14 | End: 2025-08-14

## 2025-08-14 RX ORDER — ATROPINE SULFATE 1 MG/ML
0.25 INJECTION, SOLUTION INTRAMUSCULAR; INTRAVENOUS; SUBCUTANEOUS
Status: DISCONTINUED | OUTPATIENT
Start: 2025-08-14 | End: 2025-08-15 | Stop reason: HOSPADM

## 2025-08-14 RX ORDER — HEPARIN SODIUM (PORCINE) LOCK FLUSH IV SOLN 100 UNIT/ML 100 UNIT/ML
500 SOLUTION INTRAVENOUS AS NEEDED
Status: DISCONTINUED | OUTPATIENT
Start: 2025-08-14 | End: 2025-08-15 | Stop reason: HOSPADM

## 2025-08-14 RX ORDER — SODIUM CHLORIDE 9 MG/ML
20 INJECTION, SOLUTION INTRAVENOUS ONCE
Status: COMPLETED | OUTPATIENT
Start: 2025-08-14 | End: 2025-08-14

## 2025-08-14 RX ORDER — LETROZOLE 2.5 MG/1
2.5 TABLET, FILM COATED ORAL DAILY
COMMUNITY
Start: 2025-07-31

## 2025-08-14 RX ORDER — HEPARIN SODIUM (PORCINE) LOCK FLUSH IV SOLN 100 UNIT/ML 100 UNIT/ML
500 SOLUTION INTRAVENOUS AS NEEDED
OUTPATIENT
Start: 2025-08-14

## 2025-08-14 RX ORDER — FAMOTIDINE 10 MG/ML
20 INJECTION, SOLUTION INTRAVENOUS ONCE
Status: COMPLETED | OUTPATIENT
Start: 2025-08-14 | End: 2025-08-14

## 2025-08-14 RX ORDER — ACETAMINOPHEN 325 MG/1
650 TABLET ORAL ONCE
Status: COMPLETED | OUTPATIENT
Start: 2025-08-14 | End: 2025-08-14

## 2025-08-14 RX ADMIN — SODIUM CHLORIDE 610 MG: 9 INJECTION, SOLUTION INTRAVENOUS at 12:18

## 2025-08-14 RX ADMIN — PALONOSETRON HYDROCHLORIDE 0.25 MG: 0.25 INJECTION INTRAVENOUS at 11:05

## 2025-08-14 RX ADMIN — SODIUM CHLORIDE 20 ML/HR: 9 INJECTION, SOLUTION INTRAVENOUS at 11:09

## 2025-08-14 RX ADMIN — DEXAMETHASONE SODIUM PHOSPHATE 12 MG: 10 INJECTION, SOLUTION INTRAMUSCULAR; INTRAVENOUS at 11:09

## 2025-08-14 RX ADMIN — SODIUM CHLORIDE 25 MG: 9 INJECTION, SOLUTION INTRAVENOUS at 11:09

## 2025-08-14 RX ADMIN — FOSAPREPITANT 150 MG: 150 INJECTION, POWDER, LYOPHILIZED, FOR SOLUTION INTRAVENOUS at 11:31

## 2025-08-14 RX ADMIN — FAMOTIDINE 20 MG: 10 INJECTION INTRAVENOUS at 11:07

## 2025-08-14 RX ADMIN — HEPARIN 500 UNITS: 100 SYRINGE at 13:29

## 2025-08-14 RX ADMIN — ACETAMINOPHEN 650 MG: 325 TABLET ORAL at 11:01

## 2025-08-21 ENCOUNTER — APPOINTMENT (OUTPATIENT)
Dept: ONCOLOGY | Facility: HOSPITAL | Age: 60
End: 2025-08-21
Payer: COMMERCIAL

## 2025-08-21 ENCOUNTER — HOSPITAL ENCOUNTER (OUTPATIENT)
Dept: ONCOLOGY | Facility: HOSPITAL | Age: 60
Discharge: HOME | End: 2025-08-21
Admitting: INTERNAL MEDICINE
Payer: COMMERCIAL

## 2025-08-21 VITALS
BODY MASS INDEX: 26.9 KG/M2 | TEMPERATURE: 96.5 F | HEART RATE: 88 BPM | WEIGHT: 137 LBS | HEIGHT: 60 IN | DIASTOLIC BLOOD PRESSURE: 74 MMHG | RESPIRATION RATE: 16 BRPM | SYSTOLIC BLOOD PRESSURE: 135 MMHG

## 2025-08-21 DIAGNOSIS — Z45.2 ENCOUNTER FOR CARE RELATED TO VASCULAR ACCESS PORT: ICD-10-CM

## 2025-08-21 DIAGNOSIS — C79.51 MALIGNANT NEOPLASM METASTATIC TO BONE: ICD-10-CM

## 2025-08-21 DIAGNOSIS — Z85.3 HISTORY OF LEFT BREAST CANCER: ICD-10-CM

## 2025-08-21 DIAGNOSIS — C50.112 MALIGNANT NEOPLASM OF CENTRAL PORTION OF LEFT FEMALE BREAST, UNSPECIFIED ESTROGEN RECEPTOR STATUS: Primary | ICD-10-CM

## 2025-08-21 LAB
ALBUMIN SERPL-MCNC: 3.9 G/DL (ref 3.5–5.2)
ALBUMIN/GLOB SERPL: 1.7 G/DL
ALP SERPL-CCNC: 132 U/L (ref 39–117)
ALT SERPL W P-5'-P-CCNC: 37 U/L (ref 1–33)
ANION GAP SERPL CALCULATED.3IONS-SCNC: 8 MMOL/L (ref 5–15)
AST SERPL-CCNC: 30 U/L (ref 1–32)
BASOPHILS # BLD AUTO: 0.02 10*3/MM3 (ref 0–0.2)
BASOPHILS NFR BLD AUTO: 0.8 % (ref 0–1.5)
BILIRUB SERPL-MCNC: <0.2 MG/DL (ref 0–1.2)
BUN SERPL-MCNC: 18.8 MG/DL (ref 8–23)
BUN/CREAT SERPL: 22.4 (ref 7–25)
CALCIUM SPEC-SCNC: 9 MG/DL (ref 8.6–10.5)
CHLORIDE SERPL-SCNC: 103 MMOL/L (ref 98–107)
CO2 SERPL-SCNC: 28 MMOL/L (ref 22–29)
CREAT SERPL-MCNC: 0.84 MG/DL (ref 0.57–1)
DEPRECATED RDW RBC AUTO: 46.3 FL (ref 37–54)
EGFRCR SERPLBLD CKD-EPI 2021: 78.7 ML/MIN/1.73
EOSINOPHIL # BLD AUTO: 0.16 10*3/MM3 (ref 0–0.4)
EOSINOPHIL NFR BLD AUTO: 6 % (ref 0.3–6.2)
ERYTHROCYTE [DISTWIDTH] IN BLOOD BY AUTOMATED COUNT: 13.3 % (ref 12.3–15.4)
GLOBULIN UR ELPH-MCNC: 2.3 GM/DL
GLUCOSE SERPL-MCNC: 98 MG/DL (ref 65–99)
HCT VFR BLD AUTO: 34.1 % (ref 34–46.6)
HGB BLD-MCNC: 11.5 G/DL (ref 12–15.9)
IMM GRANULOCYTES # BLD AUTO: 0.01 10*3/MM3 (ref 0–0.05)
IMM GRANULOCYTES NFR BLD AUTO: 0.4 % (ref 0–0.5)
LYMPHOCYTES # BLD AUTO: 0.91 10*3/MM3 (ref 0.7–3.1)
LYMPHOCYTES NFR BLD AUTO: 34.2 % (ref 19.6–45.3)
MCH RBC QN AUTO: 31.6 PG (ref 26.6–33)
MCHC RBC AUTO-ENTMCNC: 33.7 G/DL (ref 31.5–35.7)
MCV RBC AUTO: 93.7 FL (ref 79–97)
MONOCYTES # BLD AUTO: 0.35 10*3/MM3 (ref 0.1–0.9)
MONOCYTES NFR BLD AUTO: 13.2 % (ref 5–12)
NEUTROPHILS NFR BLD AUTO: 1.21 10*3/MM3 (ref 1.7–7)
NEUTROPHILS NFR BLD AUTO: 45.4 % (ref 42.7–76)
PLATELET # BLD AUTO: 179 10*3/MM3 (ref 140–450)
PMV BLD AUTO: 9 FL (ref 6–12)
POTASSIUM SERPL-SCNC: 4.5 MMOL/L (ref 3.5–5.2)
PROT SERPL-MCNC: 6.2 G/DL (ref 6–8.5)
RBC # BLD AUTO: 3.64 10*6/MM3 (ref 3.77–5.28)
SODIUM SERPL-SCNC: 139 MMOL/L (ref 136–145)
WBC NRBC COR # BLD AUTO: 2.66 10*3/MM3 (ref 3.4–10.8)

## 2025-08-21 PROCEDURE — 96413 CHEMO IV INFUSION 1 HR: CPT

## 2025-08-21 PROCEDURE — 25010000002 PALONOSETRON PER 25 MCG: Performed by: INTERNAL MEDICINE

## 2025-08-21 PROCEDURE — 96367 TX/PROPH/DG ADDL SEQ IV INF: CPT

## 2025-08-21 PROCEDURE — 85025 COMPLETE CBC W/AUTO DIFF WBC: CPT | Performed by: INTERNAL MEDICINE

## 2025-08-21 PROCEDURE — 25010000002 DIPHENHYDRAMINE PER 50 MG: Performed by: INTERNAL MEDICINE

## 2025-08-21 PROCEDURE — 25010000002 FAMOTIDINE 10 MG/ML SOLUTION: Performed by: INTERNAL MEDICINE

## 2025-08-21 PROCEDURE — 25810000003 SODIUM CHLORIDE 0.9 % SOLUTION: Performed by: INTERNAL MEDICINE

## 2025-08-21 PROCEDURE — 25010000002 HEPARIN LOCK FLUSH PER 10 UNITS: Performed by: INTERNAL MEDICINE

## 2025-08-21 PROCEDURE — 96375 TX/PRO/DX INJ NEW DRUG ADDON: CPT

## 2025-08-21 PROCEDURE — 25010000002 DEXAMETHASONE SODIUM PHOSPHATE 100 MG/10ML SOLUTION: Performed by: INTERNAL MEDICINE

## 2025-08-21 PROCEDURE — 96368 THER/DIAG CONCURRENT INF: CPT

## 2025-08-21 PROCEDURE — 25810000003 SODIUM CHLORIDE 0.9 % SOLUTION 250 ML FLEX CONT: Performed by: INTERNAL MEDICINE

## 2025-08-21 PROCEDURE — 25010000002 SACITUZUMAB GOVITECAN-HZIY 180 MG RECONSTITUTED SOLUTION 1 EACH VIAL: Performed by: INTERNAL MEDICINE

## 2025-08-21 PROCEDURE — 80053 COMPREHEN METABOLIC PANEL: CPT | Performed by: INTERNAL MEDICINE

## 2025-08-21 PROCEDURE — 25010000002 FOSAPREPITANT PER 1 MG: Performed by: INTERNAL MEDICINE

## 2025-08-21 RX ORDER — FAMOTIDINE 10 MG/ML
20 INJECTION, SOLUTION INTRAVENOUS ONCE
Status: COMPLETED | OUTPATIENT
Start: 2025-08-21 | End: 2025-08-21

## 2025-08-21 RX ORDER — ATROPINE SULFATE 1 MG/ML
0.25 INJECTION, SOLUTION INTRAMUSCULAR; INTRAVENOUS; SUBCUTANEOUS
Status: DISCONTINUED | OUTPATIENT
Start: 2025-08-21 | End: 2025-08-22 | Stop reason: HOSPADM

## 2025-08-21 RX ORDER — PALONOSETRON 0.05 MG/ML
0.25 INJECTION, SOLUTION INTRAVENOUS ONCE
Status: COMPLETED | OUTPATIENT
Start: 2025-08-21 | End: 2025-08-21

## 2025-08-21 RX ORDER — FAMOTIDINE 10 MG/ML
20 INJECTION, SOLUTION INTRAVENOUS AS NEEDED
Status: DISCONTINUED | OUTPATIENT
Start: 2025-08-21 | End: 2025-08-22 | Stop reason: HOSPADM

## 2025-08-21 RX ORDER — SODIUM CHLORIDE 9 MG/ML
20 INJECTION, SOLUTION INTRAVENOUS ONCE
Status: COMPLETED | OUTPATIENT
Start: 2025-08-21 | End: 2025-08-21

## 2025-08-21 RX ORDER — ACETAMINOPHEN 325 MG/1
650 TABLET ORAL ONCE
Status: COMPLETED | OUTPATIENT
Start: 2025-08-21 | End: 2025-08-21

## 2025-08-21 RX ORDER — DIPHENHYDRAMINE HYDROCHLORIDE 50 MG/ML
50 INJECTION, SOLUTION INTRAMUSCULAR; INTRAVENOUS AS NEEDED
Status: DISCONTINUED | OUTPATIENT
Start: 2025-08-21 | End: 2025-08-22 | Stop reason: HOSPADM

## 2025-08-21 RX ORDER — HEPARIN SODIUM (PORCINE) LOCK FLUSH IV SOLN 100 UNIT/ML 100 UNIT/ML
500 SOLUTION INTRAVENOUS AS NEEDED
Status: DISCONTINUED | OUTPATIENT
Start: 2025-08-21 | End: 2025-08-22 | Stop reason: HOSPADM

## 2025-08-21 RX ORDER — HYDROCORTISONE SODIUM SUCCINATE 100 MG/2ML
100 INJECTION INTRAMUSCULAR; INTRAVENOUS AS NEEDED
Status: DISCONTINUED | OUTPATIENT
Start: 2025-08-21 | End: 2025-08-22 | Stop reason: HOSPADM

## 2025-08-21 RX ORDER — HEPARIN SODIUM (PORCINE) LOCK FLUSH IV SOLN 100 UNIT/ML 100 UNIT/ML
500 SOLUTION INTRAVENOUS AS NEEDED
OUTPATIENT
Start: 2025-08-21

## 2025-08-21 RX ADMIN — Medication 500 UNITS: at 11:03

## 2025-08-21 RX ADMIN — ACETAMINOPHEN 650 MG: 325 TABLET ORAL at 09:01

## 2025-08-21 RX ADMIN — PALONOSETRON HYDROCHLORIDE 0.25 MG: 0.25 INJECTION, SOLUTION INTRAVENOUS at 09:01

## 2025-08-21 RX ADMIN — SODIUM CHLORIDE 25 MG: 9 INJECTION, SOLUTION INTRAVENOUS at 09:20

## 2025-08-21 RX ADMIN — SODIUM CHLORIDE 20 ML/HR: 9 INJECTION, SOLUTION INTRAVENOUS at 08:56

## 2025-08-21 RX ADMIN — DEXAMETHASONE SODIUM PHOSPHATE 12 MG: 10 INJECTION, SOLUTION INTRAMUSCULAR; INTRAVENOUS at 09:07

## 2025-08-21 RX ADMIN — SODIUM CHLORIDE 150 MG: 9 INJECTION, SOLUTION INTRAVENOUS at 09:10

## 2025-08-21 RX ADMIN — FAMOTIDINE 20 MG: 10 INJECTION INTRAVENOUS at 09:05

## 2025-08-21 RX ADMIN — SODIUM CHLORIDE 610 MG: 9 INJECTION, SOLUTION INTRAVENOUS at 09:57

## 2025-08-25 DIAGNOSIS — C50.112 MALIGNANT NEOPLASM OF CENTRAL PORTION OF LEFT FEMALE BREAST, UNSPECIFIED ESTROGEN RECEPTOR STATUS: ICD-10-CM

## 2025-08-25 DIAGNOSIS — F41.9 ANXIETY: ICD-10-CM

## 2025-08-25 DIAGNOSIS — C79.51 MALIGNANT NEOPLASM METASTATIC TO BONE: Primary | ICD-10-CM

## 2025-08-25 DIAGNOSIS — Z85.3 HISTORY OF LEFT BREAST CANCER: ICD-10-CM

## 2025-08-25 DIAGNOSIS — G47.00 INSOMNIA, UNSPECIFIED TYPE: ICD-10-CM

## 2025-08-26 DIAGNOSIS — F41.9 ANXIETY: ICD-10-CM

## 2025-08-26 DIAGNOSIS — G47.00 INSOMNIA, UNSPECIFIED TYPE: ICD-10-CM

## 2025-08-26 RX ORDER — ALPRAZOLAM 0.5 MG
0.5 TABLET ORAL NIGHTLY PRN
Qty: 30 TABLET | Refills: 0 | OUTPATIENT
Start: 2025-08-26

## 2025-08-26 RX ORDER — ALPRAZOLAM 0.5 MG
0.5 TABLET ORAL NIGHTLY PRN
Qty: 30 TABLET | Refills: 0 | Status: SHIPPED | OUTPATIENT
Start: 2025-08-26

## 2025-08-28 ENCOUNTER — HOSPITAL ENCOUNTER (OUTPATIENT)
Dept: PET IMAGING | Facility: HOSPITAL | Age: 60
Discharge: HOME | End: 2025-08-28
Payer: COMMERCIAL

## 2025-08-28 DIAGNOSIS — C50.112 MALIGNANT NEOPLASM OF CENTRAL PORTION OF LEFT BREAST IN FEMALE, ESTROGEN RECEPTOR POSITIVE: ICD-10-CM

## 2025-08-28 DIAGNOSIS — C78.7 CANCER, METASTATIC TO LIVER: ICD-10-CM

## 2025-08-28 DIAGNOSIS — Z17.0 MALIGNANT NEOPLASM OF CENTRAL PORTION OF LEFT BREAST IN FEMALE, ESTROGEN RECEPTOR POSITIVE: ICD-10-CM

## 2025-08-28 DIAGNOSIS — C79.51 MALIGNANT NEOPLASM METASTATIC TO BONE: ICD-10-CM

## 2025-08-28 LAB — GLUCOSE BLDC GLUCOMTR-MCNC: 96 MG/DL (ref 70–130)

## 2025-08-28 PROCEDURE — 34310000005 FLUDEOXYGLUCOSE F18 SOLUTION: Performed by: NURSE PRACTITIONER

## 2025-08-28 PROCEDURE — 82948 REAGENT STRIP/BLOOD GLUCOSE: CPT

## 2025-08-28 PROCEDURE — A9552 F18 FDG: HCPCS | Performed by: NURSE PRACTITIONER

## 2025-08-28 PROCEDURE — 78815 PET IMAGE W/CT SKULL-THIGH: CPT

## 2025-08-28 RX ADMIN — FLUDEOXYGLUCOSE F 18 1 DOSE: 200 INJECTION, SOLUTION INTRAVENOUS at 08:14

## (undated) DEVICE — GLV SURG BIOGEL LTX PF 6

## (undated) DEVICE — SUT SILK 2/0 SH 30IN K833H

## (undated) DEVICE — VLV 4 NDL/FREE SAFESITE W CAP ASP/INJ

## (undated) DEVICE — GOWN,NON-REINFORCED,SIRUS,SET IN SLV,XL: Brand: MEDLINE

## (undated) DEVICE — SOL BETADINE 4OZ

## (undated) DEVICE — SUT MNCRYL PLS ANTIB UD 3/0 PS2 27IN

## (undated) DEVICE — SUT MNCRYL PLS ANTIB UD 4/0 PS2 18IN

## (undated) DEVICE — COVER,LIGHT HANDLE,FLX,1/PK: Brand: MEDLINE INDUSTRIES, INC.

## (undated) DEVICE — DRSNG GZ CURAD XEROFORM NONADHR OVERWRAP 5X9IN

## (undated) DEVICE — PK CHST BRST 10

## (undated) DEVICE — SPNG GZ WOVN 4X4IN 12PLY 10/BX STRL

## (undated) DEVICE — ELECTRD BLD EZ CLN STD 2.5IN

## (undated) DEVICE — STERILE PVP: Brand: MEDLINE INDUSTRIES, INC.

## (undated) DEVICE — JACKSON-PRATT 100CC BULB RESERVOIR: Brand: CARDINAL HEALTH

## (undated) DEVICE — BLAKE SILICONE DRAIN, 15 FR ROUND, HUBLESS WITH 3/16" TROCAR: Brand: BLAKE